# Patient Record
Sex: FEMALE | Race: WHITE | NOT HISPANIC OR LATINO | Employment: OTHER | URBAN - METROPOLITAN AREA
[De-identification: names, ages, dates, MRNs, and addresses within clinical notes are randomized per-mention and may not be internally consistent; named-entity substitution may affect disease eponyms.]

---

## 2018-02-09 ENCOUNTER — APPOINTMENT (EMERGENCY)
Dept: CT IMAGING | Facility: HOSPITAL | Age: 68
DRG: 191 | End: 2018-02-09
Payer: MEDICARE

## 2018-02-09 ENCOUNTER — HOSPITAL ENCOUNTER (INPATIENT)
Facility: HOSPITAL | Age: 68
LOS: 3 days | Discharge: HOME | DRG: 191 | End: 2018-02-12
Attending: EMERGENCY MEDICINE | Admitting: INTERNAL MEDICINE
Payer: MEDICARE

## 2018-02-09 ENCOUNTER — APPOINTMENT (EMERGENCY)
Dept: RADIOLOGY | Facility: HOSPITAL | Age: 68
DRG: 191 | End: 2018-02-09
Payer: MEDICARE

## 2018-02-09 DIAGNOSIS — J44.1 COPD EXACERBATION (HCC): ICD-10-CM

## 2018-02-09 DIAGNOSIS — R06.00 DYSPNEA: Primary | ICD-10-CM

## 2018-02-09 DIAGNOSIS — R07.9 CHEST PAIN: ICD-10-CM

## 2018-02-09 DIAGNOSIS — J44.1 ACUTE EXACERBATION OF CHRONIC OBSTRUCTIVE PULMONARY DISEASE (COPD) (HCC): ICD-10-CM

## 2018-02-09 DIAGNOSIS — R94.31 ABNORMAL EKG: ICD-10-CM

## 2018-02-09 DIAGNOSIS — J40 BRONCHITIS: ICD-10-CM

## 2018-02-09 DIAGNOSIS — I10 HYPERTENSION: ICD-10-CM

## 2018-02-09 PROBLEM — M54.16 CHRONIC RADICULAR PAIN OF LOWER BACK: Status: ACTIVE | Noted: 2018-02-09

## 2018-02-09 PROBLEM — I25.10 CORONARY ARTERY DISEASE: Status: ACTIVE | Noted: 2018-02-09

## 2018-02-09 PROBLEM — E66.01 MORBID OBESITY DUE TO EXCESS CALORIES (HCC): Status: ACTIVE | Noted: 2018-02-09

## 2018-02-09 PROBLEM — G89.29 CHRONIC RADICULAR PAIN OF LOWER BACK: Status: ACTIVE | Noted: 2018-02-09

## 2018-02-09 PROBLEM — E87.6 HYPOKALEMIA: Status: ACTIVE | Noted: 2018-02-09

## 2018-02-09 LAB
ALBUMIN SERPL BCP-MCNC: 3.7 G/DL (ref 3.5–5)
ALP SERPL-CCNC: 61 U/L (ref 46–116)
ALT SERPL W P-5'-P-CCNC: 41 U/L (ref 12–78)
ANION GAP SERPL CALCULATED.3IONS-SCNC: 8 MMOL/L (ref 4–13)
ARTERIAL PATENCY WRIST A: YES
AST SERPL W P-5'-P-CCNC: 31 U/L (ref 5–45)
BACTERIA UR QL AUTO: ABNORMAL /HPF
BACTERIA UR QL AUTO: ABNORMAL /HPF
BASE EXCESS BLDA CALC-SCNC: -0.1 MMOL/L
BASOPHILS # BLD AUTO: 0.05 THOUSANDS/ΜL (ref 0–0.1)
BASOPHILS NFR BLD AUTO: 1 % (ref 0–1)
BILIRUB DIRECT SERPL-MCNC: 0.22 MG/DL (ref 0–0.2)
BILIRUB SERPL-MCNC: 0.7 MG/DL (ref 0.2–1)
BILIRUB UR QL STRIP: NEGATIVE
BILIRUB UR QL STRIP: NEGATIVE
BUN SERPL-MCNC: 11 MG/DL (ref 5–25)
CALCIUM SERPL-MCNC: 8.9 MG/DL (ref 8.3–10.1)
CHLORIDE SERPL-SCNC: 103 MMOL/L (ref 100–108)
CLARITY UR: ABNORMAL
CLARITY UR: CLEAR
CO2 SERPL-SCNC: 33 MMOL/L (ref 21–32)
COLOR UR: YELLOW
COLOR UR: YELLOW
CREAT SERPL-MCNC: 0.96 MG/DL (ref 0.6–1.3)
DEPRECATED D DIMER PPP: 1015 NG/ML (FEU) (ref 0–424)
EOSINOPHIL # BLD AUTO: 0.24 THOUSAND/ΜL (ref 0–0.61)
EOSINOPHIL NFR BLD AUTO: 3 % (ref 0–6)
ERYTHROCYTE [DISTWIDTH] IN BLOOD BY AUTOMATED COUNT: 12.9 % (ref 11.6–15.1)
GFR SERPL CREATININE-BSD FRML MDRD: 62 ML/MIN/1.73SQ M
GLUCOSE SERPL-MCNC: 126 MG/DL (ref 65–140)
GLUCOSE UR STRIP-MCNC: ABNORMAL MG/DL
GLUCOSE UR STRIP-MCNC: NEGATIVE MG/DL
HCO3 BLDA-SCNC: 23.3 MMOL/L (ref 22–28)
HCT VFR BLD AUTO: 45.6 % (ref 34.8–46.1)
HGB BLD-MCNC: 14.8 G/DL (ref 11.5–15.4)
HGB UR QL STRIP.AUTO: ABNORMAL
HGB UR QL STRIP.AUTO: NEGATIVE
INR PPP: 0.91 (ref 0.86–1.16)
KETONES UR STRIP-MCNC: ABNORMAL MG/DL
KETONES UR STRIP-MCNC: NEGATIVE MG/DL
LEUKOCYTE ESTERASE UR QL STRIP: ABNORMAL
LEUKOCYTE ESTERASE UR QL STRIP: ABNORMAL
LIPASE SERPL-CCNC: 136 U/L (ref 73–393)
LYMPHOCYTES # BLD AUTO: 2.54 THOUSANDS/ΜL (ref 0.6–4.47)
LYMPHOCYTES NFR BLD AUTO: 35 % (ref 14–44)
MCH RBC QN AUTO: 28 PG (ref 26.8–34.3)
MCHC RBC AUTO-ENTMCNC: 32.5 G/DL (ref 31.4–37.4)
MCV RBC AUTO: 86 FL (ref 82–98)
MONOCYTES # BLD AUTO: 0.58 THOUSAND/ΜL (ref 0.17–1.22)
MONOCYTES NFR BLD AUTO: 8 % (ref 4–12)
NEUTROPHILS # BLD AUTO: 3.76 THOUSANDS/ΜL (ref 1.85–7.62)
NEUTS SEG NFR BLD AUTO: 52 % (ref 43–75)
NITRITE UR QL STRIP: NEGATIVE
NITRITE UR QL STRIP: NEGATIVE
NON VENT ROOM AIR: ABNORMAL %
NON-SQ EPI CELLS URNS QL MICRO: ABNORMAL /HPF
NON-SQ EPI CELLS URNS QL MICRO: ABNORMAL /HPF
NRBC BLD AUTO-RTO: 0 /100 WBCS
NT-PROBNP SERPL-MCNC: 185 PG/ML
O2 CT BLDA-SCNC: 19.2 ML/DL (ref 16–23)
OXYHGB MFR BLDA: 94 % (ref 94–97)
PCO2 BLDA: 34.5 MM HG (ref 36–44)
PH BLDA: 7.45 [PH] (ref 7.35–7.45)
PH UR STRIP.AUTO: 5.5 [PH] (ref 4.5–8)
PH UR STRIP.AUTO: 6 [PH] (ref 4.5–8)
PLATELET # BLD AUTO: 249 THOUSANDS/UL (ref 149–390)
PLATELET # BLD AUTO: 261 THOUSANDS/UL (ref 149–390)
PMV BLD AUTO: 8.9 FL (ref 8.9–12.7)
PMV BLD AUTO: 9.1 FL (ref 8.9–12.7)
PO2 BLDA: 75.2 MM HG (ref 75–129)
POTASSIUM SERPL-SCNC: 3.4 MMOL/L (ref 3.5–5.3)
PROT SERPL-MCNC: 7.5 G/DL (ref 6.4–8.2)
PROT UR STRIP-MCNC: NEGATIVE MG/DL
PROT UR STRIP-MCNC: NEGATIVE MG/DL
PROTHROMBIN TIME: 12.5 SECONDS (ref 12.1–14.4)
RBC # BLD AUTO: 5.28 MILLION/UL (ref 3.81–5.12)
RBC #/AREA URNS AUTO: ABNORMAL /HPF
RBC #/AREA URNS AUTO: ABNORMAL /HPF
SODIUM SERPL-SCNC: 144 MMOL/L (ref 136–145)
SP GR UR STRIP.AUTO: 1.01 (ref 1–1.03)
SP GR UR STRIP.AUTO: 1.01 (ref 1–1.03)
SPECIMEN SOURCE: ABNORMAL
TROPONIN I SERPL-MCNC: <0.02 NG/ML
TSH SERPL DL<=0.05 MIU/L-ACNC: 2.07 UIU/ML (ref 0.36–3.74)
UROBILINOGEN UR QL STRIP.AUTO: 0.2 E.U./DL
UROBILINOGEN UR QL STRIP.AUTO: 1 E.U./DL
WBC # BLD AUTO: 7.19 THOUSAND/UL (ref 4.31–10.16)
WBC #/AREA URNS AUTO: ABNORMAL /HPF
WBC #/AREA URNS AUTO: ABNORMAL /HPF

## 2018-02-09 PROCEDURE — 81001 URINALYSIS AUTO W/SCOPE: CPT | Performed by: INTERNAL MEDICINE

## 2018-02-09 PROCEDURE — 99285 EMERGENCY DEPT VISIT HI MDM: CPT

## 2018-02-09 PROCEDURE — 83880 ASSAY OF NATRIURETIC PEPTIDE: CPT | Performed by: EMERGENCY MEDICINE

## 2018-02-09 PROCEDURE — 83690 ASSAY OF LIPASE: CPT | Performed by: EMERGENCY MEDICINE

## 2018-02-09 PROCEDURE — 36415 COLL VENOUS BLD VENIPUNCTURE: CPT | Performed by: EMERGENCY MEDICINE

## 2018-02-09 PROCEDURE — 84484 ASSAY OF TROPONIN QUANT: CPT | Performed by: INTERNAL MEDICINE

## 2018-02-09 PROCEDURE — 87086 URINE CULTURE/COLONY COUNT: CPT | Performed by: EMERGENCY MEDICINE

## 2018-02-09 PROCEDURE — 36600 WITHDRAWAL OF ARTERIAL BLOOD: CPT

## 2018-02-09 PROCEDURE — 94640 AIRWAY INHALATION TREATMENT: CPT

## 2018-02-09 PROCEDURE — 84484 ASSAY OF TROPONIN QUANT: CPT | Performed by: EMERGENCY MEDICINE

## 2018-02-09 PROCEDURE — 85379 FIBRIN DEGRADATION QUANT: CPT | Performed by: EMERGENCY MEDICINE

## 2018-02-09 PROCEDURE — 80076 HEPATIC FUNCTION PANEL: CPT | Performed by: EMERGENCY MEDICINE

## 2018-02-09 PROCEDURE — 71275 CT ANGIOGRAPHY CHEST: CPT

## 2018-02-09 PROCEDURE — 85025 COMPLETE CBC W/AUTO DIFF WBC: CPT | Performed by: EMERGENCY MEDICINE

## 2018-02-09 PROCEDURE — 87186 SC STD MICRODIL/AGAR DIL: CPT | Performed by: EMERGENCY MEDICINE

## 2018-02-09 PROCEDURE — 84443 ASSAY THYROID STIM HORMONE: CPT | Performed by: INTERNAL MEDICINE

## 2018-02-09 PROCEDURE — 85049 AUTOMATED PLATELET COUNT: CPT | Performed by: INTERNAL MEDICINE

## 2018-02-09 PROCEDURE — 87798 DETECT AGENT NOS DNA AMP: CPT | Performed by: EMERGENCY MEDICINE

## 2018-02-09 PROCEDURE — 96374 THER/PROPH/DIAG INJ IV PUSH: CPT

## 2018-02-09 PROCEDURE — 82805 BLOOD GASES W/O2 SATURATION: CPT | Performed by: INTERNAL MEDICINE

## 2018-02-09 PROCEDURE — 93005 ELECTROCARDIOGRAM TRACING: CPT

## 2018-02-09 PROCEDURE — 87077 CULTURE AEROBIC IDENTIFY: CPT | Performed by: EMERGENCY MEDICINE

## 2018-02-09 PROCEDURE — 71046 X-RAY EXAM CHEST 2 VIEWS: CPT

## 2018-02-09 PROCEDURE — 80048 BASIC METABOLIC PNL TOTAL CA: CPT | Performed by: EMERGENCY MEDICINE

## 2018-02-09 PROCEDURE — 99223 1ST HOSP IP/OBS HIGH 75: CPT | Performed by: INTERNAL MEDICINE

## 2018-02-09 PROCEDURE — 85610 PROTHROMBIN TIME: CPT | Performed by: EMERGENCY MEDICINE

## 2018-02-09 PROCEDURE — 81001 URINALYSIS AUTO W/SCOPE: CPT | Performed by: EMERGENCY MEDICINE

## 2018-02-09 PROCEDURE — 94760 N-INVAS EAR/PLS OXIMETRY 1: CPT

## 2018-02-09 RX ORDER — FUROSEMIDE 20 MG/1
20 TABLET ORAL 2 TIMES DAILY
Status: DISCONTINUED | OUTPATIENT
Start: 2018-02-09 | End: 2018-02-12 | Stop reason: HOSPADM

## 2018-02-09 RX ORDER — MAGNESIUM HYDROXIDE/ALUMINUM HYDROXICE/SIMETHICONE 120; 1200; 1200 MG/30ML; MG/30ML; MG/30ML
30 SUSPENSION ORAL EVERY 6 HOURS PRN
Status: DISCONTINUED | OUTPATIENT
Start: 2018-02-09 | End: 2018-02-12 | Stop reason: HOSPADM

## 2018-02-09 RX ORDER — DOCUSATE SODIUM 100 MG/1
100 CAPSULE, LIQUID FILLED ORAL 2 TIMES DAILY
Status: DISCONTINUED | OUTPATIENT
Start: 2018-02-09 | End: 2018-02-11

## 2018-02-09 RX ORDER — LEVOTHYROXINE SODIUM 0.05 MG/1
50 TABLET ORAL DAILY
COMMUNITY
End: 2020-01-01 | Stop reason: HOSPADM

## 2018-02-09 RX ORDER — OXYCODONE HYDROCHLORIDE AND ACETAMINOPHEN 325; 5 MG/5ML; MG/5ML
SOLUTION ORAL EVERY 4 HOURS PRN
COMMUNITY
End: 2018-09-06 | Stop reason: HOSPADM

## 2018-02-09 RX ORDER — METHYLPREDNISOLONE SODIUM SUCCINATE 125 MG/2ML
60 INJECTION, POWDER, LYOPHILIZED, FOR SOLUTION INTRAMUSCULAR; INTRAVENOUS EVERY 6 HOURS SCHEDULED
Status: DISCONTINUED | OUTPATIENT
Start: 2018-02-09 | End: 2018-02-09

## 2018-02-09 RX ORDER — CYCLOBENZAPRINE HCL 5 MG
10 TABLET ORAL 3 TIMES DAILY PRN
COMMUNITY
End: 2018-09-21

## 2018-02-09 RX ORDER — CYCLOBENZAPRINE HCL 10 MG
10 TABLET ORAL 3 TIMES DAILY PRN
Status: DISCONTINUED | OUTPATIENT
Start: 2018-02-09 | End: 2018-02-12 | Stop reason: HOSPADM

## 2018-02-09 RX ORDER — HEPARIN SODIUM 5000 [USP'U]/ML
5000 INJECTION, SOLUTION INTRAVENOUS; SUBCUTANEOUS EVERY 8 HOURS SCHEDULED
Status: DISCONTINUED | OUTPATIENT
Start: 2018-02-09 | End: 2018-02-12 | Stop reason: HOSPADM

## 2018-02-09 RX ORDER — METHYLPREDNISOLONE SODIUM SUCCINATE 125 MG/2ML
125 INJECTION, POWDER, LYOPHILIZED, FOR SOLUTION INTRAMUSCULAR; INTRAVENOUS ONCE
Status: COMPLETED | OUTPATIENT
Start: 2018-02-09 | End: 2018-02-09

## 2018-02-09 RX ORDER — IBUPROFEN 600 MG/1
TABLET ORAL EVERY 6 HOURS PRN
COMMUNITY
End: 2018-09-26 | Stop reason: HOSPADM

## 2018-02-09 RX ORDER — ALBUTEROL SULFATE 90 UG/1
2 AEROSOL, METERED RESPIRATORY (INHALATION) EVERY 6 HOURS PRN
Status: DISCONTINUED | OUTPATIENT
Start: 2018-02-09 | End: 2018-02-10

## 2018-02-09 RX ORDER — ALBUTEROL SULFATE 2.5 MG/3ML
5 SOLUTION RESPIRATORY (INHALATION) ONCE
Status: COMPLETED | OUTPATIENT
Start: 2018-02-09 | End: 2018-02-09

## 2018-02-09 RX ORDER — ASPIRIN 81 MG/1
81 TABLET, CHEWABLE ORAL DAILY
COMMUNITY
End: 2020-01-01 | Stop reason: HOSPADM

## 2018-02-09 RX ORDER — PANTOPRAZOLE SODIUM 40 MG/1
40 TABLET, DELAYED RELEASE ORAL DAILY
COMMUNITY
End: 2020-01-01 | Stop reason: HOSPADM

## 2018-02-09 RX ORDER — ALBUTEROL SULFATE 90 UG/1
2 AEROSOL, METERED RESPIRATORY (INHALATION) 2 TIMES DAILY
COMMUNITY
End: 2020-01-01 | Stop reason: HOSPADM

## 2018-02-09 RX ORDER — PANTOPRAZOLE SODIUM 40 MG/1
40 TABLET, DELAYED RELEASE ORAL
Status: DISCONTINUED | OUTPATIENT
Start: 2018-02-10 | End: 2018-02-12 | Stop reason: HOSPADM

## 2018-02-09 RX ORDER — METHYLPREDNISOLONE SODIUM SUCCINATE 40 MG/ML
40 INJECTION, POWDER, LYOPHILIZED, FOR SOLUTION INTRAMUSCULAR; INTRAVENOUS EVERY 8 HOURS
Status: DISCONTINUED | OUTPATIENT
Start: 2018-02-09 | End: 2018-02-10

## 2018-02-09 RX ORDER — OXYCODONE HCL 5 MG/5 ML
5 SOLUTION, ORAL ORAL EVERY 4 HOURS PRN
Status: DISCONTINUED | OUTPATIENT
Start: 2018-02-09 | End: 2018-02-12 | Stop reason: HOSPADM

## 2018-02-09 RX ORDER — FUROSEMIDE 20 MG/1
20 TABLET ORAL 2 TIMES DAILY
COMMUNITY
End: 2018-03-05 | Stop reason: SDUPTHER

## 2018-02-09 RX ORDER — ALBUTEROL SULFATE 2.5 MG/3ML
2.5 SOLUTION RESPIRATORY (INHALATION)
Status: DISCONTINUED | OUTPATIENT
Start: 2018-02-09 | End: 2018-02-10

## 2018-02-09 RX ORDER — SODIUM CHLORIDE FOR INHALATION 0.9 %
3 VIAL, NEBULIZER (ML) INHALATION EVERY 4 HOURS PRN
Status: DISCONTINUED | OUTPATIENT
Start: 2018-02-09 | End: 2018-02-10

## 2018-02-09 RX ORDER — ASPIRIN 81 MG/1
162 TABLET, CHEWABLE ORAL DAILY
Status: DISCONTINUED | OUTPATIENT
Start: 2018-02-09 | End: 2018-02-09

## 2018-02-09 RX ORDER — ASPIRIN 81 MG/1
81 TABLET, CHEWABLE ORAL DAILY
Status: DISCONTINUED | OUTPATIENT
Start: 2018-02-10 | End: 2018-02-12 | Stop reason: HOSPADM

## 2018-02-09 RX ORDER — ONDANSETRON 2 MG/ML
4 INJECTION INTRAMUSCULAR; INTRAVENOUS EVERY 6 HOURS PRN
Status: DISCONTINUED | OUTPATIENT
Start: 2018-02-09 | End: 2018-02-12 | Stop reason: HOSPADM

## 2018-02-09 RX ORDER — OXYCODONE HYDROCHLORIDE AND ACETAMINOPHEN 325; 5 MG/5ML; MG/5ML
5 SOLUTION ORAL EVERY 4 HOURS PRN
Status: DISCONTINUED | OUTPATIENT
Start: 2018-02-09 | End: 2018-02-09

## 2018-02-09 RX ORDER — LEVOTHYROXINE SODIUM 0.05 MG/1
50 TABLET ORAL
Status: DISCONTINUED | OUTPATIENT
Start: 2018-02-10 | End: 2018-02-12 | Stop reason: HOSPADM

## 2018-02-09 RX ADMIN — IOHEXOL 85 ML: 350 INJECTION, SOLUTION INTRAVENOUS at 15:33

## 2018-02-09 RX ADMIN — HEPARIN SODIUM 5000 UNITS: 5000 INJECTION, SOLUTION INTRAVENOUS; SUBCUTANEOUS at 21:04

## 2018-02-09 RX ADMIN — IPRATROPIUM BROMIDE 0.5 MG: 0.5 SOLUTION RESPIRATORY (INHALATION) at 16:12

## 2018-02-09 RX ADMIN — ALBUTEROL SULFATE 2.5 MG: 2.5 SOLUTION RESPIRATORY (INHALATION) at 21:22

## 2018-02-09 RX ADMIN — METHYLPREDNISOLONE SODIUM SUCCINATE 40 MG: 125 INJECTION, POWDER, FOR SOLUTION INTRAMUSCULAR; INTRAVENOUS at 20:49

## 2018-02-09 RX ADMIN — ASPIRIN 81 MG 162 MG: 81 TABLET ORAL at 20:49

## 2018-02-09 RX ADMIN — FLUTICASONE PROPIONATE AND SALMETEROL 1 PUFF: 50; 100 POWDER RESPIRATORY (INHALATION) at 20:51

## 2018-02-09 RX ADMIN — METHYLPREDNISOLONE SODIUM SUCCINATE 125 MG: 125 INJECTION, POWDER, FOR SOLUTION INTRAMUSCULAR; INTRAVENOUS at 16:16

## 2018-02-09 RX ADMIN — METOPROLOL TARTRATE 25 MG: 25 TABLET, FILM COATED ORAL at 20:53

## 2018-02-09 RX ADMIN — ALBUTEROL SULFATE 5 MG: 2.5 SOLUTION RESPIRATORY (INHALATION) at 16:12

## 2018-02-09 RX ADMIN — FUROSEMIDE 20 MG: 20 TABLET ORAL at 20:54

## 2018-02-09 RX ADMIN — METHYLPREDNISOLONE SODIUM SUCCINATE 60 MG: 125 INJECTION, POWDER, FOR SOLUTION INTRAMUSCULAR; INTRAVENOUS at 20:50

## 2018-02-09 NOTE — H&P
History and Physical - [facility] Internal Medicine    Patient Information: Pavan Vargas 74 y o  female MRN: [id number]  Unit/Bed#: ED 09 Encounter: [id number]  Admitting Physician: Farhan Robles MD  PCP: No primary care provider on file  Date of Admission:  02/09/18    Assessment/Plan:    Hospital Problem List:     Principal Problem:    Acute exacerbation of chronic obstructive pulmonary disease (COPD) ([facility] [address] )  Active Problems:    Chest pain    Accelerated hypertension    Morbid obesity due to excess calories (HCC)    Coronary artery disease    Hypokalemia    Chronic radicular pain of lower back      Plan for the Primary Problem(s):  #1ASSESSMENT:  Differential diagnoses will include  - SOB due to    *Asthma COPD acute exacerbation caused by URTI, allergen exposure, medication nonocompliance causing respiratory distress was a hypoxemia  *Bronchitis  *Pneumonia - no infiltrate on CXR    PLAN:  -DuoNeb's q 4 hr and PRN SOB  - Solu-medrol 40 mg IV q 6 hr  - O2 to keep SpO2 higher than 92% (SpO higher than 95% if CAD)only if needed  - CBC, BMP in AM  - Sputum Gram stain, C+S  - Robitussin 10 cc PO q 4 hr  - Tylenol 650 mg PO q 4-6 hr PRN pain/fever  - Heparin 5000 U SQ BID  - Home meds - check the list    - Azithromycine 500mg po daily  -May Consider CT chest and ABG if no improvement  -We'll consider obtaining pulmonology consultation if no improvement within a reasonable time    #2CHEST PAIN/ TIGHTNESS:  Given patients chest pain symptoms and risk factors, we will initiate further cardiac workup and GI prophylaxis  Also EKG does show ST depression in inferior and lateral leads and first set of cardiac enzymes were not alarming and the history does not really fit this kind of pain with such a prolonged course  These findings reviewed and discussed with the patient/family  It is certainly reasonable to monitor patient in Telemetry setting over next 12-24 hours to rule out ACS      We will draw for two other sets of cardiac enzymes  Repeat EKG if needed  For now, will give antiplatelet therapy with aspirin 325 by mouth daily  For pain control, well initiate therapy with Acetaminophen 650 mg PO q6hr pain, with consideration for narcotics if pain persists or worsens  Heartburn prophylaxis with Nexium 40mg PO qhs and sucralfate 1g or Maalox PO q6hrs PRN qd  Well consider giving supplemental oxygen if needed  We'll consider obtaining stress tests and cardiology consultation if needed  #3 accelerated hypertension  History of HTN:       We will continue patient home medications  Although initially his blood pressure was higher in emergency department, it later stabilized  Well also initiate IV hydralazine and IV metoprolol for SBP greater than 160 mmHg  We will advise patient to follow-up with next PCP in regards to further better management of ongoing HTN  VTE Prophylaxis: Heparin  / sequential compression device   Code Status: full code  POLST: There is no POLST form on file for this patient (pre-hospital)    Anticipated Length of Stay:  Patient will be admitted on an Inpatient basis with an anticipated length of stay of  Greater than 2 midnights  Justification for Hospital Stay: COPD exacerbation    Total Time for Visit, including Counseling / Coordination of Care: 45 minutes  Greater than 50% of this total time spent on direct patient counseling and coordination of care  Chief Complaint:   "shortness of breath is out of shape ×12 days, chest pain ×1 day    History of Present Illness: This is a 51-year-old female was past medical history of hypertension, morbid obesity, history of advanced COPD, 50-pack-year history of tobacco abuse reports over the last 2 weeks, shortness of breath, she reports at times she feels like she can't catch her breath  Apparently seen by her doctor felt to have possible urinary tract infection but not treated with medication at that time    Patient reports the symptoms seem to have resolved but now she complains of some shortness of breath cough and congestion    reports positive sputum production, does report some chest tightness with exertion only but no chest pain at this time  She reports no chills no nausea vomiting does report decreased appetite no other changes in bowel or urinary habits  Review of Systems:    Review of Systems  As per HPI  Past Medical and Surgical History:     Past Medical History:   Diagnosis Date    Cardiac disease     Rheumatic fever        Past Surgical History:   Procedure Laterality Date    CARDIAC SURGERY      valve replacement        Meds/Allergies:    Prior to Admission medications    Medication Sig Start Date End Date Taking?  Authorizing Provider   albuterol (PROVENTIL HFA,VENTOLIN HFA) 90 mcg/act inhaler Inhale 2 puffs every 6 (six) hours as needed for wheezing   Yes Historical Provider, MD   aspirin 81 mg chewable tablet Chew 81 mg daily   Yes Historical Provider, MD   co-enzyme Q-10 30 MG capsule Take 400 mg by mouth 3 (three) times a day   Yes Historical Provider, MD   cyclobenzaprine (FLEXERIL) 5 mg tablet Take 10 mg by mouth 3 (three) times a day as needed for muscle spasms   Yes Historical Provider, MD   furosemide (LASIX) 20 mg tablet Take 20 mg by mouth 2 (two) times a day   Yes Historical Provider, MD   ibuprofen (MOTRIN) 600 mg tablet Take by mouth every 6 (six) hours as needed for mild pain   Yes Historical Provider, MD   levothyroxine 50 mcg tablet Take 50 mcg by mouth daily   Yes Historical Provider, MD   metoprolol tartrate (LOPRESSOR) 25 mg tablet Take 25 mg by mouth every 12 (twelve) hours   Yes Historical Provider, MD   oxyCODONE-acetaminophen (ROXICET) 5-325 mg/5 mL solution Take by mouth every 4 (four) hours as needed for moderate pain   Yes Historical Provider, MD   pantoprazole (PROTONIX) 40 mg tablet Take 40 mg by mouth daily   Yes Historical Provider, MD     I have reviewed home medications with patient personally  Allergies: Allergies   Allergen Reactions    Iohexol     Statins        Social History:     Marital Status:      History   Alcohol Use No     History   Smoking Status    Former Smoker   Smokeless Tobacco    Never Used     History   Drug Use No       Family History:    non-contributory    Physical Exam:     Vitals:   Blood Pressure: (!) 190/87 (02/09/18 1302)  Pulse: 104 (02/09/18 1302)  Temperature: 97 8 °F (36 6 °C) (02/09/18 1302)  Temp Source: Oral (02/09/18 1302)  Respirations: 18 (02/09/18 1302)  Height: 5' 2" (157 5 cm) (02/09/18 1302)  Weight - Scale: 73 9 kg (163 lb) (02/09/18 1302)  SpO2: 98 % (02/09/18 1302)    Physical Exam    GENERAL APPEARANCE: WD/WN in NAD pleasant, obese daughter by bedside  SKIN: no rash  HEENT: NC/AT, PERRLA (B), moist MM, no epistaxis  NECK: Supple, no JVD    LUNGS:mild expiratory wheezing with exertion, poor air movement throughout, severely prolonged expiratory phase,   no use of accessory muscles  HEART:          S1S 2, RRR  , PMI is not displaced  ABDOMEN: Soft, nontender, nondistended, +BS  Rectal exam:  EXTREMITIES: no edema   PERIPHERAL VASCULAR: palpable pulses   NEURO:  AAO x 3, CN 2-12: non focal  MUSCLE STRENGHT: 5/5 (B), SENSATION: nonfocal  DTR: ++, CEREBELLAR: non focal    Lab Results: I have personally reviewed pertinent reports          Results from last 7 days  Lab Units 02/09/18  1352   WBC Thousand/uL 7 19   HEMOGLOBIN g/dL 14 8   HEMATOCRIT % 45 6   PLATELETS Thousands/uL 249   NEUTROS PCT % 52   LYMPHS PCT % 35   MONOS PCT % 8   EOS PCT % 3       Results from last 7 days  Lab Units 02/09/18  1352   SODIUM mmol/L 144   POTASSIUM mmol/L 3 4*   CHLORIDE mmol/L 103   CO2 mmol/L 33*   BUN mg/dL 11   CREATININE mg/dL 0 96   CALCIUM mg/dL 8 9   TOTAL PROTEIN g/dL 7 5   BILIRUBIN TOTAL mg/dL 0 70   ALK PHOS U/L 61   ALT U/L 41   AST U/L 31   GLUCOSE RANDOM mg/dL 126       Results from last 7 days  Lab Units 02/09/18  1352   INR  0 91 Imaging: I have personally reviewed pertinent reports  Xr Chest Pa & Lateral    Result Date: 2/9/2018  Narrative: CHEST - DUAL ENERGY INDICATION:  Cough, shortness of breath and chest pains COMPARISON:  None VIEWS:  PA (including soft tissue/bone algorithms) and lateral projections IMAGES:  4 FINDINGS: Cardiomediastinal silhouette appears unremarkable  A prosthetic aortic valve is present  No evidence of heart failure  The lungs are clear  No pneumothorax or pleural effusion  Surgical clips in the right paratracheal region  Visualized osseous structures appear within normal limits for the patient's age  Impression: No active pulmonary disease  Workstation performed: [id number]     Cta Ed Chest Pe Study    Result Date: 2/9/2018  Narrative: CTA - CHEST WITH IV CONTRAST - PULMONARY ANGIOGRAM INDICATION: Chest pain  Shortness of breath  COMPARISON: None  TECHNIQUE: CTA examination of the chest was performed using angiographic technique according to a protocol specifically tailored to evaluate for pulmonary embolism  Reformatted images were created in axial, sagittal, and coronal planes  In addition, coronal 3D MIP postprocessing was performed on the acquisition scanner  Radiation dose length product (DLP) for this visit:  542 mGy-cm   This examination, like all CT scans performed in the [facility], was performed utilizing techniques to minimize radiation dose exposure, including the use of iterative reconstruction and automated exposure control  IV Contrast:  85 mL of iohexol (OMNIPAQUE)  FINDINGS: Diffuse atherosclerotic plaque throughout the aorta and its branches  PULMONARY ARTERIAL TREE:  No pulmonary embolus is seen  LUNGS:  Diffuse lung emphysematous changes  8 mm subpleural nodule right lower lobe series 3 image 45  Diffuse bronchial wall thickening could relate to bronchitis  PLEURA:  Unremarkable   HEART/AORTA:  Diffuse atherosclerotic plaque throughout the aorta and its branches  There is an aortic valve prosthetic device  4 2 x 4 cm ascending aortic aneurysm  MEDIASTINUM AND MARIELA:  Unremarkable  CHEST WALL AND LOWER NECK: Normal  VISUALIZED STRUCTURES IN THE UPPER ABDOMEN:  Hepatic steatosis  OSSEOUS STRUCTURES:  No acute fracture or destructive osseous lesion  Impression: 1  No evidence of pulmonary metabolism  2   4 2 x 4 cm ascending aortic aneurysm  3   Diffuse emphysematous changes  4   8 mm subpleural nodule right lower lobe Based on current Fleischner Society 2017 Guidelines on incidental pulmonary nodule, followup non-contrast CT is recommended at 6 months from the initial examination and, if stable at that time, an additional followup is recommended for 18-24 months from the initial examination  5   Diffuse bronchial wall thickening could relate to bronchitis  Workstation performed: [id number]       EKG, Pathology, and Other Studies Reviewed on Admission:   · EKG: ST depression in inferior lateral leads, nonspecific T-wave changes no ST elevation appreciated    Allscripts / Epic Records Reviewed: No     ** Please Note: This note has been constructed using a voice recognition system   **

## 2018-02-09 NOTE — ED PROVIDER NOTES
History  Chief Complaint   Patient presents with    Shortness of Breath     Pt with shortness of breath for about 2 weeks, and also a UTI      HPI patient is a 61-year-old female reports over the last 2 weeks, shortness of breath, she reports at times she feels like she can't catch her breath  Apparently seen by her doctor felt to have possible urinary tract infection but not treated with medication at that time  Patient reports the symptoms seem to have resolved but now she complains of some shortness of breath cough and congestion  Patient reports shortness of breath with exertion  She denies any previous lung disease  She denies any recent cough or congestion  She denies any fever  Past medical history of valve replacement, no history of asthma  Family history noncontributory  Social history, nonsmoker no history of drug abuse,    Prior to Admission Medications   Prescriptions Last Dose Informant Patient Reported? Taking?    albuterol (PROVENTIL HFA,VENTOLIN HFA) 90 mcg/act inhaler 2/9/2018 at Unknown time  Yes Yes   Sig: Inhale 2 puffs every 6 (six) hours as needed for wheezing   aspirin 81 mg chewable tablet 2/9/2018 at Unknown time  Yes Yes   Sig: Chew 81 mg daily   co-enzyme Q-10 30 MG capsule 2/9/2018 at Unknown time  Yes Yes   Sig: Take 400 mg by mouth 3 (three) times a day   cyclobenzaprine (FLEXERIL) 5 mg tablet 2/9/2018 at Unknown time  Yes Yes   Sig: Take 10 mg by mouth 3 (three) times a day as needed for muscle spasms   furosemide (LASIX) 20 mg tablet 2/9/2018 at Unknown time  Yes Yes   Sig: Take 20 mg by mouth 2 (two) times a day   ibuprofen (MOTRIN) 600 mg tablet 2/9/2018 at Unknown time  Yes Yes   Sig: Take by mouth every 6 (six) hours as needed for mild pain   levothyroxine 50 mcg tablet 2/9/2018 at Unknown time  Yes Yes   Sig: Take 50 mcg by mouth daily   metoprolol tartrate (LOPRESSOR) 25 mg tablet 2/9/2018 at Unknown time  Yes Yes   Sig: Take 25 mg by mouth every 12 (twelve) hours oxyCODONE-acetaminophen (ROXICET) 5-325 mg/5 mL solution 2/9/2018 at Unknown time  Yes Yes   Sig: Take by mouth every 4 (four) hours as needed for moderate pain   pantoprazole (PROTONIX) 40 mg tablet 2/9/2018 at Unknown time  Yes Yes   Sig: Take 40 mg by mouth daily      Facility-Administered Medications: None       Past Medical History:   Diagnosis Date    Cardiac disease     Rheumatic fever        Past Surgical History:   Procedure Laterality Date    CARDIAC SURGERY      valve replacement        History reviewed  No pertinent family history  I have reviewed and agree with the history as documented  Social History   Substance Use Topics    Smoking status: Former Smoker    Smokeless tobacco: Never Used    Alcohol use No        Review of Systems   Constitutional: Negative for diaphoresis, fatigue and fever  HENT: Negative for congestion, ear pain, nosebleeds and sore throat  Eyes: Negative for photophobia, pain, discharge and visual disturbance  Respiratory: Positive for chest tightness and shortness of breath  Negative for cough, choking and wheezing  Cardiovascular: Negative for chest pain and palpitations  Gastrointestinal: Negative for abdominal distention, abdominal pain, diarrhea and vomiting  Genitourinary: Negative for dysuria, flank pain and frequency  Musculoskeletal: Negative for back pain, gait problem and joint swelling  Skin: Negative for color change and rash  Neurological: Negative for dizziness, syncope and headaches  Psychiatric/Behavioral: Negative for behavioral problems and confusion  The patient is not nervous/anxious  All other systems reviewed and are negative        Physical Exam  ED Triage Vitals [02/09/18 1302]   Temperature Pulse Respirations Blood Pressure SpO2   97 8 °F (36 6 °C) 104 18 (!) 190/87 98 %      Temp Source Heart Rate Source Patient Position - Orthostatic VS BP Location FiO2 (%)   Oral Monitor Sitting Left arm --      Pain Score       No Pain           Orthostatic Vital Signs  Vitals:    02/09/18 1302 02/09/18 2017   BP: (!) 190/87 159/78   Pulse: 104 98   Patient Position - Orthostatic VS: Sitting Sitting       Physical Exam   Constitutional: She is oriented to person, place, and time  She appears well-developed and well-nourished  HENT:   Head: Normocephalic  Right Ear: External ear normal    Left Ear: External ear normal    Nose: Nose normal    Mouth/Throat: Oropharynx is clear and moist    Eyes: EOM and lids are normal  Pupils are equal, round, and reactive to light  Neck: Normal range of motion  Neck supple  Cardiovascular: Normal rate, regular rhythm, normal heart sounds and intact distal pulses  Pulmonary/Chest: Effort normal  No respiratory distress  Bilateral decreased breath sounds, minimal faint wheeze with expiration  Abdominal: Soft  She exhibits no mass  There is no tenderness  There is no guarding  Musculoskeletal: Normal range of motion  She exhibits no deformity  Neurological: She is alert and oriented to person, place, and time  Skin: Skin is warm and dry  Psychiatric: She has a normal mood and affect  Nursing note and vitals reviewed     Pulse oximetry 93% on room air adequate oxygenation, there is no hypoxia    ED Medications  Medications   furosemide (LASIX) tablet 20 mg (20 mg Oral Given 2/9/18 2054)   metoprolol tartrate (LOPRESSOR) tablet 25 mg (25 mg Oral Given 2/9/18 2053)   levothyroxine tablet 50 mcg (not administered)   pantoprazole (PROTONIX) EC tablet 40 mg (not administered)   aspirin chewable tablet 81 mg (not administered)   albuterol (PROVENTIL HFA,VENTOLIN HFA) inhaler 2 puff (not administered)   cyclobenzaprine (FLEXERIL) tablet 10 mg (not administered)   docusate sodium (COLACE) capsule 100 mg (100 mg Oral Not Given 2/9/18 2016)   ondansetron (ZOFRAN) injection 4 mg (not administered)   aluminum-magnesium hydroxide-simethicone (MYLANTA) 200-200-20 mg/5 mL oral suspension 30 mL (not administered)   albuterol inhalation solution 2 5 mg (2 5 mg Nebulization Given 2/9/18 2122)     And   sodium chloride 0 9 % inhalation solution 3 mL (not administered)   methylPREDNISolone sodium succinate (Solu-MEDROL) injection 40 mg (40 mg Intravenous Given 2/9/18 2049)   heparin (porcine) subcutaneous injection 5,000 Units (5,000 Units Subcutaneous Given 2/9/18 2104)   fluticasone-salmeterol (ADVAIR) 100-50 mcg/dose inhaler 1 puff (1 puff Inhalation Given 2/9/18 2051)   oxyCODONE (ROXICODONE) oral solution 5 mg (not administered)   iohexol (OMNIPAQUE) 350 MG/ML injection (MULTI-DOSE) 85 mL (85 mL Intravenous Given 2/9/18 1533)   albuterol inhalation solution 5 mg (5 mg Nebulization Given 2/9/18 1612)   ipratropium (ATROVENT) 0 02 % inhalation solution 0 5 mg (0 5 mg Nebulization Given 2/9/18 1612)   methylPREDNISolone sodium succinate (Solu-MEDROL) injection 125 mg (125 mg Intravenous Given 2/9/18 1616)       Diagnostic Studies  Results Reviewed     Procedure Component Value Units Date/Time    Troponin I [[id number]]     Lab Status:  No result Specimen:  Blood     Troponin I [[id number]]  (Normal) Collected:  02/09/18 2034    Lab Status:  Final result Specimen:  Blood from Arm, Left Updated:  02/09/18 2103     Troponin I <0 02 ng/mL     Narrative:         [employer] Chemistry analyzer 99% cutoff is > 0 04 ng/mL in network labs    o cTnI 99% cutoff is useful only when applied to patients in the clinical setting of myocardial ischemia  o cTnI 99% cutoff should be interpreted in the context of clinical history, ECG findings and possibly cardiac imaging to establish correct diagnosis  o cTnI 99% cutoff may be suggestive but clearly not indicative of a coronary event without the clinical setting of myocardial ischemia      Troponin I [[id number]]  (Normal) Collected:  02/09/18 1743    Lab Status:  Final result Specimen:  Blood from Arm, Right Updated:  02/09/18 1811     Troponin I <0 02 ng/mL     Narrative:         Rapid Pathogen Screening Chemistry analyzer 99% cutoff is > 0 04 ng/mL in network labs    o cTnI 99% cutoff is useful only when applied to patients in the clinical setting of myocardial ischemia  o cTnI 99% cutoff should be interpreted in the context of clinical history, ECG findings and possibly cardiac imaging to establish correct diagnosis  o cTnI 99% cutoff may be suggestive but clearly not indicative of a coronary event without the clinical setting of myocardial ischemia  Troponin I [[id number]]     Lab Status:  No result Specimen:  Blood     BNP [[id number]]  (Abnormal) Collected:  02/09/18 1352    Lab Status:  Final result Specimen:  Blood from Arm, Right Updated:  02/09/18 1558     NT-proBNP 185 (H) pg/mL     Urine Microscopic [[id number]]  (Abnormal) Collected:  02/09/18 1417    Lab Status:  Final result Specimen:  Urine from Urine, Clean Catch Updated:  02/09/18 1431     RBC, UA 0-1 (A) /hpf      WBC, UA 20-30 (A) /hpf      Epithelial Cells Occasional /hpf      Bacteria, UA Occasional /hpf     Urine culture [[id number]] Collected:  02/09/18 1417    Lab Status: In process Specimen:  Urine from Urine, Clean Catch Updated:  02/09/18 1431    UA w Reflex to Microscopic w Reflex to Culture [[id number]]  (Abnormal) Collected:  02/09/18 1417    Lab Status:  Final result Specimen:  Urine from Urine, Clean Catch Updated:  02/09/18 1424     Color, UA Yellow     Clarity, UA Slightly Cloudy     Specific Gravity, UA 1 015     pH, UA 6 0     Leukocytes, UA Moderate (A)     Nitrite, UA Negative     Protein, UA Negative mg/dl      Glucose, UA Negative mg/dl      Ketones, UA Negative mg/dl      Urobilinogen, UA 0 2 E U /dl      Bilirubin, UA Negative     Blood, UA Negative    Influenza A/B and RSV by PCR (indicated for patients >2 mo of age) [[id number]] Collected:  02/09/18 1417    Lab Status:   In process Specimen:  Nasopharyngeal from Nasopharyngeal Swab Updated:  02/09/18 1421    D-dimer, quantitative [[id number]]  (Abnormal) Collected:  02/09/18 1355 Lab Status:  Final result Specimen:  Blood from Arm, Right Updated:  02/09/18 1417     D-Dimer, Quant 1,015 (H) ng/ml (FEU)     Troponin I [[id number]]  (Normal) Collected:  02/09/18 1352    Lab Status:  Final result Specimen:  Blood from Arm, Right Updated:  02/09/18 1416     Troponin I <0 02 ng/mL     Narrative:         [employer] Chemistry analyzer 99% cutoff is > 0 04 ng/mL in network labs    o cTnI 99% cutoff is useful only when applied to patients in the clinical setting of myocardial ischemia  o cTnI 99% cutoff should be interpreted in the context of clinical history, ECG findings and possibly cardiac imaging to establish correct diagnosis  o cTnI 99% cutoff may be suggestive but clearly not indicative of a coronary event without the clinical setting of myocardial ischemia  Hepatic function panel [[id number]]  (Abnormal) Collected:  02/09/18 1352    Lab Status:  Final result Specimen:  Blood from Arm, Right Updated:  02/09/18 1414     Total Bilirubin 0 70 mg/dL      Bilirubin, Direct 0 22 (H) mg/dL      Alkaline Phosphatase 61 U/L      AST 31 U/L      ALT 41 U/L      Total Protein 7 5 g/dL      Albumin 3 7 g/dL     Basic metabolic panel [[id number]]  (Abnormal) Collected:  02/09/18 1352    Lab Status:  Final result Specimen:  Blood from Arm, Right Updated:  02/09/18 1414     Sodium 144 mmol/L      Potassium 3 4 (L) mmol/L      Chloride 103 mmol/L      CO2 33 (H) mmol/L      Anion Gap 8 mmol/L      BUN 11 mg/dL      Creatinine 0 96 mg/dL      Glucose 126 mg/dL      Calcium 8 9 mg/dL      eGFR 62 ml/min/1 73sq m     Narrative:         National Kidney Disease Education Program recommendations are as follows:  GFR calculation is accurate only with a steady state creatinine  Chronic Kidney disease less than 60 ml/min/1 73 sq  meters  Kidney failure less than 15 ml/min/1 73 sq  meters      Lipase [[id number]]  (Normal) Collected:  02/09/18 1352    Lab Status:  Final result Specimen:  Blood from Arm, Right Updated: 02/09/18 1414     Lipase 136 u/L     Protime-INR [[id number]]  (Normal) Collected:  02/09/18 1352    Lab Status:  Final result Specimen:  Blood from Arm, Right Updated:  02/09/18 1413     Protime 12 5 seconds      INR 0 91    CBC and differential [[id number]]  (Abnormal) Collected:  02/09/18 1352    Lab Status:  Final result Specimen:  Blood from Arm, Right Updated:  02/09/18 1358     WBC 7 19 Thousand/uL      RBC 5 28 (H) Million/uL      Hemoglobin 14 8 g/dL      Hematocrit 45 6 %      MCV 86 fL      MCH 28 0 pg      MCHC 32 5 g/dL      RDW 12 9 %      MPV 8 9 fL      Platelets 984 Thousands/uL      nRBC 0 /100 WBCs      Neutrophils Relative 52 %      Lymphocytes Relative 35 %      Monocytes Relative 8 %      Eosinophils Relative 3 %      Basophils Relative 1 %      Neutrophils Absolute 3 76 Thousands/µL      Lymphocytes Absolute 2 54 Thousands/µL      Monocytes Absolute 0 58 Thousand/µL      Eosinophils Absolute 0 24 Thousand/µL      Basophils Absolute 0 05 Thousands/µL                  CTA ED chest PE study   Final Result by Darlyn White MD (02/09 1552)         1  No evidence of pulmonary metabolism  2   4 2 x 4 cm ascending aortic aneurysm  3   Diffuse emphysematous changes  4   8 mm subpleural nodule right lower lobe Based on current Fleischner Society 2017 Guidelines on incidental pulmonary nodule, followup non-contrast CT is recommended at 6 months from the initial examination and, if stable at that time, an additional    followup is recommended for 18-24 months from the initial examination  5   Diffuse bronchial wall thickening could relate to bronchitis  Workstation performed: [id number]         XR chest pa & lateral   Final Result by Diego Lemus MD (02/09 1446)      No active pulmonary disease        Workstation performed: [id number]                    Procedures  ECG 12 Lead Documentation  Date/Time: 2/9/2018 4:20 PM  Performed by: Lencho Bennett by: Kenya Henderson Indications / Diagnosis:  Shortness of breath  ECG reviewed by me, the ED Provider: yes    Patient location:  ED  Previous ECG:     Previous ECG:  Unavailable  Interpretation:     Interpretation: non-specific    Rate:     ECG rate:  Eighty-two    ECG rate assessment: normal    Rhythm:     Rhythm: sinus rhythm    Comments:      Nonspecific ST-T wave changes, there inferior EKG lead changes       initial chest x-ray showed no infiltrates, normal mediastinum, no pneumothorax, normal bony architecture, interpreted by me I was initial   Phone Contacts  ED Phone Contact    ED Course  ED Course               repeat EKG showed persistent inferior wall depressions, no acute elevations  diagnostic testing showed normal cardiac troponin no sign of cardiac ischemia, patient's urinalysis did show some white cells  D-dimer was elevated patient complained of shortness of breath, therefore CT scan was required  CT for pulmonary embolism showed no acute pulmonary embolism, there was bronchitis on CT, patient's white count was normal at 7 1, hemoglobin was normal at 14 8 no sign of anemia  MDM medical decision making 72-year-old female presents with 2 weeks of dyspnea, she reports shortness of breath, she reports an uncomfortable chest tightness and shortness of breath with minimal exertion, exam showed decreased breath sounds bilaterally but no significant bronchospasm, patient did not improve with bronchodilators  EKG showed inferior ST-T wave changes consistent with ischemia, initial cardiac troponin was negative, CT was done because the patient had a positive D-dimer, no sign of pulmonary embolism with the patient had bronchitis on CT  At a minimum the patient has bronchitis with shortness of breath not responsive to bronchodilators  Patient may have underlying cardiac ischemia  Woman present with atypical symptoms for cardiac ischemia and the patient has primarily dyspnea with exertion  Due to abnormal EKG, significant inferior ST depressions I believe the patient should be observed for serial troponins she may require stress testing or cardiology consult  Discussed with the hospitalist service they agree to admission    I do not feel comfortable discharging the patient as her EKG was significantly abnormal   CritCare Time    Disposition  Final diagnoses:   Dyspnea   Abnormal EKG   Bronchitis   COPD exacerbation ([facility] [address] )     Time reflects when diagnosis was documented in both MDM as applicable and the Disposition within this note     Time User Action Codes Description Comment    2/9/2018  5:07 PM Reji Sierras Add [R06 00] Dyspnea     2/9/2018  5:07 PM Reji Sierras Add [R94 31] Abnormal EKG     2/9/2018  5:07 PM Reji Sierras Add [J40] Bronchitis     2/9/2018  5:07 PM Reji Sierras Add [J44 1] COPD exacerbation ([facility] [address] )     2/9/2018  7:00 PM Shanice Bullion Add [R07 9] Chest pain     2/9/2018  7:00 PM Shanice Bullion Modify [R07 9] Chest pain       ED Disposition     ED Disposition Condition Comment    Admit  Case was discussed with Dr Per Flores and the patient's admission status was agreed to be 2 midnights the service of Dr Feroz Ortega    None       Current Discharge Medication List      CONTINUE these medications which have NOT CHANGED    Details   albuterol (PROVENTIL HFA,VENTOLIN HFA) 90 mcg/act inhaler Inhale 2 puffs every 6 (six) hours as needed for wheezing      aspirin 81 mg chewable tablet Chew 81 mg daily      co-enzyme Q-10 30 MG capsule Take 400 mg by mouth 3 (three) times a day      cyclobenzaprine (FLEXERIL) 5 mg tablet Take 10 mg by mouth 3 (three) times a day as needed for muscle spasms      furosemide (LASIX) 20 mg tablet Take 20 mg by mouth 2 (two) times a day      ibuprofen (MOTRIN) 600 mg tablet Take by mouth every 6 (six) hours as needed for mild pain      levothyroxine 50 mcg tablet Take 50 mcg by mouth daily      metoprolol tartrate (LOPRESSOR) 25 mg tablet Take 25 mg by mouth every 12 (twelve) hours      oxyCODONE-acetaminophen (ROXICET) 5-325 mg/5 mL solution Take by mouth every 4 (four) hours as needed for moderate pain      pantoprazole (PROTONIX) 40 mg tablet Take 40 mg by mouth daily           No discharge procedures on file      ED Provider  Electronically Signed by           Aleks Cohn MD  02/09/18 5408       Aleks Cohn MD  02/09/18 6176

## 2018-02-10 PROBLEM — N39.0 URINARY TRACT INFECTION: Status: ACTIVE | Noted: 2018-02-10

## 2018-02-10 LAB
ALBUMIN SERPL BCP-MCNC: 3.4 G/DL (ref 3.5–5)
ALP SERPL-CCNC: 58 U/L (ref 46–116)
ALT SERPL W P-5'-P-CCNC: 36 U/L (ref 12–78)
ANION GAP SERPL CALCULATED.3IONS-SCNC: 13 MMOL/L (ref 4–13)
AST SERPL W P-5'-P-CCNC: 22 U/L (ref 5–45)
ATRIAL RATE: 82 BPM
ATRIAL RATE: 95 BPM
ATRIAL RATE: 96 BPM
BILIRUB SERPL-MCNC: 0.6 MG/DL (ref 0.2–1)
BUN SERPL-MCNC: 13 MG/DL (ref 5–25)
CALCIUM SERPL-MCNC: 9.1 MG/DL (ref 8.3–10.1)
CHLORIDE SERPL-SCNC: 99 MMOL/L (ref 100–108)
CO2 SERPL-SCNC: 26 MMOL/L (ref 21–32)
CREAT SERPL-MCNC: 1.13 MG/DL (ref 0.6–1.3)
ERYTHROCYTE [DISTWIDTH] IN BLOOD BY AUTOMATED COUNT: 12.8 % (ref 11.6–15.1)
FLUAV AG SPEC QL: NORMAL
FLUBV AG SPEC QL: NORMAL
GFR SERPL CREATININE-BSD FRML MDRD: 51 ML/MIN/1.73SQ M
GLUCOSE SERPL-MCNC: 277 MG/DL (ref 65–140)
HCT VFR BLD AUTO: 41.3 % (ref 34.8–46.1)
HGB BLD-MCNC: 13.7 G/DL (ref 11.5–15.4)
MCH RBC QN AUTO: 28 PG (ref 26.8–34.3)
MCHC RBC AUTO-ENTMCNC: 33.2 G/DL (ref 31.4–37.4)
MCV RBC AUTO: 84 FL (ref 82–98)
P AXIS: 75 DEGREES
P AXIS: 76 DEGREES
P AXIS: 76 DEGREES
PHOSPHATE SERPL-MCNC: 2.5 MG/DL (ref 2.3–4.1)
PLATELET # BLD AUTO: 260 THOUSANDS/UL (ref 149–390)
PMV BLD AUTO: 9.2 FL (ref 8.9–12.7)
POTASSIUM SERPL-SCNC: 3.5 MMOL/L (ref 3.5–5.3)
PR INTERVAL: 142 MS
PR INTERVAL: 144 MS
PR INTERVAL: 154 MS
PROT SERPL-MCNC: 7.3 G/DL (ref 6.4–8.2)
QRS AXIS: 51 DEGREES
QRS AXIS: 67 DEGREES
QRS AXIS: 71 DEGREES
QRSD INTERVAL: 82 MS
QRSD INTERVAL: 84 MS
QRSD INTERVAL: 84 MS
QT INTERVAL: 354 MS
QT INTERVAL: 378 MS
QT INTERVAL: 382 MS
QTC INTERVAL: 444 MS
QTC INTERVAL: 446 MS
QTC INTERVAL: 477 MS
RBC # BLD AUTO: 4.9 MILLION/UL (ref 3.81–5.12)
RSV B RNA SPEC QL NAA+PROBE: NORMAL
SODIUM SERPL-SCNC: 138 MMOL/L (ref 136–145)
T WAVE AXIS: 106 DEGREES
T WAVE AXIS: 153 DEGREES
T WAVE AXIS: 231 DEGREES
TROPONIN I SERPL-MCNC: <0.02 NG/ML
VENTRICULAR RATE: 82 BPM
VENTRICULAR RATE: 95 BPM
VENTRICULAR RATE: 96 BPM
WBC # BLD AUTO: 9.13 THOUSAND/UL (ref 4.31–10.16)

## 2018-02-10 PROCEDURE — 99232 SBSQ HOSP IP/OBS MODERATE 35: CPT | Performed by: NURSE PRACTITIONER

## 2018-02-10 PROCEDURE — 84484 ASSAY OF TROPONIN QUANT: CPT | Performed by: INTERNAL MEDICINE

## 2018-02-10 PROCEDURE — 80053 COMPREHEN METABOLIC PANEL: CPT | Performed by: INTERNAL MEDICINE

## 2018-02-10 PROCEDURE — 93010 ELECTROCARDIOGRAM REPORT: CPT | Performed by: INTERNAL MEDICINE

## 2018-02-10 PROCEDURE — 94640 AIRWAY INHALATION TREATMENT: CPT

## 2018-02-10 PROCEDURE — 94760 N-INVAS EAR/PLS OXIMETRY 1: CPT

## 2018-02-10 PROCEDURE — 84100 ASSAY OF PHOSPHORUS: CPT | Performed by: INTERNAL MEDICINE

## 2018-02-10 PROCEDURE — 99222 1ST HOSP IP/OBS MODERATE 55: CPT | Performed by: INTERNAL MEDICINE

## 2018-02-10 PROCEDURE — 85027 COMPLETE CBC AUTOMATED: CPT | Performed by: INTERNAL MEDICINE

## 2018-02-10 RX ORDER — ALPRAZOLAM 0.25 MG/1
0.25 TABLET ORAL ONCE
Status: COMPLETED | OUTPATIENT
Start: 2018-02-10 | End: 2018-02-10

## 2018-02-10 RX ORDER — IPRATROPIUM BROMIDE AND ALBUTEROL SULFATE 2.5; .5 MG/3ML; MG/3ML
3 SOLUTION RESPIRATORY (INHALATION)
Status: DISCONTINUED | OUTPATIENT
Start: 2018-02-10 | End: 2018-02-12 | Stop reason: HOSPADM

## 2018-02-10 RX ORDER — METHYLPREDNISOLONE SODIUM SUCCINATE 40 MG/ML
40 INJECTION, POWDER, LYOPHILIZED, FOR SOLUTION INTRAMUSCULAR; INTRAVENOUS EVERY 12 HOURS SCHEDULED
Status: DISCONTINUED | OUTPATIENT
Start: 2018-02-10 | End: 2018-02-11

## 2018-02-10 RX ORDER — ALBUTEROL SULFATE 90 UG/1
2 AEROSOL, METERED RESPIRATORY (INHALATION) EVERY 4 HOURS PRN
Status: DISCONTINUED | OUTPATIENT
Start: 2018-02-10 | End: 2018-02-12 | Stop reason: HOSPADM

## 2018-02-10 RX ORDER — IPRATROPIUM BROMIDE AND ALBUTEROL SULFATE 2.5; .5 MG/3ML; MG/3ML
3 SOLUTION RESPIRATORY (INHALATION)
Status: DISCONTINUED | OUTPATIENT
Start: 2018-02-10 | End: 2018-02-10

## 2018-02-10 RX ADMIN — HEPARIN SODIUM 5000 UNITS: 5000 INJECTION, SOLUTION INTRAVENOUS; SUBCUTANEOUS at 13:40

## 2018-02-10 RX ADMIN — HEPARIN SODIUM 5000 UNITS: 5000 INJECTION, SOLUTION INTRAVENOUS; SUBCUTANEOUS at 01:00

## 2018-02-10 RX ADMIN — FLUTICASONE PROPIONATE AND SALMETEROL 1 PUFF: 50; 100 POWDER RESPIRATORY (INHALATION) at 09:09

## 2018-02-10 RX ADMIN — METHYLPREDNISOLONE SODIUM SUCCINATE 40 MG: 125 INJECTION, POWDER, FOR SOLUTION INTRAMUSCULAR; INTRAVENOUS at 05:10

## 2018-02-10 RX ADMIN — METHYLPREDNISOLONE SODIUM SUCCINATE 40 MG: 40 INJECTION, POWDER, FOR SOLUTION INTRAMUSCULAR; INTRAVENOUS at 20:04

## 2018-02-10 RX ADMIN — OXYCODONE HYDROCHLORIDE 5 MG: 5 SOLUTION ORAL at 16:46

## 2018-02-10 RX ADMIN — LEVOTHYROXINE SODIUM 50 MCG: 50 TABLET ORAL at 05:10

## 2018-02-10 RX ADMIN — FUROSEMIDE 20 MG: 20 TABLET ORAL at 17:43

## 2018-02-10 RX ADMIN — IPRATROPIUM BROMIDE AND ALBUTEROL SULFATE 3 ML: .5; 3 SOLUTION RESPIRATORY (INHALATION) at 10:40

## 2018-02-10 RX ADMIN — METOPROLOL TARTRATE 25 MG: 25 TABLET, FILM COATED ORAL at 20:04

## 2018-02-10 RX ADMIN — HEPARIN SODIUM 5000 UNITS: 5000 INJECTION, SOLUTION INTRAVENOUS; SUBCUTANEOUS at 21:06

## 2018-02-10 RX ADMIN — METOPROLOL TARTRATE 25 MG: 25 TABLET, FILM COATED ORAL at 09:06

## 2018-02-10 RX ADMIN — ASPIRIN 81 MG 81 MG: 81 TABLET ORAL at 09:09

## 2018-02-10 RX ADMIN — ALPRAZOLAM 0.25 MG: 0.25 TABLET ORAL at 17:41

## 2018-02-10 RX ADMIN — FUROSEMIDE 20 MG: 20 TABLET ORAL at 09:08

## 2018-02-10 RX ADMIN — PANTOPRAZOLE SODIUM 40 MG: 40 TABLET, DELAYED RELEASE ORAL at 05:11

## 2018-02-10 RX ADMIN — IPRATROPIUM BROMIDE AND ALBUTEROL SULFATE 3 ML: .5; 3 SOLUTION RESPIRATORY (INHALATION) at 14:41

## 2018-02-10 RX ADMIN — HEPARIN SODIUM 5000 UNITS: 5000 INJECTION, SOLUTION INTRAVENOUS; SUBCUTANEOUS at 05:11

## 2018-02-10 RX ADMIN — METHYLPREDNISOLONE SODIUM SUCCINATE 40 MG: 125 INJECTION, POWDER, FOR SOLUTION INTRAMUSCULAR; INTRAVENOUS at 12:54

## 2018-02-10 RX ADMIN — IPRATROPIUM BROMIDE AND ALBUTEROL SULFATE 3 ML: .5; 3 SOLUTION RESPIRATORY (INHALATION) at 19:29

## 2018-02-10 RX ADMIN — CEFTRIAXONE 1000 MG: 1 INJECTION, SOLUTION INTRAVENOUS at 14:26

## 2018-02-10 RX ADMIN — CYCLOBENZAPRINE HYDROCHLORIDE 10 MG: 10 TABLET, FILM COATED ORAL at 16:46

## 2018-02-10 NOTE — CONSULTS
Consultation - Cardiology    Nilton Harper 74 y o  female MRN: [id number]  Unit/Bed#: -01 Encounter: [id number]    Physician Requesting Consult: Vitaliy Sutherland MD    Reason for Consult / Chief Complaint: sob/cp    HPI: Nilton Harper is a 74 y o  female with a past medical history significant for aortic valve replacement with porcine valve done through the groin, htn, copd  Patient states for the last couple of weeks she has been feeling short of breath  She states she feels like she just can't catch her breath  She states it occurs with minimal exertion  She states she was actually seen at the doctor couple of weeks ago thought to have a UTI  She states those symptoms have resolved but she continued to have shortness of breath, cough, chest pain, congestion  She states it feels like her bra was too tight  She states the symptoms of feeling short of breath have just gotten worse and become more continuous  She states chest pain occurs with exertion  She also admits to palpitations  Patient had TAVR done in [place] in approximately 2013, with no cardiac follow-up since then  Patient states she moved from [place] 1 year after the procedure, and has not seen cardiologist   She denies any recent stress test or echocardiogram   She states the symptoms she is having feel similar to when she had her aortic valve replaced  She denies any cardiac catheterization at that time  She states they went through the groin to fix her aortic valve  She states it is a pig valve  Historical Information   Past Medical History:   Diagnosis Date    Cardiac disease     Rheumatic fever      Past Surgical History:   Procedure Laterality Date    CARDIAC SURGERY      valve replacement      History   Alcohol Use No     History   Drug Use No     History   Smoking Status    Former Smoker   Smokeless Tobacco    Never Used       FAMILY HISTORY:  History reviewed  No pertinent family history      MEDS & ALLERGIES:  all current active meds have been reviewed and current meds:   Current Facility-Administered Medications   Medication Dose Route Frequency    albuterol (PROVENTIL HFA,VENTOLIN HFA) inhaler 2 puff  2 puff Inhalation Q4H PRN    aluminum-magnesium hydroxide-simethicone (MYLANTA) 200-200-20 mg/5 mL oral suspension 30 mL  30 mL Oral Q6H PRN    aspirin chewable tablet 81 mg  81 mg Oral Daily    cyclobenzaprine (FLEXERIL) tablet 10 mg  10 mg Oral TID PRN    docusate sodium (COLACE) capsule 100 mg  100 mg Oral BID    fluticasone-salmeterol (ADVAIR) 100-50 mcg/dose inhaler 1 puff  1 puff Inhalation Q12H JUNI    furosemide (LASIX) tablet 20 mg  20 mg Oral BID    heparin (porcine) subcutaneous injection 5,000 Units  5,000 Units Subcutaneous Q8H [address]    ipratropium-albuterol (DUO-NEB) 0 5-2 5 mg/3 mL inhalation solution 3 mL  3 mL Nebulization 4x Daily    levothyroxine tablet 50 mcg  50 mcg Oral Early Morning    methylPREDNISolone sodium succinate (Solu-MEDROL) injection 40 mg  40 mg Intravenous Q8H    metoprolol tartrate (LOPRESSOR) tablet 25 mg  25 mg Oral Q12H JUNI    ondansetron (ZOFRAN) injection 4 mg  4 mg Intravenous Q6H PRN    oxyCODONE (ROXICODONE) oral solution 5 mg  5 mg Oral Q4H PRN    pantoprazole (PROTONIX) EC tablet 40 mg  40 mg Oral Daily Before Breakfast        Allergies   Allergen Reactions    Iohexol     Statins Other (See Comments)     Severe muscle cramps-- cannot move         REVIEW OF SYSTEMS:  Constitutional: Denies fever or chills  Eyes: Denies eye discharge or change in visual acuity   HENT: Denies sneezing, nasal congestion or sore throat   Respiratory: + shortness of breath, cough, congestion  Cardiovascular: + chest pain, palpitations Denies lower extremity swelling   GI: Denies abdominal pain, nausea, vomiting, bloody stools or diarrhea   : Denies dysuria, frequency, difficulty in micturition or nocturia  Musculoskeletal: Denies back pain or joint pain   Neurologic: Denies lightheadedness, syncope, headache, focal weakness or sensory changes   Endocrine: Denies polyuria or polydipsia   Lymphatic: Denies swollen glands   Psychiatric: Denies depression, anxiety or panic       PHYSICAL EXAM:  Vitals:   Vitals:    02/10/18 0700   BP: 133/60   Pulse: 90   Resp: 18   Temp: 97 9 °F (36 6 °C)   SpO2: 95%     Body mass index is 29 8 kg/m²  Systolic (27XLD), COL:353 , Min:112 , FDP:044     Diastolic (43JZQ), NEQ:01, Min:60, Max:87      Intake/Output Summary (Last 24 hours) at 02/10/18 0917  Last data filed at 02/09/18 2113   Gross per 24 hour   Intake                0 ml   Output              100 ml   Net             -100 ml     Weight (last 2 days)     Date/Time   Weight    02/09/18 2017  73 9 (162 92)    02/09/18 1302  73 9 (163)            Invasive Devices     Peripheral Intravenous Line            Peripheral IV 02/09/18 Right Antecubital less than 1 day                General: Patient is not in acute distress  Laying comfortably in the bed  Awake, alert, responding to commands  Head: Normocephalic  Atraumatic  HEENT: Both pupils normal sized, round and reactive to light  Nonicteric  Neck: JVP not elevated  Trachea central  No thyromegaly  Respiratory:  Decreased breath sounds bilaterally  Cardiovascular: RRR  S1 and S2 normal  No murmur, rub or gallop  GI: Abdomen soft, nontender  No guarding or rigidity  Liver and spleen not palpable  Bowel sounds present  Neurologic: Patient is awake, alert, responding to commands  Well-oriented to time, place and person   Moving all extremities  Extremities: No clubbing, cyanosis or edema  Integument: No skin rashes or ulceration  Lymphatic: No cervical lymphadenopathy      LABORATORY RESULTS:    Results from last 7 days  Lab Units 02/10/18  0526 02/09/18 2034 02/09/18  1743   TROPONIN I ng/mL <0 02 <0 02 <0 02       CBC with diff:   Results from last 7 days  Lab Units 02/10/18  0526 02/09/18 2034 02/09/18  1352   WBC Thousand/uL 9 13  -- 7  19   HEMOGLOBIN g/dL 13 7  --  14 8   HEMATOCRIT % 41 3  --  45 6   MCV fL 84  --  86   PLATELETS Thousands/uL 260 261 249   MCH pg 28 0  --  28 0   MCHC g/dL 33 2  --  32 5   RDW % 12 8  --  12 9   MPV fL 9 2 9 1 8 9   NRBC AUTO /100 WBCs  --   --  0       CMP:  Results from last 7 days  Lab Units 02/10/18  0526 02/09/18  1352   SODIUM mmol/L 138 144   POTASSIUM mmol/L 3 5 3 4*   CHLORIDE mmol/L 99* 103   CO2 mmol/L 26 33*   ANION GAP mmol/L 13 8   BUN mg/dL 13 11   CREATININE mg/dL 1 13 0 96   GLUCOSE RANDOM mg/dL 277* 126   CALCIUM mg/dL 9 1 8 9   AST U/L 22 31   ALT U/L 36 41   ALK PHOS U/L 58 61   TOTAL PROTEIN g/dL 7 3 7 5   BILIRUBIN TOTAL mg/dL 0 60 0 70   EGFR ml/min/1 73sq m 51 62       BMP:  Results from last 7 days  Lab Units 02/10/18  0526 02/09/18  1352   SODIUM mmol/L 138 144   POTASSIUM mmol/L 3 5 3 4*   CHLORIDE mmol/L 99* 103   CO2 mmol/L 26 33*   BUN mg/dL 13 11   CREATININE mg/dL 1 13 0 96   GLUCOSE RANDOM mg/dL 277* 126   CALCIUM mg/dL 9 1 8 9         Results from last 7 days  Lab Units 02/09/18  1352   NT-PRO BNP pg/mL 185*                Results from last 7 days  Lab Units 02/09/18  2034   TSH 3RD GENERATON uIU/mL 2 066       Results from last 7 days  Lab Units 02/09/18  1352   INR  0 91       Lipid Profile:   No results found for: CHOL  No results found for: HDL  No results found for: LDLCALC  No results found for: TRIG    Cardiac testing:   No results found for this or any previous visit  No results found for this or any previous visit  No procedure found  No results found for this or any previous visit  Imaging: I have personally reviewed pertinent reports  Procedure: Xr Chest Pa & Lateral    Result Date: 2/9/2018  Narrative: CHEST - DUAL ENERGY INDICATION:  Cough, shortness of breath and chest pains COMPARISON:  None VIEWS:  PA (including soft tissue/bone algorithms) and lateral projections IMAGES:  4 FINDINGS: Cardiomediastinal silhouette appears unremarkable    A prosthetic aortic valve is present  No evidence of heart failure  The lungs are clear  No pneumothorax or pleural effusion  Surgical clips in the right paratracheal region  Visualized osseous structures appear within normal limits for the patient's age  Impression: No active pulmonary disease  Workstation performed: [id number]     Procedure: Cta Ed Chest Pe Study    Result Date: 2/9/2018  Narrative: CTA - CHEST WITH IV CONTRAST - PULMONARY ANGIOGRAM INDICATION: Chest pain  Shortness of breath  COMPARISON: None  TECHNIQUE: CTA examination of the chest was performed using angiographic technique according to a protocol specifically tailored to evaluate for pulmonary embolism  Reformatted images were created in axial, sagittal, and coronal planes  In addition, coronal 3D MIP postprocessing was performed on the acquisition scanner  Radiation dose length product (DLP) for this visit:  542 mGy-cm   This examination, like all CT scans performed in the [facility], was performed utilizing techniques to minimize radiation dose exposure, including the use of iterative reconstruction and automated exposure control  IV Contrast:  85 mL of iohexol (OMNIPAQUE)  FINDINGS: Diffuse atherosclerotic plaque throughout the aorta and its branches  PULMONARY ARTERIAL TREE:  No pulmonary embolus is seen  LUNGS:  Diffuse lung emphysematous changes  8 mm subpleural nodule right lower lobe series 3 image 45  Diffuse bronchial wall thickening could relate to bronchitis  PLEURA:  Unremarkable  HEART/AORTA:  Diffuse atherosclerotic plaque throughout the aorta and its branches  There is an aortic valve prosthetic device  4 2 x 4 cm ascending aortic aneurysm  MEDIASTINUM AND MARIELA:  Unremarkable  CHEST WALL AND LOWER NECK: Normal  VISUALIZED STRUCTURES IN THE UPPER ABDOMEN:  Hepatic steatosis  OSSEOUS STRUCTURES:  No acute fracture or destructive osseous lesion  Impression: 1  No evidence of pulmonary metabolism   2   4 2 x 4 cm ascending aortic aneurysm  3   Diffuse emphysematous changes  4   8 mm subpleural nodule right lower lobe Based on current Fleischner Society 2017 Guidelines on incidental pulmonary nodule, followup non-contrast CT is recommended at 6 months from the initial examination and, if stable at that time, an additional followup is recommended for 18-24 months from the initial examination  5   Diffuse bronchial wall thickening could relate to bronchitis  Workstation performed: [id number]       EKG reviewed personally:  EKG unavailable to me at this time, will order 1    Telemetry reviewed personally:  Sinus rhythm      Assessment and Plan:  1  Chest pain; troponins negative x3  Will check echocardiogram to assess LV function  Discussed the need for stress test to assess for ischemia as the cause of her symptoms  Patient denies any stress test recently  Patient denies any cardiac catheterization in the past   Continue aspirin, heparin subcu, metoprolol 25 b i d   Patient has allergy to statins  2   Dyspnea--likely multifactorial could be related to CAD, could have a component of COPD/acute bronchitis  Continue all medications  Continue neb meds/steroids  Management per [facility] Hospitalist   Echocardiogram pending  Stress test pending  3   History of aortic valve replacement, porcine valve done in 2013 without recent cardiology follow-up  Check echocardiogram to assess LV function and valve status  4   Ascending aortic aneurysm measuring 4 2 x 4 cm as seen on CT scan--unclear if this is new  Continue all medications  Continue good blood pressure control  5   Hypertension--stable  Last blood pressure 133/60  Continue all medications  Code Status: Level 1 - Full Code      Thank you for allowing us to participate in this patient's care  This pt will follow up with Dr Eric Moore  once discharged          Counseling / Coordination of Care  Total floor / unit time spent today 35 minutes  Greater than 50% of total time was spent with the patient and / or family counseling and / or coordination of care  A description of the counseling / coordination of care: Review of history, current assessment, development of a plan         Mónica Chavira PA-C  2/10/2018,9:17 AM

## 2018-02-10 NOTE — ASSESSMENT & PLAN NOTE
· Continue home dose of Lopressor and Lasix  · Blood pressure much better controlled today    · Monitor

## 2018-02-10 NOTE — ASSESSMENT & PLAN NOTE
· Patient admitted with shortness of breath  Does have history of COPD, thought to be COPD exacerbation  Started on intravenous Solu-Medrol  · Will decrease intravenous Solu-Medrol to 40 mg q 12 hours  Patient is doing well, currently on room air  Lung sounds are decreased but without wheezes  Patient states that breathing has improved  · Chest x-ray negative, CT PE study negative for acute pulmonary embolism  · Continue Alisha Mak nebs t i d  · Sputum culture pending  · Flu/RSV swab pending however I do not feel the patient has the flu   · Do not feel patient needs pulmonary consult at this time as she is improving    · Continue to monitor

## 2018-02-10 NOTE — INCIDENTAL FINDINGS
The following findings require follow up:  Radiographic finding   Findin mm subpleural nodule right lower lobe   Follow up required: repeat imaging in 6 months      Follow up should be done within 6 month(s)    Please notify the following clinician to assist with the follow up:   Primary Care Physician

## 2018-02-10 NOTE — CASE MANAGEMENT
Initial Clinical Review    Admission: Date/Time/Statement:   2/9/18 AT 1708     Orders Placed This Encounter   Procedures    Inpatient Admission (expected length of stay for this patient is greater than two midnights)     Standing Status:   Standing     Number of Occurrences:   1     Order Specific Question:   Admitting Physician     Answer:   NEEMA Reese [[id number]]     Order Specific Question:   Level of Care     Answer:   Med Surg [16]     Order Specific Question:   Estimated length of stay     Answer:   More than 2 Midnights     Order Specific Question:   Certification     Answer:   I certify that inpatient services are medically necessary for this patient for a duration of greater than two midnights  See H&P and MD Progress Notes for additional information about the patient's course of treatment       ED: Date/Time/Mode of Arrival:   ED Arrival Information     Expected Arrival Acuity Means of Arrival Escorted By Service Admission Type    - 2/9/2018 12:58 Urgent Walk-In Family Member General Medicine Urgent    Arrival Complaint    SHORTNESS OF BREATH          Chief Complaint:   Chief Complaint   Patient presents with    Shortness of Breath     Pt with shortness of breath for about 2 weeks, and also a UTI        Chief Complaint:   "shortness of breath is out of shape ×12 days, chest pain ×1 day     History of Present Illness:   This is a 22-year-old female was past medical history of hypertension, morbid obesity, history of advanced COPD, 50-pack-year history of tobacco abuse reports over the last 2 weeks, shortness of breath, she reports at times she feels like she can't catch her breath   Apparently seen by her doctor felt to have possible urinary tract infection but not treated with medication at that time   Patient reports the symptoms seem to have resolved but now she complains of some shortness of breath cough and congestion    reports positive sputum production, does report some chest tightness with exertion only but no chest pain at this time    She reports no chills no nausea vomiting does report decreased appetite no other changes in bowel or urinary habits      Review of Systems:  As per HPI      ED Vital Signs:   ED Triage Vitals [02/09/18 1302]   Temperature Pulse Respirations Blood Pressure SpO2   97 8 °F (36 6 °C) 104 18 (!) 190/87 98 %      Temp Source Heart Rate Source Patient Position - Orthostatic VS BP Location FiO2 (%)   Oral Monitor Sitting Left arm --      Pain Score       No Pain        Wt Readings from Last 1 Encounters:   02/09/18 73 9 kg (162 lb 14 7 oz)      02/09 0701  02/10 0700 02/10 0701  02/10 1652  Most Recent    Temperature (°F) 97 397 9 97 598 2  97 5 (36 4)    Pulse 79104 9397  97    Respirations 18 18  18    Blood Pressure 112/60190/87 118/60122/73  122/73    SpO2 (%) 9398 9397  94          LABS/Diagnostic Test Results:   Results from last 7 days  Lab Units 02/09/18  1352   WBC Thousand/uL 7 19   HEMOGLOBIN g/dL 14 8   HEMATOCRIT % 45 6   PLATELETS Thousands/uL 249   NEUTROS PCT % 52   LYMPHS PCT % 35   MONOS PCT % 8   EOS PCT % 3       Results from last 7 days  Lab Units 02/09/18  1352   SODIUM mmol/L 144   POTASSIUM mmol/L 3 4*   CHLORIDE mmol/L 103   CO2 mmol/L 33*   BUN mg/dL 11   CREATININE mg/dL 0 96   CALCIUM mg/dL 8 9   TOTAL PROTEIN g/dL 7 5   BILIRUBIN TOTAL mg/dL 0 70   ALK PHOS U/L 61   ALT U/L 41   AST U/L 31   GLUCOSE RANDOM mg/dL 126       Results from last 7 days  Lab Units 02/09/18  1352   INR   0 91     D-dimer, quantitative [[id number]] (Abnormal) Collected: 02/09/18 1352   Lab Status: Final result Specimen: Blood from Arm, Right Updated: 02/09/18 1417    D-Dimer, Quant 1,015 (H) 0 - 424 ng/ml (FEU)      Urinalysis [[id number]] (Abnormal) Collected: 02/09/18 2112   Lab Status: Final result Specimen: Urine from Urine, Clean Catch Updated: 02/09/18 2124    Color, UA Yellow    Clarity, UA Clear    Specific [place], UA 1 010 1 003 - 1 030     pH, UA 5 5 4 5 - 8 0 Leukocytes, UA Trace (A) Negative     Nitrite, UA Negative Negative     Protein, UA Negative Negative mg/dl     Glucose, UA >=1000 (1%) (A) Negative mg/dl     Ketones, UA 15 (1+) (A) Negative mg/dl     Urobilinogen, UA 1 0 0 2, 1 0 E U /dl E U /dl     Bilirubin, UA Negative Negative     Blood, UA Trace-lysed (A) Negative    Urine Microscopic [[id number]] (Abnormal) Collected: 02/09/18 2112   Lab Status: Final result Specimen: Urine from Urine, Clean Catch Updated: 02/09/18 2131    RBC, UA 4-10 (A) None Seen, 0-5 /hpf     WBC, UA 10-20 (A) None Seen, 0-5, 5-55, 5-65 /hpf     Epithelial Cells Moderate (A) None Seen, Occasional /hpf     Bacteria, UA Moderate (A) None Seen, Occasional /hpf          TROPONIN NEG X 3      · EKG:  ST depression in inferior lateral leads, nonspecific T-wave changes no ST elevation appreciated    CT CHEST -  1  No evidence of pulmonary metabolism  2   4 2 x 4 cm ascending aortic aneurysm  3   Diffuse emphysematous changes  4   8 mm subpleural nodule right lower lobe Based on current Fleischner Society 2017 Guidelines on incidental pulmonary nodule, followup non-contrast CT is recommended at 6 months from the initial examination and, if stable at that time, an additional followup is recommended for 18-24 months from the initial examination     5   Diffuse bronchial wall thickening could relate to bronchitis      ED Treatment:   Medication Administration from 02/09/2018 1258 to 02/09/2018 2013       Date/Time Order Dose Route Action Action by Comments     02/09/2018 1533 iohexol (OMNIPAQUE) 350 MG/ML injection (MULTI-DOSE) 85 mL 85 mL Intravenous Given Salome Doss      02/09/2018 1612 albuterol inhalation solution 5 mg 5 mg Nebulization Given Doretha Hidalgo RN      02/09/2018 1612 ipratropium (ATROVENT) 0 02 % inhalation solution 0 5 mg 0 5 mg Nebulization Given Doretha Hidalgo RN      02/09/2018 1616 methylPREDNISolone sodium succinate (Solu-MEDROL) injection 125 mg 125 mg Intravenous Given Tete Salas RN           Past Medical/Surgical History:   Past Medical History:   Diagnosis Date    Cardiac disease     Rheumatic fever        Admitting Diagnosis: Chest pain [R07 9]  Dyspnea [R06 00]  SOB (shortness of breath) [R06 02]  Bronchitis [J40]  Abnormal EKG [R94 31]  COPD exacerbation (HCC) [J44 1]    Age/Sex: 74 y o  female      Assessment/Plan:   Hospital Problem List:    Principal Problem:    Acute exacerbation of chronic obstructive pulmonary disease (COPD) ([facility] [address] )  Active Problems:    Chest pain    Accelerated hypertension    Morbid obesity due to excess calories (HCC)    Coronary artery disease    Hypokalemia    Chronic radicular pain of lower back      Plan for the Primary Problem(s):  #1ASSESSMENT:  Differential diagnoses will include  - SOB due to    *Asthma COPD acute exacerbation caused by URTI, allergen exposure, medication nonocompliance causing respiratory distress was a hypoxemia  *Bronchitis  *Pneumonia - no infiltrate on CXR     PLAN:  -DuoNeb's q 4 hr and PRN SOB  - Solu-medrol 40 mg IV q 6 hr  - O2 to keep SpO2 higher than 92% (SpO higher than 95% if CAD)only if needed  - CBC, BMP in AM  - Sputum Gram stain, C+S  - Robitussin 10 cc PO q 4 hr  - Tylenol 650 mg PO q 4-6 hr PRN pain/fever  - Heparin 5000 U SQ BID  - Home meds - check the list    - Azithromycine 500mg po daily  -May Consider CT chest and ABG if no improvement  -We'll consider obtaining pulmonology consultation if no improvement within a reasonable time          #2CHEST PAIN/ TIGHTNESS:  Given patients chest pain symptoms and risk factors, we will initiate further cardiac workup and GI prophylaxis  Also EKG does show ST depression in inferior and lateral leads and first set of cardiac enzymes were not alarming and the history does not really fit this kind of pain with such a prolonged course  These findings reviewed and discussed with the patient/family    It is certainly reasonable to monitor patient in Telemetry setting over next 12-24 hours to rule out ACS  We will draw for two other sets of cardiac enzymes  Repeat EKG if needed  For now, will give antiplatelet therapy with aspirin 325 by mouth daily  For pain control, well initiate therapy with Acetaminophen 650 mg PO q6hr pain, with consideration for narcotics if pain persists or worsens  Heartburn prophylaxis with Nexium 40mg PO qhs and sucralfate 1g or Maalox PO q6hrs PRN qd  Well consider giving supplemental oxygen if needed  We'll consider obtaining stress tests and cardiology consultation if needed      #3 accelerated hypertension  History of HTN:       We will continue patient home medications  Although initially his blood pressure was higher in emergency department, it later stabilized  Well also initiate IV hydralazine and IV metoprolol for SBP greater than 160 mmHg  We will advise patient to follow-up with next PCP in regards to further better management of ongoing HTN     VTE Prophylaxis: Heparin  / sequential compression device   Code Status: full code  POLST: There is no POLST form on file for this patient (pre-hospital)     Anticipated Length of Stay:  Patient will be admitted on an Inpatient basis with an anticipated length of stay of  Greater than 2 midnights       Justification for Hospital Stay: COPD exacerbation       Admission Orders:   2/9/18 AT 1708   ADMIT INPATIENT  TELEMETRY  VS Q4HRS      Activity as Tolerated  SCD    Regular House Diet    IV ACCESS    Scheduled Meds:   Current Facility-Administered Medications:  albuterol 2 puff Inhalation Q4H PRN Jeffery Cobos MD    aluminum-magnesium hydroxide-simethicone 30 mL Oral Q6H PRN Landy Batres MD    aspirin 81 mg Oral Daily Landy Batres MD    cefTRIAXone 1,000 mg Intravenous Q24H HAVEN Prince Last Rate: 1,000 mg (02/10/18 1426)   cyclobenzaprine 10 mg Oral TID PRN Landy Batres MD    docusate sodium 100 mg Oral BID Landy Batres MD fluticasone-salmeterol 1 puff Inhalation Q12H [address] Jazmyne Pate MD    furosemide 20 mg Oral BID Jazmyne Pate MD    heparin (porcine) 5,000 Units Subcutaneous [facility] Jazmyne Pate MD    ipratropium-albuterol 3 mL Nebulization TID Marlen Lynn MD    levothyroxine 50 mcg Oral Early Morning HAVEN Bradley    methylPREDNISolone sodium succinate 40 mg Intravenous Q12H [address] HAVEN Prince    metoprolol tartrate 25 mg Oral Q12H [address] Jazmyne Pate MD    ondansetron 4 mg Intravenous Q6H PRN Jazmyne Pate MD    oxyCODONE 5 mg Oral Q4H PRN HAVEN Duvall    pantoprazole 40 mg Oral Daily Before Breakfast Jazmyne Pate MD        PRN Meds:     NEB Tx with albuterol    aluminum-magnesium hydroxide-simethicone    Cyclobenzaprine 10 mg TID prn given x 1    ondansetron    oxyCODONE 5 mg q4hrs prn given x 1    CONSULT CARD    ECHO    STRESS TEST        Cardiology  Consults Date of Service: 2/10/2018  9:16 AM     Assessment and Plan:  1  Chest pain; troponins negative x3  Will check echocardiogram to assess LV function  Discussed the need for stress test to assess for ischemia as the cause of her symptoms  Patient denies any stress test recently  Patient denies any cardiac catheterization in the past   Continue aspirin, heparin subcu, metoprolol 25 b i d   Patient has allergy to statins      2  Dyspnea--likely multifactorial could be related to CAD, could have a component of COPD/acute bronchitis  Continue all medications  Continue neb meds/steroids  Management per Freya Malik Internal Medicine Hospitalist   Echocardiogram pending  Stress test pending      3  History of aortic valve replacement, porcine valve done in 2013 without recent cardiology follow-up  Check echocardiogram to assess LV function and valve status      4  Ascending aortic aneurysm measuring 4 2 x 4 cm as seen on CT scan--unclear if this is new  Continue all medications  Continue good blood pressure control      5  Hypertension--stable  Last blood pressure 133/60    Continue all medications

## 2018-02-10 NOTE — ASSESSMENT & PLAN NOTE
· Patient complaining of central/substernal chest pain  Patient states that it does worsen when she coughs  Denies any radiation or associated factors  · Troponins x3 were negative  · EKG did show some changes  Cardiology has been consulted plan is for stress test on Monday  Patient does have a history of a porcine aortic valve replacement  Has not had any follow-up for several years  · Echocardiogram and stress test pending, follow-up result  · Continue telemetry, no events on event review  · Continue aspirin  · Started on Protonix on admission, continue

## 2018-02-10 NOTE — ASSESSMENT & PLAN NOTE
· Patient was recently seen by her primary care physician who did a urine test secondary to dysuria, frequency and urgency however she states this test was normal   She states that her PCP called her in some Pyridium but this did not help  · Patient still complaining of urgency, frequency and dysuria at times  Urinalysis on admission was positive, culture showing E coli  Will start patient on intravenous Rocephin    · Patient is afebrile without leukocytosis  · Monitor for symptomatic improvement

## 2018-02-10 NOTE — PROGRESS NOTES
[facility]  Progress Note - Mary Brown 1950, 74 y o  female MRN: [id number]    Unit/Bed#: -01 Encounter: [id number]    Primary Care Provider: No primary care provider on file  Date and time admitted to hospital: 2/9/2018  1:17 PM    * Acute exacerbation of chronic obstructive pulmonary disease (COPD) ([facility] [address] )   Assessment & Plan    · Patient admitted with shortness of breath  Does have history of COPD, thought to be COPD exacerbation  Started on intravenous Solu-Medrol  · Will decrease intravenous Solu-Medrol to 40 mg q 12 hours  Patient is doing well, currently on room air  Lung sounds are decreased but without wheezes  Patient states that breathing has improved  · Chest x-ray negative, CT PE study negative for acute pulmonary embolism  · Continue Alisha Mak nebs t i d  · Sputum culture pending  · Flu/RSV swab pending however I do not feel the patient has the flu   · Do not feel patient needs pulmonary consult at this time as she is improving  · Continue to monitor        Chest pain   Assessment & Plan    · Patient complaining of central/substernal chest pain  Patient states that it does worsen when she coughs  Denies any radiation or associated factors  · Troponins x3 were negative  · EKG did show some changes  Cardiology has been consulted plan is for stress test on Monday  Patient does have a history of a porcine aortic valve replacement  Has not had any follow-up for several years  · Echocardiogram and stress test pending, follow-up result  · Continue telemetry, no events on event review  · Continue aspirin  · Started on Protonix on admission, continue          Urinary tract infection   Assessment & Plan    · Patient was recently seen by her primary care physician who did a urine test secondary to dysuria, frequency and urgency however she states this test was normal   She states that her PCP called her in some Pyridium but this did not help   · Patient still complaining of urgency, frequency and dysuria at times  Urinalysis on admission was positive, culture showing E coli  Will start patient on intravenous Rocephin  · Patient is afebrile without leukocytosis  · Monitor for symptomatic improvement        Coronary artery disease   Assessment & Plan    · Continue aspirin  Patient is allergic to statins  · Lipid panel pending        Morbid obesity due to excess calories ([address] )   Assessment & Plan    · Encourage lifestyle modification, weight loss  Hypertension   Assessment & Plan    · Continue home dose of Lopressor and Lasix  · Blood pressure much better controlled today  · Monitor          Pharmacologic: Heparin  Mechanical VTE Prophylaxis in Place: Yes    Patient Centered Rounds: I have performed bedside rounds with nursing staff today  Discussions with Specialists or Other Care Team Provider: nursing    Education and Discussions with Family / Patient: patient, daughters, and sister at bedside  Time Spent for Care: 30 minutes  More than 50% of total time spent on counseling and coordination of care as described above  Current Length of Stay: 1 day(s)    Current Patient Status: Inpatient   Certification Statement: The patient will continue to require additional inpatient hospital stay due to Stress test, echocardiogram pending    Discharge Plan / Estimated Discharge Date:  Pending echocardiogram and stress test       Code Status: Level 1 - Full Code      Subjective:   Patient states that her breathing has improved  Still complaining of central/substernal chest pain which does worsen with coughing  States that it does not radiate anywhere  She is also complaining of urgency, frequency and dysuria  Denies any fevers or chills but does state that her temperature of 98 0° is abnormal for her because her normal temperature is 96 5       Objective:     Vitals:   Temp (24hrs), Av 8 °F (36 6 °C), Min:97 3 °F (36 3 °C), Max:98 2 °F (36 8 °C)    HR:  [79-98] 93  Resp:  [18] 18  BP: (112-159)/(60-78) 118/60  SpO2:  [93 %-97 %] 95 %  Body mass index is 29 8 kg/m²  Input and Output Summary (last 24 hours): Intake/Output Summary (Last 24 hours) at 02/10/18 1407  Last data filed at 02/09/18 2113   Gross per 24 hour   Intake                0 ml   Output              100 ml   Net             -100 ml       Physical Exam:     Physical Exam    Additional Data:     Labs:      Results from last 7 days  Lab Units 02/10/18  0526  02/09/18  1352   WBC Thousand/uL 9 13  --  7 19   HEMOGLOBIN g/dL 13 7  --  14 8   HEMATOCRIT % 41 3  --  45 6   PLATELETS Thousands/uL 260  < > 249   NEUTROS PCT %  --   --  52   LYMPHS PCT %  --   --  35   MONOS PCT %  --   --  8   EOS PCT %  --   --  3   < > = values in this interval not displayed      Results from last 7 days  Lab Units 02/10/18  0526   SODIUM mmol/L 138   POTASSIUM mmol/L 3 5   CHLORIDE mmol/L 99*   CO2 mmol/L 26   BUN mg/dL 13   CREATININE mg/dL 1 13   CALCIUM mg/dL 9 1   TOTAL PROTEIN g/dL 7 3   BILIRUBIN TOTAL mg/dL 0 60   ALK PHOS U/L 58   ALT U/L 36   AST U/L 22   GLUCOSE RANDOM mg/dL 277*       Results from last 7 days  Lab Units 02/09/18  1352   INR  0 91         Recent Cultures (last 7 days):       Results from last 7 days  Lab Units 02/09/18  1417   URINE CULTURE  >100,000 cfu/ml Gram Negative [place] resembling Escherichia coli*   INFLUENZA A PCR  None Detected   INFLUENZA B PCR  None Detected   RSV PCR  None Detected       Last 24 Hours Medication List:     Current Facility-Administered Medications:  albuterol 2 puff Inhalation Q4H PRN Altaf Ulloa MD   aluminum-magnesium hydroxide-simethicone 30 mL Oral Q6H PRN Deann Gray MD   aspirin 81 mg Oral Daily Deann Gray MD   cefTRIAXone 1,000 mg Intravenous Q24H HAVEN Prince   cyclobenzaprine 10 mg Oral TID PRN Deann Gray MD   docusate sodium 100 mg Oral BID Deann Gray MD   fluticasone-salmeterol 1 puff Inhalation Q12H [address] Ulysses Patterson MD   furosemide 20 mg Oral BID Ulysses Patterson MD   heparin (porcine) 5,000 Units Subcutaneous [facility] Ulysses Patterson MD   ipratropium-albuterol 3 mL Nebulization TID Doreen Mobley MD   levothyroxine 50 mcg Oral Early Morning Ulysses Patterson MD   methylPREDNISolone sodium succinate 40 mg Intravenous Q12H [address] HAVEN Prince   metoprolol tartrate 25 mg Oral Q12H [address] Ulysses Patterson MD   ondansetron 4 mg Intravenous Q6H PRN Ulysses Patterson MD   oxyCODONE 5 mg Oral Q4H PRN HAVEN Duvall   pantoprazole 40 mg Oral Daily Before Breakfast Ulysses Patterson MD        Today, Patient Was Seen By: HAVEN Dasilva    ** Please Note: Dragon 360 Dictation voice to text software may have been used in the creation of this document   **

## 2018-02-11 ENCOUNTER — APPOINTMENT (INPATIENT)
Dept: NON INVASIVE DIAGNOSTICS | Facility: HOSPITAL | Age: 68
DRG: 191 | End: 2018-02-11
Payer: MEDICARE

## 2018-02-11 LAB
ANION GAP SERPL CALCULATED.3IONS-SCNC: 10 MMOL/L (ref 4–13)
BACTERIA UR CULT: ABNORMAL
BACTERIA UR CULT: ABNORMAL
BASOPHILS # BLD AUTO: 0.01 THOUSANDS/ΜL (ref 0–0.1)
BASOPHILS NFR BLD AUTO: 0 % (ref 0–1)
BUN SERPL-MCNC: 17 MG/DL (ref 5–25)
CALCIUM SERPL-MCNC: 9.1 MG/DL (ref 8.3–10.1)
CHLORIDE SERPL-SCNC: 102 MMOL/L (ref 100–108)
CHOLEST SERPL-MCNC: 235 MG/DL (ref 50–200)
CO2 SERPL-SCNC: 28 MMOL/L (ref 21–32)
CREAT SERPL-MCNC: 1.04 MG/DL (ref 0.6–1.3)
EOSINOPHIL # BLD AUTO: 0 THOUSAND/ΜL (ref 0–0.61)
EOSINOPHIL NFR BLD AUTO: 0 % (ref 0–6)
ERYTHROCYTE [DISTWIDTH] IN BLOOD BY AUTOMATED COUNT: 13.1 % (ref 11.6–15.1)
GFR SERPL CREATININE-BSD FRML MDRD: 57 ML/MIN/1.73SQ M
GLUCOSE SERPL-MCNC: 264 MG/DL (ref 65–140)
HCT VFR BLD AUTO: 38.1 % (ref 34.8–46.1)
HDLC SERPL-MCNC: 52 MG/DL (ref 40–60)
HGB BLD-MCNC: 12.5 G/DL (ref 11.5–15.4)
LDLC SERPL CALC-MCNC: 160 MG/DL (ref 0–100)
LYMPHOCYTES # BLD AUTO: 1.44 THOUSANDS/ΜL (ref 0.6–4.47)
LYMPHOCYTES NFR BLD AUTO: 10 % (ref 14–44)
MCH RBC QN AUTO: 28 PG (ref 26.8–34.3)
MCHC RBC AUTO-ENTMCNC: 32.8 G/DL (ref 31.4–37.4)
MCV RBC AUTO: 85 FL (ref 82–98)
MONOCYTES # BLD AUTO: 0.73 THOUSAND/ΜL (ref 0.17–1.22)
MONOCYTES NFR BLD AUTO: 5 % (ref 4–12)
NEUTROPHILS # BLD AUTO: 12.92 THOUSANDS/ΜL (ref 1.85–7.62)
NEUTS SEG NFR BLD AUTO: 85 % (ref 43–75)
NRBC BLD AUTO-RTO: 0 /100 WBCS
PLATELET # BLD AUTO: 261 THOUSANDS/UL (ref 149–390)
PMV BLD AUTO: 9.3 FL (ref 8.9–12.7)
POTASSIUM SERPL-SCNC: 4.3 MMOL/L (ref 3.5–5.3)
RBC # BLD AUTO: 4.46 MILLION/UL (ref 3.81–5.12)
SODIUM SERPL-SCNC: 140 MMOL/L (ref 136–145)
TRIGL SERPL-MCNC: 115 MG/DL
WBC # BLD AUTO: 15.18 THOUSAND/UL (ref 4.31–10.16)

## 2018-02-11 PROCEDURE — 80061 LIPID PANEL: CPT | Performed by: PHYSICIAN ASSISTANT

## 2018-02-11 PROCEDURE — 93308 TTE F-UP OR LMTD: CPT | Performed by: INTERNAL MEDICINE

## 2018-02-11 PROCEDURE — 80048 BASIC METABOLIC PNL TOTAL CA: CPT | Performed by: NURSE PRACTITIONER

## 2018-02-11 PROCEDURE — 93306 TTE W/DOPPLER COMPLETE: CPT

## 2018-02-11 PROCEDURE — 94640 AIRWAY INHALATION TREATMENT: CPT

## 2018-02-11 PROCEDURE — 99232 SBSQ HOSP IP/OBS MODERATE 35: CPT | Performed by: INTERNAL MEDICINE

## 2018-02-11 PROCEDURE — 85025 COMPLETE CBC W/AUTO DIFF WBC: CPT | Performed by: NURSE PRACTITIONER

## 2018-02-11 PROCEDURE — 93325 DOPPLER ECHO COLOR FLOW MAPG: CPT | Performed by: INTERNAL MEDICINE

## 2018-02-11 PROCEDURE — 93321 DOPPLER ECHO F-UP/LMTD STD: CPT | Performed by: INTERNAL MEDICINE

## 2018-02-11 PROCEDURE — 94760 N-INVAS EAR/PLS OXIMETRY 1: CPT

## 2018-02-11 RX ORDER — DOCUSATE SODIUM 100 MG/1
100 CAPSULE, LIQUID FILLED ORAL 2 TIMES DAILY PRN
Status: DISCONTINUED | OUTPATIENT
Start: 2018-02-11 | End: 2018-02-12 | Stop reason: HOSPADM

## 2018-02-11 RX ORDER — DIPHENHYDRAMINE HYDROCHLORIDE 50 MG/ML
25 INJECTION INTRAMUSCULAR; INTRAVENOUS
Status: DISCONTINUED | OUTPATIENT
Start: 2018-02-11 | End: 2018-02-12 | Stop reason: HOSPADM

## 2018-02-11 RX ORDER — PREDNISONE 20 MG/1
20 TABLET ORAL DAILY
Status: DISCONTINUED | OUTPATIENT
Start: 2018-02-11 | End: 2018-02-12 | Stop reason: HOSPADM

## 2018-02-11 RX ADMIN — DIPHENHYDRAMINE HYDROCHLORIDE 25 MG: 50 INJECTION, SOLUTION INTRAMUSCULAR; INTRAVENOUS at 20:03

## 2018-02-11 RX ADMIN — FUROSEMIDE 20 MG: 20 TABLET ORAL at 08:24

## 2018-02-11 RX ADMIN — IPRATROPIUM BROMIDE AND ALBUTEROL SULFATE 3 ML: .5; 3 SOLUTION RESPIRATORY (INHALATION) at 07:36

## 2018-02-11 RX ADMIN — ASPIRIN 81 MG 81 MG: 81 TABLET ORAL at 08:24

## 2018-02-11 RX ADMIN — IPRATROPIUM BROMIDE AND ALBUTEROL SULFATE 3 ML: .5; 3 SOLUTION RESPIRATORY (INHALATION) at 21:10

## 2018-02-11 RX ADMIN — HEPARIN SODIUM 5000 UNITS: 5000 INJECTION, SOLUTION INTRAVENOUS; SUBCUTANEOUS at 05:02

## 2018-02-11 RX ADMIN — HEPARIN SODIUM 5000 UNITS: 5000 INJECTION, SOLUTION INTRAVENOUS; SUBCUTANEOUS at 17:47

## 2018-02-11 RX ADMIN — METOPROLOL TARTRATE 25 MG: 25 TABLET, FILM COATED ORAL at 08:24

## 2018-02-11 RX ADMIN — FUROSEMIDE 20 MG: 20 TABLET ORAL at 17:47

## 2018-02-11 RX ADMIN — FLUTICASONE PROPIONATE AND SALMETEROL 1 PUFF: 50; 100 POWDER RESPIRATORY (INHALATION) at 21:04

## 2018-02-11 RX ADMIN — IPRATROPIUM BROMIDE AND ALBUTEROL SULFATE 3 ML: .5; 3 SOLUTION RESPIRATORY (INHALATION) at 15:01

## 2018-02-11 RX ADMIN — OXYCODONE HYDROCHLORIDE 5 MG: 5 SOLUTION ORAL at 12:53

## 2018-02-11 RX ADMIN — HEPARIN SODIUM 5000 UNITS: 5000 INJECTION, SOLUTION INTRAVENOUS; SUBCUTANEOUS at 21:02

## 2018-02-11 RX ADMIN — CEFTRIAXONE 1000 MG: 1 INJECTION, SOLUTION INTRAVENOUS at 17:46

## 2018-02-11 RX ADMIN — PANTOPRAZOLE SODIUM 40 MG: 40 TABLET, DELAYED RELEASE ORAL at 05:02

## 2018-02-11 RX ADMIN — METOPROLOL TARTRATE 25 MG: 25 TABLET, FILM COATED ORAL at 20:03

## 2018-02-11 RX ADMIN — OXYCODONE HYDROCHLORIDE 5 MG: 5 SOLUTION ORAL at 20:03

## 2018-02-11 RX ADMIN — LEVOTHYROXINE SODIUM 50 MCG: 50 TABLET ORAL at 05:02

## 2018-02-11 RX ADMIN — PREDNISONE 20 MG: 20 TABLET ORAL at 12:53

## 2018-02-11 NOTE — PROGRESS NOTES
Progress Note - Cardiology     Chandra Cons 74 y o  female MRN: [id number]  Unit/Bed#: [facility]01 Encounter: [id number]      Subjective:   SOB better  Patient denies chest pain, palpitations, lightheadedness  Objective:   Vitals:  Vitals:    02/11/18 1503   BP: 119/61   Pulse: 86   Resp: 18   Temp: 97 7 °F (36 5 °C)   SpO2: 100%       Body mass index is 29 8 kg/m²  Systolic (93OMW), VÍCTOR:173 , Min:104 , DBV:349     Diastolic (03KHB), KVV:50, Min:54, Max:61      Intake/Output Summary (Last 24 hours) at 02/11/18 1617  Last data filed at 02/10/18 1730   Gross per 24 hour   Intake              420 ml   Output              150 ml   Net              270 ml     Weight (last 2 days)     Date/Time   Weight    02/09/18 2017  73 9 (162 92)    02/09/18 1302  73 9 (163)              PHYSICAL EXAM:  General: Patient is not in acute distress  Laying comfortably in the bed  Awake, alert, responding to commands  Head: Normocephalic  Atraumatic  HEENT: Both pupils normal sized, round and reactive to light  Nonicteric  Neck: JVP not raised  Trachea central  No thyromegaly  Respiratory: Bilateral bronchovascular breath sounds with no crackles or rhonchi  Cardiovascular: RRR  S1 and S2 normal  No murmur, rub or gallop  GI: Abdomen soft, nontender  No guarding or rigidity  Liver and spleen not palpable  Bowel sounds present  Neurologic: Patient is awake, alert, responding to command   Moving all extremities  Integumentary:  No skin rash  Lymphatic: No cervical lymphadenopathy  Extremities: No clubbing, cyanosis or edema      LABORATORY RESULTS:    Results from last 7 days  Lab Units 02/10/18  0526 02/09/18 2034 02/09/18  1743   TROPONIN I ng/mL <0 02 <0 02 <0 02     CBC with diff:   Results from last 7 days  Lab Units 02/11/18  0445 02/10/18  0526 02/09/18 2034 02/09/18  1352   WBC Thousand/uL 15 18* 9 13  --  7 19   HEMOGLOBIN g/dL 12 5 13 7  --  14 8   HEMATOCRIT % 38 1 41 3  --  45 6   MCV fL 85 84  --  86   PLATELETS Thousands/uL 261 260 261 249   MCH pg 28 0 28 0  --  28 0   MCHC g/dL 32 8 33 2  --  32 5   RDW % 13 1 12 8  --  12 9   MPV fL 9 3 9 2 9 1 8 9   NRBC AUTO /100 WBCs 0  --   --  0       CMP:  Results from last 7 days  Lab Units 02/11/18  0445 02/10/18  0526 02/09/18  1352   SODIUM mmol/L 140 138 144   POTASSIUM mmol/L 4 3 3 5 3 4*   CHLORIDE mmol/L 102 99* 103   CO2 mmol/L 28 26 33*   ANION GAP mmol/L 10 13 8   BUN mg/dL 17 13 11   CREATININE mg/dL 1 04 1 13 0 96   GLUCOSE RANDOM mg/dL 264* 277* 126   CALCIUM mg/dL 9 1 9 1 8 9   AST U/L  --  22 31   ALT U/L  --  36 41   ALK PHOS U/L  --  58 61   TOTAL PROTEIN g/dL  --  7 3 7 5   BILIRUBIN TOTAL mg/dL  --  0 60 0 70   EGFR ml/min/1 73sq m 57 51 62       BMP:  Results from last 7 days  Lab Units 02/11/18  0445 02/10/18  0526 02/09/18  1352   SODIUM mmol/L 140 138 144   POTASSIUM mmol/L 4 3 3 5 3 4*   CHLORIDE mmol/L 102 99* 103   CO2 mmol/L 28 26 33*   BUN mg/dL 17 13 11   CREATININE mg/dL 1 04 1 13 0 96   GLUCOSE RANDOM mg/dL 264* 277* 126   CALCIUM mg/dL 9 1 9 1 8 9         Results from last 7 days  Lab Units 02/09/18  1352   NT-PRO BNP pg/mL 185*      Results from last 7 days  Lab Units 02/09/18  2034   TSH 3RD GENERATON uIU/mL 2 066         Results from last 7 days  Lab Units 02/09/18  1352   INR  0 91       Lipid Profile:   Lab Results   Component Value Date    CHOL 235 (H) 02/11/2018     Lab Results   Component Value Date    HDL 52 02/11/2018     Lab Results   Component Value Date    LDLCALC 160 (H) 02/11/2018     Lab Results   Component Value Date    TRIG 115 02/11/2018       Cardiac testing:   Results for orders placed during the hospital encounter of 02/09/18   Echo complete with contrast if indicated    Narrative [address] [place] [address] [place], [place] [address]  [phone]    Transthoracic Echocardiogram  Limited 2D, M-mode, Doppler, and Color Doppler    Study date:  11-Feb-2018    Patient: Cindi Thomas  MR number: [id number]  Account number: [de-identified]  : 1950  Age: 74 years  Gender: Female  Status: Inpatient  Location: Bedside  Height: 62 in  Weight: 162 lb  BP: 133/ 60 mmHg    Indications: Aortic valve disorder  Diagnoses: I35 0 - Nonrheumatic aortic (valve) stenosis    Sonographer:  Mckinney RCS  Interpreting Physician:  Lani Westbrook MD  Referring Physician:  Lani Westbrook MD  Group:  [facility]    SUMMARY    PROCEDURE INFORMATION:  This was a technically difficult study  LEFT VENTRICLE:  Systolic function was normal  Ejection fraction was estimated to be 60 %  There were no regional wall motion abnormalities  Doppler parameters were consistent with abnormal left ventricular relaxation (grade 1 diastolic dysfunction)  RIGHT VENTRICLE:  The size was normal   Systolic function was normal     MITRAL VALVE:  There was mild annular calcification  AORTIC VALVE:  Pt is s/p TAVR  The valve was not well visualized  The peak and mean gradients across the LVOT and aortic valve as measured by CW Doppler were 63 and 18 mm Hg respectively  TRICUSPID VALVE:  There was trace regurgitation  Pulmonary artery systolic pressure was within the normal range  HISTORY: PRIOR HISTORY: TAVR    COPD  CAD  PRIOR PROCEDURES: Bioprosthesis of aortic valve  PROCEDURE: The procedure was performed at the bedside  This was a routine study  The transthoracic approach was used  The study included limited 2D imaging, M-mode, complete spectral Doppler, and color Doppler  The heart rate was 100 bpm,  at the start of the study  Images were obtained from the parasternal, apical, subcostal, and suprasternal notch acoustic windows  This was a technically difficult study  LEFT VENTRICLE: Size was normal  Systolic function was normal  Ejection fraction was estimated to be 60 %  There were no regional wall motion abnormalities  Wall thickness was normal  No evidence of apical thrombus  DOPPLER: Doppler  parameters were consistent with abnormal left ventricular relaxation (grade 1 diastolic dysfunction)  RIGHT VENTRICLE: The size was normal  Systolic function was normal  Wall thickness was normal     LEFT ATRIUM: Size was normal     RIGHT ATRIUM: Size was normal     MITRAL VALVE: There was mild annular calcification  There was normal leaflet separation  DOPPLER: The transmitral velocity was within the normal range  There was no evidence for stenosis  There was no significant regurgitation  AORTIC VALVE: Pt is s/p TAVR  The valve was not well visualized  The peak and mean gradients across the LVOT and aortic valve as measured by CW Doppler were 63 and 18 mm Hg respectively  DOPPLER: There was no significant regurgitation  TRICUSPID VALVE: The valve structure was normal  There was normal leaflet separation  DOPPLER: The transtricuspid velocity was within the normal range  There was no evidence for stenosis  There was trace regurgitation  Pulmonary artery  systolic pressure was within the normal range  PULMONIC VALVE: Leaflets exhibited normal thickness, no calcification, and normal cuspal separation  DOPPLER: The transpulmonic velocity was within the normal range  There was no significant regurgitation  PERICARDIUM: There was no pericardial effusion  The pericardium was normal in appearance  AORTA: The root exhibited normal size  SYSTEMIC VEINS: IVC: The inferior vena cava was normal in size  SYSTEM MEASUREMENT TABLES    2D  LA Area: 12 8 cm2  LVEDV MOD A4C: 54 8 ml  LVEF MOD A4C: 72 8 %  LVESV MOD A4C: 14 9 ml  LVLd A4C: 6 9 cm  LVLs A4C: 6 cm  RA Area: 8 cm2  RVIDd: 2 7 cm  SV MOD A4C: 39 9 ml    CW  AV Env  Ti: 306 4 ms  AV VTI: 57 4 cm  AV Vmax: 0 m/s  AV Vmax: 3 8 m/s  AV Vmean: 1 9 m/s  AV maxP mmHg  AV maxP mmHg  AV meanP mmHg  MV A Denzel: 2 1 m/s  MV Dec Addison: 6 5 m/s2  MV DecT: 250 ms  MV E Denzel: 1 6 m/s  MV E/A Ratio: 0 8  MV PHT: 72 5 ms  MVA By PHT: 3 cm2  TR Vmax: 2 3 m/s  TR maxP 9 mmHg    MM  TAPSE: 1 7 cm    PW  E': 0 1 m/s  E/E': 25 4    [employer] Accredited Echocardiography Laboratory    Prepared and electronically signed by    Phyllis Vazquez MD  Signed 2018 14:39:49       No results found for this or any previous visit  No results found for this or any previous visit  No procedure found  No results found for this or any previous visit  Imaging: I have personally reviewed pertinent reports  Procedure: Xr Chest Pa & Lateral    Result Date: 2018  Narrative: CHEST - DUAL ENERGY INDICATION:  Cough, shortness of breath and chest pains COMPARISON:  None VIEWS:  PA (including soft tissue/bone algorithms) and lateral projections IMAGES:  4 FINDINGS: Cardiomediastinal silhouette appears unremarkable  A prosthetic aortic valve is present  No evidence of heart failure  The lungs are clear  No pneumothorax or pleural effusion  Surgical clips in the right paratracheal region  Visualized osseous structures appear within normal limits for the patient's age  Impression: No active pulmonary disease  Workstation performed: [id number]     Procedure: Cta Ed Chest Pe Study    Result Date: 2018  Narrative: CTA - CHEST WITH IV CONTRAST - PULMONARY ANGIOGRAM INDICATION: Chest pain  Shortness of breath  COMPARISON: None  TECHNIQUE: CTA examination of the chest was performed using angiographic technique according to a protocol specifically tailored to evaluate for pulmonary embolism  Reformatted images were created in axial, sagittal, and coronal planes  In addition, coronal 3D MIP postprocessing was performed on the acquisition scanner  Radiation dose length product (DLP) for this visit:  542 mGy-cm     This examination, like all CT scans performed in the [facility], was performed utilizing techniques to minimize radiation dose exposure, including the use of iterative reconstruction and automated exposure control  IV Contrast:  85 mL of iohexol (OMNIPAQUE)  FINDINGS: Diffuse atherosclerotic plaque throughout the aorta and its branches  PULMONARY ARTERIAL TREE:  No pulmonary embolus is seen  LUNGS:  Diffuse lung emphysematous changes  8 mm subpleural nodule right lower lobe series 3 image 45  Diffuse bronchial wall thickening could relate to bronchitis  PLEURA:  Unremarkable  HEART/AORTA:  Diffuse atherosclerotic plaque throughout the aorta and its branches  There is an aortic valve prosthetic device  4 2 x 4 cm ascending aortic aneurysm  MEDIASTINUM AND MARIELA:  Unremarkable  CHEST WALL AND LOWER NECK: Normal  VISUALIZED STRUCTURES IN THE UPPER ABDOMEN:  Hepatic steatosis  OSSEOUS STRUCTURES:  No acute fracture or destructive osseous lesion  Impression: 1  No evidence of pulmonary metabolism  2   4 2 x 4 cm ascending aortic aneurysm  3   Diffuse emphysematous changes  4   8 mm subpleural nodule right lower lobe Based on current Fleischner Society 2017 Guidelines on incidental pulmonary nodule, followup non-contrast CT is recommended at 6 months from the initial examination and, if stable at that time, an additional followup is recommended for 18-24 months from the initial examination  5   Diffuse bronchial wall thickening could relate to bronchitis   Workstation performed: [id number]         Meds/Allergies   current meds:   Current Facility-Administered Medications   Medication Dose Route Frequency    albuterol (PROVENTIL HFA,VENTOLIN HFA) inhaler 2 puff  2 puff Inhalation Q4H PRN    aluminum-magnesium hydroxide-simethicone (MYLANTA) 200-200-20 mg/5 mL oral suspension 30 mL  30 mL Oral Q6H PRN    aspirin chewable tablet 81 mg  81 mg Oral Daily    cefTRIAXone (ROCEPHIN) IVPB (premix) 1,000 mg  1,000 mg Intravenous Q24H    cyclobenzaprine (FLEXERIL) tablet 10 mg  10 mg Oral TID PRN    diphenhydrAMINE (BENADRYL) injection 25 mg  25 mg Intravenous HS PRN    docusate sodium (COLACE) capsule 100 mg  100 mg Oral BID PRN    fluticasone-salmeterol (ADVAIR) 100-50 mcg/dose inhaler 1 puff  1 puff Inhalation Q12H [address]    furosemide (LASIX) tablet 20 mg  20 mg Oral BID    heparin (porcine) subcutaneous injection 5,000 Units  5,000 Units Subcutaneous Q8H [address]    ipratropium-albuterol (DUO-NEB) 0 5-2 5 mg/3 mL inhalation solution 3 mL  3 mL Nebulization TID    levothyroxine tablet 50 mcg  50 mcg Oral Early Morning    metoprolol tartrate (LOPRESSOR) tablet 25 mg  25 mg Oral Q12H [address]    ondansetron (ZOFRAN) injection 4 mg  4 mg Intravenous Q6H PRN    oxyCODONE (ROXICODONE) oral solution 5 mg  5 mg Oral Q4H PRN    pantoprazole (PROTONIX) EC tablet 40 mg  40 mg Oral Daily Before Breakfast    predniSONE tablet 20 mg  20 mg Oral Daily     Prescriptions Prior to Admission   Medication    albuterol (PROVENTIL HFA,VENTOLIN HFA) 90 mcg/act inhaler    aspirin 81 mg chewable tablet    co-enzyme Q-10 30 MG capsule    cyclobenzaprine (FLEXERIL) 5 mg tablet    furosemide (LASIX) 20 mg tablet    ibuprofen (MOTRIN) 600 mg tablet    levothyroxine 50 mcg tablet    metoprolol tartrate (LOPRESSOR) 25 mg tablet    oxyCODONE-acetaminophen (ROXICET) 5-325 mg/5 mL solution    pantoprazole (PROTONIX) 40 mg tablet            Assessment and Plan:    1  Chest pain - troponins negative x3  Echocardiogram with normal EF and no RWMA  Scheduled for stress test tomorrow  Continue aspirin, metoprolol  Patient has allergy to statins      2  Dyspnea - likely multifactorial could be related to CAD, could have a component of COPD/acute bronchitis  Continue nebs/steroids  Echocardiogram reviewed  Stress test pending      3  History of aortic valve replacement, porcine valve, done in 2013 without recent cardiology follow-up  Increased gradient seen across the LVOT and aortic valve  Limited echo tomorrow to localize the exact site of pressure gradient       4    Ascending aortic aneurysm measuring 4 2 x 4 cm as seen on CT scan-unclear if this is new   Continue BB  Continue good blood pressure control      5  Hypertension--stable  Continue current medications       ** Please Note: Dictation voice to text software may have been used in the creation of this document   **

## 2018-02-11 NOTE — PHYSICIAN ADVISOR
Current patient class: Inpatient  The patient is currently on Hospital Day: 2      The patient was admitted to the hospital at 1708 on 2/9/18 for the following diagnosis:  Chest pain [R07 9]  Dyspnea [R06 00]  SOB (shortness of breath) [R06 02]  Bronchitis [J40]  Abnormal EKG [R94 31]  COPD exacerbation (HCC) [J44 1]       There is documentation in the medical record of an expected length of stay of at least 2 midnights  The patient is therefore expected to satisfy the 2 midnight benchmark and given the 2 midnight presumption is appropriate for INPATIENT ADMISSION  Given this expectation of a satisfying stay, CMS instructs us that the patient is most often appropriate for inpatient admission under part A provided medical necessity is documented in the chart  After review of the relevant documentation, labs, vital signs and test results, the patient is appropriate for INPATIENT ADMISSION  Admission to the hospital as an inpatient is a complex decision making process which requires the practitioner to consider the patients presenting complaint, history and physical examination and all relevant testing  With this in mind, in this case, the patient was deemed appropriate for INPATIENT ADMISSION  After review of the documentation and testing available at the time of the admission I concur with this clinical determination of medical necessity  Rationale is as follows:     The patient is a 74 yrs old Female who presented to the ED at 2/9/2018  1:17 PM with a chief complaint of Shortness of Breath (Pt with shortness of breath for about 2 weeks, and also a UTI )    The patients vitals on arrival were ED Triage Vitals [02/09/18 1302]   Temperature Pulse Respirations Blood Pressure SpO2   97 8 °F (36 6 °C) 104 18 (!) 190/87 98 %      Temp Source Heart Rate Source Patient Position - Orthostatic VS BP Location FiO2 (%)   Oral Monitor Sitting Left arm --      Pain Score       No Pain           Past Medical History: Diagnosis Date    Cardiac disease     Rheumatic fever      Past Surgical History:   Procedure Laterality Date    CARDIAC SURGERY      valve replacement            Consults have been placed to:   IP CONSULT TO CARDIOLOGY    Vitals:    02/10/18 1442 02/10/18 1500 02/10/18 1900 02/10/18 1929   BP:  122/73 122/57    BP Location:  Left arm Left arm    Pulse:  97 101    Resp:  18 18    Temp:  97 5 °F (36 4 °C) 97 9 °F (36 6 °C)    TempSrc:  Oral Oral    SpO2: 93% 94% 94% 96%   Weight:       Height:           Most recent labs:    Recent Labs      02/09/18   1352   02/10/18   0526   WBC  7 19   --   9 13   HGB  14 8   --   13 7   HCT  45 6   --   41 3   PLT  249   < >  260   K  3 4*   --   3 5   NA  144   --   138   CALCIUM  8 9   --   9 1   BUN  11   --   13   CREATININE  0 96   --   1 13   LIPASE  136   --    --    INR  0 91   --    --    TROPONINI  <0 02   < >  <0 02   AST  31   --   22   ALT  41   --   36   ALKPHOS  61   --   58   BILITOT  0 70   --   0 60    < > = values in this interval not displayed         Scheduled Meds:  Current Facility-Administered Medications:  albuterol 2 puff Inhalation Q4H PRN Brandon Vallecillo MD    aluminum-magnesium hydroxide-simethicone 30 mL Oral Q6H PRN Deloris Villarreal MD    aspirin 81 mg Oral Daily Deloris Villarreal MD    cefTRIAXone 1,000 mg Intravenous Q24H HAVEN Prince Last Rate: 1,000 mg (02/10/18 1426)   cyclobenzaprine 10 mg Oral TID PRN Deloris Villarreal MD    docusate sodium 100 mg Oral BID Deloris Villarreal MD    fluticasone-salmeterol 1 puff Inhalation Q12H [facility] Deloris Villarreal MD    furosemide 20 mg Oral BID Deloris Villarreal MD    heparin (porcine) 5,000 Units Subcutaneous [facility] Deloris Villarreal MD    ipratropium-albuterol 3 mL Nebulization TID Brandon Vallecillo MD    levothyroxine 50 mcg Oral Early Morning HAVEN Gotti    methylPREDNISolone sodium succinate 40 mg Intravenous Q12H [facility] HAVEN Prince    metoprolol tartrate 25 mg Oral Q12H [facility] Deloris Villarreal MD ondansetron 4 mg Intravenous Q6H PRN Tayo Turner MD    oxyCODONE 5 mg Oral Q4H PRN HAVEN Duvall    pantoprazole 40 mg Oral Daily Before Breakfast Tayo Turner MD      Continuous Infusions:   PRN Meds:   albuterol    aluminum-magnesium hydroxide-simethicone    cyclobenzaprine    ondansetron    oxyCODONE    Surgical procedures (if appropriate):

## 2018-02-11 NOTE — PROGRESS NOTES
[facility] Progress Note  Patient: Puneet Tyler 74 y o  female   MRN: [id number]  PCP: No primary care provider on file  Unit/Bed#: [facility] Encounter: [id number]  Date Of Visit: 02/11/18    Assessment:    Principal Problem:    Acute exacerbation of chronic obstructive pulmonary disease (COPD) (Formerly [facility])  Active Problems:    Chest pain    Hypertension    Morbid obesity due to excess calories (Formerly [facility])    Coronary artery disease    Hypokalemia    Chronic radicular pain of lower back    Urinary tract infection      Plan:      #1ASSESSMENT:  Differential diagnoses will include  - SOB due to    *Asthma COPD acute exacerbation caused by URTI, allergen exposure, medication nonocompliance causing respiratory distress was a hypoxemia  *Bronchitis  *Pneumonia - no infiltrate on CXR/CTA negative for PE  PLAN:  -DuoNeb's q 4 hr and PRN SOB  - Solu-medrol 40 mg IV q 6 hr  - O2 to keep SpO2 higher than 92% (SpO higher than 95% if CAD)  - CBC, BMP in AM  - Sputum Gram stain, C+S  - Robitussin 10 cc PO q 4 hr  - Tylenol 650 mg PO q 4-6 hr PRN pain/fever  - Heparin 5000 U SQ BID  - Home meds - check the list    - Azithromycine 500mg po daily  -May Consider CT chest and ABG if no improvement  -We'll consider obtaining pulmonology consultation if no improvement within a reasonable time  #2ASSESSMENT:  - Chest pain due to  Highly atypical in nature  *Muskuloskeletal CP - myofascial strain, costochondritis  *GERD  *Esophageal spasm      CAD Risk     HTN PVD LDL [address] DM HDL Smoking Age Sedentary  PLAN:  - Serial cardiac enzymes x 3 q 6 hr  - EKG now and in AM  - ASA  - O2 by NC to keep SpO2 greater than 92%  - UA  - Urine toxic screen  - CBC, BMP in AM  - Fasting lipids  - Morphine 2 mg IV q 4- 6hr PRN chest pain  - Tylenol 650 mg PO q 4-6 hr PRN headache  - Home meds (check list)  - Heparin 5000 U SQ BID        #3 UTI  Continue his Rocephin  Awaiting for culture and sensitivity of Escherichia coli    #4 Generalized deconditioning  Due to acute illness  PT OT eval  Nursing instructed to ambulate this patient at least 3 times a day  Patient instructed to be out of bed      VTE Pharmacologic Prophylaxis:   Pharmacologic: Heparin  Mechanical VTE Prophylaxis in Place: Yes    Patient Centered Rounds: I have performed bedside rounds with nursing staff today  Discussions with Specialists or Other Care Team Provider: S    Education and Discussions with Family / Patient: S    Time Spent for Care: 45 minutes  More than 50% of total time spent on counseling and coordination of care as described above  Current Length of Stay: 2 day(s)    Current Patient Status: Inpatient   Certification Statement: The patient will continue to require additional inpatient hospital stay due to COPD exacerbation    Discharge Plan / Estimated Discharge Date: 1-2 days If clinical course allows/1 clinical course allows    Code Status: Level 1 - Full Code      Subjective:   "Feeling slightly better today"  Afebrile stable overnight    Objective:     Vitals:   Temp (24hrs), Av °F (36 7 °C), Min:97 5 °F (36 4 °C), Max:98 2 °F (36 8 °C)    HR:  [] 96  Resp:  [18] 18  BP: (106-136)/(54-73) 136/60  SpO2:  [93 %-97 %] 95 %  Body mass index is 29 8 kg/m²  Input and Output Summary (last 24 hours): Intake/Output Summary (Last 24 hours) at 18 1007  Last data filed at 02/10/18 1730   Gross per 24 hour   Intake              420 ml   Output              150 ml   Net              270 ml       Physical Exam:     Physical Exam  GENERAL APPEARANCE: WD/WN in NAD pleasant  SKIN: no rash  HEENT: NC/AT, PERRLA (B), moist MM, no epistaxis  NECK: Supple, no JVD    LUNGS: CTA (B) mildly prolonged expiratory phase,   no use of accessory muscles    HEART:          S1S 2, RRR  , PMI is not displaced  ABDOMEN: Soft, nontender, nondistended, +BS  Rectal exam:  EXTREMITIES: no edema   PERIPHERAL VASCULAR: palpable pulses   NEURO:  AAO x 3, CN 2-12: non focal  MUSCLE STRENGHT: 5/5 (B), SENSATION: nonfocal  DTR: ++, CEREBELLAR: non focal  Additional Data:     Labs:      Results from last 7 days  Lab Units 02/11/18  0445   WBC Thousand/uL 15 18*   HEMOGLOBIN g/dL 12 5   HEMATOCRIT % 38 1   PLATELETS Thousands/uL 261   NEUTROS PCT % 85*   LYMPHS PCT % 10*   MONOS PCT % 5   EOS PCT % 0       Results from last 7 days  Lab Units 02/11/18  0445 02/10/18  0526   SODIUM mmol/L 140 138   POTASSIUM mmol/L 4 3 3 5   CHLORIDE mmol/L 102 99*   CO2 mmol/L 28 26   BUN mg/dL 17 13   CREATININE mg/dL 1 04 1 13   CALCIUM mg/dL 9 1 9 1   TOTAL PROTEIN g/dL  --  7 3   BILIRUBIN TOTAL mg/dL  --  0 60   ALK PHOS U/L  --  58   ALT U/L  --  36   AST U/L  --  22   GLUCOSE RANDOM mg/dL 264* 277*       Results from last 7 days  Lab Units 02/09/18  1352   INR  0 91       * I Have Reviewed All Lab Data Listed Above  * Additional Pertinent Lab Tests Reviewed:  [facility] 68 Admission Reviewed    Imaging:    Imaging Reports Reviewed Today Include: Yes  Imaging Personally Reviewed by Myself Includes:  Yes    Recent Cultures (last 7 days):       Results from last 7 days  Lab Units 02/09/18  1417   URINE CULTURE  >100,000 cfu/ml Gram Negative [place] resembling Escherichia coli*   INFLUENZA A PCR  None Detected   INFLUENZA B PCR  None Detected   RSV PCR  None Detected       Last 24 Hours Medication List:     Current Facility-Administered Medications:  albuterol 2 puff Inhalation Q4H PRN Rob Nieto MD    aluminum-magnesium hydroxide-simethicone 30 mL Oral Q6H PRN Shanell Pradhan MD    aspirin 81 mg Oral Daily Shanell Pradhan MD    cefTRIAXone 1,000 mg Intravenous Q24H HAVEN Prince Last Rate: 1,000 mg (02/10/18 1426)   cyclobenzaprine 10 mg Oral TID PRN Shanell Pradhan MD    docusate sodium 100 mg Oral BID Shanell Pradhan MD    fluticasone-salmeterol 1 puff Inhalation Q12H [address] Shanell Pradhan MD    furosemide 20 mg Oral BID Shanell Pradhan MD    heparin (porcine) 5,000 Units Subcutaneous Q8H Jeannie Johnson MD    ipratropium-albuterol 3 mL Nebulization TID Charlette Sagastume MD    levothyroxine 50 mcg Oral Early Morning HAVEN Rosenberg    methylPREDNISolone sodium succinate 40 mg Intravenous Q12H [address] HAVEN Prince    metoprolol tartrate 25 mg Oral Q12H [address] Sherryle Shells, MD    ondansetron 4 mg Intravenous Q6H PRN Sherryle Shells, MD    oxyCODONE 5 mg Oral Q4H PRN HAVEN Duvall    pantoprazole 40 mg Oral Daily Before Breakfast Sherryle Shells, MD         Today, Patient Was Seen By: Sherryle Shells, MD    ** Please Note: This note has been constructed using a voice recognition system   **

## 2018-02-12 ENCOUNTER — APPOINTMENT (INPATIENT)
Dept: NON INVASIVE DIAGNOSTICS | Facility: HOSPITAL | Age: 68
DRG: 191 | End: 2018-02-12
Payer: MEDICARE

## 2018-02-12 ENCOUNTER — APPOINTMENT (INPATIENT)
Dept: NUCLEAR MEDICINE | Facility: HOSPITAL | Age: 68
DRG: 191 | End: 2018-02-12
Payer: MEDICARE

## 2018-02-12 VITALS
RESPIRATION RATE: 17 BRPM | HEIGHT: 62 IN | BODY MASS INDEX: 29.98 KG/M2 | HEART RATE: 71 BPM | WEIGHT: 162.92 LBS | OXYGEN SATURATION: 94 % | DIASTOLIC BLOOD PRESSURE: 96 MMHG | TEMPERATURE: 97.6 F | SYSTOLIC BLOOD PRESSURE: 123 MMHG

## 2018-02-12 LAB
ANION GAP SERPL CALCULATED.3IONS-SCNC: 7 MMOL/L (ref 4–13)
BASOPHILS # BLD AUTO: 0.01 THOUSANDS/ΜL (ref 0–0.1)
BASOPHILS NFR BLD AUTO: 0 % (ref 0–1)
BUN SERPL-MCNC: 17 MG/DL (ref 5–25)
CALCIUM SERPL-MCNC: 9 MG/DL (ref 8.3–10.1)
CHEST PAIN STATEMENT: NORMAL
CHLORIDE SERPL-SCNC: 102 MMOL/L (ref 100–108)
CO2 SERPL-SCNC: 34 MMOL/L (ref 21–32)
CREAT SERPL-MCNC: 0.94 MG/DL (ref 0.6–1.3)
EOSINOPHIL # BLD AUTO: 0.02 THOUSAND/ΜL (ref 0–0.61)
EOSINOPHIL NFR BLD AUTO: 0 % (ref 0–6)
ERYTHROCYTE [DISTWIDTH] IN BLOOD BY AUTOMATED COUNT: 13.3 % (ref 11.6–15.1)
GFR SERPL CREATININE-BSD FRML MDRD: 64 ML/MIN/1.73SQ M
GLUCOSE SERPL-MCNC: 135 MG/DL (ref 65–140)
HCT VFR BLD AUTO: 40 % (ref 34.8–46.1)
HGB BLD-MCNC: 12.8 G/DL (ref 11.5–15.4)
LYMPHOCYTES # BLD AUTO: 3.68 THOUSANDS/ΜL (ref 0.6–4.47)
LYMPHOCYTES NFR BLD AUTO: 29 % (ref 14–44)
MAX DIASTOLIC BP: 60 MMHG
MAX HEART RATE: 93 BPM
MAX PREDICTED HEART RATE: 155 BPM
MAX. SYSTOLIC BP: 130 MMHG
MCH RBC QN AUTO: 28 PG (ref 26.8–34.3)
MCHC RBC AUTO-ENTMCNC: 32 G/DL (ref 31.4–37.4)
MCV RBC AUTO: 88 FL (ref 82–98)
MONOCYTES # BLD AUTO: 0.76 THOUSAND/ΜL (ref 0.17–1.22)
MONOCYTES NFR BLD AUTO: 6 % (ref 4–12)
NEUTROPHILS # BLD AUTO: 8.11 THOUSANDS/ΜL (ref 1.85–7.62)
NEUTS SEG NFR BLD AUTO: 64 % (ref 43–75)
NRBC BLD AUTO-RTO: 0 /100 WBCS
PLATELET # BLD AUTO: 257 THOUSANDS/UL (ref 149–390)
PMV BLD AUTO: 9.3 FL (ref 8.9–12.7)
POTASSIUM SERPL-SCNC: 3.2 MMOL/L (ref 3.5–5.3)
PROTOCOL NAME: NORMAL
RBC # BLD AUTO: 4.57 MILLION/UL (ref 3.81–5.12)
REASON FOR TERMINATION: NORMAL
SODIUM SERPL-SCNC: 143 MMOL/L (ref 136–145)
TARGET HR FORMULA: NORMAL
TEST INDICATION: NORMAL
TIME IN EXERCISE PHASE: NORMAL
WBC # BLD AUTO: 12.69 THOUSAND/UL (ref 4.31–10.16)

## 2018-02-12 PROCEDURE — 78452 HT MUSCLE IMAGE SPECT MULT: CPT

## 2018-02-12 PROCEDURE — 99239 HOSP IP/OBS DSCHRG MGMT >30: CPT | Performed by: INTERNAL MEDICINE

## 2018-02-12 PROCEDURE — 78452 HT MUSCLE IMAGE SPECT MULT: CPT | Performed by: INTERNAL MEDICINE

## 2018-02-12 PROCEDURE — 99232 SBSQ HOSP IP/OBS MODERATE 35: CPT | Performed by: INTERNAL MEDICINE

## 2018-02-12 PROCEDURE — A9502 TC99M TETROFOSMIN: HCPCS

## 2018-02-12 PROCEDURE — G8979 MOBILITY GOAL STATUS: HCPCS

## 2018-02-12 PROCEDURE — 80048 BASIC METABOLIC PNL TOTAL CA: CPT | Performed by: INTERNAL MEDICINE

## 2018-02-12 PROCEDURE — 93018 CV STRESS TEST I&R ONLY: CPT | Performed by: INTERNAL MEDICINE

## 2018-02-12 PROCEDURE — 93016 CV STRESS TEST SUPVJ ONLY: CPT | Performed by: INTERNAL MEDICINE

## 2018-02-12 PROCEDURE — 93017 CV STRESS TEST TRACING ONLY: CPT

## 2018-02-12 PROCEDURE — 94640 AIRWAY INHALATION TREATMENT: CPT

## 2018-02-12 PROCEDURE — 93308 TTE F-UP OR LMTD: CPT

## 2018-02-12 PROCEDURE — 97162 PT EVAL MOD COMPLEX 30 MIN: CPT

## 2018-02-12 PROCEDURE — 94760 N-INVAS EAR/PLS OXIMETRY 1: CPT

## 2018-02-12 PROCEDURE — 85025 COMPLETE CBC W/AUTO DIFF WBC: CPT | Performed by: INTERNAL MEDICINE

## 2018-02-12 PROCEDURE — G8978 MOBILITY CURRENT STATUS: HCPCS

## 2018-02-12 RX ORDER — POTASSIUM CHLORIDE 20 MEQ/1
40 TABLET, EXTENDED RELEASE ORAL ONCE
Status: DISCONTINUED | OUTPATIENT
Start: 2018-02-12 | End: 2018-02-12 | Stop reason: HOSPADM

## 2018-02-12 RX ORDER — POTASSIUM CHLORIDE 20 MEQ/1
40 TABLET, EXTENDED RELEASE ORAL ONCE
Status: COMPLETED | OUTPATIENT
Start: 2018-02-12 | End: 2018-02-12

## 2018-02-12 RX ORDER — PREDNISONE 20 MG/1
20 TABLET ORAL DAILY
Qty: 5 TABLET | Refills: 0 | Status: SHIPPED | OUTPATIENT
Start: 2018-02-13 | End: 2018-02-18

## 2018-02-12 RX ORDER — FUROSEMIDE 20 MG/1
20 TABLET ORAL 2 TIMES DAILY
Qty: 30 TABLET | Refills: 0 | Status: ON HOLD | OUTPATIENT
Start: 2018-02-12 | End: 2018-09-26

## 2018-02-12 RX ADMIN — FUROSEMIDE 20 MG: 20 TABLET ORAL at 09:05

## 2018-02-12 RX ADMIN — HEPARIN SODIUM 5000 UNITS: 5000 INJECTION, SOLUTION INTRAVENOUS; SUBCUTANEOUS at 14:08

## 2018-02-12 RX ADMIN — LEVOTHYROXINE SODIUM 50 MCG: 50 TABLET ORAL at 05:04

## 2018-02-12 RX ADMIN — IPRATROPIUM BROMIDE AND ALBUTEROL SULFATE 3 ML: .5; 3 SOLUTION RESPIRATORY (INHALATION) at 14:24

## 2018-02-12 RX ADMIN — PREDNISONE 20 MG: 20 TABLET ORAL at 09:05

## 2018-02-12 RX ADMIN — CEFTRIAXONE 1000 MG: 1 INJECTION, SOLUTION INTRAVENOUS at 14:08

## 2018-02-12 RX ADMIN — ASPIRIN 81 MG 81 MG: 81 TABLET ORAL at 09:05

## 2018-02-12 RX ADMIN — OXYCODONE HYDROCHLORIDE 5 MG: 5 SOLUTION ORAL at 14:10

## 2018-02-12 RX ADMIN — IPRATROPIUM BROMIDE AND ALBUTEROL SULFATE 3 ML: .5; 3 SOLUTION RESPIRATORY (INHALATION) at 08:43

## 2018-02-12 RX ADMIN — PANTOPRAZOLE SODIUM 40 MG: 40 TABLET, DELAYED RELEASE ORAL at 05:05

## 2018-02-12 RX ADMIN — POTASSIUM CHLORIDE 40 MEQ: 1500 TABLET, EXTENDED RELEASE ORAL at 09:05

## 2018-02-12 RX ADMIN — METOPROLOL TARTRATE 25 MG: 25 TABLET, FILM COATED ORAL at 09:05

## 2018-02-12 RX ADMIN — REGADENOSON 0.4 MG: 0.08 INJECTION, SOLUTION INTRAVENOUS at 10:50

## 2018-02-12 RX ADMIN — HEPARIN SODIUM 5000 UNITS: 5000 INJECTION, SOLUTION INTRAVENOUS; SUBCUTANEOUS at 05:04

## 2018-02-12 NOTE — PLAN OF CARE
Problem: Potential for Falls  Goal: Patient will remain free of falls  INTERVENTIONS:  - Assess patient frequently for physical needs  -  Identify cognitive and physical deficits and behaviors that affect risk of falls    -  [place] fall precautions as indicated by assessment   - Educate patient/family on patient safety including physical limitations  - Instruct patient to call for assistance with activity based on assessment  - Modify environment to reduce risk of injury  - Consider OT/PT consult to assist with strengthening/mobility   Outcome: Progressing      Problem: PAIN - ADULT  Goal: Verbalizes/displays adequate comfort level or baseline comfort level  Interventions:  - Encourage patient to monitor pain and request assistance  - Assess pain using appropriate pain scale  - Administer analgesics based on type and severity of pain and evaluate response  - Implement non-pharmacological measures as appropriate and evaluate response  - Consider cultural and social influences on pain and pain management  - Notify physician/advanced practitioner if interventions unsuccessful or patient reports new pain   Outcome: Progressing      Problem: INFECTION - ADULT  Goal: Absence or prevention of progression during hospitalization  INTERVENTIONS:  - Assess and monitor for signs and symptoms of infection  - Monitor lab/diagnostic results  - Monitor all insertion sites, i e  indwelling lines, tubes, and drains  - Monitor endotracheal (as able) and nasal secretions for changes in amount and color  - [place] appropriate cooling/warming therapies per order  - Administer medications as ordered  - Instruct and encourage patient and family to use good hand hygiene technique  - Identify and instruct in appropriate isolation precautions for identified infection/condition   Outcome: Progressing    Goal: Absence of fever/infection during neutropenic period  INTERVENTIONS:  - Monitor WBC  - Implement neutropenic guidelines   Outcome: Progressing      Problem: SAFETY ADULT  Goal: Maintain or return to baseline ADL function  INTERVENTIONS:  -  Assess patient's ability to carry out ADLs; assess patient's baseline for ADL function and identify physical deficits which impact ability to perform ADLs (bathing, care of mouth/teeth, toileting, grooming, dressing, etc )  - Assess/evaluate cause of self-care deficits   - Assess range of motion  - Assess patient's mobility; develop plan if impaired  - Assess patient's need for assistive devices and provide as appropriate  - Encourage maximum independence but intervene and supervise when necessary  ¯ Involve family in performance of ADLs  ¯ Assess for home care needs following discharge   ¯ Request OT consult to assist with ADL evaluation and planning for discharge  ¯ Provide patient education as appropriate   Outcome: Progressing    Goal: Maintain or return mobility status to optimal level  INTERVENTIONS:  - Assess patient's baseline mobility status (ambulation, transfers, stairs, etc )    - Identify cognitive and physical deficits and behaviors that affect mobility  - Identify mobility aids required to assist with transfers and/or ambulation (gait belt, sit-to-stand, lift, walker, cane, etc )  - [place] fall precautions as indicated by assessment  - Record patient progress and toleration of activity level on Mobility SBAR; progress patient to next Phase/Stage  - Instruct patient to call for assistance with activity based on assessment  - Request Rehabilitation consult to assist with strengthening/weightbearing, etc    Outcome: Progressing      Problem: DISCHARGE PLANNING  Goal: Discharge to home or other facility with appropriate resources  INTERVENTIONS:  - Identify barriers to discharge w/patient and caregiver  - Arrange for needed discharge resources and transportation as appropriate  - Identify discharge learning needs (meds, wound care, etc )  - Arrange for interpretive services to assist at discharge as needed  - Refer to Case Management Department for coordinating discharge planning if the patient needs post-hospital services based on physician/advanced practitioner order or complex needs related to functional status, cognitive ability, or social support system   Outcome: Progressing      Problem: Knowledge Deficit  Goal: Patient/family/caregiver demonstrates understanding of disease process, treatment plan, medications, and discharge instructions  Complete learning assessment and assess knowledge base    Interventions:  - Provide teaching at level of understanding  - Provide teaching via preferred learning methods   Outcome: Progressing      Problem: DISCHARGE PLANNING - CARE MANAGEMENT  Goal: Discharge to post-acute care or home with appropriate resources  INTERVENTIONS:  - Conduct assessment to determine patient/family and health care team treatment goals, and need for post-acute services based on payer coverage, community resources, and patient preferences, and barriers to discharge  - Address psychosocial, clinical, and financial barriers to discharge as identified in assessment in conjunction with the patient/family and health care team  - Arrange appropriate level of post-acute services according to patients   needs and preference and payer coverage in collaboration with the physician and health care team  - Communicate with and update the patient/family, physician, and health care team regarding progress on the discharge plan  - Arrange appropriate transportation to post-acute venues   Outcome: Progressing

## 2018-02-12 NOTE — PHYSICAL THERAPY NOTE
Physical Therapy Evaluation    Patient's Name: Nilton Harper    Admitting Diagnosis  Chest pain [R07 9]  Dyspnea [R06 00]  SOB (shortness of breath) [R06 02]  Bronchitis [J40]  Abnormal EKG [R94 31]  COPD exacerbation (HCC) [J44 1]    Problem List  Patient Active Problem List   Diagnosis    Acute exacerbation of chronic obstructive pulmonary disease (COPD) ([facility] [address] [facility]    Chest pain    Hypertension    Morbid obesity due to excess calories ([facility] [address] )    Coronary artery disease    Hypokalemia    Chronic radicular pain of lower back    Urinary tract infection       Past Medical History  Past Medical History:   Diagnosis Date    Cardiac disease     Rheumatic fever        Past Surgical History  Past Surgical History:   Procedure Laterality Date    CARDIAC SURGERY      valve replacement       02/12/18 1416   Note Type   Note type Eval only   Pain Assessment   Pain Assessment 0-10   Pain Score 5   Pain Location Chest  ("where they did the ECHO")   Home Living   Type of [address] [place] [address] Two level;Stairs to enter with rails;Bed/bath upstairs  (bedroom upstairs, also full bath on 1st floor, 4 OBDULIA)   Bathroom Shower/Tub Walk-in shower  (has both tub/shower and walk in shower)   Bathroom Toilet Standard   Bathroom Equipment Grab bars in shower  (no DME at baseline)   [address] (no AD at baseline)   Prior Function   Level of [place] Independent with ADLs and functional mobility   Lives With Family;Daughter  (sister and dtr- sister also retired)   Receives Help From [email or web address]  (per pt)   IADLs Needs assistance  (sister performs at baseline)   Falls in the last 6 months 0  ((+) fall history, none recently)   Vocational Retired   Comments (+) driving   Restrictions/Precautions   [place] Bearing Precautions Per Order No   Braces or Orthoses (none per pt)   Other Precautions Telemetry;Pain   General   Family/Caregiver Present No   Cognition   Overall Cognitive Status WFL   Arousal/Participation Alert   Orientation Level Oriented X4   Memory Within functional limits   Following Commands Follows all commands and directions without difficulty   Comments pt agreeable to PT IE   RUE Assessment   RUE Assessment WFL   RUE Strength   RUE Overall Strength Within Functional Limits - able to perform ADL tasks with strength   LUE Assessment   LUE Assessment WFL   LUE Strength   LUE Overall Strength Within Functional Limits - able to perform ADL tasks with strength   RLE Assessment   RLE Assessment WFL  (grossly assessed during functional mobility: at least 3+/5)   LLE Assessment   LLE Assessment WFL  (grossly assessed during functional mobility: at least 3+/5)   Coordination   Movements are Fluid and Coordinated 1   Sensation WFL  (pt denying any numbness/tingling)   Light Touch   RLE Light Touch Grossly intact   LLE Light Touch Grossly intact   Bed Mobility   Rolling R 6  Modified independent   Rolling L 6  Modified independent   Supine to Sit 6  Modified independent   Sit to Supine 6  Modified independent   Transfers   Sit to Stand 6  Modified independent   Stand to Sit 6  Modified independent   Toilet transfer 6  Modified independent   Additional Comments (+) SOB, lowest SpO2 during ambulation 91% on RA   Ambulation/Elevation   Gait pattern Inconsistent nhung   Gait Assistance 5  Supervision   Additional items Assist x 1;Verbal cues  (for pacing)   Assistive Device None   Distance 150' x2   Stair Management Assistance 5  Supervision   Additional items Assist x 1;Verbal cues  (for pacing)   Stair Management Technique One rail L;Alternating pattern; Foreward   Number of Stairs 4   Balance   Static Sitting Good   Dynamic Sitting Good   Static Standing Good   Dynamic Standing Good   Ambulatory Good   Endurance Deficit   Endurance Deficit Yes   Endurance Deficit Description mild SOB/KIRKLAND   Activity Tolerance   Activity Tolerance Patient limited by fatigue   Nurse Made Aware yes, LEIGHANN Jeronimo verbalized pt appropriate to see, made aware of session outcome/recs   Assessment   Prognosis Good   Problem List Decreased endurance;Decreased mobility;Pain;Obesity   Assessment Pt is 74 y o  female seen for PT evaluation on 2/12/2018 s/p admit to [facility] on 2/9/2018 w/ Acute exacerbation of chronic obstructive pulmonary disease (COPD) ([facility] [address] )  Pt presents with worsening SOB  PT consulted to assess pt's functional mobility and d/c needs  Order placed for PT eval and tx, w/ activity as tolerated and bathroom privileges order  Performed at least 2 patient identifiers during session: Name and wristband  Comorbidities affecting pt's physical performance at time of assessment include: h/o tobacco use, HTN, morbid obesity, h/o advanced COPD, rheumatic fever, cardiac disease  PTA, pt was independent w/ all functional mobility w/ no AD/DME, ambulates unrestricted distances and all terrain, has 4 OBDULIA, lives w/ sister and dtr in 2 level home and retired  Personal factors affecting pt at time of IE include: lives in 2 story house, stairs to enter home and positive fall history  Please find objective findings from PT assessment regarding body systems outlined above with impairments and limitations including decreased endurance, gait deviations, decreased activity tolerance and SOB upon exertion, as well as mobility assessment (need for mod I- S assist w/ all phases of mobility when usually ambulating independently and need for cueing for mobility technique)  The following objective measures performed on IE also reveal limitations: Barthel Index: 85/100 and Modified [place]: 2 (slight disability)  Pt's clinical presentation is currently evolving seen in pt's presentation of on telemetry monitoring, unstable medical status and pending further cardiology workup  Pt to benefit from continued PT tx to address deficits as defined above and maximize level of functional independent mobility and consistency   From PT/mobility standpoint, recommendation at time of d/c would be Home PT vs  anticipate no needs pending progress in order to facilitate return to PLOF  Barriers to Discharge None   Goals   Patient Goals to return home   STG Expiration Date 02/22/18   Short Term Goal #1 In 7-10 days: Perform all transfers independently to improve independence, Ambulate > 300 ft  with least restrictive assistive device independently w/o LOB and w/ normalized gait pattern 100% of the time, Navigate 13 stairs independently with unilateral handrail to facilitate return to previous living environment, Increase all balance 1/2 grade to decrease risk for falls, Complete exercise program independently and Improve Barthel Index score to 95 or greater to facilitate independence   Treatment Day 0   Plan   Treatment/Interventions Functional transfer training;LE strengthening/ROM; Elevations; Therapeutic exercise; Endurance training;Patient/family training;Equipment eval/education;Gait training;Spoke to nursing   PT Frequency (3-5x/wk)   Recommendation   Recommendation Home PT; Home with family support  (vs no further PT needs pending progress)   Equipment Recommended (TBD, anticipate none)   PT - OK to Discharge Yes  (when medically cleared)   Modified Adri Scale   Modified [place] Scale 2   Barthel Index   Feeding 10   Bathing 5   Grooming Score 5   Dressing Score 10   Bladder Score 10   Bowels Score 10   Toilet Use Score 10   Transfers (Bed/Chair) Score 10   Mobility (Level Surface) Score 10   Stairs Score 5   Barthel Index Score 85           Phu Shrestha, PT, DPT

## 2018-02-12 NOTE — PLAN OF CARE
Problem: PHYSICAL THERAPY ADULT  Goal: Performs mobility at highest level of function for planned discharge setting  See evaluation for individualized goals  Treatment/Interventions: Functional transfer training, LE strengthening/ROM, Elevations, Therapeutic exercise, Endurance training, Patient/family training, Equipment eval/education, Gait training, Spoke to nursing  Equipment Recommended:  (TBD, anticipate none)       See flowsheet documentation for full assessment, interventions and recommendations  Prognosis: Good  Problem List: Decreased endurance, Decreased mobility, Pain, Obesity  Assessment: Pt is 74 y o  female seen for PT evaluation on 2/12/2018 s/p admit to [facility] on 2/9/2018 w/ Acute exacerbation of chronic obstructive pulmonary disease (COPD) ([facility] [address] )  Pt presents with worsening SOB  PT consulted to assess pt's functional mobility and d/c needs  Order placed for PT eval and tx, w/ activity as tolerated and bathroom privileges order  Performed at least 2 patient identifiers during session: Name and wristband  Comorbidities affecting pt's physical performance at time of assessment include: h/o tobacco use, HTN, morbid obesity, h/o advanced COPD, rheumatic fever, cardiac disease  PTA, pt was independent w/ all functional mobility w/ no AD/DME, ambulates unrestricted distances and all terrain, has 4 OBDULIA, lives w/ sister and dtr in 2 level home and retired  Personal factors affecting pt at time of IE include: lives in 2 story house, stairs to enter home and positive fall history  Please find objective findings from PT assessment regarding body systems outlined above with impairments and limitations including decreased endurance, gait deviations, decreased activity tolerance and SOB upon exertion, as well as mobility assessment (need for mod I- S assist w/ all phases of mobility when usually ambulating independently and need for cueing for mobility technique)   The following objective measures performed on IE also reveal limitations: Barthel Index: 85/100 and Modified [place]: 2 (slight disability)  Pt's clinical presentation is currently evolving seen in pt's presentation of on telemetry monitoring, unstable medical status and pending further cardiology workup  Pt to benefit from continued PT tx to address deficits as defined above and maximize level of functional independent mobility and consistency  From PT/mobility standpoint, recommendation at time of d/c would be Home PT vs  anticipate no needs pending progress in order to facilitate return to PLOF  Barriers to Discharge: None     Recommendation: Home PT, Home with family support (vs no further PT needs pending progress)     PT - OK to Discharge: Yes (when medically cleared)    See flowsheet documentation for full assessment

## 2018-02-12 NOTE — SOCIAL WORK
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  It is difficult to find a local PCP who will order her pain meds  Her pain level at the present time is a 4/10  She used OP/PT in 2013 following a valve replacement  She also used [facility] services at that time, but does not remember the name of the agency  Pt denies substance abuse or mental health issues  She used [facility] in [facility] and has no problem with her co-pays  She does not have a POA or Advanced Directive, but already has the info to complete  She does not work, but does drive  Her daughter Samantha Schmittr will transport home when medically cleared  CM discussed discharge needs including [facility] services  Pt does not feel she will need [facility] services at this time  CM department will continue to follow  CM reviewed discharge planning process including the following: identifying help at home, patient preference for discharge planning needs, pharmacy preference, and availability of treatment team to discuss questions or concerns patient and/or family may have regarding understanding medications and recognizing signs and symptoms once discharged  CM also encouraged patient to follow up with all recommended appointments after discharge  Patient advised of importance for patient and family to participate in managing patients medical well being

## 2018-02-12 NOTE — PROGRESS NOTES
General Cardiology   Progress Note   Sanjeev Luo 74 y o  female MRN: [id number]  Unit/Bed#: -01 Encounter: [id number]        Subjective:    No significant events since the last encounter  No chest pain or shortness of breath    REVIEW OF SYSTEMS:  Constitutional:  Denies fever or chills   Eyes:  Denies change in visual acuity   HENT:  Denies nasal congestion or sore throat   Respiratory:  Denies cough or shortness of breath   Cardiovascular:  Denies chest pain or edema   GI:  Denies abdominal pain, nausea, vomiting, bloody stools or diarrhea   :  Denies dysuria, frequency, difficulty in micturition and nocturia  Musculoskeletal:  Denies back pain or joint pain   Neurologic:  Denies headache, focal weakness or sensory changes   Endocrine:  Denies polyuria or polydipsia   Lymphatic:  Denies swollen glands   Psychiatric:  Denies depression or anxiety  Objective:   Vitals:  Vitals:    02/12/18 0843   BP:    Pulse:    Resp:    Temp:    SpO2: 95%       Body mass index is 29 8 kg/m²  Systolic (40LGG), VZS:000 , Min:119 , ZEH:292     Diastolic (10CPE), ITN:82, Min:61, Max:96      Intake/Output Summary (Last 24 hours) at 02/12/18 1121  Last data filed at 02/11/18 2104   Gross per 24 hour   Intake              180 ml   Output              470 ml   Net             -290 ml     Weight (last 2 days)     None          Telemetry Review: No significant arrhythmias seen on telemetry review  PHYSICAL EXAMS:  General:  Patient is not in acute distress, laying in the bed comfortably, awake, alert responding to commands  Head: Normocephalic, Atraumatic  HEENT: White sclera, pink conjunctiva,  PERRLA,pharynx benign  Neck:  Supple, no neck vein distention, carotids+2/+2 no bruits, thyromegaly, adenopathy  Respiratory: clear to P/A  Cardiovascular:  PMI normal, S1-S2 normal, 3/ 6 systolic Murmur, thrills, gallops, rubs   Regular rhythm  GI:  Abdomen soft nontender   No hepatosplenomegaly, adenopathy, ascites,or rebound tenderness  Extremities: No edema, normal pulses, no calf tenderness, no joint deformities, no venous disease   Integument:  No skin rashes or ulceration  Lymphatic:  No cervical or inguinal lymphadenopathy  Neurologic:  Patient is awake alert, responding to command, well-oriented to time and place and person moving all extremities      LABORATORY RESULTS:    Results from last 7 days  Lab Units 02/10/18  0526 02/09/18  2034 02/09/18  1743   TROPONIN I ng/mL <0 02 <0 02 <0 02     CBC with diff:   Results from last 7 days  Lab Units 02/12/18  0446 02/11/18  0445 02/10/18  0526  02/09/18  1352   WBC Thousand/uL 12 69* 15 18* 9 13  --  7 19   HEMOGLOBIN g/dL 12 8 12 5 13 7  --  14 8   HEMATOCRIT % 40 0 38 1 41 3  --  45 6   MCV fL 88 85 84  --  86   PLATELETS Thousands/uL 257 261 260  < > 249   MCH pg 28 0 28 0 28 0  --  28 0   MCHC g/dL 32 0 32 8 33 2  --  32 5   RDW % 13 3 13 1 12 8  --  12 9   MPV fL 9 3 9 3 9 2  < > 8 9   NRBC AUTO /100 WBCs 0 0  --   --  0   < > = values in this interval not displayed      CMP:  Results from last 7 days  Lab Units 02/12/18  0446 02/11/18  0445 02/10/18  0526 02/09/18  1352   SODIUM mmol/L 143 140 138 144   POTASSIUM mmol/L 3 2* 4 3 3 5 3 4*   CHLORIDE mmol/L 102 102 99* 103   CO2 mmol/L 34* 28 26 33*   ANION GAP mmol/L 7 10 13 8   BUN mg/dL 17 17 13 11   CREATININE mg/dL 0 94 1 04 1 13 0 96   GLUCOSE RANDOM mg/dL 135 264* 277* 126   CALCIUM mg/dL 9 0 9 1 9 1 8 9   AST U/L  --   --  22 31   ALT U/L  --   --  36 41   ALK PHOS U/L  --   --  58 61   TOTAL PROTEIN g/dL  --   --  7 3 7 5   BILIRUBIN TOTAL mg/dL  --   --  0 60 0 70   EGFR ml/min/1 73sq m 64 57 51 62       BMP:  Results from last 7 days  Lab Units 02/12/18  0446 02/11/18  0445 02/10/18  0526   SODIUM mmol/L 143 140 138   POTASSIUM mmol/L 3 2* 4 3 3 5   CHLORIDE mmol/L 102 102 99*   CO2 mmol/L 34* 28 26   BUN mg/dL 17 17 13   CREATININE mg/dL 0 94 1 04 1 13   GLUCOSE RANDOM mg/dL 135 264* 277*   CALCIUM mg/dL 9 0 9 1 9 1         Results from last 7 days  Lab Units 18  1352   NT-PRO BNP pg/mL 185*                Results from last 7 days  Lab Units 18  2034   TSH 3RD GENERATON uIU/mL 2 066       Results from last 7 days  Lab Units 18  1352   INR  0 91       Lipid Profile:   Lab Results   Component Value Date    CHOL 235 (H) 2018     Lab Results   Component Value Date    HDL 52 2018     Lab Results   Component Value Date    LDLCALC 160 (H) 2018     Lab Results   Component Value Date    TRIG 115 2018       Cardiac testing:   Results for orders placed during the hospital encounter of 18   Echo complete with contrast if indicated    Narrative [address] [place] [phone]    Transthoracic Echocardiogram  Limited 2D, M-mode, Doppler, and Color Doppler    Study date:  2018    Patient: Mary Jo Camacho  MR number: [id number]  Account number: [de-identified]  : 1950  Age: 74 years  Gender: Female  Status: Inpatient  Location: Bedside  Height: 62 in  Weight: 162 lb  BP: 133/ 60 mmHg    Indications: Aortic valve disorder  Diagnoses: I35 0 - Nonrheumatic aortic (valve) stenosis    Sonographer:  Zepeda RCS  Interpreting Physician:  Chester Ram MD  Referring Physician:  Chester Ram MD  Group:  [facility]    SUMMARY    PROCEDURE INFORMATION:  This was a technically difficult study  LEFT VENTRICLE:  Systolic function was normal  Ejection fraction was estimated to be 60 %  There were no regional wall motion abnormalities  Doppler parameters were consistent with abnormal left ventricular relaxation (grade 1 diastolic dysfunction)  RIGHT VENTRICLE:  The size was normal   Systolic function was normal     MITRAL VALVE:  There was mild annular calcification  AORTIC VALVE:  Pt is s/p TAVR  The valve was not well visualized   The peak and mean gradients across the LVOT and aortic valve as measured by CW Doppler were 63 and 18 mm Hg respectively  TRICUSPID VALVE:  There was trace regurgitation  Pulmonary artery systolic pressure was within the normal range  HISTORY: PRIOR HISTORY: TAVR  2013  COPD  CAD  PRIOR PROCEDURES: Bioprosthesis of aortic valve  PROCEDURE: The procedure was performed at the bedside  This was a routine study  The transthoracic approach was used  The study included limited 2D imaging, M-mode, complete spectral Doppler, and color Doppler  The heart rate was 100 bpm,  at the start of the study  Images were obtained from the parasternal, apical, subcostal, and suprasternal notch acoustic windows  This was a technically difficult study  LEFT VENTRICLE: Size was normal  Systolic function was normal  Ejection fraction was estimated to be 60 %  There were no regional wall motion abnormalities  Wall thickness was normal  No evidence of apical thrombus  DOPPLER: Doppler  parameters were consistent with abnormal left ventricular relaxation (grade 1 diastolic dysfunction)  RIGHT VENTRICLE: The size was normal  Systolic function was normal  Wall thickness was normal     LEFT ATRIUM: Size was normal     RIGHT ATRIUM: Size was normal     MITRAL VALVE: There was mild annular calcification  There was normal leaflet separation  DOPPLER: The transmitral velocity was within the normal range  There was no evidence for stenosis  There was no significant regurgitation  AORTIC VALVE: Pt is s/p TAVR  The valve was not well visualized  The peak and mean gradients across the LVOT and aortic valve as measured by CW Doppler were 63 and 18 mm Hg respectively  DOPPLER: There was no significant regurgitation  TRICUSPID VALVE: The valve structure was normal  There was normal leaflet separation  DOPPLER: The transtricuspid velocity was within the normal range  There was no evidence for stenosis  There was trace regurgitation   Pulmonary artery  systolic pressure was within the normal range  PULMONIC VALVE: Leaflets exhibited normal thickness, no calcification, and normal cuspal separation  DOPPLER: The transpulmonic velocity was within the normal range  There was no significant regurgitation  PERICARDIUM: There was no pericardial effusion  The pericardium was normal in appearance  AORTA: The root exhibited normal size  SYSTEMIC VEINS: IVC: The inferior vena cava was normal in size  SYSTEM MEASUREMENT TABLES    2D  LA Area: 12 8 cm2  LVEDV MOD A4C: 54 8 ml  LVEF MOD A4C: 72 8 %  LVESV MOD A4C: 14 9 ml  LVLd A4C: 6 9 cm  LVLs A4C: 6 cm  RA Area: 8 cm2  RVIDd: 2 7 cm  SV MOD A4C: 39 9 ml    CW  AV Env  Ti: 306 4 ms  AV VTI: 57 4 cm  AV Vmax: 0 m/s  AV Vmax: 3 8 m/s  AV Vmean: 1 9 m/s  AV maxP mmHg  AV maxP mmHg  AV meanP mmHg  MV A Denzel: 2 1 m/s  MV Dec Elk: 6 5 m/s2  MV DecT: 250 ms  MV E Denzel: 1 6 m/s  MV E/A Ratio: 0 8  MV PHT: 72 5 ms  MVA By PHT: 3 cm2  TR Vmax: 2 3 m/s  TR maxP 9 mmHg    MM  TAPSE: 1 7 cm    PW  E': 0 1 m/s  E/E': 25 4    [employer] Accredited E[facility]    Prepared and electronically signed by    Amanda Dunbar MD  Signed 2018 14:39:49       No results found for this or any previous visit  No results found for this or any previous visit  No procedure found  No results found for this or any previous visit      Meds/Allergies   all current active meds have been reviewed  Prescriptions Prior to Admission   Medication    albuterol (PROVENTIL HFA,VENTOLIN HFA) 90 mcg/act inhaler    aspirin 81 mg chewable tablet    co-enzyme Q-10 30 MG capsule    cyclobenzaprine (FLEXERIL) 5 mg tablet    furosemide (LASIX) 20 mg tablet    ibuprofen (MOTRIN) 600 mg tablet    levothyroxine 50 mcg tablet    metoprolol tartrate (LOPRESSOR) 25 mg tablet    oxyCODONE-acetaminophen (ROXICET) 5-325 mg/5 mL solution    pantoprazole (PROTONIX) 40 mg tablet            Assessment/Plan:  1   Chest pain - troponins negative x3  Echocardiogram with normal EF and no RWMA  Stress test rule out ischemia  Continue aspirin, metoprolol     Patient has allergy to statins      2   Dyspnea - likely multifactorial, could be related to CAD, could have a component of COPD/acute bronchitis   Continue nebs/steroids     Echocardiogram reviewed  Lucía Hook elevated aortic valve gradient  Will consider follow-up echocardiogram 6 months      3   History of aortic valve replacement, porcine valve(TAVR), done in 2013   Mildly elevated aortic valve gradient  MEAN GRADIENT 18 mmhg  Will continue to monitor  If shortness of breath persists, will consider VERONICA to evaluate aortic valve as it was not well visualized on transthoracic echo        4   Ascending aortic aneurysm measuring 4 2 x 4 cm as seen on CT scan-unclear if this is new   Continue BB  Needs follow-up CT scan  Continue good blood pressure control      5   Hypertension--stable  Continue current medications  Counseling / Coordination of Care  Total floor / unit time spent today 30 minutes  Greater than 50% of total time was spent with the patient and / or family counseling and / or coordination of care  ** Please Note: Dragon 360 Dictation voice to text software may have been used in the creation of this document   **

## 2018-02-12 NOTE — PLAN OF CARE
Problem: DISCHARGE PLANNING - CARE MANAGEMENT  Goal: Discharge to post-acute care or home with appropriate resources  INTERVENTIONS:  - Conduct assessment to determine patient/family and health care team treatment goals, and need for post-acute services based on payer coverage, community resources, and patient preferences, and barriers to discharge  - Address psychosocial, clinical, and financial barriers to discharge as identified in assessment in conjunction with the patient/family and health care team  - Arrange appropriate level of post-acute services according to patients   needs and preference and payer coverage in collaboration with the physician and health care team  - Communicate with and update the patient/family, physician, and health care team regarding progress on the discharge plan  - Arrange appropriate transportation to post-acute venues  Outcome: Progressing  CM met with pt at bedside  Pt lives with her sister King Clare and her handicapped daughter Damian Castillo in a 2 story house with 13 steps w/railing to reach her bedroom and 4 steps w/railing to enter the residence  Pt occasionally has difficulty navigating steps due to SOB  Pt has COPD-she quit smoking 12 years ago  Pt moved here from [place] last year and has not established with a local pulmonologist   CM set up an appoint for pt to see Dr Kathryn Luu per pt request on 3/6/18-12:45pm   She wants to keep her PCP in [place] at the present time  Her PCP treats her with Percocet and 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  It is difficult to find a local PCP who will order her pain meds  Her pain level at the present time is a 4/10  She used OP/PT in 2013 following a valve replacement  She also used [facility] services at that time, but does not remember the name of the agency  Pt denies substance abuse or mental health issues  She used [employer] in [place] [facility] and has no problem with her co-pays   She does not have a POA or Advanced Directive, but already has the info to complete  She does not work, but does drive  Her daughter Dixie Mejia will transport home when medically cleared  CM discussed discharge needs including [facility] services  Pt does not feel she will need [facility] services at this time  CM department will continue to follow  CM reviewed discharge planning process including the following: identifying help at home, patient preference for discharge planning needs, pharmacy preference, and availability of treatment team to discuss questions or concerns patient and/or family may have regarding understanding medications and recognizing signs and symptoms once discharged  CM also encouraged patient to follow up with all recommended appointments after discharge  Patient advised of importance for patient and family to participate in managing patients medical well being

## 2018-02-12 NOTE — DISCHARGE SUMMARY
Discharge Summary - Marco Antonio Chun 74 y o  female MRN: [id number]    Unit/Bed#: -01 Encounter: [id number]    Admission Date: 2/9/2018     Admitting Diagnosis: Chest pain [R07 9]  Dyspnea [R06 00]  SOB (shortness of breath) [R06 02]  Bronchitis [J40]  Abnormal EKG [R94 31]  COPD exacerbation (HCC) [J44 1]    HPI: This is a 81-year-old female was past medical history of hypertension, morbid obesity, history of advanced COPD, 50-pack-year history of tobacco abuse reports over the last 2 weeks, shortness of breath, she reports at times she feels like she can't catch her breath   Apparently seen by her doctor felt to have possible urinary tract infection but not treated with medication at that time   Patient reports the symptoms seem to have resolved but now she complains of some shortness of breath cough and congestion    reports positive sputum production, does report some chest tightness with exertion only but no chest pain at this time  She reports no chills no nausea vomiting does report decreased appetite no other changes in bowel or urinary habits  Review of Systems:    Procedures Performed:   Orders Placed This Encounter   Procedures    ED ECG Documentation Only       Hospital Course: patient was subsequently admitted for COPD exacerbation treated with IV Solu-Medrol symptoms quickly improved patient was switched to by mouth prednisone  Recommended continuation of Advair at home with prednisone taper  Patient also underwent left heart catheterization as per cardiology recommendation which was unremarkable and patient was cleared for discharge  Prior to discharge I have ambulated with the patient personally for approximately 4 feet and obtain POSS oximetry no drop in oxygen levels noted patient is stable she reports at home she has been in control of all activities daily living she is able to climb stairs no problem    At discharge vital signs are stable: Discharge exam is as following    GENERAL APPEARANCE: WD/WN in NAD pleasant  SKIN: no rash  HEENT: NC/AT, PERRLA (B), moist MM, no epistaxis  NECK: Supple, no JVD    LUNGS: CTA (B) mildly prolonged expiratory phase,   no use of accessory muscles  HEART:          S1S 2, RRR  , PMI is not displaced  ABDOMEN: Soft, nontender, nondistended, +BS  Rectal exam:  EXTREMITIES: no edema   PERIPHERAL VASCULAR: palpable pulses   NEURO:  AAO x 3, CN 2-12: non focal  MUSCLE STRENGHT: 5/5 (B), SENSATION: nonfocal  DTR: ++, CEREBELLAR: non focal    Discharge planning was discussed on telephone was patient's daughter all parties currently agreeable with current discharge treatment plan: prescriptions sent to [facility] of patient's choice  Significant Findings, Care, Treatment and Services Provided: Yes    Complications: none    Discharge Diagnosis:   COPD exacerbation  Coronary artery disease or obstructive  Hypertension  Condition at Discharge: good     Discharge instructions/Information to patient and family:   See after visit summary for information provided to patient and family  Provisions for Follow-Up Care:  See after visit summary for information related to follow-up care and any pertinent home health orders  Disposition: Home    Planned Readmission: No    Discharge Statement   I spent 45 minutes discharging the patient  This time was spent on the day of discharge  I had direct contact with the patient on the day of discharge  Additional documentation is required if more than 30 minutes were spent on discharge  Discharge Medications:  See after visit summary for reconciled discharge medications provided to patient and family

## 2018-02-13 PROCEDURE — 93325 DOPPLER ECHO COLOR FLOW MAPG: CPT | Performed by: INTERNAL MEDICINE

## 2018-02-13 PROCEDURE — 93321 DOPPLER ECHO F-UP/LMTD STD: CPT | Performed by: INTERNAL MEDICINE

## 2018-02-13 PROCEDURE — 93308 TTE F-UP OR LMTD: CPT | Performed by: INTERNAL MEDICINE

## 2018-03-14 ENCOUNTER — OFFICE VISIT (OUTPATIENT)
Dept: CARDIOLOGY CLINIC | Facility: CLINIC | Age: 68
End: 2018-03-14
Payer: MEDICARE

## 2018-03-14 VITALS
WEIGHT: 162 LBS | DIASTOLIC BLOOD PRESSURE: 74 MMHG | SYSTOLIC BLOOD PRESSURE: 142 MMHG | BODY MASS INDEX: 29.81 KG/M2 | OXYGEN SATURATION: 99 % | HEIGHT: 62 IN | HEART RATE: 102 BPM

## 2018-03-14 DIAGNOSIS — I10 HYPERTENSION, UNSPECIFIED TYPE: ICD-10-CM

## 2018-03-14 DIAGNOSIS — I73.9 PAD (PERIPHERAL ARTERY DISEASE) (HCC): ICD-10-CM

## 2018-03-14 DIAGNOSIS — I71.2 ASCENDING AORTIC ANEURYSM (HCC): ICD-10-CM

## 2018-03-14 DIAGNOSIS — R00.0 TACHYCARDIA: ICD-10-CM

## 2018-03-14 DIAGNOSIS — I35.9 AORTIC VALVE DISEASE: Primary | ICD-10-CM

## 2018-03-14 DIAGNOSIS — E78.5 HYPERLIPIDEMIA, UNSPECIFIED HYPERLIPIDEMIA TYPE: ICD-10-CM

## 2018-03-14 PROCEDURE — 99214 OFFICE O/P EST MOD 30 MIN: CPT | Performed by: INTERNAL MEDICINE

## 2018-03-14 NOTE — PROGRESS NOTES
[facility]  [facility] [id number]  Cardiology Consultation     Janna Louis  [id number]  1950      1  Aortic valve disease     2  Ascending aortic aneurysm ([address] )     3  Hypertension, unspecified type         Chief Complaint   Patient presents with    Follow-up     s/p hospital       HPI:  Patient with aortic valve disease s/p TAVR, AAA, HTN, COPD presents for hospital follow up  Occasionally tachycardic  Continues to have SOB likely from lungs  Denies chest pain,  lightheadedness, leg swelling, syncope  TAVR was not well visualized in both ECHO and limited ECHO  Stress test unremarkable for ischemia  TAVR done in 2013 with vascular complications  Has R common femoral artery stent, has not had US of that stent within the last year  Patient Active Problem List   Diagnosis    Acute exacerbation of chronic obstructive pulmonary disease (COPD) ([address] )    Chest pain    Hypertension    Morbid obesity due to excess calories ([address] )    Coronary artery disease    Hypokalemia    Chronic radicular pain of lower back    Urinary tract infection     Past Medical History:   Diagnosis Date    Bronchiolitis     Cardiac disease     Chest pain     COPD (chronic obstructive pulmonary disease) ([address] )     Dyspnea     Hypertension     Hypokalemia     Morbid obesity ([address] )     Rheumatic fever     SOB (shortness of breath)      Social History     Social History    Marital status:      Spouse name: N/A    Number of children: N/A    Years of education: N/A     Occupational History    Not on file  Social History Main Topics    Smoking status: Former Smoker    Smokeless tobacco: Never Used    Alcohol use No    Drug use: No    Sexual activity: Not on file     Other Topics Concern    Not on file     Social History Narrative    No narrative on file      History reviewed  No pertinent family history    Past Surgical History:   Procedure Laterality Date  CARDIAC SURGERY      aortic valve replacement       Current Outpatient Prescriptions:     albuterol (PROVENTIL HFA,VENTOLIN HFA) 90 mcg/act inhaler, Inhale 2 puffs every 6 (six) hours as needed for wheezing, Disp: , Rfl:     aspirin 81 mg chewable tablet, Chew 81 mg daily, Disp: , Rfl:     co-enzyme Q-10 30 MG capsule, Take 400 mg by mouth 3 (three) times a day, Disp: , Rfl:     cyclobenzaprine (FLEXERIL) 5 mg tablet, Take 10 mg by mouth 3 (three) times a day as needed for muscle spasms, Disp: , Rfl:     fluticasone-salmeterol (ADVAIR) 100-50 mcg/dose, Inhale 1 puff every 12 (twelve) hours, Disp: 1 puff, Rfl: 0    furosemide (LASIX) 20 mg tablet, Take 1 tablet (20 mg total) by mouth 2 (two) times a day, Disp: 30 tablet, Rfl: 0    ibuprofen (MOTRIN) 600 mg tablet, Take by mouth every 6 (six) hours as needed for mild pain, Disp: , Rfl:     levothyroxine 50 mcg tablet, Take 50 mcg by mouth daily, Disp: , Rfl:     metoprolol tartrate (LOPRESSOR) 25 mg tablet, Take 1 tablet (25 mg total) by mouth every 12 (twelve) hours, Disp: 30 tablet, Rfl: 0    oxyCODONE-acetaminophen (ROXICET) 5-325 mg/5 mL solution, Take by mouth every 4 (four) hours as needed for moderate pain, Disp: , Rfl:     pantoprazole (PROTONIX) 40 mg tablet, Take 40 mg by mouth daily, Disp: , Rfl:     Potassium 95 MG TABS, Take 99 mg by mouth daily, Disp: , Rfl:   Allergies   Allergen Reactions    Iohexol     Statins Other (See Comments)     Severe muscle cramps-- cannot move     Vitals:    03/14/18 1613   BP: 142/74   Pulse: 102   SpO2: 99%   Weight: 73 5 kg (162 lb)   Height: 5' 2" (1 575 m)       Labs:  Admission on 02/09/2018, Discharged on 02/12/2018   No results displayed because visit has over 200 results  Imaging: No results found  Review of Systems:  Review of Systems   Constitutional: Negative for diaphoresis, fatigue and fever  HENT: Negative for congestion, ear discharge, hearing loss, sinus pain and sore throat  Eyes: Negative for pain, redness and visual disturbance  Respiratory: Positive for shortness of breath  Negative for cough, chest tightness and wheezing  Cardiovascular: Negative for chest pain, palpitations and leg swelling  Gastrointestinal: Negative for abdominal distention, abdominal pain, constipation, diarrhea, nausea and vomiting  Endocrine: Negative for cold intolerance and heat intolerance  Genitourinary: Negative for difficulty urinating and hematuria  Musculoskeletal: Negative for arthralgias, back pain, gait problem, joint swelling, myalgias, neck pain and neck stiffness  Skin: Negative for color change, pallor, rash and wound  Neurological: Negative for dizziness, syncope, weakness, numbness and headaches  Hematological: Does not bruise/bleed easily  Psychiatric/Behavioral: Negative for agitation, behavioral problems and confusion  The patient is not nervous/anxious  /74   Pulse 102   Ht 5' 2" (1 575 m)   Wt 73 5 kg (162 lb)   SpO2 99%   BMI 29 63 kg/m²     Physical Exam:  Physical Exam   Constitutional: She is oriented to person, place, and time  She appears well-developed and well-nourished  HENT:   Head: Normocephalic and atraumatic  Eyes: Conjunctivae and EOM are normal  Pupils are equal, round, and reactive to light  Neck: Normal range of motion  Neck supple  Cardiovascular: Normal rate, regular rhythm and intact distal pulses  Exam reveals no gallop and no friction rub  Murmur heard   +2/0 systolic murmur, +tachycardic   Pulmonary/Chest: Effort normal and breath sounds normal  No respiratory distress  She has no wheezes  She has no rales  She exhibits no tenderness  Abdominal: Soft  Bowel sounds are normal  She exhibits no distension  There is no rebound  Musculoskeletal: Normal range of motion  She exhibits no edema  Neurological: She is alert and oriented to person, place, and time  She has normal reflexes  Skin: Skin is warm and dry  Psychiatric: She has a normal mood and affect  Her behavior is normal  Judgment and thought content normal        Discussion/Summary:  1  SOB, Tachycardia: Will keep same dose of metoprolol  Tachycardia likely attributed to steroid use  ECHO shows EF 60%, grade 1 DD   Aortic valve not well visualized on either ECHO or limited ECHO  Stress test unremarkable  2  Aortic valve disease s/p porcineTAVR:   -Done 2013, with vascular complications  -Has R common femoral artery stent  -Not well visualized via ECHO or limited ECHO    3  R common femoral artery stent: Will order R sided lower limb arterial duplex to assess  Continue ASA  Failed statins  4  Ascending aortic aneurysm:   -4 2 x 4 cm as seen on CT scan Feb 2018  Continue BB   Needs follow up CT scan at some point  Continue BP control  5  HTN: Stable, continue present regimen      F/u 6 months

## 2018-03-20 ENCOUNTER — OFFICE VISIT (OUTPATIENT)
Dept: PULMONOLOGY | Facility: CLINIC | Age: 68
End: 2018-03-20
Payer: MEDICARE

## 2018-03-20 VITALS
DIASTOLIC BLOOD PRESSURE: 80 MMHG | BODY MASS INDEX: 30 KG/M2 | HEIGHT: 62 IN | HEART RATE: 88 BPM | OXYGEN SATURATION: 98 % | WEIGHT: 163 LBS | SYSTOLIC BLOOD PRESSURE: 132 MMHG

## 2018-03-20 DIAGNOSIS — R06.02 SHORTNESS OF BREATH: Primary | ICD-10-CM

## 2018-03-20 DIAGNOSIS — J43.2 CENTRILOBULAR EMPHYSEMA (HCC): ICD-10-CM

## 2018-03-20 DIAGNOSIS — R91.1 LUNG NODULE: ICD-10-CM

## 2018-03-20 PROCEDURE — 99214 OFFICE O/P EST MOD 30 MIN: CPT | Performed by: INTERNAL MEDICINE

## 2018-03-20 NOTE — PROGRESS NOTES
Assessment/Plan:     Diagnoses and all orders for this visit:    Shortness of breath  -     Complete pulmonary function test; Future    Centrilobular emphysema (HCC)  -     Complete pulmonary function test; Future  -     Tiotropium Bromide-Olodaterol (STIOLTO RESPIMAT) 2 5-2 5 MCG/ACT AERS; Inhale 2 puffs daily    Lung nodule  -     CT chest without contrast; Future      Shortness of breath and dyspnea on exertion probably secondary to her COPD emphysema  Will get complete PFTs with bronchodilators lung volumes and DLCO  Recent CT of the chest with significant amount of emphysema bilaterally  Discussed with the patient the need for compliance with the long-acting bronchodilators understands and verbalizes  Stiolto repimat 2 puffs daily  Ventolin MDI 2 puffs every 6 hourly when necessary    Lung nodule, 8 mm lung nodule right lower lobe, no prior CTs of the chest to compare, given extensive smoking history and the size of the lung nodules would do a short-term follow-up in 3 months for follow-up  Have also requested records from her prior pulmonologist/PCP at [place]  She will follow-up in 3 months with the CT of the chest or when necessary earlier as needed  Vaccinations up-to-date  Return in about 3 months (around 6/20/2018)  All questions are answered to the patient's satisfaction and understanding  She verbalizes understanding  She is encouraged to call with any further questions or concerns  Portions of the record may have been created with voice recognition software  Occasional wrong word or "sound a like" substitutions may have occurred due to the inherent limitations of voice recognition software  Read the chart carefully and recognize, using context, where substitutions have occurred  ______________________________________________________________________    Chief Complaint: No chief complaint on file         Patient ID: Kentrell Schneider is a 74 y o  y o  female has a past medical history of Bronchiolitis; Cardiac disease; Chest pain; COPD (chronic obstructive pulmonary disease) ([facility] ); Dyspnea; Hypertension; Hypokalemia; Morbid obesity ([facility] ); Rheumatic fever; and SOB (shortness of breath)     3/20/2018  Patient is a very pleasant 29-year-old lady, with significant smoking history greater than 50-pack-year smoking history who quit a few years ago, diagnosed with COPD 2 years ago, used to follow-up with the pulmonologist at [place], recently moved into [address]  Recently she was admitted at [address] for shortness of breath and was treated for COPD exacerbation  She states she does feel better since the hospital admission, but still with shortness of breath and dyspnea on exertion  She was on long-acting bronchodilators in the past, has not been using it for the past several months since she moved into the [facility],      Occupational/Exposure history:  Travel history:  Review of Systems   Constitutional: Positive for fatigue  Negative for activity change, appetite change, chills, diaphoresis, fever and unexpected weight change  HENT: Negative for congestion, dental problem, drooling, nosebleeds, postnasal drip, rhinorrhea, sinus pressure, sore throat and voice change  Eyes: Negative for discharge, itching and visual disturbance  Respiratory: Positive for cough (clear sputum, no hemoptysis), shortness of breath and wheezing  Cardiovascular: Negative for chest pain, palpitations and leg swelling  Endocrine: Negative for cold intolerance and heat intolerance  Allergic/Immunologic: Negative for food allergies and immunocompromised state  Neurological: Negative for dizziness, facial asymmetry, speech difficulty and weakness  Hematological: Negative for adenopathy  Psychiatric/Behavioral: Negative for agitation, confusion and sleep disturbance  The patient is not nervous/anxious  Social history: She reports that she has quit smoking   She has never used smokeless tobacco  She reports that she does not drink alcohol or use drugs  Past surgical history:   Past Surgical History:   Procedure Laterality Date    CARDIAC SURGERY      aortic valve replacement     Family history: No family history on file  There is no immunization history on file for this patient  Current Outpatient Prescriptions   Medication Sig Dispense Refill    albuterol (PROVENTIL HFA,VENTOLIN HFA) 90 mcg/act inhaler Inhale 2 puffs every 6 (six) hours as needed for wheezing      aspirin 81 mg chewable tablet Chew 81 mg daily      co-enzyme Q-10 30 MG capsule Take 400 mg by mouth 3 (three) times a day      cyclobenzaprine (FLEXERIL) 5 mg tablet Take 10 mg by mouth 3 (three) times a day as needed for muscle spasms      furosemide (LASIX) 20 mg tablet Take 1 tablet (20 mg total) by mouth 2 (two) times a day 30 tablet 0    ibuprofen (MOTRIN) 600 mg tablet Take by mouth every 6 (six) hours as needed for mild pain      levothyroxine 50 mcg tablet Take 50 mcg by mouth daily      metoprolol tartrate (LOPRESSOR) 25 mg tablet Take 1 tablet (25 mg total) by mouth every 12 (twelve) hours 30 tablet 0    oxyCODONE-acetaminophen (ROXICET) 5-325 mg/5 mL solution Take by mouth every 4 (four) hours as needed for moderate pain      pantoprazole (PROTONIX) 40 mg tablet Take 40 mg by mouth daily      Potassium 95 MG TABS Take 99 mg by mouth daily      Tiotropium Bromide-Olodaterol (STIOLTO RESPIMAT) 2 5-2 5 MCG/ACT AERS Inhale 2 puffs daily 1 Inhaler 5     No current facility-administered medications for this visit  Allergies: Iohexol and Statins    Objective:  Vitals:    03/20/18 1342   BP: 132/80   Pulse: 88   SpO2: 98%   Weight: 73 9 kg (163 lb)   Height: 5' 2" (1 575 m)   Oxygen Therapy  SpO2: 98 %    Wt Readings from Last 3 Encounters:   03/20/18 73 9 kg (163 lb)   03/14/18 73 5 kg (162 lb)   02/09/18 73 9 kg (162 lb 14 7 oz)     Body mass index is 29 81 kg/m²      Physical Exam Constitutional: She is oriented to person, place, and time  She appears well-developed and well-nourished  HENT:   Head: Normocephalic and atraumatic  Eyes: Conjunctivae are normal  Pupils are equal, round, and reactive to light  Neck: Normal range of motion  Neck supple  No JVD present  No thyromegaly present  Cardiovascular: Normal rate, regular rhythm and normal heart sounds  Exam reveals no gallop and no friction rub  No murmur heard  Pulmonary/Chest: Effort normal and breath sounds normal  No respiratory distress  She has no wheezes  She has no rales  She exhibits no tenderness  Abdominal: Soft  Bowel sounds are normal    Musculoskeletal: Normal range of motion  She exhibits no edema, tenderness or deformity  Lymphadenopathy:     She has no cervical adenopathy  Neurological: She is alert and oriented to person, place, and time  Skin: Skin is warm and dry  Psychiatric: She has a normal mood and affect  Nursing note and vitals reviewed

## 2018-03-23 ENCOUNTER — TELEPHONE (OUTPATIENT)
Dept: CARDIOLOGY CLINIC | Facility: CLINIC | Age: 68
End: 2018-03-23

## 2018-04-05 ENCOUNTER — HOSPITAL ENCOUNTER (OUTPATIENT)
Dept: PULMONOLOGY | Facility: HOSPITAL | Age: 68
Discharge: HOME | End: 2018-04-05
Attending: INTERNAL MEDICINE
Payer: MEDICARE

## 2018-04-05 DIAGNOSIS — J43.2 CENTRILOBULAR EMPHYSEMA (HCC): ICD-10-CM

## 2018-04-05 DIAGNOSIS — R06.02 SHORTNESS OF BREATH: ICD-10-CM

## 2018-04-05 PROCEDURE — 94060 EVALUATION OF WHEEZING: CPT

## 2018-04-05 PROCEDURE — 94729 DIFFUSING CAPACITY: CPT

## 2018-04-05 PROCEDURE — 94727 GAS DIL/WSHOT DETER LNG VOL: CPT

## 2018-04-05 PROCEDURE — 94760 N-INVAS EAR/PLS OXIMETRY 1: CPT

## 2018-04-05 PROCEDURE — PBPULM: Performed by: INTERNAL MEDICINE

## 2018-04-05 RX ORDER — ALBUTEROL SULFATE 2.5 MG/3ML
2.5 SOLUTION RESPIRATORY (INHALATION) ONCE
Status: COMPLETED | OUTPATIENT
Start: 2018-04-05 | End: 2018-04-05

## 2018-04-05 RX ADMIN — ALBUTEROL SULFATE 2.5 MG: 2.5 SOLUTION RESPIRATORY (INHALATION) at 12:38

## 2018-04-11 ENCOUNTER — HOSPITAL ENCOUNTER (OUTPATIENT)
Dept: NON INVASIVE DIAGNOSTICS | Facility: CLINIC | Age: 68
Discharge: HOME | End: 2018-04-11
Payer: MEDICARE

## 2018-04-11 DIAGNOSIS — I73.9 PAD (PERIPHERAL ARTERY DISEASE) (HCC): ICD-10-CM

## 2018-04-11 PROCEDURE — 93923 UPR/LXTR ART STDY 3+ LVLS: CPT

## 2018-04-11 PROCEDURE — 93926 LOWER EXTREMITY STUDY: CPT

## 2018-04-12 PROCEDURE — 93926 LOWER EXTREMITY STUDY: CPT | Performed by: INTERNAL MEDICINE

## 2018-05-29 ENCOUNTER — HOSPITAL ENCOUNTER (OUTPATIENT)
Dept: CT IMAGING | Facility: HOSPITAL | Age: 68
Discharge: HOME | End: 2018-05-29
Attending: INTERNAL MEDICINE
Payer: MEDICARE

## 2018-05-29 DIAGNOSIS — R91.1 LUNG NODULE: ICD-10-CM

## 2018-05-29 PROCEDURE — 71250 CT THORAX DX C-: CPT

## 2018-06-11 ENCOUNTER — OFFICE VISIT (OUTPATIENT)
Dept: PULMONOLOGY | Facility: CLINIC | Age: 68
End: 2018-06-11
Payer: MEDICARE

## 2018-06-11 VITALS
DIASTOLIC BLOOD PRESSURE: 80 MMHG | HEIGHT: 62 IN | BODY MASS INDEX: 30.55 KG/M2 | WEIGHT: 166 LBS | HEART RATE: 73 BPM | OXYGEN SATURATION: 96 % | SYSTOLIC BLOOD PRESSURE: 120 MMHG

## 2018-06-11 DIAGNOSIS — R06.02 SHORTNESS OF BREATH: Primary | ICD-10-CM

## 2018-06-11 DIAGNOSIS — R91.1 LUNG NODULE: ICD-10-CM

## 2018-06-11 DIAGNOSIS — J43.2 CENTRILOBULAR EMPHYSEMA (HCC): ICD-10-CM

## 2018-06-11 PROCEDURE — 99214 OFFICE O/P EST MOD 30 MIN: CPT | Performed by: INTERNAL MEDICINE

## 2018-06-11 NOTE — PROGRESS NOTES
Assessment/Plan:   Diagnoses and all orders for this visit:    Shortness of breath    Centrilobular emphysema (HCC)    Lung nodule  -     CT chest without contrast; Future        Shortness of breath and dyspnea on exertion probably secondary to her centrilobular emphysema  PFTs demonstrating, moderate obstructive ventilatory limitation with no appreciable response to the bronchodilator with moderately decreased DLCO consistent with COPD emphysema  She is continuing to use her stiolto 2 puffs daily  Ventolin MDI 2 puffs every 6 hours as needed only  Lung nodule prior CT of the chest demonstrating 8 mm for which she had a repeat CT of the chest, its currently around 6 mm, as per the radiologist given the prior one was around 6 mm going back in measuring, definitely the size hasn't increased  Also there is a new 3 mm lung nodule  From the prior study  We will repeat CT of the chest in 6 months and follow-up  Vaccinations up-to-date    Return in about 6 months (around 12/11/2018)  All questions are answered to the patient's satisfaction and understanding  She verbalizes understanding  She is encouraged to call with any further questions or concerns  Portions of the record may have been created with voice recognition software  Occasional wrong word or "sound a like" substitutions may have occurred due to the inherent limitations of voice recognition software  Read the chart carefully and recognize, using context, where substitutions have occurred  ______________________________________________________________________    Chief Complaint:   Chief Complaint   Patient presents with    Shortness of Breath     pft ct chest        Patient ID: Maty Villaseñor is a 74 y o  y o  female has a past medical history of Bronchiolitis; Cardiac disease; Chest pain; COPD (chronic obstructive pulmonary disease) ([address] ); Dyspnea; Hypertension; Hypokalemia; Morbid obesity ([address] );  Rheumatic fever; and SOB (shortness of breath)  6/11/2018  3/20/2018  Patient is a very pleasant 42-year-old lady, with significant smoking history greater than 50-pack-year smoking history who quit a few years ago, diagnosed with COPD 2 years ago, used to follow-up with the pulmonologist at [place], recently moved into this area the [facility] [address]  Recently she was admitted at [address] for shortness of breath and was treated for COPD exacerbation  She states she does feel better since the hospital admission, but still with shortness of breath and dyspnea on exertion  She was on long-acting bronchodilators in the past, has not been using it for the past several months since she moved into the [facility]      Shortness of Breath   Associated symptoms include wheezing  Pertinent negatives include no chest pain, fever, leg swelling, rhinorrhea or sore throat  Review of Systems   Constitutional: Positive for fatigue  Negative for activity change, appetite change, chills, diaphoresis, fever and unexpected weight change  HENT: Negative for congestion, dental problem, drooling, nosebleeds, postnasal drip, rhinorrhea, sinus pressure, sore throat and voice change  Eyes: Negative for discharge, itching and visual disturbance  Respiratory: Positive for cough (clear sputum, no hemoptysis), shortness of breath and wheezing  Cardiovascular: Negative for chest pain, palpitations and leg swelling  Endocrine: Negative for cold intolerance and heat intolerance  Allergic/Immunologic: Negative for food allergies and immunocompromised state  Neurological: Negative for dizziness, facial asymmetry, speech difficulty and weakness  Hematological: Negative for adenopathy  Psychiatric/Behavioral: Negative for agitation, confusion and sleep disturbance  The patient is not nervous/anxious  Smoking history: She reports that she has quit smoking   She has never used smokeless tobacco     The following portions of the patient's history were reviewed and updated as appropriate: allergies, current medications, past family history, past medical history, past social history, past surgical history and problem list       There is no immunization history on file for this patient  Current Outpatient Prescriptions   Medication Sig Dispense Refill    albuterol (PROVENTIL HFA,VENTOLIN HFA) 90 mcg/act inhaler Inhale 2 puffs every 6 (six) hours as needed for wheezing      aspirin 81 mg chewable tablet Chew 81 mg daily      co-enzyme Q-10 30 MG capsule Take 400 mg by mouth 3 (three) times a day      cyclobenzaprine (FLEXERIL) 5 mg tablet Take 10 mg by mouth 3 (three) times a day as needed for muscle spasms      furosemide (LASIX) 20 mg tablet Take 1 tablet (20 mg total) by mouth 2 (two) times a day 30 tablet 0    ibuprofen (MOTRIN) 600 mg tablet Take by mouth every 6 (six) hours as needed for mild pain      levothyroxine 50 mcg tablet Take 50 mcg by mouth daily      metoprolol tartrate (LOPRESSOR) 25 mg tablet Take 1 tablet (25 mg total) by mouth every 12 (twelve) hours 30 tablet 0    oxyCODONE-acetaminophen (ROXICET) 5-325 mg/5 mL solution Take by mouth every 4 (four) hours as needed for moderate pain      pantoprazole (PROTONIX) 40 mg tablet Take 40 mg by mouth daily      Potassium 95 MG TABS Take 99 mg by mouth daily      Tiotropium Bromide-Olodaterol (STIOLTO RESPIMAT) 2 5-2 5 MCG/ACT AERS Inhale 2 puffs daily 1 Inhaler 5     No current facility-administered medications for this visit  Allergies: Iohexol and Statins    Objective:  Vitals:    06/11/18 1133   BP: 120/80   Pulse: 73   SpO2: 96%   Weight: 75 3 kg (166 lb)   Height: 5' 2" (1 575 m)   Oxygen Therapy  SpO2: 96 %    Wt Readings from Last 3 Encounters:   06/11/18 75 3 kg (166 lb)   03/20/18 73 9 kg (163 lb)   03/14/18 73 5 kg (162 lb)     Body mass index is 30 36 kg/m²  Physical Exam   Constitutional: She is oriented to person, place, and time   She appears well-developed and well-nourished  HENT:   Head: Normocephalic and atraumatic  Crowded oropharyngeal airways, Mallampati score 3   Eyes: Conjunctivae are normal  Pupils are equal, round, and reactive to light  Neck: Normal range of motion  Neck supple  No JVD present  No thyromegaly present  Short and wide neck   Cardiovascular: Normal rate, regular rhythm and normal heart sounds  Exam reveals no gallop and no friction rub  No murmur heard  Pulmonary/Chest: Effort normal and breath sounds normal  No respiratory distress  She has no wheezes  She has no rales  She exhibits no tenderness  Abdominal: Soft  Bowel sounds are normal    Musculoskeletal: Normal range of motion  She exhibits no edema, tenderness or deformity  Lymphadenopathy:     She has no cervical adenopathy  Neurological: She is alert and oriented to person, place, and time  Skin: Skin is warm and dry  Psychiatric: She has a normal mood and affect  Nursing note and vitals reviewed  Ct Chest Without Contrast    Result Date: 6/1/2018  Narrative: CT CHEST WITHOUT IV CONTRAST INDICATION:   R91 1: Solitary pulmonary nodule  Patient is a former smoker  COMPARISON: None  TECHNIQUE: CT examination of the chest was performed without intravenous contrast   Axial, sagittal, and coronal 2D reformatted images were created from the source data and submitted for interpretation  Radiation dose length product (DLP) for this visit:  161 mGy-cm   This examination, like all CT scans performed in the [facility], was performed utilizing techniques to minimize radiation dose exposure, including the use of iterative reconstruction and automated exposure control  FINDINGS: LUNGS: There is moderate emphysema  There are multiple pulmonary nodules, which will be described on series 2: Image 26, right upper lobe, solid, smooth bordered, 3 mm, unchanged    (Nodule was not described on prior report, but in retrospect was present ) Image 56, right lower lobe, solid, smooth bordered, 6 mm, unchanged  (Nodule was previously measured as 8 mm, but by my remeasurement, it was actually 6 mm ) No new pulmonary nodules  PLEURA:  Unremarkable  HEART/GREAT VESSELS:  Unchanged 4 2 cm ascending thoracic aortic aneurysm  Aortic valve replacement in place  MEDIASTINUM AND AMRIELA:  Unremarkable  CHEST WALL AND LOWER NECK:   Unremarkable  VISUALIZED STRUCTURES IN THE UPPER ABDOMEN:  Unremarkable  OSSEOUS STRUCTURES:  No acute fracture or destructive osseous lesion  Impression: Again seen is moderate emphysema, and a 4 2 cm ascending thoracic aortic aneurysm  Stable pulmonary nodules, largest 6 mm  Based on current Fleischner Society 2017 Guidelines on incidental pulmonary nodule, followup non-contrast CT is recommended at 6-12 months from the initial examination and, if stable at that time, an additional followup is recommended for 18-24 months from the initial examination    (Since 1st chest CT was 2/9/2018,  recommend next follow-up around February 2019 ) Workstation performed: [id number]

## 2018-09-06 ENCOUNTER — TELEPHONE (OUTPATIENT)
Dept: CARDIOLOGY CLINIC | Facility: CLINIC | Age: 68
End: 2018-09-06

## 2018-09-06 ENCOUNTER — OFFICE VISIT (OUTPATIENT)
Dept: CARDIOLOGY CLINIC | Facility: CLINIC | Age: 68
End: 2018-09-06
Payer: MEDICARE

## 2018-09-06 ENCOUNTER — APPOINTMENT (OUTPATIENT)
Dept: LAB | Facility: CLINIC | Age: 68
End: 2018-09-06
Payer: MEDICARE

## 2018-09-06 VITALS
DIASTOLIC BLOOD PRESSURE: 84 MMHG | WEIGHT: 165 LBS | OXYGEN SATURATION: 94 % | HEART RATE: 82 BPM | HEIGHT: 62 IN | SYSTOLIC BLOOD PRESSURE: 164 MMHG | BODY MASS INDEX: 30.36 KG/M2

## 2018-09-06 DIAGNOSIS — I73.9 PERIPHERAL ARTERY DISEASE (HCC): ICD-10-CM

## 2018-09-06 DIAGNOSIS — I70.213 INTERMITTENT CLAUDICATION OF BOTH LOWER EXTREMITIES DUE TO ATHEROSCLEROSIS (HCC): ICD-10-CM

## 2018-09-06 DIAGNOSIS — I10 ESSENTIAL HYPERTENSION: ICD-10-CM

## 2018-09-06 DIAGNOSIS — R06.00 DYSPNEA ON EXERTION: ICD-10-CM

## 2018-09-06 DIAGNOSIS — E78.2 MIXED HYPERLIPIDEMIA: ICD-10-CM

## 2018-09-06 DIAGNOSIS — Z95.3 S/P TAVR (TRANSCATHETER AORTIC VALVE REPLACEMENT), BIOPROSTHETIC: Primary | ICD-10-CM

## 2018-09-06 LAB
ANION GAP SERPL CALCULATED.3IONS-SCNC: 7 MMOL/L (ref 4–13)
BUN SERPL-MCNC: 11 MG/DL (ref 5–25)
CALCIUM SERPL-MCNC: 8.9 MG/DL (ref 8.3–10.1)
CHLORIDE SERPL-SCNC: 104 MMOL/L (ref 100–108)
CO2 SERPL-SCNC: 31 MMOL/L (ref 21–32)
CREAT SERPL-MCNC: 1.02 MG/DL (ref 0.6–1.3)
ERYTHROCYTE [DISTWIDTH] IN BLOOD BY AUTOMATED COUNT: 13.2 % (ref 11.6–15.1)
GFR SERPL CREATININE-BSD FRML MDRD: 57 ML/MIN/1.73SQ M
GLUCOSE P FAST SERPL-MCNC: 95 MG/DL (ref 65–99)
HCT VFR BLD AUTO: 47.1 % (ref 34.8–46.1)
HGB BLD-MCNC: 15 G/DL (ref 11.5–15.4)
INR PPP: 1.01 (ref 0.86–1.17)
MCH RBC QN AUTO: 28.4 PG (ref 26.8–34.3)
MCHC RBC AUTO-ENTMCNC: 31.8 G/DL (ref 31.4–37.4)
MCV RBC AUTO: 89 FL (ref 82–98)
PLATELET # BLD AUTO: 310 THOUSANDS/UL (ref 149–390)
PMV BLD AUTO: 9.5 FL (ref 8.9–12.7)
POTASSIUM SERPL-SCNC: 3.7 MMOL/L (ref 3.5–5.3)
PROTHROMBIN TIME: 13.4 SECONDS (ref 11.8–14.2)
RBC # BLD AUTO: 5.29 MILLION/UL (ref 3.81–5.12)
SODIUM SERPL-SCNC: 142 MMOL/L (ref 136–145)
WBC # BLD AUTO: 7.74 THOUSAND/UL (ref 4.31–10.16)

## 2018-09-06 PROCEDURE — 85610 PROTHROMBIN TIME: CPT | Performed by: INTERNAL MEDICINE

## 2018-09-06 PROCEDURE — 36415 COLL VENOUS BLD VENIPUNCTURE: CPT | Performed by: INTERNAL MEDICINE

## 2018-09-06 PROCEDURE — 80048 BASIC METABOLIC PNL TOTAL CA: CPT | Performed by: INTERNAL MEDICINE

## 2018-09-06 PROCEDURE — 85027 COMPLETE CBC AUTOMATED: CPT | Performed by: INTERNAL MEDICINE

## 2018-09-06 PROCEDURE — 99214 OFFICE O/P EST MOD 30 MIN: CPT | Performed by: INTERNAL MEDICINE

## 2018-09-06 NOTE — TELEPHONE ENCOUNTER
Dr Melanie Le ordered a lower extremity angiogram to be done at [facility]  He gave patient lab orders  Can you set up please  Thanks

## 2018-09-06 NOTE — PROGRESS NOTES
Cardiology Consultation     Nu Snell  [id number]  1950  [address]  [facility] [address] [place] [id number]    C/c:Follow-up of shortness of breath, TAVR, peripheral artery disease  HPI:  Patient with   Past medical history of aortic valve disease s/p BEJA(7052), AAA, HTN, COPD presents for follow up  Patient continues to have severe shortness of breath on exertion which is chronic and has not changed  Denies chest pain,  lightheadedness, leg swelling, syncope  Stress test unremarkable for ischemia  Patient complains of heaviness in the legs which is worse on exertion, right leg worse than the left leg  Lower extremity arterial Doppler did show stenosis in the right common femoral artery between 50-75%  RIGHT EXTERNAL ILIAC ARTERY WAS PATENT  TAVR done in 2013 with vascular complications  which was followed by right CFA stent    Impression:  RIGHT LOWER LIMB:  Patent external iliac artery stent  There is a 50-75% stenosis in the common femoral artery  Ankle/Brachial index:   0 94 (Normal)  Metatarsal pressure of 93 mmHg  Great toe pressure of 117 mmHg, within the healing range  PVR/ PPG tracings are normal      LEFT LOWER LIMB:  Ankle/Brachial index:  0 96 (Normal)  Metatarsal pressure of 128 mmHg  Great toe pressure of 95 mmHg, within the healing range    PVR/ PPG tracings are normal       Patient Active Problem List   Diagnosis    Acute exacerbation of chronic obstructive pulmonary disease (COPD) ([address] )    Chest pain    Hypertension    Morbid obesity due to excess calories ([address] )    Coronary artery disease    Hypokalemia    Chronic radicular pain of lower back    Urinary tract infection    Centrilobular emphysema (HCC)    Shortness of breath     Past Medical History:   Diagnosis Date    Aneurysm ([address] )     abdominal aortic aneurysm    Aortic valve disease     Aortic valve replaced     TAVR    Bronchiolitis     Cardiac disease     Chest pain     COPD (chronic obstructive pulmonary disease) (HCC)     Dyspnea     Hypertension     Hypokalemia     Morbid obesity (HCC)     PAD (peripheral artery disease) (HCC)     Rheumatic fever     SOB (shortness of breath)     Tachycardia      Social History     Social History    Marital status:      Spouse name: N/A    Number of children: N/A    Years of education: N/A     Occupational History    Not on file  Social History Main Topics    Smoking status: Former Smoker    Smokeless tobacco: Never Used    Alcohol use 0 6 - 1 2 oz/week     1 - 2 Cans of beer per week      Comment: social    Drug use: No    Sexual activity: Not on file     Other Topics Concern    Not on file     Social History Narrative    No narrative on file      History reviewed  No pertinent family history    Past Surgical History:   Procedure Laterality Date    CARDIAC SURGERY      aortic valve replacement    CARDIAC VALVE REPLACEMENT      REPLACEMENT AORTIC VALVE TRANSCATHETER (TAVR)         Current Outpatient Prescriptions:     albuterol (PROVENTIL HFA,VENTOLIN HFA) 90 mcg/act inhaler, Inhale 2 puffs 2 (two) times a day  , Disp: , Rfl:     aspirin 81 mg chewable tablet, Chew 81 mg daily, Disp: , Rfl:     co-enzyme Q-10 30 MG capsule, Take 400 mg by mouth daily  , Disp: , Rfl:     cyclobenzaprine (FLEXERIL) 5 mg tablet, Take 10 mg by mouth 3 (three) times a day as needed for muscle spasms, Disp: , Rfl:     furosemide (LASIX) 20 mg tablet, Take 1 tablet (20 mg total) by mouth 2 (two) times a day (Patient taking differently: Take 20 mg by mouth daily  ), Disp: 30 tablet, Rfl: 0    levothyroxine 50 mcg tablet, Take 50 mcg by mouth daily, Disp: , Rfl:     metoprolol tartrate (LOPRESSOR) 25 mg tablet, Take 1 tablet (25 mg total) by mouth every 12 (twelve) hours, Disp: 30 tablet, Rfl: 0    pantoprazole (PROTONIX) 40 mg tablet, Take 40 mg by mouth daily, Disp: , Rfl:     Potassium 95 MG TABS, Take 99 mg by mouth daily, Disp: , Rfl:     Tiotropium Bromide-Olodaterol (STIOLTO RESPIMAT) 2 5-2 5 MCG/ACT AERS, Inhale 2 puffs daily, Disp: 1 Inhaler, Rfl: 5    ibuprofen (MOTRIN) 600 mg tablet, Take by mouth every 6 (six) hours as needed for mild pain, Disp: , Rfl:   Allergies   Allergen Reactions    Iohexol     Statins Other (See Comments)     Severe muscle cramps-- cannot move     Vitals:    09/06/18 1355   BP: 164/84   Pulse: 82   SpO2: 94%   Weight: 74 8 kg (165 lb)   Height: 5' 2" (1 575 m)       Labs:  No visits with results within 2 Month(s) from this visit  Latest known visit with results is:   Admission on 02/09/2018, Discharged on 02/12/2018   No results displayed because visit has over 200 results  Imaging: No results found  Review of Systems:  Review of Systems   Constitutional: Negative for diaphoresis and fatigue  HENT: Negative for congestion and facial swelling  Eyes: Negative for photophobia and visual disturbance  Respiratory: Positive for shortness of breath  Negative for chest tightness  Bilateral lower extremity claudication,  Right worse than left   Cardiovascular: Negative for chest pain, palpitations and leg swelling  Gastrointestinal: Negative for abdominal pain and nausea  Endocrine: Negative for cold intolerance and heat intolerance  Musculoskeletal: Negative for arthralgias and myalgias  Skin: Negative for pallor and rash  Neurological: Negative for dizziness and tremors  Psychiatric/Behavioral: Negative for sleep disturbance  The patient is not nervous/anxious  Physical Exam:  Physical Exam   Constitutional: She is oriented to person, place, and time  She appears well-developed and well-nourished  HENT:   Head: Normocephalic and atraumatic  Eyes: Conjunctivae and EOM are normal  Pupils are equal, round, and reactive to light  Neck: Normal range of motion  Neck supple   No JVD present  No thyromegaly present  Cardiovascular: Normal rate, regular rhythm, S1 normal, S2 normal, normal heart sounds and intact distal pulses  Exam reveals no gallop and no friction rub  No murmur heard  Pulses:       Carotid pulses are 2+ on the right side, and 2+ on the left side  Pulmonary/Chest: Effort normal  No respiratory distress  She has decreased breath sounds  She has wheezes  She has no rales  Abdominal: Soft  Bowel sounds are normal  She exhibits no distension  There is no tenderness  There is no rebound and no guarding  Musculoskeletal: Normal range of motion  She exhibits no edema  Neurological: She is alert and oriented to person, place, and time  She has normal reflexes  No cranial nerve deficit  Skin: Skin is warm and dry  Psychiatric: She has a normal mood and affect  Discussion/Summary:   1  Aortic stenosis status post TAVR   mild gradient across the aortic valve   shortness of breath is probably noncardiac in due to COPD  2  Bilateral lower extremity claudication, abnormal arterial Doppler with 50-75% stenosis in right common femoral artery  I will schedule patient for aortogram with bilateral infrainguinal runoff with a without intervention  Risks, benefits and alternatives explained to the patient informed consent was obtained  3  Hyperlipidemia, last LDL of 160  Patient has taken for statins in the past including Lipitor, Crestor and simvastatin followed by significant side effects  I will initiate Aliene Formosa as she is high risk due to known peripheral artery disease  4   Ascending aortic aneurysm measuring 4 2 x 4 cm as seen on CT scan-unclear if this is new   Continue BB  Needs follow-up CT scan  Continue good blood pressure control      5   Hypertension--stable  Patient states the blood pressure is normally low in  House  She has white coat hypertension    She was asked to monitor blood pressure and call me for systolic blood pressures more than 140 mm of mercury  Continue current medications  5  Shortness of breath, if patient continues to have shortness of breath in spite of good medical management of COPD he may have to consider right and left heart catheterization to evaluate for the cause of shortness of breath        Follow up with me a week after the procedure

## 2018-09-07 ENCOUNTER — TELEPHONE (OUTPATIENT)
Dept: CARDIOLOGY CLINIC | Facility: CLINIC | Age: 68
End: 2018-09-07

## 2018-09-07 DIAGNOSIS — Z91.041 ALLERGY HISTORY, RADIOGRAPHIC DYE: Primary | ICD-10-CM

## 2018-09-07 RX ORDER — DIPHENHYDRAMINE HCL 25 MG
TABLET ORAL
Qty: 30 TABLET | Refills: 0 | Status: SHIPPED | OUTPATIENT
Start: 2018-09-07 | End: 2018-09-21

## 2018-09-07 RX ORDER — PREDNISONE 20 MG/1
TABLET ORAL
Qty: 6 TABLET | Refills: 0 | Status: SHIPPED | OUTPATIENT
Start: 2018-09-07 | End: 2018-09-21

## 2018-09-07 RX ORDER — FAMOTIDINE 20 MG/1
TABLET, FILM COATED ORAL
Qty: 2 TABLET | Refills: 0 | Status: SHIPPED | OUTPATIENT
Start: 2018-09-07 | End: 2018-09-21

## 2018-09-07 NOTE — TELEPHONE ENCOUNTER
S/w pt, paperwork faxed to the hospital for scheduling  IV Dye steroid prep sent to the pharmacy  Pt aware

## 2018-09-10 ENCOUNTER — TELEPHONE (OUTPATIENT)
Dept: INTERNAL MEDICINE CLINIC | Facility: CLINIC | Age: 68
End: 2018-09-10

## 2018-09-12 ENCOUNTER — TELEPHONE (OUTPATIENT)
Dept: INTERNAL MEDICINE CLINIC | Facility: CLINIC | Age: 68
End: 2018-09-12

## 2018-09-13 ENCOUNTER — TELEPHONE (OUTPATIENT)
Dept: CARDIOLOGY CLINIC | Facility: CLINIC | Age: 68
End: 2018-09-13

## 2018-09-13 NOTE — TELEPHONE ENCOUNTER
A prior auth was done on cover my meds for the Famotidine tablets  [employer] has denied the auth for the medication

## 2018-09-18 ENCOUNTER — TELEPHONE (OUTPATIENT)
Dept: NON INVASIVE DIAGNOSTICS | Facility: HOSPITAL | Age: 68
End: 2018-09-18

## 2018-09-18 RX ORDER — SODIUM CHLORIDE 9 MG/ML
75 INJECTION, SOLUTION INTRAVENOUS CONTINUOUS
Status: CANCELLED | OUTPATIENT
Start: 2018-09-18

## 2018-09-19 ENCOUNTER — TELEPHONE (OUTPATIENT)
Dept: SURGERY | Facility: HOSPITAL | Age: 68
End: 2018-09-19

## 2018-09-20 ENCOUNTER — HOSPITAL ENCOUNTER (OUTPATIENT)
Dept: INTERVENTIONAL RADIOLOGY/VASCULAR | Facility: HOSPITAL | Age: 68
Discharge: HOME | End: 2018-09-20
Attending: INTERNAL MEDICINE | Admitting: INTERNAL MEDICINE
Payer: MEDICARE

## 2018-09-20 VITALS
BODY MASS INDEX: 30.72 KG/M2 | HEIGHT: 61 IN | OXYGEN SATURATION: 94 % | WEIGHT: 162.7 LBS | TEMPERATURE: 98.5 F | SYSTOLIC BLOOD PRESSURE: 125 MMHG | HEART RATE: 74 BPM | DIASTOLIC BLOOD PRESSURE: 56 MMHG | RESPIRATION RATE: 18 BRPM

## 2018-09-20 DIAGNOSIS — I73.9 PERIPHERAL ARTERY DISEASE (HCC): ICD-10-CM

## 2018-09-20 DIAGNOSIS — I70.213 INTERMITTENT CLAUDICATION OF BOTH LOWER EXTREMITIES DUE TO ATHEROSCLEROSIS (HCC): ICD-10-CM

## 2018-09-20 PROCEDURE — 36246 INS CATH ABD/L-EXT ART 2ND: CPT

## 2018-09-20 PROCEDURE — 99153 MOD SED SAME PHYS/QHP EA: CPT | Performed by: INTERNAL MEDICINE

## 2018-09-20 PROCEDURE — 75630 X-RAY AORTA LEG ARTERIES: CPT | Performed by: INTERNAL MEDICINE

## 2018-09-20 PROCEDURE — C1769 GUIDE WIRE: HCPCS | Performed by: INTERNAL MEDICINE

## 2018-09-20 PROCEDURE — C1894 INTRO/SHEATH, NON-LASER: HCPCS | Performed by: INTERNAL MEDICINE

## 2018-09-20 PROCEDURE — 99152 MOD SED SAME PHYS/QHP 5/>YRS: CPT | Performed by: INTERNAL MEDICINE

## 2018-09-20 PROCEDURE — C1760 CLOSURE DEV, VASC: HCPCS | Performed by: INTERNAL MEDICINE

## 2018-09-20 RX ORDER — SODIUM CHLORIDE 9 MG/ML
75 INJECTION, SOLUTION INTRAVENOUS CONTINUOUS
Status: DISCONTINUED | OUTPATIENT
Start: 2018-09-20 | End: 2018-09-20

## 2018-09-20 RX ORDER — SODIUM CHLORIDE 9 MG/ML
75 INJECTION, SOLUTION INTRAVENOUS CONTINUOUS
Status: DISPENSED | OUTPATIENT
Start: 2018-09-20 | End: 2018-09-20

## 2018-09-20 RX ORDER — ACETAMINOPHEN 325 MG/1
650 TABLET ORAL EVERY 6 HOURS PRN
Status: DISCONTINUED | OUTPATIENT
Start: 2018-09-20 | End: 2018-09-22 | Stop reason: SDUPTHER

## 2018-09-20 RX ORDER — LIDOCAINE HYDROCHLORIDE 10 MG/ML
INJECTION, SOLUTION INFILTRATION; PERINEURAL CODE/TRAUMA/SEDATION MEDICATION
Status: COMPLETED | OUTPATIENT
Start: 2018-09-20 | End: 2018-09-20

## 2018-09-20 RX ORDER — FENTANYL CITRATE 50 UG/ML
INJECTION, SOLUTION INTRAMUSCULAR; INTRAVENOUS CODE/TRAUMA/SEDATION MEDICATION
Status: COMPLETED | OUTPATIENT
Start: 2018-09-20 | End: 2018-09-20

## 2018-09-20 RX ORDER — MIDAZOLAM HYDROCHLORIDE 1 MG/ML
INJECTION INTRAMUSCULAR; INTRAVENOUS CODE/TRAUMA/SEDATION MEDICATION
Status: COMPLETED | OUTPATIENT
Start: 2018-09-20 | End: 2018-09-20

## 2018-09-20 RX ADMIN — SODIUM CHLORIDE 75 ML/HR: 0.9 INJECTION, SOLUTION INTRAVENOUS at 07:46

## 2018-09-20 RX ADMIN — MIDAZOLAM HYDROCHLORIDE 1 MG: 1 INJECTION, SOLUTION INTRAMUSCULAR; INTRAVENOUS at 08:28

## 2018-09-20 RX ADMIN — MIDAZOLAM HYDROCHLORIDE 1 MG: 1 INJECTION, SOLUTION INTRAMUSCULAR; INTRAVENOUS at 09:01

## 2018-09-20 RX ADMIN — LIDOCAINE HYDROCHLORIDE 10 ML: 10 INJECTION, SOLUTION INFILTRATION; PERINEURAL at 08:34

## 2018-09-20 RX ADMIN — FENTANYL CITRATE 50 MCG: 50 INJECTION, SOLUTION INTRAMUSCULAR; INTRAVENOUS at 09:04

## 2018-09-20 RX ADMIN — IODIXANOL 70 ML: 320 INJECTION, SOLUTION INTRAVASCULAR at 09:00

## 2018-09-20 RX ADMIN — FENTANYL CITRATE 50 MCG: 50 INJECTION, SOLUTION INTRAMUSCULAR; INTRAVENOUS at 08:37

## 2018-09-20 RX ADMIN — ACETAMINOPHEN 650 MG: 325 TABLET, FILM COATED ORAL at 11:35

## 2018-09-20 RX ADMIN — FENTANYL CITRATE 50 MCG: 50 INJECTION, SOLUTION INTRAMUSCULAR; INTRAVENOUS at 08:28

## 2018-09-20 NOTE — INTERVAL H&P NOTE
Update: (This section must be completed if the H&P was completed greater than 24 hrs to procedure or admission)    H&P reviewed  After examining the patient, I find no changed to the H&P since it had been written  Patient re-evaluated   Accept as history and physical     Rudy Roach MD/September 20, 2018/8:11 AM

## 2018-09-20 NOTE — DISCHARGE INSTRUCTIONS
After Radial Heart Catheterization   WHAT YOU NEED TO KNOW:   What will happen after a radial heart catheterization? · You will be attached to a heart monitor until you are fully awake  A heart monitor is an EKG that stays on continuously to record your heart's electrical activity  Healthcare providers will monitor your vital signs and pulses in your arm  They will frequently check your pressure bandage for bleeding or swelling  · You may have a band wrapped tightly around your wrist  The band puts pressure on your wound and helps prevent bleeding  A healthcare provider can put air into the band or remove air from the band  A healthcare provider will gradually remove air from the band and decrease pressure on your wrist  The band may be removed in 2 hours or when your wound stops bleeding  · You will need to keep your wrist straight for 2 to 4 hours  Do not  push or pull with your arm  Arm movements can cause serious bleeding  After you are monitored for several hours, you may go home or may need to stay in the hospital overnight  What do I need to know before I go home? · Care for your wound as directed  Remove the pressure bandage in 24 hours or as directed  Mild bruising is normal and expected  A small bandage can be placed on your wound after you remove the pressure bandage  Do not put powders, lotions, or creams on your wound  They may cause your wound to get infected  Monitor your wound every day for signs of infection, such as redness, swelling, or pus  · Shower the day after your procedure or as directed  Remove your pressure bandage before you shower  Do not take baths or go in hot tubs or pools  Carefully wash the wound with soap and water  Pat the area dry  A small bandage can be placed on your wound after you shower  · Apply firm, steady pressure to your wound if it bleeds  Apply pressure with a clean gauze or towel for 5 to 10 minutes   Call 911 if bleeding becomes heavy or does not stop  · Drink liquids as directed  Liquids will help flush the contrast liquid from your body  Ask how much liquid to drink each day and which liquids are best for you  · Do not lift anything heavier than 5 pounds until directed by your healthcare provider  Heavy lifting can put stress on your wound and cause bleeding  Do not push or pull with the arm that was used for the procedure  Do not do vigorous activity for at least 48 hours  Vigorous activity may cause bleeding from your wound  Rest and do quiet activities  Take short walks around the house to prevent a blood clot  Ask your healthcare provider when you can return to your normal activities  · Do not drive or return to work until your healthcare provider says it is okay  Your healthcare provider may tell you to wait 48 hours before you drive to decrease your risk for bleeding  You may not be able to return to work for at least 2 days after your procedure if your job involves heavy lifting  What medicines may I need? You may need any of the following:  · Blood thinners    help prevent blood clots  Examples of blood thinners include heparin and warfarin  Clots can cause strokes, heart attacks, and death  The following are general safety guidelines to follow while you are taking a blood thinner:    ¨ Watch for bleeding and bruising while you take blood thinners  Watch for bleeding from your gums or nose  Watch for blood in your urine and bowel movements  Use a soft washcloth on your skin, and a soft toothbrush to brush your teeth  This can keep your skin and gums from bleeding  If you shave, use an electric shaver  Do not play contact sports  ¨ Tell your dentist and other healthcare providers that you take anticoagulants  Wear a bracelet or necklace that says you take this medicine  ¨ Do not start or stop any medicines unless your healthcare provider tells you to  Many medicines cannot be used with blood thinners       ¨ Tell your healthcare provider right away if you forget to take the medicine, or if you take too much  ¨ Warfarin  is a blood thinner that you may need to take  The following are things you should be aware of if you take warfarin  § Foods and medicines can affect the amount of warfarin in your blood  Do not make major changes to your diet while you take warfarin  Warfarin works best when you eat about the same amount of vitamin K every day  Vitamin K is found in green leafy vegetables and certain other foods  Ask for more information about what to eat when you are taking warfarin  § You will need to see your healthcare provider for follow-up visits when you are on warfarin  You will need regular blood tests  These tests are used to decide how much medicine you need  · Acetaminophen  helps decrease pain and fever  This medicine is available without a doctor's order  Ask how much medicine is safe to take, and how often to take it  Acetaminophen can cause liver damage if not taken correctly  · Take your medicine as directed  Contact your healthcare provider if you think your medicine is not helping or if you have side effects  Tell him or her if you are allergic to any medicine  Keep a list of the medicines, vitamins, and herbs you take  Include the amounts, and when and why you take them  Bring the list or the pill bottles to follow-up visits  Carry your medicine list with you in case of an emergency    Call 911 for any of the following:   · You have any of the following signs of a heart attack:      ¨ Squeezing, pressure, or pain in your chest that lasts longer than 5 minutes or returns    ¨ Discomfort or pain in your back, neck, jaw, stomach, or arm     ¨ Trouble breathing    ¨ Nausea or vomiting    ¨ Lightheadedness or a sudden cold sweat, especially with chest pain or trouble breathing    · You have any of the following signs of a stroke:      ¨ Numbness or drooping on one side of your face     ¨ Weakness in an arm or leg    ¨ Confusion or difficulty speaking    ¨ Dizziness, a severe headache, or vision loss    · You feel lightheaded, short of breath, and have chest pain  · You cough up blood  · You have trouble breathing  · You cannot stop the bleeding from your wound even after you hold firm pressure for 10 minutes  When should I seek immediate care? · Blood soaks through your bandage  · Your stitches come apart  · Your hand or arm feels numb, cool, or looks pale  · Your wound gets swollen quickly  When should I contact my healthcare provider? · You have a fever or chills  · Your wound is red, swollen, or draining pus  · Your wound looks more bruised or you have new bruising on the side of your wrist      · You have nausea or are vomiting  · Your skin is itchy, swollen, or you have a rash  · You have questions or concerns about your condition or care  CARE AGREEMENT:   You have the right to help plan your care  Learn about your health condition and how it may be treated  Discuss treatment options with your caregivers to decide what care you want to receive  You always have the right to refuse treatment  The above information is an  only  It is not intended as medical advice for individual conditions or treatments  Talk to your doctor, nurse or pharmacist before following any medical regimen to see if it is safe and effective for you  © 2017 [employer] Information is for End User's use only and may not be sold, redistributed or otherwise used for commercial purposes  All illustrations and images included in CareNotes® are the copyrighted property of [employer]  or [employer]

## 2018-09-20 NOTE — DISCHARGE SUMMARY
Pre-procedure diagnosis:  Bilateral lower extremity claudication, abnormal CANDIE, history of PAD  Postprocedure diagnosis:  Stable PAD without evidence of worsening disease  Procedure:  Lower extremity angiogram with patent right iliac stent, no evidence of worsening PAD  Diet:  Cardiac  Disposition:  Home  Condition:  Good  Medications:  Resume all home medications  Instructions:  Advised not to lift more than a gallon of milk for the next 1 week  Follow-up:  Follow up as directed  Patient be monitored for the next several hours  Vital signs we measured  TR band will be attended to  Patient will be fed  Patient will be ambulated  Lungs patient is stable she will be ready for discharge later today

## 2018-09-20 NOTE — H&P (VIEW-ONLY)
Cardiology Consultation     Adela Jacinto  [id number]  1950  [address]  [facility] [address] [place] [id number]    C/c:Follow-up of shortness of breath, TAVR, peripheral artery disease  HPI:  Patient with   Past medical history of aortic valve disease s/p DGCE(1995), AAA, HTN, COPD presents for follow up  Patient continues to have severe shortness of breath on exertion which is chronic and has not changed  Denies chest pain,  lightheadedness, leg swelling, syncope  Stress test unremarkable for ischemia  Patient complains of heaviness in the legs which is worse on exertion, right leg worse than the left leg  Lower extremity arterial Doppler did show stenosis in the right common femoral artery between 50-75%  RIGHT EXTERNAL ILIAC ARTERY WAS PATENT  TAVR done in 2013 with vascular complications  which was followed by right CFA stent    Impression:  RIGHT LOWER LIMB:  Patent external iliac artery stent  There is a 50-75% stenosis in the common femoral artery  Ankle/Brachial index:   0 94 (Normal)  Metatarsal pressure of 93 mmHg  Great toe pressure of 117 mmHg, within the healing range  PVR/ PPG tracings are normal      LEFT LOWER LIMB:  Ankle/Brachial index:  0 96 (Normal)  Metatarsal pressure of 128 mmHg  Great toe pressure of 95 mmHg, within the healing range    PVR/ PPG tracings are normal       Patient Active Problem List   Diagnosis    Acute exacerbation of chronic obstructive pulmonary disease (COPD) ([address] )    Chest pain    Hypertension    Morbid obesity due to excess calories ([address] )    Coronary artery disease    Hypokalemia    Chronic radicular pain of lower back    Urinary tract infection    Centrilobular emphysema (HCC)    Shortness of breath     Past Medical History:   Diagnosis Date    Aneurysm ([address] )     abdominal aortic aneurysm    Aortic valve disease     Aortic valve replaced     TAVR    Bronchiolitis     Cardiac disease     Chest pain     COPD (chronic obstructive pulmonary disease) (HCC)     Dyspnea     Hypertension     Hypokalemia     Morbid obesity (HCC)     PAD (peripheral artery disease) (HCC)     Rheumatic fever     SOB (shortness of breath)     Tachycardia      Social History     Social History    Marital status:      Spouse name: N/A    Number of children: N/A    Years of education: N/A     Occupational History    Not on file  Social History Main Topics    Smoking status: Former Smoker    Smokeless tobacco: Never Used    Alcohol use 0 6 - 1 2 oz/week     1 - 2 Cans of beer per week      Comment: social    Drug use: No    Sexual activity: Not on file     Other Topics Concern    Not on file     Social History Narrative    No narrative on file      History reviewed  No pertinent family history    Past Surgical History:   Procedure Laterality Date    CARDIAC SURGERY      aortic valve replacement    CARDIAC VALVE REPLACEMENT      REPLACEMENT AORTIC VALVE TRANSCATHETER (TAVR)         Current Outpatient Prescriptions:     albuterol (PROVENTIL HFA,VENTOLIN HFA) 90 mcg/act inhaler, Inhale 2 puffs 2 (two) times a day  , Disp: , Rfl:     aspirin 81 mg chewable tablet, Chew 81 mg daily, Disp: , Rfl:     co-enzyme Q-10 30 MG capsule, Take 400 mg by mouth daily  , Disp: , Rfl:     cyclobenzaprine (FLEXERIL) 5 mg tablet, Take 10 mg by mouth 3 (three) times a day as needed for muscle spasms, Disp: , Rfl:     furosemide (LASIX) 20 mg tablet, Take 1 tablet (20 mg total) by mouth 2 (two) times a day (Patient taking differently: Take 20 mg by mouth daily  ), Disp: 30 tablet, Rfl: 0    levothyroxine 50 mcg tablet, Take 50 mcg by mouth daily, Disp: , Rfl:     metoprolol tartrate (LOPRESSOR) 25 mg tablet, Take 1 tablet (25 mg total) by mouth every 12 (twelve) hours, Disp: 30 tablet, Rfl: 0    pantoprazole (PROTONIX) 40 mg tablet, Take 40 mg by mouth daily, Disp: , Rfl:     Potassium 95 MG TABS, Take 99 mg by mouth daily, Disp: , Rfl:     Tiotropium Bromide-Olodaterol (STIOLTO RESPIMAT) 2 5-2 5 MCG/ACT AERS, Inhale 2 puffs daily, Disp: 1 Inhaler, Rfl: 5    ibuprofen (MOTRIN) 600 mg tablet, Take by mouth every 6 (six) hours as needed for mild pain, Disp: , Rfl:   Allergies   Allergen Reactions    Iohexol     Statins Other (See Comments)     Severe muscle cramps-- cannot move     Vitals:    09/06/18 1355   BP: 164/84   Pulse: 82   SpO2: 94%   Weight: 74 8 kg (165 lb)   Height: 5' 2" (1 575 m)       Labs:  No visits with results within 2 Month(s) from this visit  Latest known visit with results is:   Admission on 02/09/2018, Discharged on 02/12/2018   No results displayed because visit has over 200 results  Imaging: No results found  Review of Systems:  Review of Systems   Constitutional: Negative for diaphoresis and fatigue  HENT: Negative for congestion and facial swelling  Eyes: Negative for photophobia and visual disturbance  Respiratory: Positive for shortness of breath  Negative for chest tightness  Bilateral lower extremity claudication,  Right worse than left   Cardiovascular: Negative for chest pain, palpitations and leg swelling  Gastrointestinal: Negative for abdominal pain and nausea  Endocrine: Negative for cold intolerance and heat intolerance  Musculoskeletal: Negative for arthralgias and myalgias  Skin: Negative for pallor and rash  Neurological: Negative for dizziness and tremors  Psychiatric/Behavioral: Negative for sleep disturbance  The patient is not nervous/anxious  Physical Exam:  Physical Exam   Constitutional: She is oriented to person, place, and time  She appears well-developed and well-nourished  HENT:   Head: Normocephalic and atraumatic  Eyes: Conjunctivae and EOM are normal  Pupils are equal, round, and reactive to light  Neck: Normal range of motion  Neck supple   No JVD present  No thyromegaly present  Cardiovascular: Normal rate, regular rhythm, S1 normal, S2 normal, normal heart sounds and intact distal pulses  Exam reveals no gallop and no friction rub  No murmur heard  Pulses:       Carotid pulses are 2+ on the right side, and 2+ on the left side  Pulmonary/Chest: Effort normal  No respiratory distress  She has decreased breath sounds  She has wheezes  She has no rales  Abdominal: Soft  Bowel sounds are normal  She exhibits no distension  There is no tenderness  There is no rebound and no guarding  Musculoskeletal: Normal range of motion  She exhibits no edema  Neurological: She is alert and oriented to person, place, and time  She has normal reflexes  No cranial nerve deficit  Skin: Skin is warm and dry  Psychiatric: She has a normal mood and affect  Discussion/Summary:   1  Aortic stenosis status post TAVR   mild gradient across the aortic valve   shortness of breath is probably noncardiac in due to COPD  2  Bilateral lower extremity claudication, abnormal arterial Doppler with 50-75% stenosis in right common femoral artery  I will schedule patient for aortogram with bilateral infrainguinal runoff with a without intervention  Risks, benefits and alternatives explained to the patient informed consent was obtained  3  Hyperlipidemia, last LDL of 160  Patient has taken for statins in the past including Lipitor, Crestor and simvastatin followed by significant side effects  I will initiate Abhay Questa as she is high risk due to known peripheral artery disease  4   Ascending aortic aneurysm measuring 4 2 x 4 cm as seen on CT scan-unclear if this is new   Continue BB  Needs follow-up CT scan  Continue good blood pressure control      5   Hypertension--stable  Patient states the blood pressure is normally low in  House  She has white coat hypertension    She was asked to monitor blood pressure and call me for systolic blood pressures more than 140 mm of mercury  Continue current medications  5  Shortness of breath, if patient continues to have shortness of breath in spite of good medical management of COPD he may have to consider right and left heart catheterization to evaluate for the cause of shortness of breath        Follow up with me a week after the procedure

## 2018-09-21 ENCOUNTER — ANESTHESIA EVENT (EMERGENCY)
Dept: PERIOP | Facility: HOSPITAL | Age: 68
DRG: 271 | End: 2018-09-21
Payer: MEDICARE

## 2018-09-21 ENCOUNTER — HOSPITAL ENCOUNTER (EMERGENCY)
Facility: HOSPITAL | Age: 68
End: 2018-09-21
Attending: EMERGENCY MEDICINE | Admitting: EMERGENCY MEDICINE
Payer: MEDICARE

## 2018-09-21 ENCOUNTER — APPOINTMENT (EMERGENCY)
Dept: RADIOLOGY | Facility: HOSPITAL | Age: 68
DRG: 271 | End: 2018-09-21
Payer: MEDICARE

## 2018-09-21 ENCOUNTER — APPOINTMENT (EMERGENCY)
Dept: ULTRASOUND IMAGING | Facility: HOSPITAL | Age: 68
End: 2018-09-21
Payer: MEDICARE

## 2018-09-21 ENCOUNTER — ANESTHESIA (EMERGENCY)
Dept: PERIOP | Facility: HOSPITAL | Age: 68
DRG: 271 | End: 2018-09-21
Payer: MEDICARE

## 2018-09-21 ENCOUNTER — HOSPITAL ENCOUNTER (INPATIENT)
Facility: HOSPITAL | Age: 68
LOS: 5 days | Discharge: HOME | DRG: 271 | End: 2018-09-26
Attending: SURGERY | Admitting: SURGERY
Payer: MEDICARE

## 2018-09-21 VITALS
DIASTOLIC BLOOD PRESSURE: 71 MMHG | OXYGEN SATURATION: 95 % | HEART RATE: 82 BPM | BODY MASS INDEX: 32.07 KG/M2 | WEIGHT: 169.75 LBS | SYSTOLIC BLOOD PRESSURE: 132 MMHG | RESPIRATION RATE: 27 BRPM | TEMPERATURE: 98.3 F

## 2018-09-21 DIAGNOSIS — I70.209 OCCLUSION OF COMMON FEMORAL ARTERY (HCC): Primary | ICD-10-CM

## 2018-09-21 DIAGNOSIS — I10 HYPERTENSION: ICD-10-CM

## 2018-09-21 DIAGNOSIS — I74.9 ARTERIAL THROMBOSIS (HCC): Primary | ICD-10-CM

## 2018-09-21 LAB
ABO GROUP BLD: NORMAL
ABO GROUP BLD: NORMAL
ANION GAP SERPL CALCULATED.3IONS-SCNC: 10 MMOL/L (ref 4–13)
APTT PPP: 25 SECONDS (ref 24–36)
BASOPHILS # BLD AUTO: 0.05 THOUSANDS/ΜL (ref 0–0.1)
BASOPHILS NFR BLD AUTO: 0 % (ref 0–1)
BLD GP AB SCN SERPL QL: NEGATIVE
BLD GP AB SCN SERPL QL: NEGATIVE
BUN SERPL-MCNC: 14 MG/DL (ref 5–25)
CALCIUM SERPL-MCNC: 8.7 MG/DL (ref 8.3–10.1)
CHLORIDE SERPL-SCNC: 104 MMOL/L (ref 100–108)
CO2 SERPL-SCNC: 28 MMOL/L (ref 21–32)
CREAT SERPL-MCNC: 1.24 MG/DL (ref 0.6–1.3)
EOSINOPHIL # BLD AUTO: 0.17 THOUSAND/ΜL (ref 0–0.61)
EOSINOPHIL NFR BLD AUTO: 1 % (ref 0–6)
ERYTHROCYTE [DISTWIDTH] IN BLOOD BY AUTOMATED COUNT: 13.3 % (ref 11.6–15.1)
GFR SERPL CREATININE-BSD FRML MDRD: 45 ML/MIN/1.73SQ M
GLUCOSE SERPL-MCNC: 140 MG/DL (ref 65–140)
GLUCOSE SERPL-MCNC: 80 MG/DL (ref 65–140)
HCT VFR BLD AUTO: 45.6 % (ref 34.8–46.1)
HGB BLD-MCNC: 14.7 G/DL (ref 11.5–15.4)
IMM GRANULOCYTES # BLD AUTO: 0.03 THOUSAND/UL (ref 0–0.2)
IMM GRANULOCYTES NFR BLD AUTO: 0 % (ref 0–2)
INR PPP: 1.03 (ref 0.86–1.17)
INR PPP: 1.21 (ref 0.86–1.17)
LYMPHOCYTES # BLD AUTO: 4.01 THOUSANDS/ΜL (ref 0.6–4.47)
LYMPHOCYTES NFR BLD AUTO: 34 % (ref 14–44)
MCH RBC QN AUTO: 28.7 PG (ref 26.8–34.3)
MCHC RBC AUTO-ENTMCNC: 32.2 G/DL (ref 31.4–37.4)
MCV RBC AUTO: 89 FL (ref 82–98)
MONOCYTES # BLD AUTO: 0.86 THOUSAND/ΜL (ref 0.17–1.22)
MONOCYTES NFR BLD AUTO: 7 % (ref 4–12)
NEUTROPHILS # BLD AUTO: 6.75 THOUSANDS/ΜL (ref 1.85–7.62)
NEUTS SEG NFR BLD AUTO: 58 % (ref 43–75)
NRBC BLD AUTO-RTO: 0 /100 WBCS
PLATELET # BLD AUTO: 263 THOUSANDS/UL (ref 149–390)
PMV BLD AUTO: 9.7 FL (ref 8.9–12.7)
POTASSIUM SERPL-SCNC: 3.1 MMOL/L (ref 3.5–5.3)
PROTHROMBIN TIME: 13.4 SECONDS (ref 11.8–14.2)
PROTHROMBIN TIME: 15.4 SECONDS (ref 11.8–14.2)
RBC # BLD AUTO: 5.12 MILLION/UL (ref 3.81–5.12)
RH BLD: POSITIVE
RH BLD: POSITIVE
SODIUM SERPL-SCNC: 142 MMOL/L (ref 136–145)
SPECIMEN EXPIRATION DATE: NORMAL
SPECIMEN EXPIRATION DATE: NORMAL
TSH SERPL DL<=0.05 MIU/L-ACNC: 5.6 UIU/ML (ref 0.36–3.74)
WBC # BLD AUTO: 11.87 THOUSAND/UL (ref 4.31–10.16)

## 2018-09-21 PROCEDURE — 85730 THROMBOPLASTIN TIME PARTIAL: CPT | Performed by: EMERGENCY MEDICINE

## 2018-09-21 PROCEDURE — 99223 1ST HOSP IP/OBS HIGH 75: CPT | Performed by: SURGERY

## 2018-09-21 PROCEDURE — 99284 EMERGENCY DEPT VISIT MOD MDM: CPT | Performed by: INTERNAL MEDICINE

## 2018-09-21 PROCEDURE — 85610 PROTHROMBIN TIME: CPT | Performed by: EMERGENCY MEDICINE

## 2018-09-21 PROCEDURE — 96365 THER/PROPH/DIAG IV INF INIT: CPT

## 2018-09-21 PROCEDURE — 99285 EMERGENCY DEPT VISIT HI MDM: CPT

## 2018-09-21 PROCEDURE — 82948 REAGENT STRIP/BLOOD GLUCOSE: CPT

## 2018-09-21 PROCEDURE — 86900 BLOOD TYPING SEROLOGIC ABO: CPT | Performed by: SURGERY

## 2018-09-21 PROCEDURE — 0YPB0JZ REMOVAL OF SYNTHETIC SUBSTITUTE FROM LEFT LOWER EXTREMITY, OPEN APPROACH: ICD-10-PCS | Performed by: SURGERY

## 2018-09-21 PROCEDURE — 93005 ELECTROCARDIOGRAM TRACING: CPT

## 2018-09-21 PROCEDURE — 04UJ0KZ SUPPLEMENT LEFT EXTERNAL ILIAC ARTERY WITH NONAUTOLOGOUS TISSUE SUBSTITUTE, OPEN APPROACH: ICD-10-PCS | Performed by: SURGERY

## 2018-09-21 PROCEDURE — 86920 COMPATIBILITY TEST SPIN: CPT

## 2018-09-21 PROCEDURE — 86850 RBC ANTIBODY SCREEN: CPT | Performed by: EMERGENCY MEDICINE

## 2018-09-21 PROCEDURE — 80048 BASIC METABOLIC PNL TOTAL CA: CPT | Performed by: EMERGENCY MEDICINE

## 2018-09-21 PROCEDURE — 86901 BLOOD TYPING SEROLOGIC RH(D): CPT | Performed by: SURGERY

## 2018-09-21 PROCEDURE — 96372 THER/PROPH/DIAG INJ SC/IM: CPT

## 2018-09-21 PROCEDURE — 86901 BLOOD TYPING SEROLOGIC RH(D): CPT | Performed by: EMERGENCY MEDICINE

## 2018-09-21 PROCEDURE — 85347 COAGULATION TIME ACTIVATED: CPT

## 2018-09-21 PROCEDURE — 36415 COLL VENOUS BLD VENIPUNCTURE: CPT | Performed by: EMERGENCY MEDICINE

## 2018-09-21 PROCEDURE — 86850 RBC ANTIBODY SCREEN: CPT | Performed by: SURGERY

## 2018-09-21 PROCEDURE — 93926 LOWER EXTREMITY STUDY: CPT

## 2018-09-21 PROCEDURE — 86900 BLOOD TYPING SEROLOGIC ABO: CPT | Performed by: EMERGENCY MEDICINE

## 2018-09-21 PROCEDURE — 04CJ0ZZ EXTIRPATION OF MATTER FROM LEFT EXTERNAL ILIAC ARTERY, OPEN APPROACH: ICD-10-PCS | Performed by: SURGERY

## 2018-09-21 PROCEDURE — 96375 TX/PRO/DX INJ NEW DRUG ADDON: CPT

## 2018-09-21 PROCEDURE — C1781 MESH (IMPLANTABLE): HCPCS | Performed by: SURGERY

## 2018-09-21 PROCEDURE — 84443 ASSAY THYROID STIM HORMONE: CPT | Performed by: INTERNAL MEDICINE

## 2018-09-21 PROCEDURE — 85730 THROMBOPLASTIN TIME PARTIAL: CPT | Performed by: SURGERY

## 2018-09-21 PROCEDURE — 85025 COMPLETE CBC W/AUTO DIFF WBC: CPT | Performed by: EMERGENCY MEDICINE

## 2018-09-21 PROCEDURE — 85610 PROTHROMBIN TIME: CPT | Performed by: SURGERY

## 2018-09-21 PROCEDURE — 85027 COMPLETE CBC AUTOMATED: CPT | Performed by: SURGERY

## 2018-09-21 PROCEDURE — 34201 REMOVAL OF ARTERY CLOT: CPT | Performed by: SURGERY

## 2018-09-21 DEVICE — XENOSURE BIOLOGIC PATCH, 0.8CM X 8CM, EIFU
Type: IMPLANTABLE DEVICE | Site: ARTERIAL | Status: FUNCTIONAL
Brand: XENOSURE BIOLOGIC PATCH

## 2018-09-21 RX ORDER — PROPOFOL 10 MG/ML
INJECTION, EMULSION INTRAVENOUS AS NEEDED
Status: DISCONTINUED | OUTPATIENT
Start: 2018-09-21 | End: 2018-09-21 | Stop reason: SURG

## 2018-09-21 RX ORDER — ONDANSETRON 2 MG/ML
4 INJECTION INTRAMUSCULAR; INTRAVENOUS ONCE AS NEEDED
Status: COMPLETED | OUTPATIENT
Start: 2018-09-21 | End: 2018-09-21

## 2018-09-21 RX ORDER — FENTANYL CITRATE/PF 50 MCG/ML
25 SYRINGE (ML) INJECTION
Status: DISCONTINUED | OUTPATIENT
Start: 2018-09-21 | End: 2018-09-22

## 2018-09-21 RX ORDER — SODIUM CHLORIDE, SODIUM LACTATE, POTASSIUM CHLORIDE, CALCIUM CHLORIDE 600; 310; 30; 20 MG/100ML; MG/100ML; MG/100ML; MG/100ML
INJECTION, SOLUTION INTRAVENOUS CONTINUOUS PRN
Status: DISCONTINUED | OUTPATIENT
Start: 2018-09-21 | End: 2018-09-21 | Stop reason: SURG

## 2018-09-21 RX ORDER — ROCURONIUM BROMIDE 10 MG/ML
INJECTION, SOLUTION INTRAVENOUS AS NEEDED
Status: DISCONTINUED | OUTPATIENT
Start: 2018-09-21 | End: 2018-09-21 | Stop reason: SURG

## 2018-09-21 RX ORDER — HEPARIN SODIUM 10000 [USP'U]/100ML
INJECTION, SOLUTION INTRAVENOUS CONTINUOUS PRN
Status: DISCONTINUED | OUTPATIENT
Start: 2018-09-21 | End: 2018-09-21 | Stop reason: SURG

## 2018-09-21 RX ORDER — DEXTROSE, SODIUM CHLORIDE, AND POTASSIUM CHLORIDE 5; .45; .15 G/100ML; G/100ML; G/100ML
75 INJECTION INTRAVENOUS CONTINUOUS
Status: DISCONTINUED | OUTPATIENT
Start: 2018-09-21 | End: 2018-09-21

## 2018-09-21 RX ORDER — SODIUM CHLORIDE, SODIUM LACTATE, POTASSIUM CHLORIDE, CALCIUM CHLORIDE 600; 310; 30; 20 MG/100ML; MG/100ML; MG/100ML; MG/100ML
20 INJECTION, SOLUTION INTRAVENOUS CONTINUOUS
Status: DISCONTINUED | OUTPATIENT
Start: 2018-09-21 | End: 2018-09-21

## 2018-09-21 RX ORDER — HEPARIN SODIUM 1000 [USP'U]/ML
6000 INJECTION, SOLUTION INTRAVENOUS; SUBCUTANEOUS AS NEEDED
Status: DISCONTINUED | OUTPATIENT
Start: 2018-09-21 | End: 2018-09-24

## 2018-09-21 RX ORDER — MORPHINE SULFATE 10 MG/ML
6 INJECTION, SOLUTION INTRAMUSCULAR; INTRAVENOUS ONCE
Status: COMPLETED | OUTPATIENT
Start: 2018-09-21 | End: 2018-09-21

## 2018-09-21 RX ORDER — CEFAZOLIN SODIUM 1 G/3ML
INJECTION, POWDER, FOR SOLUTION INTRAMUSCULAR; INTRAVENOUS AS NEEDED
Status: DISCONTINUED | OUTPATIENT
Start: 2018-09-21 | End: 2018-09-21 | Stop reason: SURG

## 2018-09-21 RX ORDER — HEPARIN SODIUM 5000 [USP'U]/ML
5000 INJECTION, SOLUTION INTRAVENOUS; SUBCUTANEOUS ONCE
Status: COMPLETED | OUTPATIENT
Start: 2018-09-21 | End: 2018-09-21

## 2018-09-21 RX ORDER — HEPARIN SODIUM 10000 [USP'U]/100ML
300-2000 INJECTION, SOLUTION INTRAVENOUS
Status: DISCONTINUED | OUTPATIENT
Start: 2018-09-21 | End: 2018-09-21 | Stop reason: HOSPADM

## 2018-09-21 RX ORDER — DIPHENHYDRAMINE HYDROCHLORIDE 50 MG/ML
INJECTION INTRAMUSCULAR; INTRAVENOUS AS NEEDED
Status: DISCONTINUED | OUTPATIENT
Start: 2018-09-21 | End: 2018-09-21 | Stop reason: SURG

## 2018-09-21 RX ORDER — HEPARIN SODIUM 10000 [USP'U]/100ML
3-30 INJECTION, SOLUTION INTRAVENOUS
Status: DISPENSED | OUTPATIENT
Start: 2018-09-21 | End: 2018-09-24

## 2018-09-21 RX ORDER — HEPARIN SODIUM 1000 [USP'U]/ML
3000 INJECTION, SOLUTION INTRAVENOUS; SUBCUTANEOUS AS NEEDED
Status: DISCONTINUED | OUTPATIENT
Start: 2018-09-21 | End: 2018-09-24

## 2018-09-21 RX ORDER — SODIUM CHLORIDE 9 MG/ML
INJECTION, SOLUTION INTRAVENOUS CONTINUOUS PRN
Status: DISCONTINUED | OUTPATIENT
Start: 2018-09-21 | End: 2018-09-21 | Stop reason: SURG

## 2018-09-21 RX ORDER — HEPARIN SODIUM 10000 [USP'U]/100ML
INJECTION, SOLUTION INTRAVENOUS CONTINUOUS PRN
Status: DISCONTINUED | OUTPATIENT
Start: 2018-09-21 | End: 2018-09-21

## 2018-09-21 RX ORDER — MIDAZOLAM HYDROCHLORIDE 1 MG/ML
INJECTION INTRAMUSCULAR; INTRAVENOUS AS NEEDED
Status: DISCONTINUED | OUTPATIENT
Start: 2018-09-21 | End: 2018-09-21 | Stop reason: SURG

## 2018-09-21 RX ORDER — HEPARIN SODIUM 1000 [USP'U]/ML
INJECTION, SOLUTION INTRAVENOUS; SUBCUTANEOUS AS NEEDED
Status: DISCONTINUED | OUTPATIENT
Start: 2018-09-21 | End: 2018-09-21 | Stop reason: SURG

## 2018-09-21 RX ORDER — ALBUMIN, HUMAN INJ 5% 5 %
SOLUTION INTRAVENOUS CONTINUOUS PRN
Status: DISCONTINUED | OUTPATIENT
Start: 2018-09-21 | End: 2018-09-21 | Stop reason: SURG

## 2018-09-21 RX ORDER — LIDOCAINE HYDROCHLORIDE 10 MG/ML
INJECTION, SOLUTION INFILTRATION; PERINEURAL AS NEEDED
Status: DISCONTINUED | OUTPATIENT
Start: 2018-09-21 | End: 2018-09-21 | Stop reason: SURG

## 2018-09-21 RX ORDER — FENTANYL CITRATE 50 UG/ML
INJECTION, SOLUTION INTRAMUSCULAR; INTRAVENOUS AS NEEDED
Status: DISCONTINUED | OUTPATIENT
Start: 2018-09-21 | End: 2018-09-21 | Stop reason: SURG

## 2018-09-21 RX ADMIN — MORPHINE SULFATE 6 MG: 10 INJECTION INTRAVENOUS at 16:25

## 2018-09-21 RX ADMIN — PROPOFOL 50 MG: 10 INJECTION, EMULSION INTRAVENOUS at 19:00

## 2018-09-21 RX ADMIN — HEPARIN SODIUM AND DEXTROSE 1000 UNITS/HR: 10000; 5 INJECTION INTRAVENOUS at 16:29

## 2018-09-21 RX ADMIN — ALBUMIN (HUMAN): 12.5 SOLUTION INTRAVENOUS at 19:15

## 2018-09-21 RX ADMIN — MIDAZOLAM 2 MG: 1 INJECTION INTRAMUSCULAR; INTRAVENOUS at 18:45

## 2018-09-21 RX ADMIN — HEPARIN SODIUM 5000 UNITS: 1000 INJECTION INTRAVENOUS; SUBCUTANEOUS at 19:55

## 2018-09-21 RX ADMIN — SODIUM CHLORIDE, SODIUM LACTATE, POTASSIUM CHLORIDE, AND CALCIUM CHLORIDE: .6; .31; .03; .02 INJECTION, SOLUTION INTRAVENOUS at 18:47

## 2018-09-21 RX ADMIN — CEFAZOLIN 2000 MG: 1 INJECTION, POWDER, FOR SOLUTION INTRAVENOUS at 19:20

## 2018-09-21 RX ADMIN — LIDOCAINE HYDROCHLORIDE 50 MG: 10 INJECTION, SOLUTION INFILTRATION; PERINEURAL at 18:53

## 2018-09-21 RX ADMIN — DIPHENHYDRAMINE HYDROCHLORIDE 50 MG: 50 INJECTION, SOLUTION INTRAMUSCULAR; INTRAVENOUS at 19:29

## 2018-09-21 RX ADMIN — DEXAMETHASONE SODIUM PHOSPHATE 10 MG: 10 INJECTION INTRAMUSCULAR; INTRAVENOUS at 18:56

## 2018-09-21 RX ADMIN — ALBUMIN (HUMAN): 12.5 SOLUTION INTRAVENOUS at 18:57

## 2018-09-21 RX ADMIN — SODIUM CHLORIDE: 0.9 INJECTION, SOLUTION INTRAVENOUS at 18:57

## 2018-09-21 RX ADMIN — HYDROCORTISONE SODIUM SUCCINATE 125 MG: 100 INJECTION, POWDER, FOR SOLUTION INTRAMUSCULAR; INTRAVENOUS at 19:29

## 2018-09-21 RX ADMIN — HEPARIN SODIUM AND DEXTROSE 18 UNITS/KG/HR: 10000; 5 INJECTION INTRAVENOUS at 23:16

## 2018-09-21 RX ADMIN — HEPARIN SODIUM AND DEXTROSE 1000 UNITS/HR: 10000; 5 INJECTION INTRAVENOUS at 16:25

## 2018-09-21 RX ADMIN — ONDANSETRON 4 MG: 2 INJECTION INTRAMUSCULAR; INTRAVENOUS at 23:12

## 2018-09-21 RX ADMIN — ROCURONIUM BROMIDE 40 MG: 10 INJECTION INTRAVENOUS at 18:53

## 2018-09-21 RX ADMIN — HEPARIN SODIUM 18 UNITS/KG/HR: 10000 INJECTION, SOLUTION INTRAVENOUS at 19:59

## 2018-09-21 RX ADMIN — FENTANYL CITRATE 100 MCG: 50 INJECTION, SOLUTION INTRAMUSCULAR; INTRAVENOUS at 19:01

## 2018-09-21 RX ADMIN — PROPOFOL 150 MG: 10 INJECTION, EMULSION INTRAVENOUS at 18:53

## 2018-09-21 RX ADMIN — HEPARIN SODIUM 5000 UNITS: 5000 INJECTION, SOLUTION INTRAVENOUS; SUBCUTANEOUS at 16:24

## 2018-09-21 RX ADMIN — SODIUM CHLORIDE: 0.9 INJECTION, SOLUTION INTRAVENOUS at 21:24

## 2018-09-21 RX ADMIN — ALBUMIN (HUMAN): 12.5 SOLUTION INTRAVENOUS at 21:38

## 2018-09-21 NOTE — ED NOTES
Pt transported to OR via stretcher by myself, pt followed by vascular surgeon    Pt transferrred with Heparin drip infusing, continued per verbal order from Dr Antonia Stevenson, RN  09/21/18 1455

## 2018-09-21 NOTE — ED NOTES
Vascular surgery in to see pt, pt continues on heparin drip 1000 units/hr     Jacque Basurto, LEIGHANN  09/21/18 [place] Joyce Sniff, LEIGHANN  09/21/18 [id number]

## 2018-09-21 NOTE — ED PROVIDER NOTES
History  Chief Complaint   Patient presents with    Cold Extremity     Patient had stent placement in her left leg yesterday  Left leg cold     L leg pain and coolness since yesterday evening  Had cardiac cath via L groin yesterday  Pain in leg constant, aching, diffuse, worse w ambulation and resolved after several minutes of rest  No fever  No cp or sob  No known injury to LLE  Currently symptomatic  Prior to Admission Medications   Prescriptions Last Dose Informant Patient Reported? Taking?    Potassium 95 MG TABS   Yes No   Sig: Take 99 mg by mouth daily   Tiotropium Bromide-Olodaterol (STIOLTO RESPIMAT) 2 5-2 5 MCG/ACT AERS   No No   Sig: Inhale 2 puffs daily   albuterol (PROVENTIL HFA,VENTOLIN HFA) 90 mcg/act inhaler   Yes No   Sig: Inhale 2 puffs 2 (two) times a day     aspirin 81 mg chewable tablet   Yes No   Sig: Chew 81 mg daily   co-enzyme Q-10 30 MG capsule   Yes No   Sig: Take 400 mg by mouth daily     cyclobenzaprine (FLEXERIL) 5 mg tablet   Yes No   Sig: Take 10 mg by mouth 3 (three) times a day as needed for muscle spasms   diphenhydrAMINE (BENADRYL) 25 mg tablet   No No   Sig: TAKE 1 TABLET THE EVENING PRIOR TO  PROCEDURE AND 1 TABLET THE MORNING OF PROCEDURE   famotidine (PEPCID) 20 mg tablet   No No   Sig: TAKE 1 TABLET THE EVENING PRIOR TO  PROCEDURE AND 1 TABLET THE MORNING OF PROCEDURE   furosemide (LASIX) 20 mg tablet   No No   Sig: Take 1 tablet (20 mg total) by mouth 2 (two) times a day   Patient taking differently: Take 20 mg by mouth daily     ibuprofen (MOTRIN) 600 mg tablet   Yes No   Sig: Take by mouth every 6 (six) hours as needed for mild pain   levothyroxine 50 mcg tablet   Yes No   Sig: Take 50 mcg by mouth daily   metoprolol tartrate (LOPRESSOR) 25 mg tablet   No No   Sig: Take 1 tablet (25 mg total) by mouth every 12 (twelve) hours   pantoprazole (PROTONIX) 40 mg tablet   Yes No   Sig: Take 40 mg by mouth daily   predniSONE 20 mg tablet   No No   Sig: TAKE 3 TABLETS THE EVENING PRIOR TO PROCEDURE AND 3 TABLETS THE MORNING OF PROCEDURE      Facility-Administered Medications: None       Past Medical History:   Diagnosis Date    Aneurysm ([address] )     abdominal aortic aneurysm    Aortic valve disease     Aortic valve replaced     TAVR    Bronchiolitis     Cardiac disease     Chest pain     COPD (chronic obstructive pulmonary disease) (HCC)     Disease of thyroid gland     Dyspnea     Hypertension     Hypokalemia     Morbid obesity (HCC)     PAD (peripheral artery disease) (HCC)     Rheumatic fever     SOB (shortness of breath)     Tachycardia        Past Surgical History:   Procedure Laterality Date    CARDIAC SURGERY      aortic valve replacement    CARDIAC VALVE REPLACEMENT      ESOPHAGUS SURGERY N/A     REPLACEMENT AORTIC VALVE TRANSCATHETER (TAVR)         History reviewed  No pertinent family history  I have reviewed and agree with the history as documented  Social History   Substance Use Topics    Smoking status: Former Smoker    Smokeless tobacco: Never Used    Alcohol use 0 6 - 1 2 oz/week     1 - 2 Cans of beer per week      Comment: social        Review of Systems   Constitutional: Negative  Eyes: Negative  Respiratory: Negative  Cardiovascular: Negative  Gastrointestinal: Negative  Endocrine: Negative  Genitourinary: Negative  Skin: Positive for pallor  Neurological: Negative  Hematological: Negative          Physical Exam  Physical Exam    Vital Signs  ED Triage Vitals   Temperature Pulse Respirations Blood Pressure SpO2   09/21/18 1442 09/21/18 1430 09/21/18 1430 09/21/18 1430 09/21/18 1432   98 3 °F (36 8 °C) 75 14 (!) 171/69 96 %      Temp src Heart Rate Source Patient Position - Orthostatic VS BP Location FiO2 (%)   -- 09/21/18 1528 09/21/18 1528 09/21/18 1528 --    Monitor Lying Left arm       Pain Score       09/21/18 1625       Worst Possible Pain           Vitals:    09/21/18 1430 09/21/18 1528 09/21/18 1600   BP: (!) 171/69 126/58 132/71   Pulse: 75 80 82   Patient Position - Orthostatic VS:  Lying        Visual Acuity      ED Medications  Medications   heparin (porcine) 25,000 units in 250 mL infusion (premix) (1,000 Units/hr Intravenous New Bag 9/21/18 1629)   heparin (porcine) subcutaneous injection 5,000 Units (5,000 Units Subcutaneous Given 9/21/18 1624)   morphine (PF) 10 mg/mL injection 6 mg (6 mg Intravenous Given 9/21/18 1625)       Diagnostic Studies  Results Reviewed     Procedure Component Value Units Date/Time    Basic metabolic panel [[id number]]  (Abnormal) Collected:  09/21/18 1442    Lab Status:  Final result Specimen:  Blood from Arm, Right Updated:  09/21/18 1509     Sodium 142 mmol/L      Potassium 3 1 (L) mmol/L      Chloride 104 mmol/L      CO2 28 mmol/L      ANION GAP 10 mmol/L      BUN 14 mg/dL      Creatinine 1 24 mg/dL      Glucose 80 mg/dL      Calcium 8 7 mg/dL      eGFR 45 ml/min/1 73sq m     Narrative:         National Kidney Disease Education Program recommendations are as follows:  GFR calculation is accurate only with a steady state creatinine  Chronic Kidney disease less than 60 ml/min/1 73 sq  meters  Kidney failure less than 15 ml/min/1 73 sq  meters      Luisa Morrow [[id number]]  (Normal) Collected:  09/21/18 1442    Lab Status:  Final result Specimen:  Blood from Arm, Right Updated:  09/21/18 1508     Protime 13 4 seconds      INR 1 03    APTT [[id number]]  (Normal) Collected:  09/21/18 1442    Lab Status:  Final result Specimen:  Blood from Arm, Right Updated:  09/21/18 1508     PTT 25 seconds     CBC and differential [[id number]]  (Abnormal) Collected:  09/21/18 1442    Lab Status:  Final result Specimen:  Blood from Arm, Right Updated:  09/21/18 1456     WBC 11 87 (H) Thousand/uL      RBC 5 12 Million/uL      Hemoglobin 14 7 g/dL      Hematocrit 45 6 %      MCV 89 fL      MCH 28 7 pg      MCHC 32 2 g/dL      RDW 13 3 %      MPV 9 7 fL      Platelets 139 Thousands/uL      nRBC 0 /100 WBCs Neutrophils Relative 58 %      Immat GRANS % 0 %      Lymphocytes Relative 34 %      Monocytes Relative 7 %      Eosinophils Relative 1 %      Basophils Relative 0 %      Neutrophils Absolute 6 75 Thousands/µL      Immature Grans Absolute 0 03 Thousand/uL      Lymphocytes Absolute 4 01 Thousands/µL      Monocytes Absolute 0 86 Thousand/µL      Eosinophils Absolute 0 17 Thousand/µL      Basophils Absolute 0 05 Thousands/µL                  VAS pseudoaneurysm study    (Results Pending)              Procedures  ECG 12 Lead Documentation  Date/Time: 9/21/2018 2:53 PM  Performed by: Trish Molina  Authorized by: Ursula CALLOWAY     Indications / Diagnosis:  Leg pain, eval for Afib  ECG reviewed by me, the ED Provider: yes    Patient location:  ED  Previous ECG:     Previous ECG:  Compared to current    Similarity:  No change  Interpretation:     Interpretation: abnormal    Rate:     ECG rate:  66    ECG rate assessment: normal    Comments:      Inferior and lateral ST depression, abnormal EKG, unchanged from most recent EKG in [facility]    CriticalCare Time  Performed by: Trish Molina  Authorized by: Trish Molina     Critical care provider statement:     Critical care time (minutes):  35    Critical care time was exclusive of:  Separately billable procedures and treating other patients    Critical care was necessary to treat or prevent imminent or life-threatening deterioration of the following conditions:  Circulatory failure    Critical care was time spent personally by me on the following activities:  Ordering and performing treatments and interventions, ordering and review of laboratory studies, ordering and review of radiographic studies, review of old charts, examination of patient, evaluation of patient's response to treatment, discussions with consultants, development of treatment plan with patient or surrogate and obtaining history from patient or surrogate           Phone Contacts  ED Phone Contact    ED Course  ED Course as of Sep 21 1633   Fri Sep 21, 2018   1614 4:14 PM spoke w u/s tech regarding prelim findings concerning for L common fem arterial thrombus  Heparin ordered  Pt reevaluated, remains well appearing  Requesting analgesic at this time  Anticipate transfer for vascular surgery eval, order placed for transfer via PACS, a/w call back at this time  80 4:19 PM spoke w vascular surgery on call who agrees with plan to cont heparin transfer to [facility] and vascular will see in consult, recommend admission to medical service  MDM    The patient presented with a condition in which there was a high probability of imminent or life-threatening deterioration, and critical care services (excluding separately billable procedures) totalled 30-74 minutes  Disposition  Final diagnoses:   Arterial thrombosis ([address] )     Time reflects when diagnosis was documented in both MDM as applicable and the Disposition within this note     Time User Action Codes Description Comment    9/21/2018  4:28 PM Silvestre Thompson Add [I74 9] Arterial thrombosis [facility])       ED Disposition     ED Disposition Condition Comment    Transfer to Another [address] [place] [address] Rd should be transferred out to Dr Rody Werner MD Documentation      Most Recent Value   Patient Condition  The patient has been stabilized such that within reasonable medical probability, no material deterioration of the patient condition or the condition of the unborn child(vinod) is likely to result from the transfer   Reason for Transfer  Level of Care needed not available at this facility   Benefits of Transfer  Specialized equipment and/or services available at the receiving facility (Include comment)________________________   Risks of Transfer  Potential for delay in receiving treatment, Potential deterioration of medical condition, Loss of IV, Increased discomfort during transfer, Possible worsening of condition or death during transfer   Accepting [facility] [address] Name, Magaly Calderon   Sending MD rubio   Provider Certification  General risk, such as traffic hazards, adverse weather conditions, rough terrain or turbulence, possible failure of equipment (including vehicle or aircraft), or consequences of actions of persons outside the control of the transport personnel      RN Documentation      [address] Name, Kathy [address]   slb      Follow-up Information    None         Patient's Medications   Discharge Prescriptions    No medications on file     No discharge procedures on file      ED Provider  Electronically Signed by           Shahid Patel MD  09/21/18 8490

## 2018-09-21 NOTE — CONSULTS
Consultation - Cardiology Team One  Miguel Angel Milton 79 y o  female MRN: [id number]  Unit/Bed#: FT 02 Encounter: [id number]    Consults    Physician Requesting Consult: No att  providers found  Reason for Consult / Principal Problem: Cold limb    HPI: Cardiologist Dr Shruti Macias is a 79y o  year old female who has a history of aortic valve disease S/P tab her, AAA, hypertension, COPD presenting with cold left lower extremity after lower extremity angiogram   Patient had cardiac lower extremity angiography yesterday, was sent home, developed left lower limb pain, and coldness  Patient came into the ED today, lower extremity pulses intact, however left limb cold to touch  Patient found to have clot on imaging in left lower extremity  Placed on heparin drip prior to arrival   Pending transfer to [facility] for higher level of care  REVIEW OF SYSTEMS:  Constitutional:  Denies fever or chills   Eyes:  Denies change in visual acuity   HENT:  Denies nasal congestion or sore throat   Respiratory:  Denies cough or shortness of breath   Cardiovascular:  Denies chest pain or edema   GI:  Denies abdominal pain, nausea, vomiting, bloody stools or diarrhea   :  Denies dysuria, frequency, difficulty in micturition and nocturia  Musculoskeletal: + left lower extremity coldness, pain     Neurologic:  Denies headache, focal weakness or sensory changes   Endocrine:  Denies polyuria or polydipsia   Lymphatic:  Denies swollen glands   Psychiatric:  Denies depression or anxiety     Historical Information   Past Medical History:   Diagnosis Date    Aneurysm ([facility] [address] )     abdominal aortic aneurysm    Aortic valve disease     Aortic valve replaced     TAVR    Bronchiolitis     Cardiac disease     Chest pain     COPD (chronic obstructive pulmonary disease) (HCC)     Disease of thyroid gland     Dyspnea     Hypertension     Hypokalemia     Morbid obesity (HCC)     [facility]per[facility] [address] )     Rheumatic fever     SOB (shortness of breath)     Tachycardia      Past Surgical History:   Procedure Laterality Date    CARDIAC SURGERY      aortic valve replacement    CARDIAC VALVE REPLACEMENT      ESOPHAGUS SURGERY N/A     REPLACEMENT AORTIC VALVE TRANSCATHETER (TAVR)       History   Alcohol Use    0 6 - 1 2 oz/week    1 - 2 Cans of beer per week     Comment: social     History   Drug Use No     History   Smoking Status    Former Smoker   Smokeless Tobacco    Never Used       Family History: History reviewed  No pertinent family history  MEDS & ALLERGIES:  all current active meds have been reviewed and current meds: Current Facility-Administered Medications   Medication Dose Route Frequency    heparin (porcine) 25,000 units in 250 mL infusion (premix)  300-2,000 Units/hr Intravenous Titrated     Facility-Administered Medications Ordered in Other Encounters   Medication Dose Route Frequency    acetaminophen (TYLENOL) tablet 650 mg  650 mg Oral Q6H PRN       heparin (porcine) 300-2,000 Units/hr Last Rate: 1,000 Units/hr (09/21/18 1629)     Allergies   Allergen Reactions    Iohexol     Statins Other (See Comments)     Severe muscle cramps-- cannot move       OBJECTIVE:  Vitals:   Vitals:    09/21/18 1600   BP: 132/71   Pulse: 82   Resp: (!) 27   Temp:    SpO2: 95%     Body mass index is 32 07 kg/m²  Systolic (39VED), YMU:926 , Min:126 , VNI:611     Diastolic (44HML), JBO:27, Min:58, Max:71    No intake or output data in the 24 hours ending 09/21/18 1701  Weight (last 2 days) before discharge     Date/Time   Weight    09/21/18 1528  77 (169 75)            Invasive Devices     Peripheral Intravenous Line            Peripheral IV 09/21/18 Right Antecubital less than 1 day                PHYSICAL EXAMS:  General:  Patient is not in acute distress, laying in the bed comfortably, awake, alert responding to commands  Head: Normocephalic, Atraumatic     HEENT: White sclera, pink conjunctiva, PERRLA,pharynx benign  Neck:  Supple, no neck vein distention, carotids+2/+2 no bruits, thyromegaly, adenopathy  Respiratory: clear to P/A  Cardiovascular:  PMI normal, S1-S2 normal, No  Murmurs, thrills, gallops, rubs   Regular rhythm  GI:  Abdomen soft nontender  No hepatosplenomegaly, adenopathy, ascites,or rebound tenderness  Extremities: + left lower limb is cold to touch, intact pulses bilaterally  Patient has weakness of left lower extremity  Decreased sensation of L lower extremity  Pale on appearance    Integument:  No skin rashes or ulceration  Lymphatic:  No cervical or inguinal lymphadenopathy  Neurologic:  Patient is awake alert, responding to command, well-oriented to time and place and person moving all extremities    LABORATORY RESULTS:      CBC with diff:   Results from last 7 days  Lab Units 09/21/18  1442   WBC Thousand/uL 11 87*   HEMOGLOBIN g/dL 14 7   HEMATOCRIT % 45 6   MCV fL 89   PLATELETS Thousands/uL 263   MCH pg 28 7   MCHC g/dL 32 2   RDW % 13 3   MPV fL 9 7   NRBC AUTO /100 WBCs 0       CMP:  Results from last 7 days  Lab Units 09/21/18  1442   SODIUM mmol/L 142   POTASSIUM mmol/L 3 1*   CHLORIDE mmol/L 104   CO2 mmol/L 28   BUN mg/dL 14   CREATININE mg/dL 1 24   CALCIUM mg/dL 8 7   EGFR ml/min/1 73sq m 45       BMP:  Results from last 7 days  Lab Units 09/21/18  1442   SODIUM mmol/L 142   POTASSIUM mmol/L 3 1*   CHLORIDE mmol/L 104   CO2 mmol/L 28   BUN mg/dL 14   CREATININE mg/dL 1 24   CALCIUM mg/dL 8 7                          Results from last 7 days  Lab Units 09/21/18  1442   INR  1 03       Lipid Profile:   No results found for: CHOL  Lab Results   Component Value Date    HDL 52 02/11/2018     Lab Results   Component Value Date    LDLCALC 160 (H) 02/11/2018     Lab Results   Component Value Date    TRIG 115 02/11/2018       Cardiac testing:   Results for orders placed during the hospital encounter of 02/09/18   Echo complete with contrast if indicated    Narrative [facility] [place], [place] [address]  [phone]    Transthoracic Echocardiogram  Limited 2D, M-mode, Doppler, and Color Doppler    Study date:  2018    Patient: Yancy Bedoya  MR number: [id number]  Account number: [de-identified]  : 1950  Age: 74 years  Gender: Female  Status: Inpatient  Location: Bedside  Height: 62 in  Weight: 162 lb  BP: 133/ 60 mmHg    Indications: Aortic valve disorder  Diagnoses: I35 0 - Nonrheumatic aortic (valve) stenosis    Sonographer:  Hadley RCS  Interpreting Physician:  Alessia Low MD  Referring Physician:  Alessia Low MD  Group:  [facility]    SUMMARY    PROCEDURE INFORMATION:  This was a technically difficult study  LEFT VENTRICLE:  Systolic function was normal  Ejection fraction was estimated to be 60 %  There were no regional wall motion abnormalities  Doppler parameters were consistent with abnormal left ventricular relaxation (grade 1 diastolic dysfunction)  RIGHT VENTRICLE:  The size was normal   Systolic function was normal     MITRAL VALVE:  There was mild annular calcification  AORTIC VALVE:  Pt is s/p TAVR  The valve was not well visualized  The peak and mean gradients across the LVOT and aortic valve as measured by CW Doppler were 63 and 18 mm Hg respectively  TRICUSPID VALVE:  There was trace regurgitation  Pulmonary artery systolic pressure was within the normal range  HISTORY: PRIOR HISTORY: TAVR    COPD  CAD  PRIOR PROCEDURES: Bioprosthesis of aortic valve  PROCEDURE: The procedure was performed at the bedside  This was a routine study  The transthoracic approach was used  The study included limited 2D imaging, M-mode, complete spectral Doppler, and color Doppler  The heart rate was 100 bpm,  at the start of the study  Images were obtained from the parasternal, apical, subcostal, and suprasternal notch acoustic windows   This was a technically difficult study  LEFT VENTRICLE: Size was normal  Systolic function was normal  Ejection fraction was estimated to be 60 %  There were no regional wall motion abnormalities  Wall thickness was normal  No evidence of apical thrombus  DOPPLER: Doppler  parameters were consistent with abnormal left ventricular relaxation (grade 1 diastolic dysfunction)  RIGHT VENTRICLE: The size was normal  Systolic function was normal  Wall thickness was normal     LEFT ATRIUM: Size was normal     RIGHT ATRIUM: Size was normal     MITRAL VALVE: There was mild annular calcification  There was normal leaflet separation  DOPPLER: The transmitral velocity was within the normal range  There was no evidence for stenosis  There was no significant regurgitation  AORTIC VALVE: Pt is s/p TAVR  The valve was not well visualized  The peak and mean gradients across the LVOT and aortic valve as measured by CW Doppler were 63 and 18 mm Hg respectively  DOPPLER: There was no significant regurgitation  TRICUSPID VALVE: The valve structure was normal  There was normal leaflet separation  DOPPLER: The transtricuspid velocity was within the normal range  There was no evidence for stenosis  There was trace regurgitation  Pulmonary artery  systolic pressure was within the normal range  PULMONIC VALVE: Leaflets exhibited normal thickness, no calcification, and normal cuspal separation  DOPPLER: The transpulmonic velocity was within the normal range  There was no significant regurgitation  PERICARDIUM: There was no pericardial effusion  The pericardium was normal in appearance  AORTA: The root exhibited normal size  SYSTEMIC VEINS: IVC: The inferior vena cava was normal in size  SYSTEM MEASUREMENT TABLES    2D  LA Area: 12 8 cm2  LVEDV MOD A4C: 54 8 ml  LVEF MOD A4C: 72 8 %  LVESV MOD A4C: 14 9 ml  LVLd A4C: 6 9 cm  LVLs A4C: 6 cm  RA Area: 8 cm2  RVIDd: 2 7 cm  SV MOD A4C: 39 9 ml    CW  AV Env  Ti: 306 4 ms  AV VTI: 57 4 cm  AV Vmax: 0 m/s  AV Vmax: 3 8 m/s  AV Vmean: 1 9 m/s  AV maxP mmHg  AV maxP mmHg  AV meanP mmHg  MV A Denzel: 2 1 m/s  MV Dec Morris: 6 5 m/s2  MV DecT: 250 ms  MV E Denzel: 1 6 m/s  MV E/A Ratio: 0 8  MV PHT: 72 5 ms  MVA By PHT: 3 cm2  TR Vmax: 2 3 m/s  TR maxP 9 mmHg    MM  TAPSE: 1 7 cm    PW  E': 0 1 m/s  E/E': 25 4    [facility]    Prepared and electronically signed by    Kinza Presley MD  Signed 2018 14:39:49    Addendum Date: 2018 10:39:59 AM  The following statement is erroneous as the pressure gradients measured were wrong due to an image acquisition error - "The peak and mean gradients across the LVOT and aortic valve as measured by CW Doppler were 63 and 18 mm Hg  respectively "  The correct peak and mean AV gradients across the bioprosthetic AV were 19 and 10 mm Hg respectively  Addendum entered by: Kinza Presley MD         Imaging:   I have personally reviewed pertinent reports  Assessment/Plan:  1  Limb ischemia:  -left leg cold to touch, S/P lower extremity angiography  -patient found to have thrombus of left lower extremity  -patient started on heparin drip  -will be transferred to [facility] for higher level of care  2   Aortic stenosis S/P TAVR:  Stable    3  Hyperlipidemia:  Intolerant to statins  4   Ascending aortic aneurysm:  4 2 x 4 cm continue beta-blocker  Will need follow-up CT scan  Tight BP control  5   Hypertension:  Stable, continue present regimen  Code Status: Prior    Counseling / Coordination of Care  Total floor / unit time spent today 35 minutes  Greater than 50% of total time was spent with the patient and / or family counseling and / or coordination of care  A description of the counseling / coordination of care: Review of history, current assessment, development of a plan      Vane Coats PA-C  ,3:61 PM

## 2018-09-21 NOTE — H&P
H&P Exam - Vascular Surgery   Butch Mann 79 y o  female MRN: [id number]  Unit/Bed#: ED 02 Encounter: [id number]    Assessment/Plan     Assessment:  68F with a thrombus in the left CFA s/p catheterization  She has some distal flow with a Doppler signal in the left PT  Plan:  -OR for open thromboembolectomy  -admission orders to follow    History of Present Illness   HPI:  Butch Mann is a 79 y o  female who presents with pain in her left groin puncture site  The pain went on to spread down the leg and to her buttock  She has worsening pain with walking and standing, and it is relieved with rest  She went to the ED at [facility] where they noted that her left foot was significantly colder than her right  They also did a pseudoscan to look for hematoma  She was found to have a completely occluded Left CFA  She was [facility] to [facility] for vascular surgery evaluation  Review of Systems   Constitutional: Negative  Negative for activity change and appetite change  HENT: Negative  Negative for hearing loss and trouble swallowing  Eyes: Negative  Negative for photophobia and redness  Respiratory: Negative  Negative for chest tightness and shortness of breath  Cardiovascular: Negative  Negative for chest pain and palpitations  Gastrointestinal: Negative  Negative for abdominal distention and abdominal pain  Endocrine: Negative  Negative for cold intolerance and heat intolerance  Genitourinary: Negative  Negative for flank pain and genital sores  Musculoskeletal: Positive for arthralgias  Negative for back pain  Skin: Negative  Negative for color change and pallor  Allergic/Immunologic: Negative  Neurological: Negative  Negative for facial asymmetry, speech difficulty and light-headedness  Hematological: Negative  Negative for adenopathy  Psychiatric/Behavioral: Negative  Negative for agitation and behavioral problems         Historical Information   Past Medical History: Diagnosis Date    Aneurysm [facility])     abdominal aortic aneurysm    Aortic valve disease     Aortic valve replaced     TAVR    Bronchiolitis     Cardiac disease     Chest pain     [facility]     Disease of thyroid gland     Dyspnea     Hypertension     Hypokalemia     Morbid obesity (HCC)     [facility]peripheral artery disease) ([facility])     Rheumatic fever     SOB (shortness of breath)     Tachycardia      Past Surgical History:   Procedure Laterality Date    CARDIAC SURGERY      aortic valve replacement    CARDIAC VALVE REPLACEMENT      ESOPHAGUS SURGERY N/A     REPLACEMENT AORTIC VALVE TRANSCATHETER (TAVR)       Social History   History   Alcohol Use    0 6 - 1 2 oz/week    1 - 2 Cans of beer per week     Comment: social     History   Drug Use No     History   Smoking Status    Former Smoker   Smokeless Tobacco    Never Used     Family History: History reviewed  No pertinent family history  Meds/Allergies   PTA meds:   Prior to Admission Medications   Prescriptions Last Dose Informant Patient Reported? Taking?    POTASSIUM CHLORIDE PO   Yes No   Sig: Take 250 mg by mouth daily   Tiotropium Bromide-Olodaterol (STIOLTO RESPIMAT) 2 5-2 5 MCG/ACT AERS   No No   Sig: Inhale 2 puffs daily   albuterol (PROVENTIL HFA,VENTOLIN HFA) 90 mcg/act inhaler   Yes No   Sig: Inhale 2 puffs 2 (two) times a day     aspirin 81 mg chewable tablet   Yes No   Sig: Chew 81 mg daily   co-enzyme Q-10 30 MG capsule   Yes No   Sig: Take 100 mg by mouth daily     furosemide (LASIX) 20 mg tablet   No No   Sig: Take 1 tablet (20 mg total) by mouth 2 (two) times a day   Patient taking differently: Take 20 mg by mouth daily     ibuprofen (MOTRIN) 600 mg tablet   Yes No   Sig: Take by mouth every 6 (six) hours as needed for mild pain   levothyroxine 50 mcg tablet   Yes No   Sig: Take 50 mcg by mouth daily   metoprolol tartrate (LOPRESSOR) 25 mg tablet   No No   Sig: Take 1 tablet (25 mg total) by mouth every 12 (twelve) hours   pantoprazole (PROTONIX) 40 mg tablet   Yes No   Sig: Take 40 mg by mouth daily      Facility-Administered Medications: None     Allergies   Allergen Reactions    Iohexol     Statins Other (See Comments)     Severe muscle cramps-- cannot move       Objective   Vitals: Blood pressure 128/64, pulse 88, resp  rate 21, height 5' 1" (1 549 m), weight 77 kg (169 lb 12 1 oz), SpO2 94 %, not currently breastfeeding  ,Body mass index is 32 07 kg/m²  No intake or output data in the 24 hours ending 09/21/18 1810  Invasive Devices     Peripheral Intravenous Line            Peripheral IV 09/21/18 Right Antecubital less than 1 day                Physical Exam   Constitutional: She is oriented to person, place, and time  She appears well-developed and well-nourished  No distress  HENT:   Head: Normocephalic and atraumatic  Right Ear: External ear normal    Left Ear: External ear normal    Mouth/Throat: Oropharynx is clear and moist    Eyes: Conjunctivae and EOM are normal  Pupils are equal, round, and reactive to light  Right eye exhibits no discharge  Left eye exhibits no discharge  No scleral icterus  Neck: No tracheal deviation present  Cardiovascular: Normal rate  Pulmonary/Chest: Effort normal  No stridor  No respiratory distress  Abdominal: Soft  She exhibits no distension  There is no tenderness  There is no rebound  Musculoskeletal: Normal range of motion  She exhibits no edema or deformity  Neurological: She is alert and oriented to person, place, and time  No cranial nerve deficit  Skin: Skin is warm and dry  She is not diaphoretic  No erythema  No pallor       Pulse exam:   Right: Doppler signal of femoral and posterial tibial  Palpable 2+ DP  Left: Doppler signal of left PT, absent DP    Lab Results:   Coags:   Lab Results   Component Value Date    PTT 25 09/21/2018    INR 1 03 09/21/2018   , Creatinine:   Lab Results   Component Value Date    CREATININE 1 24 09/21/2018   , CBC with diff:   Lab Results   Component Value Date    WBC 11 87 (H) 09/21/2018    HGB 14 7 09/21/2018    HCT 45 6 09/21/2018    MCV 89 09/21/2018     09/21/2018    MCH 28 7 09/21/2018    MCHC 32 2 09/21/2018    RDW 13 3 09/21/2018    MPV 9 7 09/21/2018    NRBC 0 09/21/2018   , BMP/CMP:   Lab Results   Component Value Date     09/21/2018    K 3 1 (L) 09/21/2018     09/21/2018    CO2 28 09/21/2018    BUN 14 09/21/2018    CREATININE 1 24 09/21/2018    CALCIUM 8 7 09/21/2018    EGFR 45 09/21/2018     Imaging: I have personally reviewed pertinent reports  and I have personally reviewed pertinent films in PACS  EKG, Pathology, and Other Studies: I have personally reviewed pertinent reports        Code Status: Prior  Advance Directive and Living Will:      Power of :    POLST:

## 2018-09-21 NOTE — ANESTHESIA PREPROCEDURE EVALUATION
Review of Systems/Medical History  Patient summary reviewed  Chart reviewed  No history of anesthetic complications     Cardiovascular  Hyperlipidemia, Hypertension , Valve replacement aortic valve  replacement, CAD , Aortic disease,   Comment: ASC AO ANEURYSM; S/P TAVR  ,  Pulmonary  COPD moderate- medication dependent , Shortness of breath, No recent URI ,        GI/Hepatic            Endo/Other  History of thyroid disease ,      GYN       Hematology   Musculoskeletal       Neurology   Psychology         Lab Results   Component Value Date    WBC 11 87 (H) 09/21/2018    HGB 14 7 09/21/2018     09/21/2018     Lab Results   Component Value Date     09/21/2018    K 3 1 (L) 09/21/2018    BUN 14 09/21/2018    CREATININE 1 24 09/21/2018     Lab Results   Component Value Date    PTT 25 09/21/2018      Lab Results   Component Value Date    INR 1 03 09/21/2018       Blood type AB    No results found for: HGBA1C    Physical Exam    Airway    Mallampati score: II  TM Distance: >3 FB       Dental       Cardiovascular      Pulmonary      Other Findings        Anesthesia Plan  ASA Score- 3 Emergent    Anesthesia Type- general with ASA Monitors  Additional Monitors:   Airway Plan: ETT  Comment:  ACOSTA Perez , have personally seen and evaluated the patient prior to anesthetic care  I have reviewed the pre-anesthetic record, and other medical records if appropriate to the anesthetic care  If a CRNA is involved in the case, I have reviewed the CRNA assessment, if present, and agree  Risks/benefits and alternatives discussed with patient including possible PONV, sore throat, and possibility of rare anesthetic and surgical emergencies        Plan Factors- Patient instructed to abstain from smoking on day of procedure  Patient did not smoke on day of surgery  Induction- intravenous  Postoperative Plan- Plan for postoperative opioid use   Planned trial extubation    Informed Consent- Anesthetic plan and risks discussed with patient  I personally reviewed this patient with the CRNA  Discussed and agreed on the Anesthesia Plan with the CRNA  Viola Barrera [place] [address]  [phone]     Transthoracic Echocardiogram  Limited 2D, Doppler, and Color Doppler     Study date:  2018     Patient: Mg Roldan  MR number: [id number]  Account number: [de-identified]  : 1950  Age: 74 years  Gender: Female  Status: Inpatient  Location: Bedside  Height: 62 in  Weight: 162 lb  BP: 123/ 96 mmHg     Indications: Aortic valve disease, Evaluate suspected aortic stenosis      Diagnoses: I35 8 - Other nonrheumatic aortic valve disorders     Sonographer:   [facility] , RDCS  Interpreting Physician:  Megha Vega MD  Referring Physician:  Megha Vega MD  Group:  [facility]     SUMMARY     PROCEDURE INFORMATION:  This was a limited study performed to evaluate the aortic valve and the LVOT  For the remaining echo, please refer to the study dated 18  This was a technically difficult study      LEFT VENTRICLE:  Systolic function was normal  Ejection fraction was estimated to be 60 %      AORTIC VALVE:  Pt is s/p TAVR  The bioprosthesis was not seen well  Peak and mean pressure gradients across the valve were 19 and 10 mm Hg respectively

## 2018-09-21 NOTE — EMTALA/ACUTE CARE TRANSFER
[address] [address]  Dept: [phone]      NNSZ[facility] TRANSFER CONSENT    NAME Hetal Bello                                         1950                              MRN [id number]    I have been informed of my rights regarding examination, treatment, and transfer   by Dr Edward Amezcua MD    Benefits: Specialized equipment and/or services available at the receiving facility (Include comment)________________________    Risks: Potential for delay in receiving treatment, Potential deterioration of medical condition, Loss of IV, Increased discomfort during transfer, Possible worsening of condition or death during transfer      Transfer Request   I acknowledge that my medical condition has been evaluated and explained to me by the emergency department physician or other qualified medical person and/or my attending physician who has recommended and offered to me further medical examination and treatment  I understand the Hospital's obligation with respect to the treatment and stabilization of my emergency medical condition  I nevertheless request to be transferred  I release the Hospital, the doctor, and any other persons caring for me from all responsibility or liability for any injury or ill effects that may result from my transfer and agree to accept all responsibility for the consequences of my choice to transfer, rather than receive stabilizing treatment at the Hospital  I understand that because the transfer is my request, my insurance may not provide reimbursement for the services  The Hospital will assist and direct me and my family in how to make arrangements for transfer, but the hospital is not liable for any fees charged by the transport service    In spite of this understanding, I refuse to consent to further medical examination and treatment which has been offered to me, and request transfer to  [facility]ita  Name, [facility] : sl[place]  I authorize the performance of emergency medical procedures and treatments upon me in both transit and upon arrival at the receiving facility  Additionally, I authorize the release of any and all medical records to the receiving facility and request they be transported with me, if possible  I authorize the performance of emergency medical procedures and treatments upon me in both transit and upon arrival at the receiving facility  Additionally, I authorize the release of any and all medical records to the receiving facility and request they be transported with me, if possible  I understand that the safest mode of transportation during a medical emergency is an ambulance and that the Hospital advocates the use of this mode of transport  Risks of traveling to the receiving facility by car, including absence of medical control, life sustaining equipment, such as oxygen, and medical personnel has been explained to me and I fully understand them  ([facility] CORRECT BOX BELOW)  [  ]  I consent to the stated transfer and to be transported by ambulance/helicopter  [  ]  I consent to the stated transfer, but refuse transportation by ambulance and accept full responsibility for my transportation by car  I understand the risks of non-ambulance transfers and I exonerate the Hospital and its staff from any deterioration in my condition that results from this refusal     X___________________________________________    DATE  18  TIME________  Signature of patient or legally responsible individual signing on patient behalf           RELATIONSHIP TO PATIENT_________________________          Provider Certification    NAME Adela Jacinto                                        [facility] 1950                              MRN [id number]    A medical screening exam was performed on the above named patient    Based on the examination:    Condition Necessitating Transfer The encounter diagnosis was Arterial thrombosis ([facility] [address] )  Patient Condition: The patient has been stabilized such that within reasonable medical probability, no material deterioration of the patient condition or the condition of the unborn child(vinod) is likely to result from the transfer    Reason for Transfer: Level of Care needed not available at this facility    Transfer Requirements: Facility hospitals   · Space available and qualified personnel available for treatment as acknowledged by    · Agreed to accept transfer and to provide appropriate medical treatment as acknowledged by       Curlie Seip  · Appropriate medical records of the examination and treatment of the patient are provided at the time of transfer   [address] _______  · Transfer will be performed by qualified personnel from    and appropriate transfer equipment as required, including the use of necessary and appropriate life support measures  Provider Certification: I have examined the patient and explained the following risks and benefits of being transferred/refusing transfer to the patient/family:  General risk, such as traffic hazards, adverse weather conditions, rough terrain or turbulence, possible failure of equipment (including vehicle or aircraft), or consequences of actions of persons outside the control of the transport personnel      Based on these reasonable risks and benefits to the patient and/or the unborn child(vinod), and based upon the information available at the time of the patients examination, I certify that the medical benefits reasonably to be expected from the provision of appropriate medical treatments at another medical facility outweigh the increasing risks, if any, to the individuals medical condition, and in the case of labor to the unborn child, from effecting the transfer      X____________________________________________ DATE 09/21/18        TIME_______      ORIGINAL - SEND TO MEDICAL RECORDS   COPY - SEND WITH PATIENT DURING TRANSFER

## 2018-09-21 NOTE — BRIEF OP NOTE (RAD/CATH)
CARDIAC LOWER EXTREMITY ANGIOGRAPHY  Procedure Note    PATIENT NAME: Sharon Daniel  : 1950  MRN: [id number]     Pre-op Diagnosis:   1  Intermittent claudication of both lower extremities due to atherosclerosis ([facility] [address] )    2  Peripheral artery disease (HCC)      Post-op Diagnosis:   1  Intermittent claudication of both lower extremities due to atherosclerosis ([facility] [address] )    2  Peripheral artery disease [facility])        Surgeon:   Ramona Morel MD    Estimated Blood Loss: 5 cc    Procedure description:  Patient was brought to cath lab of risks and benefits explained informed consent was obtained  Patient was prepped and draped in sterile fashion  2% lidocaine was administered in the left groin and access obtained using 5 Citizen of [place] sheath  Five Stunablera Omniflush catheter was placed in the distal aorta below the renal arteries  40 cc of diluted nonionic contrast was power injector 20 cc/second  A straight tip glide catheter was placed in the right common femoral artery and selective right superficial femoral artery angiogram with infrapopliteal runoff was performed  Angiogram of left infrainguinal segment was performed through the 5 Citizen of [place] sheath  The sheath was confirmed to be above bifurcation and hemostasis was obtained using Angio-Seal   Patient tolerated procedure well with no complications  She returned to recovery room in good condition  Findings:    Distal aorta: There is mild aneurysm of infrarenal aorta    Right-side:  No significant stenosis in the right common iliac artery and internal iliac artery  The stent in the right external iliac artery is patent  No significant stenosis in the right common femoral artery, superficial femoral artery, profunda femoral artery, popliteal artery the  There is 3 vessel runoff below the knee with patent tibioperoneal trunk, anterior tibial artery, peroneal artery and posterior tibial artery      Left side:  No significant stenosis in the right common iliac artery and internal iliac artery  The stent in the right external iliac artery is patent  No significant stenosis in the right common femoral artery, superficial femoral artery, profunda femoral artery, popliteal artery the  There is 3 vessel runoff below the knee with patent tibioperoneal trunk, anterior tibial artery, peroneal artery and posterior tibial artery      Complications:  None    Anesthesia: Conscious sedation and Local     Plan:  Medical management of nonobstructive peripheral artery disease  Stable  DC home today  Follow-up within 1-2 weeks    Maureen Rosa MD     Date: 9/21/2018  Time: 1:17 PM

## 2018-09-22 ENCOUNTER — APPOINTMENT (INPATIENT)
Dept: NON INVASIVE DIAGNOSTICS | Facility: HOSPITAL | Age: 68
DRG: 271 | End: 2018-09-22
Payer: MEDICARE

## 2018-09-22 LAB
ANION GAP SERPL CALCULATED.3IONS-SCNC: 5 MMOL/L (ref 4–13)
APTT PPP: 133 SECONDS (ref 24–36)
APTT PPP: 85 SECONDS (ref 24–36)
APTT PPP: 89 SECONDS (ref 24–36)
APTT PPP: >210 SECONDS (ref 24–36)
APTT PPP: >210 SECONDS (ref 24–36)
BASOPHILS # BLD AUTO: 0.01 THOUSANDS/ΜL (ref 0–0.1)
BASOPHILS NFR BLD AUTO: 0 % (ref 0–1)
BUN SERPL-MCNC: 13 MG/DL (ref 5–25)
CALCIUM SERPL-MCNC: 7.4 MG/DL (ref 8.3–10.1)
CHLORIDE SERPL-SCNC: 105 MMOL/L (ref 100–108)
CO2 SERPL-SCNC: 29 MMOL/L (ref 21–32)
CREAT SERPL-MCNC: 1.02 MG/DL (ref 0.6–1.3)
EOSINOPHIL # BLD AUTO: 0.01 THOUSAND/ΜL (ref 0–0.61)
EOSINOPHIL NFR BLD AUTO: 0 % (ref 0–6)
ERYTHROCYTE [DISTWIDTH] IN BLOOD BY AUTOMATED COUNT: 13.2 % (ref 11.6–15.1)
ERYTHROCYTE [DISTWIDTH] IN BLOOD BY AUTOMATED COUNT: 13.3 % (ref 11.6–15.1)
GFR SERPL CREATININE-BSD FRML MDRD: 57 ML/MIN/1.73SQ M
GLUCOSE SERPL-MCNC: 165 MG/DL (ref 65–140)
HCT VFR BLD AUTO: 34.6 % (ref 34.8–46.1)
HCT VFR BLD AUTO: 34.9 % (ref 34.8–46.1)
HGB BLD-MCNC: 11.1 G/DL (ref 11.5–15.4)
HGB BLD-MCNC: 11.3 G/DL (ref 11.5–15.4)
IMM GRANULOCYTES # BLD AUTO: 0.01 THOUSAND/UL (ref 0–0.2)
IMM GRANULOCYTES NFR BLD AUTO: 0 % (ref 0–2)
INR PPP: 1.13 (ref 0.86–1.17)
LACTATE SERPL-SCNC: 1.7 MMOL/L (ref 0.5–2)
LYMPHOCYTES # BLD AUTO: 0.96 THOUSANDS/ΜL (ref 0.6–4.47)
LYMPHOCYTES NFR BLD AUTO: 15 % (ref 14–44)
MAGNESIUM SERPL-MCNC: 2 MG/DL (ref 1.6–2.6)
MCH RBC QN AUTO: 28.7 PG (ref 26.8–34.3)
MCH RBC QN AUTO: 28.7 PG (ref 26.8–34.3)
MCHC RBC AUTO-ENTMCNC: 32.1 G/DL (ref 31.4–37.4)
MCHC RBC AUTO-ENTMCNC: 32.4 G/DL (ref 31.4–37.4)
MCV RBC AUTO: 89 FL (ref 82–98)
MCV RBC AUTO: 89 FL (ref 82–98)
MONOCYTES # BLD AUTO: 0.4 THOUSAND/ΜL (ref 0.17–1.22)
MONOCYTES NFR BLD AUTO: 6 % (ref 4–12)
NEUTROPHILS # BLD AUTO: 5.01 THOUSANDS/ΜL (ref 1.85–7.62)
NEUTS SEG NFR BLD AUTO: 79 % (ref 43–75)
NRBC BLD AUTO-RTO: 0 /100 WBCS
PHOSPHATE SERPL-MCNC: 3.6 MG/DL (ref 2.3–4.1)
PLATELET # BLD AUTO: 188 THOUSANDS/UL (ref 149–390)
PLATELET # BLD AUTO: 196 THOUSANDS/UL (ref 149–390)
PMV BLD AUTO: 9.7 FL (ref 8.9–12.7)
PMV BLD AUTO: 9.8 FL (ref 8.9–12.7)
POTASSIUM SERPL-SCNC: 3.9 MMOL/L (ref 3.5–5.3)
PROTHROMBIN TIME: 14.6 SECONDS (ref 11.8–14.2)
RBC # BLD AUTO: 3.87 MILLION/UL (ref 3.81–5.12)
RBC # BLD AUTO: 3.94 MILLION/UL (ref 3.81–5.12)
SODIUM SERPL-SCNC: 139 MMOL/L (ref 136–145)
WBC # BLD AUTO: 6.4 THOUSAND/UL (ref 4.31–10.16)
WBC # BLD AUTO: 8.45 THOUSAND/UL (ref 4.31–10.16)

## 2018-09-22 PROCEDURE — 99024 POSTOP FOLLOW-UP VISIT: CPT | Performed by: SURGERY

## 2018-09-22 PROCEDURE — 94760 N-INVAS EAR/PLS OXIMETRY 1: CPT

## 2018-09-22 PROCEDURE — 83735 ASSAY OF MAGNESIUM: CPT | Performed by: INTERNAL MEDICINE

## 2018-09-22 PROCEDURE — 85610 PROTHROMBIN TIME: CPT | Performed by: SURGERY

## 2018-09-22 PROCEDURE — 83605 ASSAY OF LACTIC ACID: CPT | Performed by: SURGERY

## 2018-09-22 PROCEDURE — 93923 UPR/LXTR ART STDY 3+ LVLS: CPT

## 2018-09-22 PROCEDURE — 93926 LOWER EXTREMITY STUDY: CPT | Performed by: SURGERY

## 2018-09-22 PROCEDURE — 80048 BASIC METABOLIC PNL TOTAL CA: CPT | Performed by: INTERNAL MEDICINE

## 2018-09-22 PROCEDURE — 93925 LOWER EXTREMITY STUDY: CPT | Performed by: SURGERY

## 2018-09-22 PROCEDURE — 93922 UPR/L XTREMITY ART 2 LEVELS: CPT | Performed by: SURGERY

## 2018-09-22 PROCEDURE — 85025 COMPLETE CBC W/AUTO DIFF WBC: CPT | Performed by: INTERNAL MEDICINE

## 2018-09-22 PROCEDURE — 85730 THROMBOPLASTIN TIME PARTIAL: CPT | Performed by: SURGERY

## 2018-09-22 PROCEDURE — 84100 ASSAY OF PHOSPHORUS: CPT | Performed by: INTERNAL MEDICINE

## 2018-09-22 PROCEDURE — 93925 LOWER EXTREMITY STUDY: CPT

## 2018-09-22 RX ORDER — CHOLECALCIFEROL (VITAMIN D3) 125 MCG
100 CAPSULE ORAL DAILY
Status: DISCONTINUED | OUTPATIENT
Start: 2018-09-22 | End: 2018-09-26 | Stop reason: HOSPADM

## 2018-09-22 RX ORDER — POTASSIUM CHLORIDE 20MEQ/15ML
20 LIQUID (ML) ORAL DAILY
Status: DISCONTINUED | OUTPATIENT
Start: 2018-09-22 | End: 2018-09-26 | Stop reason: HOSPADM

## 2018-09-22 RX ORDER — MAGNESIUM HYDROXIDE/ALUMINUM HYDROXICE/SIMETHICONE 120; 1200; 1200 MG/30ML; MG/30ML; MG/30ML
15 SUSPENSION ORAL EVERY 6 HOURS PRN
Status: DISCONTINUED | OUTPATIENT
Start: 2018-09-22 | End: 2018-09-26 | Stop reason: HOSPADM

## 2018-09-22 RX ORDER — ACETAMINOPHEN 325 MG/1
650 TABLET ORAL EVERY 6 HOURS PRN
Status: DISCONTINUED | OUTPATIENT
Start: 2018-09-22 | End: 2018-09-26 | Stop reason: HOSPADM

## 2018-09-22 RX ORDER — ONDANSETRON 2 MG/ML
4 INJECTION INTRAMUSCULAR; INTRAVENOUS EVERY 6 HOURS PRN
Status: DISCONTINUED | OUTPATIENT
Start: 2018-09-22 | End: 2018-09-22 | Stop reason: SDUPTHER

## 2018-09-22 RX ORDER — ACETAMINOPHEN 325 MG/1
650 TABLET ORAL EVERY 6 HOURS PRN
Status: DISCONTINUED | OUTPATIENT
Start: 2018-09-22 | End: 2018-09-22 | Stop reason: SDUPTHER

## 2018-09-22 RX ORDER — LEVOTHYROXINE SODIUM 0.05 MG/1
50 TABLET ORAL
Status: DISCONTINUED | OUTPATIENT
Start: 2018-09-22 | End: 2018-09-26 | Stop reason: HOSPADM

## 2018-09-22 RX ORDER — ASPIRIN 81 MG/1
81 TABLET, CHEWABLE ORAL DAILY
Status: DISCONTINUED | OUTPATIENT
Start: 2018-09-22 | End: 2018-09-26 | Stop reason: HOSPADM

## 2018-09-22 RX ORDER — ALBUTEROL SULFATE 2.5 MG/3ML
2.5 SOLUTION RESPIRATORY (INHALATION) EVERY 4 HOURS PRN
Status: DISCONTINUED | OUTPATIENT
Start: 2018-09-22 | End: 2018-09-22

## 2018-09-22 RX ORDER — DOCUSATE SODIUM 100 MG/1
100 CAPSULE, LIQUID FILLED ORAL 2 TIMES DAILY
Status: DISCONTINUED | OUTPATIENT
Start: 2018-09-22 | End: 2018-09-26 | Stop reason: HOSPADM

## 2018-09-22 RX ORDER — ONDANSETRON 2 MG/ML
4 INJECTION INTRAMUSCULAR; INTRAVENOUS EVERY 6 HOURS PRN
Status: DISCONTINUED | OUTPATIENT
Start: 2018-09-22 | End: 2018-09-26 | Stop reason: HOSPADM

## 2018-09-22 RX ORDER — DOCUSATE SODIUM 100 MG/1
100 CAPSULE, LIQUID FILLED ORAL 2 TIMES DAILY PRN
Status: DISCONTINUED | OUTPATIENT
Start: 2018-09-22 | End: 2018-09-26 | Stop reason: HOSPADM

## 2018-09-22 RX ORDER — OXYCODONE HYDROCHLORIDE 10 MG/1
10 TABLET ORAL EVERY 4 HOURS PRN
Status: DISCONTINUED | OUTPATIENT
Start: 2018-09-22 | End: 2018-09-26 | Stop reason: HOSPADM

## 2018-09-22 RX ORDER — OXYCODONE HYDROCHLORIDE 5 MG/1
5 TABLET ORAL EVERY 4 HOURS PRN
Status: DISCONTINUED | OUTPATIENT
Start: 2018-09-22 | End: 2018-09-26 | Stop reason: HOSPADM

## 2018-09-22 RX ORDER — FUROSEMIDE 20 MG/1
20 TABLET ORAL DAILY
Status: DISCONTINUED | OUTPATIENT
Start: 2018-09-22 | End: 2018-09-26 | Stop reason: HOSPADM

## 2018-09-22 RX ORDER — ALBUTEROL SULFATE 90 UG/1
2 AEROSOL, METERED RESPIRATORY (INHALATION) EVERY 4 HOURS PRN
Status: DISCONTINUED | OUTPATIENT
Start: 2018-09-22 | End: 2018-09-26 | Stop reason: HOSPADM

## 2018-09-22 RX ORDER — PANTOPRAZOLE SODIUM 40 MG/1
40 TABLET, DELAYED RELEASE ORAL
Status: DISCONTINUED | OUTPATIENT
Start: 2018-09-22 | End: 2018-09-26 | Stop reason: HOSPADM

## 2018-09-22 RX ADMIN — OXYCODONE HYDROCHLORIDE 10 MG: 10 TABLET ORAL at 15:31

## 2018-09-22 RX ADMIN — METOPROLOL TARTRATE 25 MG: 25 TABLET ORAL at 08:03

## 2018-09-22 RX ADMIN — LEVOTHYROXINE SODIUM 50 MCG: 50 TABLET ORAL at 05:30

## 2018-09-22 RX ADMIN — ASPIRIN 81 MG 81 MG: 81 TABLET ORAL at 08:03

## 2018-09-22 RX ADMIN — METOPROLOL TARTRATE 25 MG: 25 TABLET ORAL at 01:20

## 2018-09-22 RX ADMIN — POTASSIUM CHLORIDE 20 MEQ: 20 SOLUTION ORAL at 08:03

## 2018-09-22 RX ADMIN — METOPROLOL TARTRATE 25 MG: 25 TABLET ORAL at 20:21

## 2018-09-22 RX ADMIN — Medication 100 MG: at 08:03

## 2018-09-22 RX ADMIN — PANTOPRAZOLE SODIUM 40 MG: 40 TABLET, DELAYED RELEASE ORAL at 05:30

## 2018-09-22 RX ADMIN — TIOTROPIUM BROMIDE 18 MCG: 18 CAPSULE ORAL; RESPIRATORY (INHALATION) at 08:02

## 2018-09-22 RX ADMIN — OXYCODONE HYDROCHLORIDE 10 MG: 10 TABLET ORAL at 19:55

## 2018-09-22 RX ADMIN — FUROSEMIDE 20 MG: 20 TABLET ORAL at 08:03

## 2018-09-22 RX ADMIN — ACETAMINOPHEN 650 MG: 325 TABLET, FILM COATED ORAL at 01:22

## 2018-09-22 NOTE — OP NOTE
OPERATIVE REPORT  PATIENT NAME: Elia Olson    :  1950  MRN: [id number]  Pt Location: BE OR ROOM 09    SURGERY DATE: 2018 - 2018    Surgeon(s) and Role:     Ananya Ortiz MD - Primary  Lyle Newby, R5    Preop Diagnosis:  LLE ischemia    Postop Dx:  LLE ischemia 2/2 L CFA thrombosis with angioseal closure device and arterial atherosclerosis    Procedure(s) (LRB):  Left femoral exploration  L iliofemoral thrombectomy  L femoral endarterectomy with patch angioplasty    Specimen(s):  none    Estimated Blood Loss:   300 mL    Drains:  Urethral Catheter Non-latex 16 Fr  (Active)   Site Assessment Clean;Skin intact 2018  2:00 AM   Collection Container Standard drainage bag 2018  2:00 AM   Securement Method Securing device (Describe) 2018  2:00 AM   Output (mL) 700 mL 2018  1:21 PM   Number of days: 1       Anesthesia Type:   General ETA    Operative Indications:  Pt is a 80 yo F w/ hx of COPD, HTN, rheumatic fever, s/p TAVR '10, PAD s/p R EIA stent, recently had B leg heaviness and underwent diagnostic BLE angiogram by Dr Melanie Le at [facility] [place] [place]  Since the procedure yesterday, she has been having increased LLE pain, found to have CFA thrombosis/occlusion on ultrasound exam   To OR for revasc in the setting of acute LLE ischemia     Operative Findings:  L common femoral artery with moderate to severe smooth calcification; dissection plane in the common femoral artery proximally  Surrounding inflammation with angioseal closure device in the common femoral artery surrounded by acute thrombus extending to the femoral bifurcation and the proximal common femoral artery                Complications:   None    Procedure and Technique:  After informed consent was obtained, the patient was brought to the operating room and placed in the supine position  Perioperative IV antibiotics were given  She was given anesthesia and endotracheally intubated    A radial arterial line was placed for monitoring  She was then prepped and draped in the usual sterile fashion exposing the bilateral groins and left leg circumferentially  A timeout was performed  A marking pen was used to jesus longitudinal incision over the femoral artery, below the level of the inguinal ligament  A 15-blade was used to make the incision  Cautery was used to dissect through the soft tissue  The inguinal ligament was identified and freed medially and laterally  The femoral sheath was incised and the common femoral artery was identified and freed proximally  A vessel loop was placed  The circumflex iliac branches were also identified and lopped  The dissection was continued distally along the superficial femoral artery which was freed circumferentially and a vessel loop was placed  Lastly, the profunda femoral artery was identified and a vessel loop was placed  Despite a continuous heparin gtt, ACT was only 140 and an additional 5000 units of IV heparin were given  The heparin gtt was increased to 18 units/kg/hr and continued throughout the case  After this was given time to circulate, the vessel loops were pulled taught  An 11-blade was used to make a longitudinal arteriotomy in the common femoral artery and this was extended with Durham scissors  Fresh appearing thrombus was noted in the arterial bed  The thrombus was pulled free  The closure device was extracted from the artery wall  A #4 Phyllis catheter was used to perform thrombectomy of the iliac artery without significant return of thrombus  Flow was now noted to be brisk and pulsatile  The artery was flushed with heparinized saline and the vessel loop was again pulled taught  A #3 Phyllis catheter was then used to perform thrombectomy of the superficial femoral artery without return of thrombus  Excellent backbleeding was noted from the superficial femoral and profunda femoral arteries  The vessel loops were again pulled taught        Due to significant atherosclerosis and arterial wall calcification, the decision was made to perform endarterectomy prior to closure  The endarterectomy spatula was used to create the endarterectomy plane  This was continued towards the proximal common femoral artery  The plaque was pulled free above the level of the dissection plane which was noted upon entry into the artery  The plane was then continued distally in the common femoral artery  The hard plaque was noted to end just proximal to the femoral bifurcation and the soft plaque was sharply transected with [place] scissors  The endarterectomy bed was flushed with heparinized saline and all loose debris were pulled free  The distal endpoint was tacked down in two areas with 7-0 prolene suture  All endpoints were checked and noted to be adherent without any flaps or debris  A Xenosure bovine pericardial patch was selected and was sewn in using two 6-0 prolene sutures, tacked down at either end and run to the center of the patch  Prior to completion, the arteries were bled and back-bled, and the endarterectomy bed was flushed copiously with heparinized saline  The patch was completed and flow was restored  Because a PT pulse could be palpated, the decision was made not to perform a completion agram   The wound was then copiously irrigated with antibiotic saline  It was then checked for hemostasis  Surgicel and floseal were used to aid with hemostasis  The heparin gtt was held during closure  The wound was closed with two layers of running 2-0 vicryl suture for the femoral sheath and salo's fascia, and two layers of running 3-0 vicryl for the soft tissue and deep dermal layers  Staples were used for the skin  The incision was dressed with Histoacryl glue and a silver mepilex  2+ R DP and 1+ L PT at completion    The patient was allowed to awaken and was extubated  She was then transferred to the PACU for postoperative care         I was present for the entire procedure    Patient Disposition:  PACU     SIGNATURE: Dustin Ortiz MD  DATE: September 22, 2018  TIME: 4:05 PM

## 2018-09-22 NOTE — PROGRESS NOTES
Progress Note - Vascular Surgery   Janna Storey 79 y o  female MRN: [id number]  Unit/Bed#: [facility] 408-01 Encounter: [id number]    Assessment:  78 y/o F p/w L CFA occlusion 2/2 angioseal closure device from b/l LE angiogram on 9/20, s/p L groin cutdown, removal L femoral artery closure device, thrombectomy, L CFA endarterectomy w/ bovine patch angioplasty, POD #1    Plan:  --f/u Post-op LEADS  --Continue Heparin gtt  --Regular diet  --Serial L groin checks, pulse checks  --IS  --OOB, ambulate    Subjective/Objective     Subjective:     No acute events overnight  Pt c/o 6/10 "soreness" in her L groin  Tolerating diet  Denies any other complaints  Objective:     Blood pressure (!) 118/48, pulse 56, temperature 98 2 °F (36 8 °C), temperature source Oral, resp  rate 19, height 5' 1" (1 549 m), weight 75 5 kg (166 lb 7 2 oz), SpO2 97 %, not currently breastfeeding  ,Body mass index is 31 45 kg/m²  Intake/Output Summary (Last 24 hours) at 09/22/18 0511  Last data filed at 09/22/18 0200   Gross per 24 hour   Intake           2559 6 ml   Output             1225 ml   Net           1334 6 ml       Invasive Devices     Peripheral Intravenous Line            Peripheral IV 09/21/18 Left Wrist 1 day    Peripheral IV 09/21/18 Right Antecubital less than 1 day          Arterial Line            Arterial Line 09/21/18 Right Radial 1 day          Drain            Urethral Catheter Non-latex 16 Fr  less than 1 day                Physical Exam:    GEN: NAD  HEENT: MMM  CV: RRR  Lung: normal effort  Ab: Soft, NT/ND  Extrem: No CCE  Neuro:  A+Ox3, motor and sensation grossly intact  Pulse exam: b/l palp fem, doppl R PT, doppl L DP/PT    Lab, Imaging and other studies:  CBC:   Lab Results   Component Value Date    WBC 8 45 09/21/2018    HGB 11 3 (L) 09/21/2018    HCT 34 9 09/21/2018    MCV 89 09/21/2018     09/21/2018    MCH 28 7 09/21/2018    MCHC 32 4 09/21/2018    RDW 13 3 09/21/2018    MPV 9 7 09/21/2018    NRBC 0 09/21/2018   , CMP:   Lab Results   Component Value Date     09/21/2018    K 3 1 (L) 09/21/2018     09/21/2018    CO2 28 09/21/2018    BUN 14 09/21/2018    CREATININE 1 24 09/21/2018    CALCIUM 8 7 09/21/2018    EGFR 45 09/21/2018   , Coagulation:   Lab Results   Component Value Date    INR 1 21 (H) 09/21/2018   , Urinalysis: No results found for: Domi Manuela, SPECGRAV, PHUR, LEUKOCYTESUR, NITRITE, PROTEINUA, GLUCOSEU, KETONESU, BILIRUBINUR, BLOODU, Amylase: No results found for: AMYLASE, Lipase: No results found for: LIPASE    VTE Pharmacologic Prophylaxis: Heparin  VTE Mechanical Prophylaxis: sequential compression device

## 2018-09-22 NOTE — PROGRESS NOTES
Patient on Heparin gtt VTE/PE high running at 18 units/kg/hr  PTT was >210 at 0029 am  Notified Lisette Wiley from [facility] (vascular)  Will follow heparin protocol and order another PTT for 0730

## 2018-09-22 NOTE — RESPIRATORY THERAPY NOTE
RT Protocol Note  Phyllis Canela 79 y o  female MRN: [id number]  Unit/Bed#: [facility] 408-01 Encounter: [id number]    Assessment    Principal Problem:    Occlusion of common femoral artery (HCC)  Active Problems:    Acute exacerbation of chronic obstructive pulmonary disease (COPD) ([facility])    Hypertension    Coronary artery disease    Hypokalemia      Home Pulmonary Medications:    Home Devices/Therapy:  (spiriva daily, albuterol mdi prn)    Past Medical History:   Diagnosis Date    Aneurysm ([facility] )     abdominal aortic aneurysm    Aortic valve disease     Aortic valve replaced     TAVR    Bronchiolitis     Cardiac disease     Chest pain     COPD (chronic obstructive pulmonary disease) ([facility] )     Disease of thyroid gland     Dyspnea     Hypertension     Hypokalemia     Morbid obesity ([place] [address] )     PAD (peripheral artery disease) ([facility])     Rheumatic fever     SOB (shortness of breath)     Tachycardia      Social History     Social History    Marital status:      Spouse name: N/A    Number of children: N/A    Years of education: N/A     Social History Main Topics    Smoking status: Former Smoker    Smokeless tobacco: Never Used    Alcohol use 0 6 - 1 2 oz/week     1 - 2 Cans of beer per week      Comment: social    Drug use: No    Sexual activity: Not Asked     Other Topics Concern    None     Social History Narrative    None       Subjective         Objective    Physical Exam:   Assessment Type: Assess only  General Appearance: Alert, Awake  Respiratory Pattern: Normal  Chest Assessment: Chest expansion symmetrical  Bilateral Breath Sounds: Diminished  O2 Device: (P) NC    Vitals:  Blood pressure 117/56, pulse 87, temperature 97 8 °F (36 6 °C), temperature source Oral, resp  rate 22, height 5' 1" (1 549 m), weight 75 5 kg (166 lb 7 2 oz), SpO2 93 %, not currently breastfeeding  Imaging and other studies: I have personally reviewed pertinent reports        O2 Device: (P) NC     Plan    Respiratory Plan: Home Bronchodilator Patient pathway        Resp Comments: (P) Pt  has a history of COPD and currently uses spiriva daily and an albuterol mdi prn at home  Pt is experiencing no sob or distress and states breathing is at baseline  No wheezing noted at this time  Will continue albuterol mdi prn per patient home therapy

## 2018-09-22 NOTE — PROGRESS NOTES
[address] 79 y o  female MRN: [id number]  Unit/Bed#: [facility] 408-01 Encounter: [id number]    Assessment/Plan:  68F s/p thromboembolectomy of left CFA  Patient is feeling well and has only mild pain  She has no expanding hematoma, although there was a small area under the sterile dressing with possible collection  The dressing was clean dry and intact  Pain control is being achieved with Tylenol  The patient has had no nausea or vomiting  She is on a heparin drip to prevent re-occlusion  She has not eaten yet, but is able to order a regular diet  Blood pressure 116/59, pulse 76, temperature 97 8 °F (36 6 °C), temperature source Oral, resp  rate 20, height 5' 1" (1 549 m), weight 75 5 kg (166 lb 7 2 oz), SpO2 93 %, not currently breastfeeding  ,Body mass index is 31 45 kg/m²  Intake/Output Summary (Last 24 hours) at 09/22/18 0204  Last data filed at 09/21/18 2326   Gross per 24 hour   Intake             2500 ml   Output              775 ml   Net             1725 ml       Invasive Devices     Peripheral Intravenous Line            Peripheral IV 09/21/18 Left Wrist 1 day    Peripheral IV 09/21/18 Right Antecubital less than 1 day          Arterial Line            Arterial Line 09/21/18 Right Radial 1 day          Drain            Urethral Catheter Non-latex 16 Fr  less than 1 day                Pulses:  Palpable R DP, Doppler signal at R PT and right femoral  Palpable L femoral  Doppler signal at L PT  Faint Doppler signal at L DP    Both feet are motorsensory intact  The Left still feels slightly cooler than the right

## 2018-09-22 NOTE — ANESTHESIA POSTPROCEDURE EVALUATION
Post-Op Assessment Note      CV Status:  Stable    Mental Status:  Awake    Hydration Status:  Stable    PONV Controlled:  None    Airway Patency:  Patent    Post Op Vitals Reviewed: Yes          Staff: Anesthesiologist, CRNA           BP   140/60   Temp   98 4   Pulse  95   Resp 16   SpO2 99

## 2018-09-23 LAB
ANION GAP SERPL CALCULATED.3IONS-SCNC: 4 MMOL/L (ref 4–13)
APTT PPP: 52 SECONDS (ref 24–36)
APTT PPP: 64 SECONDS (ref 24–36)
APTT PPP: 98 SECONDS (ref 24–36)
ATRIAL RATE: 54 BPM
BASOPHILS # BLD AUTO: 0.03 THOUSANDS/ΜL (ref 0–0.1)
BASOPHILS NFR BLD AUTO: 0 % (ref 0–1)
BUN SERPL-MCNC: 12 MG/DL (ref 5–25)
CALCIUM SERPL-MCNC: 7.9 MG/DL (ref 8.3–10.1)
CHLORIDE SERPL-SCNC: 103 MMOL/L (ref 100–108)
CO2 SERPL-SCNC: 34 MMOL/L (ref 21–32)
CREAT SERPL-MCNC: 0.94 MG/DL (ref 0.6–1.3)
EOSINOPHIL # BLD AUTO: 0.26 THOUSAND/ΜL (ref 0–0.61)
EOSINOPHIL NFR BLD AUTO: 3 % (ref 0–6)
ERYTHROCYTE [DISTWIDTH] IN BLOOD BY AUTOMATED COUNT: 13.1 % (ref 11.6–15.1)
GFR SERPL CREATININE-BSD FRML MDRD: 63 ML/MIN/1.73SQ M
GLUCOSE SERPL-MCNC: 115 MG/DL (ref 65–140)
HCT VFR BLD AUTO: 33.3 % (ref 34.8–46.1)
HGB BLD-MCNC: 10.6 G/DL (ref 11.5–15.4)
IMM GRANULOCYTES # BLD AUTO: 0.03 THOUSAND/UL (ref 0–0.2)
IMM GRANULOCYTES NFR BLD AUTO: 0 % (ref 0–2)
LYMPHOCYTES # BLD AUTO: 2.73 THOUSANDS/ΜL (ref 0.6–4.47)
LYMPHOCYTES NFR BLD AUTO: 30 % (ref 14–44)
MCH RBC QN AUTO: 28.7 PG (ref 26.8–34.3)
MCHC RBC AUTO-ENTMCNC: 31.8 G/DL (ref 31.4–37.4)
MCV RBC AUTO: 90 FL (ref 82–98)
MONOCYTES # BLD AUTO: 0.81 THOUSAND/ΜL (ref 0.17–1.22)
MONOCYTES NFR BLD AUTO: 9 % (ref 4–12)
NEUTROPHILS # BLD AUTO: 5.14 THOUSANDS/ΜL (ref 1.85–7.62)
NEUTS SEG NFR BLD AUTO: 58 % (ref 43–75)
NRBC BLD AUTO-RTO: 0 /100 WBCS
P AXIS: 80 DEGREES
PLATELET # BLD AUTO: 184 THOUSANDS/UL (ref 149–390)
PMV BLD AUTO: 9.6 FL (ref 8.9–12.7)
POTASSIUM SERPL-SCNC: 3.1 MMOL/L (ref 3.5–5.3)
PR INTERVAL: 148 MS
QRS AXIS: 60 DEGREES
QRSD INTERVAL: 100 MS
QT INTERVAL: 402 MS
QTC INTERVAL: 381 MS
RBC # BLD AUTO: 3.69 MILLION/UL (ref 3.81–5.12)
SODIUM SERPL-SCNC: 141 MMOL/L (ref 136–145)
T WAVE AXIS: 142 DEGREES
VENTRICULAR RATE: 54 BPM
WBC # BLD AUTO: 9 THOUSAND/UL (ref 4.31–10.16)

## 2018-09-23 PROCEDURE — 80048 BASIC METABOLIC PNL TOTAL CA: CPT | Performed by: SURGERY

## 2018-09-23 PROCEDURE — 85025 COMPLETE CBC W/AUTO DIFF WBC: CPT | Performed by: SURGERY

## 2018-09-23 PROCEDURE — 93005 ELECTROCARDIOGRAM TRACING: CPT

## 2018-09-23 PROCEDURE — 99024 POSTOP FOLLOW-UP VISIT: CPT | Performed by: SURGERY

## 2018-09-23 PROCEDURE — 93010 ELECTROCARDIOGRAM REPORT: CPT | Performed by: INTERNAL MEDICINE

## 2018-09-23 PROCEDURE — 85730 THROMBOPLASTIN TIME PARTIAL: CPT | Performed by: SURGERY

## 2018-09-23 RX ORDER — BISACODYL 10 MG
10 SUPPOSITORY, RECTAL RECTAL ONCE
Status: COMPLETED | OUTPATIENT
Start: 2018-09-23 | End: 2018-09-23

## 2018-09-23 RX ADMIN — TIOTROPIUM BROMIDE 18 MCG: 18 CAPSULE ORAL; RESPIRATORY (INHALATION) at 08:14

## 2018-09-23 RX ADMIN — POTASSIUM CHLORIDE 20 MEQ: 20 SOLUTION ORAL at 08:14

## 2018-09-23 RX ADMIN — OXYCODONE HYDROCHLORIDE 10 MG: 10 TABLET ORAL at 01:51

## 2018-09-23 RX ADMIN — ASPIRIN 81 MG 81 MG: 81 TABLET ORAL at 08:14

## 2018-09-23 RX ADMIN — OXYCODONE HYDROCHLORIDE 5 MG: 5 TABLET ORAL at 20:21

## 2018-09-23 RX ADMIN — PANTOPRAZOLE SODIUM 40 MG: 40 TABLET, DELAYED RELEASE ORAL at 05:15

## 2018-09-23 RX ADMIN — Medication 100 MG: at 08:14

## 2018-09-23 RX ADMIN — HEPARIN SODIUM 3000 UNITS: 1000 INJECTION, SOLUTION INTRAVENOUS; SUBCUTANEOUS at 20:39

## 2018-09-23 RX ADMIN — BISACODYL 10 MG: 10 SUPPOSITORY RECTAL at 17:02

## 2018-09-23 RX ADMIN — LEVOTHYROXINE SODIUM 50 MCG: 50 TABLET ORAL at 05:15

## 2018-09-23 RX ADMIN — FUROSEMIDE 20 MG: 20 TABLET ORAL at 08:14

## 2018-09-23 RX ADMIN — DOCUSATE SODIUM 100 MG: 100 CAPSULE, LIQUID FILLED ORAL at 20:21

## 2018-09-23 RX ADMIN — HEPARIN SODIUM AND DEXTROSE 12 UNITS/KG/HR: 10000; 5 INJECTION INTRAVENOUS at 01:52

## 2018-09-23 NOTE — PROGRESS NOTES
Patient c/o LLQ pain and tender with palpitations  Notified [facility] resident and resident came down to examine patient  It was determined that is was possible gas and patient needed to have a bowel movement  Rectal suppository ordered and administered  Will continue to monitor

## 2018-09-23 NOTE — PROGRESS NOTES
Progress Note - Vascular Surgery   Butch Mann 79 y o  female MRN: [id number]  Unit/Bed#: [facility] 408-01 Encounter: [id number]    Assessment:  78 y/o F p/w L CFA occlusion 2/2 angioseal closure device from b/l LE angiogram on 9/20, s/p L groin cutdown, removal L femoral artery closure device, thrombectomy, L CFA endarterectomy w/ bovine patch angioplasty, POD #2    Plan:  --Continue Heparin gtt  --Regular diet  --Serial L groin checks, pulse checks  --IS/OOB/ambulate    Subjective/Objective     Subjective:     No acute events overnight  Patient having some continued L groin soreness  States her pain is well controlled  Feeling well  No new complaints  Objective:     Blood pressure 110/58, pulse (!) 52, temperature 97 7 °F (36 5 °C), temperature source Oral, resp  rate 14, height 5' 1" (1 549 m), weight 76 4 kg (168 lb 6 9 oz), SpO2 97 %, not currently breastfeeding  ,Body mass index is 31 82 kg/m²  Intake/Output Summary (Last 24 hours) at 09/23/18 0800  Last data filed at 09/23/18 4160   Gross per 24 hour   Intake          1238 86 ml   Output             2750 ml   Net         -1511 14 ml       Invasive Devices     Peripheral Intravenous Line            Peripheral IV 09/21/18 Left Wrist 2 days                Physical Exam:    GEN: NAD  HEENT: MMM  CV: RRR  Lung: normal effort  Ab: Soft, NT/ND  Minimal left groin swelling  Extrem: No CCE  Neuro:  A+Ox3, motor and sensation grossly intact  Pulse exam: b/l palp fem, doppl R PT, doppl L DP/PT    Lab, Imaging and other studies:  CBC:   Lab Results   Component Value Date    WBC 9 00 09/23/2018    HGB 10 6 (L) 09/23/2018    HCT 33 3 (L) 09/23/2018    MCV 90 09/23/2018     09/23/2018    MCH 28 7 09/23/2018    MCHC 31 8 09/23/2018    RDW 13 1 09/23/2018    MPV 9 6 09/23/2018    NRBC 0 09/23/2018   , CMP:   Lab Results   Component Value Date     09/23/2018    K 3 1 (L) 09/23/2018     09/23/2018    CO2 34 (H) 09/23/2018    BUN 12 09/23/2018 CREATININE 0 94 09/23/2018    CALCIUM 7 9 (L) 09/23/2018    EGFR 63 09/23/2018     VTE Pharmacologic Prophylaxis: Heparin  VTE Mechanical Prophylaxis: sequential compression device

## 2018-09-23 NOTE — SOCIAL WORK
CM met with Pt with an introduction and explanation of role  Pt reported residing with her sister and daughter in a 2 story home with no use of DME and 4 steps to enter the home  Pt reported being independent with ADLs, had [employer] [id number] services in the past but cannot the name of the provider agency, no hx of SNF, mental health or drug/alcohol placements  Pt denied having a living will, uses [address] in [facility] and has Dr Gerard Callahan as a PCP  CM reviewed d/c planning process including the following: identifying help at home, patient preference for d/c planning needs, Discharge Lounge, [facility] Meds to Bed program, availability of treatment team to discuss questions or concerns patient and/or family may have regarding understanding medications and recognizing signs and symptoms once discharged  CM also encouraged patient to follow up with all recommended appointments after discharge  Patient advised of importance for patient and family to participate in managing patients medical well being

## 2018-09-23 NOTE — CASE MANAGEMENT
Initial Clinical Review    Admission: Date/Time/Statement: 9/21/18 @ 2006     9/21 Transfer from [facility] ED    Orders Placed This Encounter   Procedures    Inpatient Admission     Standing Status:   Standing     Number of Occurrences:   1     Order Specific Question:   Admitting Physician     Answer:   Den Alvarado [[id number]]     Order Specific Question:   Level of Care     Answer:   Med Surg [[id number]]     Order Specific Question:   Estimated length of stay     Answer:   More than 2 Midnights     Order Specific Question:   Certification     Answer:   I certify that inpatient services are medically necessary for this patient for a duration of greater than two midnights  See H&P and MD Progress Notes for additional information about the patient's course of treatment  ED: Date/Time/Mode of Arrival:   ED Arrival Information     Expected Arrival Acuity Means of Arrival Escorted By Service Admission Type    - 9/21/2018 17:53 Emergent Ambulance [facility]) Vascular Surgery Emergency    Arrival Complaint    -          Chief Complaint:   Chief Complaint   Patient presents with    Leg Pain     Pt trnasferred from [place] for thrombus to left leg after cardiac cath yesterday       History of Illness: 79 y o  female who presents with pain in her left groin puncture site  The pain went on to spread down the leg and to her buttock  She has worsening pain with walking and standing, and it is relieved with rest  She went to the ED at [facility] where they noted that her left foot was significantly colder than her right  They also did a pseudoscan to look for hematoma  She was found to have a completely occluded Left CFA   She was sent to [facility] for vascular surgery evaluation         ED Vital Signs:   ED Triage Vitals [09/21/18 1800]   Temperature Pulse Respirations Blood Pressure SpO2   98 3 °F (36 8 °C) 88 21 128/64 94 %      Temp Source Heart Rate Source Patient Position - Orthostatic VS BP Location FiO2 (%) Oral Monitor Lying Right arm --      Pain Score       6        Wt Readings from Last 1 Encounters:   09/23/18 76 4 kg (168 lb 6 9 oz)       Vital Signs (abnormal): B/P = 105/ 47    Abnormal Lab: Wbc = 11 87    Diagnostic Test Results: VAS lower limb arterial duplex -   LEFT LOWER LIMB  The common femoral artery endarterectomy site is patent  Elevate velocities suggest a >75% stenosis of the distal external iliac artery  Mild diffuse disease noted throughout the superficial femoral, proximal  profunda femoris and popliteal arteries, with no significant stenosis  ED Treatment:   Medication Administration from 09/21/2018 1749 to 09/22/2018 0007       Date/Time Order Dose Route Action     09/21/2018 2151 heparin (porcine) 2,000 Units, papaverine 60 mg in multi-electrolyte (ISOLYTE-S PH 7 4 equivalent) 500 mL irrigation 200 mL Irrigation Given     09/21/2018 2255 lactated ringers infusion 20 mL/hr Intravenous Continued from OR     09/21/2018 2312 ondansetron (ZOFRAN) injection 4 mg 4 mg Intravenous Given     09/22/2018 0000 heparin (porcine) 25,000 units in 250 mL infusion (premix) 18 Units/kg/hr Intravenous Rate/Dose Verify     09/21/2018 2316 heparin (porcine) 25,000 units in 250 mL infusion (premix) 18 Units/kg/hr Intravenous New Bag          Past Medical/Surgical History:    Active Ambulatory Problems     Diagnosis Date Noted    Acute exacerbation of chronic obstructive pulmonary disease (COPD) ([facility] ) 02/09/2018    Chest pain 02/09/2018    Hypertension 02/09/2018    Morbid obesity due to excess calories ([facility] ) 02/09/2018    Coronary artery disease 02/09/2018    Hypokalemia 02/09/2018    Chronic radicular pain of lower back 02/09/2018    Urinary tract infection 02/10/2018    Centrilobular emphysema (HCC)     Dyspnea on exertion     Mixed hyperlipidemia 09/06/2018    Peripheral artery disease ([facility] ) 09/06/2018    Intermittent claudication of both lower extremities due to atherosclerosis ([facility] ) 09/06/2018  S/p TAVR (transcatheter aortic valve replacement), bioprosthetic 09/06/2018     Resolved Ambulatory Problems     Diagnosis Date Noted    No Resolved Ambulatory Problems     Past Medical History:   Diagnosis Date    Aneurysm ([address] )     Aortic valve disease     Aortic valve replaced     Bronchiolitis     Cardiac disease     Chest pain     [facility]     Disease of thyroid gland     Dyspnea     Hypertension     Hypokalemia     Morbid obesity (HCC)     PAD [facility]peripheral artery disease) ([facility])     Rheumatic fever     SOB (shortness of breath)     Tachycardia        Admitting Diagnosis: Ischemic leg [I99 8]    Age/Sex: 79 y o  female    Assessment/Plan:   75y Female, transfer from [place] ED with Thrombus Left CFA  S/P Catherization   OR for open thromboembolectomy      Admission Orders:  9/21 OR - S/P Left femoral exploration  L iliofemoral thrombectomy  L femoral endarterectomy with patch angioplasty    A Line monitoring  Regular Diet  Up with assistance       Scheduled Meds:   Current Facility-Administered Medications:  aspirin 81 mg Oral Daily   cefazolin 1 g in sodium chloride 0 9% 1000 mL irrigation bottle  Irrigation Once   co-enzyme Q-10 100 mg Oral Daily   docusate sodium 100 mg Oral BID   furosemide 20 mg Oral Daily   levothyroxine 50 mcg Oral Early Morning   metoprolol tartrate 25 mg Oral Q12H JUNI   pantoprazole 40 mg Oral Early Morning   potassium chloride 20 mEq Oral Daily   tiotropium 18 mcg Inhalation Daily     Continuous Infusions:   heparin (porcine) 3-30 Units/kg/hr (Order-Specific) Last Rate: 10 Units/kg/hr (09/23/18 0606)     PRN Meds:   acetaminophen    albuterol    HYDROmorphone    ondansetron    oxyCODONE po x2

## 2018-09-24 ENCOUNTER — APPOINTMENT (INPATIENT)
Dept: RADIOLOGY | Facility: HOSPITAL | Age: 68
DRG: 271 | End: 2018-09-24
Payer: MEDICARE

## 2018-09-24 LAB
ABO GROUP BLD BPU: NORMAL
ABO GROUP BLD BPU: NORMAL
APTT PPP: 72 SECONDS (ref 24–36)
APTT PPP: 81 SECONDS (ref 24–36)
ATRIAL RATE: 66 BPM
BPU ID: NORMAL
BPU ID: NORMAL
CROSSMATCH: NORMAL
CROSSMATCH: NORMAL
P AXIS: 78 DEGREES
PR INTERVAL: 144 MS
QRS AXIS: 68 DEGREES
QRSD INTERVAL: 78 MS
QT INTERVAL: 398 MS
QTC INTERVAL: 417 MS
T WAVE AXIS: 256 DEGREES
UNIT DISPENSE STATUS: NORMAL
UNIT DISPENSE STATUS: NORMAL
UNIT PRODUCT CODE: NORMAL
UNIT PRODUCT CODE: NORMAL
UNIT RH: NORMAL
UNIT RH: NORMAL
VENTRICULAR RATE: 66 BPM

## 2018-09-24 PROCEDURE — 85730 THROMBOPLASTIN TIME PARTIAL: CPT | Performed by: SURGERY

## 2018-09-24 PROCEDURE — 93010 ELECTROCARDIOGRAM REPORT: CPT | Performed by: INTERNAL MEDICINE

## 2018-09-24 PROCEDURE — 74018 RADEX ABDOMEN 1 VIEW: CPT

## 2018-09-24 RX ORDER — POLYETHYLENE GLYCOL 3350 17 G/17G
17 POWDER, FOR SOLUTION ORAL DAILY PRN
Status: DISCONTINUED | OUTPATIENT
Start: 2018-09-24 | End: 2018-09-24

## 2018-09-24 RX ORDER — POLYETHYLENE GLYCOL 3350 17 G/17G
17 POWDER, FOR SOLUTION ORAL ONCE
Status: COMPLETED | OUTPATIENT
Start: 2018-09-24 | End: 2018-09-24

## 2018-09-24 RX ORDER — POLYETHYLENE GLYCOL 3350 17 G/17G
17 POWDER, FOR SOLUTION ORAL ONCE
Status: COMPLETED | OUTPATIENT
Start: 2018-09-25 | End: 2018-09-25

## 2018-09-24 RX ORDER — BISACODYL 10 MG
10 SUPPOSITORY, RECTAL RECTAL ONCE
Status: COMPLETED | OUTPATIENT
Start: 2018-09-24 | End: 2018-09-24

## 2018-09-24 RX ADMIN — Medication 100 MG: at 10:01

## 2018-09-24 RX ADMIN — DOCUSATE SODIUM 100 MG: 100 CAPSULE, LIQUID FILLED ORAL at 05:15

## 2018-09-24 RX ADMIN — BISACODYL 10 MG: 10 SUPPOSITORY RECTAL at 17:46

## 2018-09-24 RX ADMIN — DOCUSATE SODIUM 100 MG: 100 CAPSULE, LIQUID FILLED ORAL at 17:46

## 2018-09-24 RX ADMIN — DOCUSATE SODIUM 100 MG: 100 CAPSULE, LIQUID FILLED ORAL at 10:02

## 2018-09-24 RX ADMIN — RIVAROXABAN 15 MG: 15 TABLET, FILM COATED ORAL at 16:29

## 2018-09-24 RX ADMIN — POTASSIUM CHLORIDE 20 MEQ: 20 SOLUTION ORAL at 10:01

## 2018-09-24 RX ADMIN — ASPIRIN 81 MG 81 MG: 81 TABLET ORAL at 10:02

## 2018-09-24 RX ADMIN — PANTOPRAZOLE SODIUM 40 MG: 40 TABLET, DELAYED RELEASE ORAL at 05:09

## 2018-09-24 RX ADMIN — TIOTROPIUM BROMIDE 18 MCG: 18 CAPSULE ORAL; RESPIRATORY (INHALATION) at 10:10

## 2018-09-24 RX ADMIN — LEVOTHYROXINE SODIUM 50 MCG: 50 TABLET ORAL at 05:09

## 2018-09-24 RX ADMIN — OXYCODONE HYDROCHLORIDE 10 MG: 10 TABLET ORAL at 10:57

## 2018-09-24 RX ADMIN — FUROSEMIDE 20 MG: 20 TABLET ORAL at 10:08

## 2018-09-24 RX ADMIN — ACETAMINOPHEN 650 MG: 325 TABLET, FILM COATED ORAL at 05:15

## 2018-09-24 RX ADMIN — POLYETHYLENE GLYCOL 3350 17 G: 17 POWDER, FOR SOLUTION ORAL at 21:27

## 2018-09-24 RX ADMIN — POLYETHYLENE GLYCOL 3350 17 G: 17 POWDER, FOR SOLUTION ORAL at 17:46

## 2018-09-24 RX ADMIN — OXYCODONE HYDROCHLORIDE 5 MG: 5 TABLET ORAL at 16:29

## 2018-09-24 RX ADMIN — METOPROLOL TARTRATE 25 MG: 25 TABLET ORAL at 10:08

## 2018-09-24 RX ADMIN — HEPARIN SODIUM AND DEXTROSE 12 UNITS/KG/HR: 10000; 5 INJECTION INTRAVENOUS at 07:38

## 2018-09-24 RX ADMIN — METOPROLOL TARTRATE 25 MG: 25 TABLET ORAL at 21:27

## 2018-09-24 NOTE — RESTORATIVE TECHNICIAN NOTE
Restorative Specialist Mobility Note       Activity: Ambulate in watknis, Chair     Assistive Device: None

## 2018-09-24 NOTE — PLAN OF CARE
Problem: DISCHARGE PLANNING - CARE MANAGEMENT  Goal: Discharge to post-acute care or home with appropriate resources  INTERVENTIONS:  - Conduct assessment to determine patient/family and health care team treatment goals, and need for post-acute services based on payer coverage, community resources, and patient preferences, and barriers to discharge  - Address psychosocial, clinical, and financial barriers to discharge as identified in assessment in conjunction with the patient/family and health care team  - Arrange appropriate level of post-acute services according to patient's   needs and preference and payer coverage in collaboration with the physician and health care team  - Communicate with and update the patient/family, physician, and health care team regarding progress on the discharge plan  - Arrange appropriate transportation to post-acute venues   INTERVENTIONS:  - Conduct assessment to determine patient/family and health care team treatment goals, and need for post-acute services based on payer coverage, community resources, and patient preferences, and barriers to discharge  - Address psychosocial, clinical, and financial barriers to discharge as identified in assessment in conjunction with the patient/family and health care team  - Arrange appropriate level of post-acute services according to patient's   needs and preference and payer coverage in collaboration with the physician and health care team  - Communicate with and update the patient/family, physician, and health care team regarding progress on the discharge plan  - Arrange appropriate transportation to post-acute venues    Plan is to return home with daughter transporting and no [facility] needs     Outcome: Adequate for Discharge

## 2018-09-24 NOTE — SOCIAL WORK
Plan is to discharge home today with daughter  However this am she is constipated and is currently going to have an abdominal xray  Again asked if she felt she needed [facility] and she does not feel it is needed  Received Rx for [facility] to check price on Xarelto  After 30 days free her copay is $3 70 and pt agrees  Xarelto was delivered to pt's room  [facility] is aware

## 2018-09-24 NOTE — PROGRESS NOTES
Spoke with Ryland Stiles the [facility] PA asking when to turn off the heparin drip after giving xarelto and was told to ask the pharmacy  Called and spoke with pharmacy about patient getting xarelto tonight and when to shut the heparin drip off  Was told to do it at the same time

## 2018-09-24 NOTE — PROGRESS NOTES
Progress Note - Vascular Surgery   Ryland Dahl 79 y o  female MRN: [id number]  Unit/Bed#: [facility] 408-01 Encounter: [id number]    Assessment:  78 y/o F p/w L CFA occlusion 2/2 angioseal closure device from b/l LE angiogram on 9/20, s/p L groin cutdown, removal L femoral artery closure device, thrombectomy, L CFA endarterectomy w/ bovine patch angioplasty, POD #2    Plan:  --Continue Heparin gtt  --Regular diet  --Serial L groin checks, pulse checks  --Bowel Regimen  --IS/OOB/ambulate    Subjective/Objective     Subjective: Overnight patient complaining of LLQ pain and constipation  Given a suppository  Still no bowel movement yet   in the left groin, though pain well controlled  No fever or chills  Objective:     Blood pressure 98/57, pulse 81, temperature 98 4 °F (36 9 °C), temperature source Oral, resp  rate 18, height 5' 1" (1 549 m), weight 74 4 kg (164 lb 0 4 oz), SpO2 95 %, not currently breastfeeding  ,Body mass index is 30 99 kg/m²        Intake/Output Summary (Last 24 hours) at 09/24/18 7832  Last data filed at 09/24/18 0500   Gross per 24 hour   Intake           466 95 ml   Output             1725 ml   Net         -1258 05 ml       Invasive Devices     Peripheral Intravenous Line            Peripheral IV 09/21/18 Left Wrist 3 days                Physical Exam:  Gen: NAD, A&O, Comfortable in Bed  Chest: Normal work of breathing, no resp distress  Abd: S, ND, NT  Ext: No edema  Pulse exam: b/l palp fem, + biphasic bilateral Dps/PTs  Skin: warm, dry, intact      Lab, Imaging and other studies:  CBC:   No results found for: WBC, HGB, HCT, MCV, PLT, ADJUSTEDWBC, MCH, MCHC, RDW, MPV, NRBC, CMP:   No results found for: NA, K, CL, CO2, ANIONGAP, BUN, CREATININE, GLUCOSE, CALCIUM, AST, ALT, ALKPHOS, PROT, ALBUMIN, BILITOT, EGFR  VTE Pharmacologic Prophylaxis: Heparin  VTE Mechanical Prophylaxis: sequential compression device

## 2018-09-25 LAB
KCT BLD-ACNC: 149 SEC (ref 89–137)
KCT BLD-ACNC: 206 SEC (ref 89–137)
SPECIMEN SOURCE: ABNORMAL
SPECIMEN SOURCE: ABNORMAL

## 2018-09-25 PROCEDURE — 99024 POSTOP FOLLOW-UP VISIT: CPT | Performed by: SURGERY

## 2018-09-25 RX ORDER — SODIUM PHOSPHATE, DIBASIC AND SODIUM PHOSPHATE, MONOBASIC 7; 19 G/133ML; G/133ML
1 ENEMA RECTAL ONCE
Status: COMPLETED | OUTPATIENT
Start: 2018-09-25 | End: 2018-09-25

## 2018-09-25 RX ADMIN — FUROSEMIDE 20 MG: 20 TABLET ORAL at 09:38

## 2018-09-25 RX ADMIN — PANTOPRAZOLE SODIUM 40 MG: 40 TABLET, DELAYED RELEASE ORAL at 05:39

## 2018-09-25 RX ADMIN — METOPROLOL TARTRATE 25 MG: 25 TABLET ORAL at 09:38

## 2018-09-25 RX ADMIN — RIVAROXABAN 15 MG: 15 TABLET, FILM COATED ORAL at 17:36

## 2018-09-25 RX ADMIN — POTASSIUM CHLORIDE 20 MEQ: 20 SOLUTION ORAL at 09:38

## 2018-09-25 RX ADMIN — DOCUSATE SODIUM 100 MG: 100 CAPSULE, LIQUID FILLED ORAL at 17:36

## 2018-09-25 RX ADMIN — SODIUM PHOSPHATE, DIBASIC AND SODIUM PHOSPHATE, MONOBASIC 1 ENEMA: 7; 19 ENEMA RECTAL at 11:12

## 2018-09-25 RX ADMIN — DOCUSATE SODIUM 100 MG: 100 CAPSULE, LIQUID FILLED ORAL at 09:38

## 2018-09-25 RX ADMIN — ACETAMINOPHEN 650 MG: 325 TABLET, FILM COATED ORAL at 20:36

## 2018-09-25 RX ADMIN — OXYCODONE HYDROCHLORIDE 5 MG: 5 TABLET ORAL at 05:39

## 2018-09-25 RX ADMIN — ASPIRIN 81 MG 81 MG: 81 TABLET ORAL at 09:38

## 2018-09-25 RX ADMIN — LEVOTHYROXINE SODIUM 50 MCG: 50 TABLET ORAL at 05:39

## 2018-09-25 RX ADMIN — OXYCODONE HYDROCHLORIDE 5 MG: 5 TABLET ORAL at 21:45

## 2018-09-25 RX ADMIN — TIOTROPIUM BROMIDE 18 MCG: 18 CAPSULE ORAL; RESPIRATORY (INHALATION) at 09:39

## 2018-09-25 RX ADMIN — RIVAROXABAN 15 MG: 15 TABLET, FILM COATED ORAL at 09:38

## 2018-09-25 RX ADMIN — Medication 100 MG: at 09:38

## 2018-09-25 RX ADMIN — POLYETHYLENE GLYCOL 3350 17 G: 17 POWDER, FOR SOLUTION ORAL at 09:38

## 2018-09-25 NOTE — CASE MANAGEMENT
Continued Stay Review    Date: 09/25/2018  Age/Sex: 79 y o  female     Assessment/Plan:   80 y/o F p/w L CFA occlusion 2/2 angioseal closure device from b/l LE angiogram on 9/20, s/p L groin cutdown, removal L femoral artery closure device, thrombectomy, L CFA endarterectomy w/ bovine patch angioplasty    Discharge Plan: TBD  - Anticoagulation with Xarelto  - Regular Diet  - Bowel Regimen    Vital Signs: /60 (BP Location: Right arm)   Pulse 69   Temp 98 1 °F (36 7 °C) (Oral)   Resp 16   Ht 5' 1" (1 549 m)   Wt 74 6 kg (164 lb 7 4 oz)   SpO2 95%   BMI 31 08 kg/m²     Medications:   Scheduled Meds:   Current Facility-Administered Medications:  acetaminophen 650 mg Oral Q6H PRN Lydia Gale MD   albuterol 2 puff Inhalation Q4H PRN Jeramie Wise Doctor, MD   aluminum-magnesium hydroxide-simethicone 15 mL Oral Q6H PRN Navin Loving MD   aspirin 81 mg Oral Daily Navin Loving MD   co-enzyme Q-10 100 mg Oral Daily Navin Loving MD   docusate sodium 100 mg Oral BID PRN Navin Loving MD   docusate sodium 100 mg Oral BID Lydia Gale MD   furosemide 20 mg Oral Daily Navin Loving MD   HYDROmorphone 0 5 mg Intravenous Q3H PRN Lydia Gale MD   levothyroxine 50 mcg Oral Early Morning Navin Loving MD   metoprolol tartrate 25 mg Oral Q12H [address] Navin Loving MD   ondansetron 4 mg Intravenous Q6H PRN Navin Loving MD   oxyCODONE 10 mg Oral Q4H PRN Lydia Gale MD   oxyCODONE 5 mg Oral Q4H PRN Lydia Gale MD   pantoprazole 40 mg Oral Early Morning Navin Loving MD   potassium chloride 20 mEq Oral Daily Navin Loving MD   rivaroxaban 15 mg Oral BID With Meals Enoch Martin PA-C   tiotropium 18 mcg Inhalation Daily Navin Loving MD     Abnormal Labs/Diagnostic Results:

## 2018-09-25 NOTE — PROGRESS NOTES
Progress Note - Vascular Surgery   Adela Jacinto 79 y o  female MRN: [id number]  Unit/Bed#: [facility] 408-01 Encounter: [id number]    Assessment:  78 y/o F p/w L CFA occlusion 2/2 angioseal closure device from b/l LE angiogram on 9/20, s/p L groin cutdown, removal L femoral artery closure device, thrombectomy, L CFA endarterectomy w/ bovine patch angioplasty    Plan:  - Anticoagulation with Xarelto  - Regular Diet  - Bowel Regimen  - Discharge 9/25    Subjective/Objective   Chief Complaint:     Subjective: No acute events  Patient still with no bowel movement despite aggressive bowel regimen of suppository, colace and miralax  Patient denies fevers, chills, n/v, sob, cp  Patient does have pain in left flank and at left hip flexor  Objective:     Blood pressure 134/60, pulse 69, temperature 98 1 °F (36 7 °C), temperature source Oral, resp  rate 16, height 5' 1" (1 549 m), weight 74 6 kg (164 lb 7 4 oz), SpO2 95 %, not currently breastfeeding  ,Body mass index is 31 08 kg/m²  Intake/Output Summary (Last 24 hours) at 09/25/18 0809  Last data filed at 09/25/18 3881   Gross per 24 hour   Intake           1955 1 ml   Output             2000 ml   Net            -44 9 ml       Invasive Devices     Peripheral Intravenous Line            Peripheral IV 09/21/18 Left Wrist 4 days                Physical Exam: General: AAOx3  Respiratory: BS b/l  Abdomen: Soft, tender to palpation on left flank and at left hip flexor - patient does have an area of firmness on left flank at iliac crest that is not on the right side  Heart: RRR, S1s2  Ext: Warm no cyanosis, M/s in tact bilaterally    Pulses b/l palpable fem's    Lab, Imaging and other studies:I have personally reviewed pertinent lab results     9/24 KUB - IMPRESSION:     Prominent colonic stool with a nonobstructive bowel gas pattern      , CBC: No results found for: WBC, HGB, HCT, MCV, PLT, ADJUSTEDWBC, MCH, MCHC, RDW, MPV, NRBC, CMP: No results found for: NA, K, CL, CO2, ANIONGAP, BUN, CREATININE, GLUCOSE, CALCIUM, AST, ALT, ALKPHOS, PROT, ALBUMIN, BILITOT, EGFR  VTE Pharmacologic Prophylaxis: Reason for no pharmacologic prophylaxis Xarelto  VTE Mechanical Prophylaxis: sequential compression device

## 2018-09-26 VITALS
RESPIRATION RATE: 18 BRPM | HEIGHT: 61 IN | WEIGHT: 163.9 LBS | OXYGEN SATURATION: 95 % | BODY MASS INDEX: 30.94 KG/M2 | DIASTOLIC BLOOD PRESSURE: 57 MMHG | SYSTOLIC BLOOD PRESSURE: 117 MMHG | TEMPERATURE: 97.4 F | HEART RATE: 62 BPM

## 2018-09-26 PROBLEM — J44.1 ACUTE EXACERBATION OF CHRONIC OBSTRUCTIVE PULMONARY DISEASE (COPD) (HCC): Status: RESOLVED | Noted: 2018-02-09 | Resolved: 2018-09-26

## 2018-09-26 PROBLEM — E87.6 HYPOKALEMIA: Status: RESOLVED | Noted: 2018-02-09 | Resolved: 2018-09-26

## 2018-09-26 PROCEDURE — 99024 POSTOP FOLLOW-UP VISIT: CPT | Performed by: PHYSICIAN ASSISTANT

## 2018-09-26 RX ORDER — DOCUSATE SODIUM 100 MG/1
100 CAPSULE, LIQUID FILLED ORAL 2 TIMES DAILY PRN
Qty: 10 CAPSULE | Refills: 0 | COMMUNITY
Start: 2018-09-26 | End: 2020-01-01 | Stop reason: HOSPADM

## 2018-09-26 RX ORDER — FUROSEMIDE 20 MG/1
20 TABLET ORAL 2 TIMES DAILY
Qty: 30 TABLET | Refills: 0 | Status: SHIPPED | OUTPATIENT
Start: 2018-09-26 | End: 2018-09-26

## 2018-09-26 RX ORDER — FUROSEMIDE 20 MG/1
20 TABLET ORAL 2 TIMES DAILY
Qty: 60 TABLET | Refills: 0 | Status: SHIPPED | OUTPATIENT
Start: 2018-09-26 | End: 2020-01-01 | Stop reason: HOSPADM

## 2018-09-26 RX ORDER — OXYCODONE HYDROCHLORIDE 5 MG/1
5 TABLET ORAL EVERY 4 HOURS PRN
Qty: 30 TABLET | Refills: 0 | Status: SHIPPED | OUTPATIENT
Start: 2018-09-26 | End: 2018-10-06

## 2018-09-26 RX ORDER — ACETAMINOPHEN 325 MG/1
650 TABLET ORAL EVERY 6 HOURS PRN
Qty: 30 TABLET | Refills: 0 | COMMUNITY
Start: 2018-09-26 | End: 2020-01-01 | Stop reason: HOSPADM

## 2018-09-26 RX ADMIN — TIOTROPIUM BROMIDE 18 MCG: 18 CAPSULE ORAL; RESPIRATORY (INHALATION) at 08:38

## 2018-09-26 RX ADMIN — ACETAMINOPHEN 650 MG: 325 TABLET, FILM COATED ORAL at 05:58

## 2018-09-26 RX ADMIN — ASPIRIN 81 MG 81 MG: 81 TABLET ORAL at 08:36

## 2018-09-26 RX ADMIN — POTASSIUM CHLORIDE 20 MEQ: 20 SOLUTION ORAL at 08:37

## 2018-09-26 RX ADMIN — METOPROLOL TARTRATE 25 MG: 25 TABLET ORAL at 08:36

## 2018-09-26 RX ADMIN — Medication 100 MG: at 08:36

## 2018-09-26 RX ADMIN — LEVOTHYROXINE SODIUM 50 MCG: 50 TABLET ORAL at 05:56

## 2018-09-26 RX ADMIN — PANTOPRAZOLE SODIUM 40 MG: 40 TABLET, DELAYED RELEASE ORAL at 05:56

## 2018-09-26 RX ADMIN — RIVAROXABAN 15 MG: 15 TABLET, FILM COATED ORAL at 08:25

## 2018-09-26 RX ADMIN — FUROSEMIDE 20 MG: 20 TABLET ORAL at 08:37

## 2018-09-26 NOTE — PLAN OF CARE
DISCHARGE PLANNING     Discharge to home or other facility with appropriate resources Adequate for Discharge        DISCHARGE PLANNING - CARE MANAGEMENT     Discharge to post-acute care or home with appropriate resources Adequate for Discharge        Knowledge Deficit     Patient/family/caregiver demonstrates understanding of disease process, treatment plan, medications, and discharge instructions Adequate for Discharge        PAIN - ADULT     Verbalizes/displays adequate comfort level or baseline comfort level Adequate for Discharge        Potential for Falls     Patient will remain free of falls Adequate for Discharge        SAFETY ADULT     Patient will remain free of falls Adequate for Discharge     Maintain or return to baseline ADL function Adequate for Discharge     Maintain or return mobility status to optimal level Adequate for Discharge

## 2018-09-26 NOTE — DISCHARGE SUMMARY
Discharge Summary    Donal Fletcher 79 y o  female MRN: [id number]    Unit/Bed#: [place] 408-01 Encounter: [id number]    Admission Date: 9/21/2018     Discharge Date: 9/26/2018    Attending: Dr Sharee Ortiz    Admitting Diagnosis: Ischemic leg [I99 8]    Principle Diagnosis: Left leg Ischemia     Secondary Diagnosis:    · Constipation    Past Medical History:   Diagnosis Date    Aneurysm [facility])     abdominal aortic aneurysm    Aortic valve disease     Aortic valve replaced     TAVR    Bronchiolitis     Cardiac disease     Chest pain     COPD [facility] disease) [facility]     Disease of thyroid gland     Dyspnea     Hypertension     Hypokalemia     Morbid obesity ([facility]     [facility]     Rheumatic fever     SOB (shortness of breath)     Tachycardia      Past Surgical History:   Procedure Laterality Date    CARDIAC SURGERY      aortic valve replacement    CARDIAC VALVE REPLACEMENT      ESOPHAGUS SURGERY N/A     OR THROMBOENDARTECTMY FEMORAL COMMON Left 9/21/2018    Procedure: ENDARTERECTOMY ARTERIAL FEMORAL;  Surgeon: Sharee Ortiz MD;  [facility]: [facility];  Service: Vascular    REPLACEMENT AORTIC VALVE TRANSCATHETER (TAVR)      THROMBECTOMY W/ EMBOLECTOMY Left 9/21/2018    Procedure: EMBOLECTOMY/THROMBECTOMY LOWER EXTREMITY (GROIN EXPLORATION); Surgeon: Sharee Ortiz MD;  [facility]: [facility];  Service: Vascular        Procedures Performed: S/P L femoral exploration, retrieval of closure device, thrombectomy, endarterectomy with patch angioplasty 9/21    Discharge Medications:  See after visit summary for reconciled discharge medications provided to patient and family        Hospital Course:   78 y/o F with hx COPD, HTN, rheumatic fever, s/p TAVR '10, PAD s/p R EIA stent, recently had B leg heaviness and underwent diagnostic BLE arteriogram by Dr Lanre Sifuentes at [facility] on 9/20 c/b LLE ischemia now s/p L Femoral exploration, retrival of closure device, thrombectomy, endarterectomy w/ patch angioplasty 9/21  She tolerated surgery well and was maintained on a Heparin gtt post-op  Prior to discharge she was transitioned to Xarelto  Her post-op course was uncomplicated but she developed Left sided abdominal pain  KUB revealed constipation and she was treated with bowel regimen w/ enema  After BM her pain improved and she was deemed appropriate for discharge home  She was discharged in the care of her family on 9/26  She redman have Left EIA stenosis and will require further intervention with interval LLE angiogram with R femoral access to address stenosis after recovery and healing of surgical site  Condition at Discharge: stable     Provisions for Follow-Up Care:  See after visit summary for information related to follow-up care and any pertinent home health orders  Disposition: Home    Discharge instructions/Information to patient and family:   See after visit summary for information provided to patient and family  Planned Readmission: Yes She will require treatment of Left EIA stenosis with Arteriogram and endovascular intervention following recovery of her recent surgical site possibly within the next 30 days  Discharge Statement   I spent 30 minutes discharging the patient  This time was spent on the day of discharge  I had direct contact with the patient on the day of discharge  Additional documentation is required if more than 30 minutes were spent on discharge

## 2018-09-26 NOTE — PLAN OF CARE
Present in room with MANUEL Mccann to discuss plan of care to discharge home later today  Questions and concerns addressed from patient at this time

## 2018-09-26 NOTE — PLAN OF CARE
DISCHARGE PLANNING     Discharge to home or other facility with appropriate resources Progressing        DISCHARGE PLANNING - CARE MANAGEMENT     Discharge to post-acute care or home with appropriate resources Progressing        Knowledge Deficit     Patient/family/caregiver demonstrates understanding of disease process, treatment plan, medications, and discharge instructions Progressing        PAIN - ADULT     Verbalizes/displays adequate comfort level or baseline comfort level Progressing        Potential for Falls     Patient will remain free of falls Progressing        SAFETY ADULT     Patient will remain free of falls Progressing     Maintain or return to baseline ADL function Progressing     Maintain or return mobility status to optimal level Progressing

## 2018-09-26 NOTE — PROGRESS NOTES
Progress Note - Vascular Surgery   Carina Hoffman 79 y o  female MRN: [id number]  Unit/Bed#: [facility] 408-01 Encounter: [id number]    Assessment:  Pt is a 80 yo F w/ hx of COPD, HTN, rheumatic fever, s/p TAVR '10, PAD s/p R EIA stent, recently had B leg heaviness and underwent diagnostic BLE angiogram by Dr Bjorn Gomes at [facility] on 9/20 c/b LLE ischemia now s/p L femoral exploration, retrieval of closure device, thrombectomy, endarterectomy with patch angioplasty 9/21     - patient hemoglobin now stable, last 3 hemoglobins 11 3, 11 1, 10 6 all from hemoglobin of 14 7 on 09/21/2018 patient with with suspected acute blood loss anemia status post left femoral exploration with a blood loss of 300 cc blood, however monitoring hemoglobins is the standard of care in vascular surgery and would typically be followed    Plan:  - Continue Xarelto  - Continue AC outpatient - plan for interval LLE angiogram with R femoral access to address EIA stenosis  - Encourage OOB/Ambulation  - Bowel Regimen   - Discharge  Today    Subjective/Objective   Chief Complaint:     Subjective:  Patient had a bowel movement yesterday that she deemed adequate and is no longer feeling discomfort in belly  Patient's left hip is feeling better, still slightly tender however improved  Denies fevers, chills, nausea, vomiting, shortness of breath or chest pain  Objective:     Blood pressure 114/57, pulse 73, temperature 97 6 °F (36 4 °C), temperature source Oral, resp  rate 18, height 5' 1" (1 549 m), weight 74 6 kg (164 lb 7 4 oz), SpO2 96 %, not currently breastfeeding  ,Body mass index is 31 08 kg/m²        Intake/Output Summary (Last 24 hours) at 09/26/18 8588  Last data filed at 09/26/18 0234   Gross per 24 hour   Intake              820 ml   Output             1976 ml   Net            -1156 ml       Invasive Devices          No matching active lines, drains, or airways          Physical Exam: General: AAOx3  Respiratory: BS b/l  Abdomen: Soft, NT, no rebound/guarding  Incision c/d/i  Heart: RRR, S1s2  Ext: Warm no cyanosis  - M/S intact  Pulses palpable pulses right femoral and right DP  Left PT Doppler signal, left popliteal Doppler signal      Lab, Imaging and other studies:I have personally reviewed pertinent lab results    , CBC: No results found for: WBC, HGB, HCT, MCV, PLT, ADJUSTEDWBC, MCH, MCHC, RDW, MPV, NRBC, CMP: No results found for: NA, K, CL, CO2, ANIONGAP, BUN, CREATININE, GLUCOSE, CALCIUM, AST, ALT, ALKPHOS, PROT, ALBUMIN, BILITOT, EGFR  VTE Pharmacologic Prophylaxis: Reason for no pharmacologic prophylaxis Xarelto  VTE Mechanical Prophylaxis: sequential compression device

## 2018-09-26 NOTE — DISCHARGE INSTRUCTIONS
DISCHARGE INSTRUCTIONS  LEG SURGERY    Following discharge from the hospital, you may have some questions about your operation, your activities or your general condition  These instructions may answer some of your questions and help you adjust during the first few weeks following your operation  ACTIVITY:  Limit your physical activity to slow walking  Avoid climbing or heavy lifting for the first four weeks after surgery  Initially some discomfort will be felt when walking as the skin and muscle tissues heal  You may require help with walking or feel more secure with someone to lean on  Walking up steps and normal activities may be resumed, as you feel ready  You should not drive a car for one week following discharge from the hospital   You may ride in a car for short trips upon discharge  DIET:  Resume your normal diet  Try to eat low fat and low cholesterol foods  Good nutrition is important for healing of your incision  INCISION:  You may include the operated area in a shower on the third day following surgery  Wash your incision daily with soap and water  Pat the incision dry and leave open to air  Do not apply lotions, ointments, creams or powders to incision  Sitting in a tub is not recommended for the first two weeks following surgery or if you have any open wounds  It is normal to have swelling or discoloration around the incision  If increasing redness or pain develops, call our office immediately  Numbness in the region of the incision may occur following the surgery  This normally resolves in six to twelve months  If any of your incisions are open and require dressing changes, you will be given instruction for your daily incision care  If you are not able to change the dressings, a visiting nurse will be arranged  Your physician may have chosen to use a type of adhesive glue to close your incision  There are stitches present under the skin which will absorb on their own  The glue is used to cover the incision, assist in closure, and prevent contamination  This adhesive will darken and peel away on its own within one to two weeks  Alternatively, your surgeon may have chosen to use skin staples to close your incision which will be removed in the office at the time of your follow-up appointment  You may wash the incision with the staples present  LEG SWELLING: Most patients have noticeable leg swelling after leg surgery  This usually disappears within a few weeks  If swelling is present, elevate the leg whenever possible  Avoid sitting with the leg hanging down for prolonged time periods  Walking is beneficial   An ACE bandage or support stocking may be helpful, but this should be discussed with your physician prior to use  FOLLOW UP STUDIES:  Doppler ultrasound studies are very important to your post-operative care  Your surgeon will arrange for them at your first postoperative visit  Repeat studies are then scheduled every three months for the first year and periodically after this  PLEASE CALL THE OFFICE IF YOU HAVE ANY QUESTIONS    [phone] Cipriano Streeter [phone] Mia KAUR [phone]  [address] [place] [address] , [address], [place], [address]  [facility], [place], [address] [facility]  [address]  [address], [facility], [address]  [address] [facility], [facility], [address]  [address] [address], [address], [address]  [address] [place] [address], [address] [facility], [address] [facility] 97   [address] [facility], [address] [facility], [address]  One [facility], [address] [facility], [facility], [address]

## 2018-09-26 NOTE — SOCIAL WORK
Pt was ready for d/c on 9/24/18 but due to postop constipation remained here until 9/26/18, discharged home with daughter  [facility]  No other needs

## 2018-09-27 ENCOUNTER — OFFICE VISIT (OUTPATIENT)
Dept: CARDIOLOGY CLINIC | Facility: CLINIC | Age: 68
End: 2018-09-27
Payer: MEDICARE

## 2018-09-27 VITALS
BODY MASS INDEX: 30.73 KG/M2 | SYSTOLIC BLOOD PRESSURE: 150 MMHG | HEIGHT: 61 IN | OXYGEN SATURATION: 96 % | HEART RATE: 72 BPM | DIASTOLIC BLOOD PRESSURE: 82 MMHG | WEIGHT: 162.8 LBS

## 2018-09-27 DIAGNOSIS — E78.2 MIXED HYPERLIPIDEMIA: ICD-10-CM

## 2018-09-27 DIAGNOSIS — I10 ESSENTIAL HYPERTENSION: Primary | ICD-10-CM

## 2018-09-27 DIAGNOSIS — S75.002S: ICD-10-CM

## 2018-09-27 DIAGNOSIS — R06.00 DYSPNEA ON EXERTION: ICD-10-CM

## 2018-09-27 DIAGNOSIS — Z95.3 S/P TAVR (TRANSCATHETER AORTIC VALVE REPLACEMENT), BIOPROSTHETIC: ICD-10-CM

## 2018-09-27 DIAGNOSIS — I70.213 INTERMITTENT CLAUDICATION OF BOTH LOWER EXTREMITIES DUE TO ATHEROSCLEROSIS (HCC): ICD-10-CM

## 2018-09-27 PROCEDURE — 99214 OFFICE O/P EST MOD 30 MIN: CPT | Performed by: INTERNAL MEDICINE

## 2018-09-27 NOTE — PROGRESS NOTES
Cardiology Consultation     Geovanna Menezes  [id number]  1950  [facility] [address]  [address] [facility] [address] [facility],[address] [facility] [address]    Chief complaint:  Hospital follow-up after left common femoral endarterectomy  History of present illness:  79-year-old female patient with past medical history of aortic stenosis status post TAVR in 2013, peripheral artery disease status post right external iliac artery stent recently had aortogram with bilateral infrainguinal runoff for evaluation of bilateral lower extremity claudication  The procedure was uneventful patient had good hemostasis of the left CFA access  The day after  The procedure patient had severe exertional left leg pain  Patient was asked to come to ER  Ultrasound showed thrombus over the Angio-Seal foot plate  Patient was immediately transferred to [facility] for higher level of care  Patient was taken to or and underwent left common femoral endarterectomy with retrieval of Angio-Seal with plate  She was in the hospital for 3-4 days  She had uneventful course  She complains of left groin pain  The circulation a left leg has improved significantly  She had good pulses      Patient Active Problem List   Diagnosis    Chest pain    Hypertension    Morbid obesity due to excess calories (HCC)    Coronary artery disease    Chronic radicular pain of lower back    Urinary tract infection    Centrilobular emphysema (HCC)    Dyspnea on exertion    Mixed hyperlipidemia    Peripheral artery disease (HCC)    Intermittent claudication of both lower extremities due to atherosclerosis ([address] )    S/p TAVR (transcatheter aortic valve replacement), bioprosthetic    Occlusion of common femoral artery (HCC)     Past Medical History:   Diagnosis Date    Aneurysm ([address] )     abdominal aortic aneurysm    Aortic valve disease     Aortic valve replaced TAVR    Bronchiolitis     Cardiac disease     Chest pain     COPD (chronic obstructive pulmonary disease) (HCC)     Disease of thyroid gland     Dyspnea     Hypertension     Hypokalemia     Morbid obesity (HCC)     PAD (peripheral artery disease) (HCC)     Rheumatic fever     SOB (shortness of breath)     Tachycardia      Social History     Social History    Marital status:      Spouse name: N/A    Number of children: N/A    Years of education: N/A     Occupational History    Not on file  Social History Main Topics    Smoking status: Former Smoker    Smokeless tobacco: Never Used    Alcohol use 0 6 - 1 2 oz/week     1 - 2 Cans of beer per week      Comment: social    Drug use: No    Sexual activity: Not on file     Other Topics Concern    Not on file     Social History Narrative    No narrative on file      History reviewed  No pertinent family history  Past Surgical History:   Procedure Laterality Date    CARDIAC SURGERY      aortic valve replacement    CARDIAC VALVE REPLACEMENT      ESOPHAGUS SURGERY N/A     NE THROMBOENDARTECTMY FEMORAL COMMON Left 9/21/2018    Procedure: ENDARTERECTOMY ARTERIAL FEMORAL;  Surgeon: Hilaria Funes MD;  Location: [facility];  Service: Vascular    REPLACEMENT AORTIC VALVE TRANSCATHETER (TAVR)      THROMBECTOMY W/ EMBOLECTOMY Left 9/21/2018    Procedure: EMBOLECTOMY/THROMBECTOMY LOWER EXTREMITY (GROIN EXPLORATION);   Surgeon: Hilaria Funes MD;  Location: [facility];  Service: Vascular       Current Outpatient Prescriptions:     acetaminophen (TYLENOL) 325 mg tablet, Take 2 tablets (650 mg total) by mouth every 6 (six) hours as needed for mild pain or fever, Disp: 30 tablet, Rfl: 0    albuterol (PROVENTIL HFA,VENTOLIN HFA) 90 mcg/act inhaler, Inhale 2 puffs 2 (two) times a day  , Disp: , Rfl:     aspirin 81 mg chewable tablet, Chew 81 mg daily, Disp: , Rfl:     co-enzyme Q-10 30 MG capsule, Take 100 mg by mouth daily  , Disp: , Rfl:    docusate sodium (COLACE) 100 mg capsule, Take 1 capsule (100 mg total) by mouth 2 (two) times a day as needed for constipation, Disp: 10 capsule, Rfl: 0    furosemide (LASIX) 20 mg tablet, Take 1 tablet (20 mg total) by mouth 2 (two) times a day, Disp: 60 tablet, Rfl: 0    levothyroxine 50 mcg tablet, Take 50 mcg by mouth daily, Disp: , Rfl:     metoprolol tartrate (LOPRESSOR) 25 mg tablet, Take 1 tablet (25 mg total) by mouth every 12 (twelve) hours, Disp: 30 tablet, Rfl: 0    oxyCODONE (ROXICODONE) 5 mg immediate release tablet, Take 1 tablet (5 mg total) by mouth every 4 (four) hours as needed for moderate pain for up to 10 days Max Daily Amount: 30 mg, Disp: 30 tablet, Rfl: 0    pantoprazole (PROTONIX) 40 mg tablet, Take 40 mg by mouth daily, Disp: , Rfl:     POTASSIUM CHLORIDE PO, Take 250 mg by mouth daily, Disp: , Rfl:     rivaroxaban (XARELTO STARTER PACK) 15 & 20 MG starter pack, Take 1 Package by mouth see administration instructions, Disp: , Rfl: 0    [START ON 10/26/2018] rivaroxaban (XARELTO) 20 mg tablet, Take 1 tablet (20 mg total) by mouth daily with breakfast, Disp: , Rfl: 0    Tiotropium Bromide-Olodaterol (STIOLTO RESPIMAT) 2 5-2 5 MCG/ACT AERS, Inhale 2 puffs daily, Disp: 1 Inhaler, Rfl: 5  No current facility-administered medications for this visit     Allergies   Allergen Reactions    Iohexol     Statins Other (See Comments)     Severe muscle cramps-- cannot move     Vitals:    09/27/18 1037   BP: 150/82   Pulse: 72   SpO2: 96%   Weight: 73 8 kg (162 lb 12 8 oz)   Height: 5' 1" (1 549 m)       Labs:  Admission on 09/21/2018, Discharged on 09/26/2018   Component Date Value    ABO Grouping 09/21/2018 AB     Rh Factor 09/21/2018 Positive     Antibody Screen 09/21/2018 Negative     Specimen Expiration Date 09/21/2018 00458949     Unit Product Code 09/24/2018 [id number]     Unit Number 09/24/2018 [id number]     Unit ABO 09/24/2018 AB     Unit [facility] 09/24/2018 POS     Crossmatch 09/24/2018 Compatible     Unit Dispense Status 09/24/2018 Return to Inv     Unit Product Code 09/24/2018 [id number]     Unit Number 09/24/2018 [id number]     Unit ABO 09/24/2018 AB     Unit RH 09/24/2018 POS     Crossmatch 09/24/2018 Compatible     Unit Dispense Status 09/24/2018 Return to Inv     POC Glucose 09/21/2018 140     TSH 3RD GENERATON 09/21/2018 5 600*    PTT 09/21/2018 >210*    WBC 09/21/2018 8 45     RBC 09/21/2018 3 94     Hemoglobin 09/21/2018 11 3*    Hematocrit 09/21/2018 34 9     MCV 09/21/2018 89     MCH 09/21/2018 28 7     MCHC 09/21/2018 32 4     RDW 09/21/2018 13 3     Platelets 02/63/0334 196     MPV 09/21/2018 9 7     Protime 09/21/2018 15 4*    INR 09/21/2018 1 21*    LACTIC ACID 09/22/2018 1 7     PTT 09/22/2018 >210*    Sodium 09/22/2018 139     Potassium 09/22/2018 3 9     Chloride 09/22/2018 105     CO2 09/22/2018 29     ANION GAP 09/22/2018 5     BUN 09/22/2018 13     Creatinine 09/22/2018 1 02     Glucose 09/22/2018 165*    Calcium 09/22/2018 7 4*    eGFR 09/22/2018 57     Magnesium 09/22/2018 2 0     Phosphorus 09/22/2018 3 6     WBC 09/22/2018 6 40     RBC 09/22/2018 3 87     Hemoglobin 09/22/2018 11 1*    Hematocrit 09/22/2018 34 6*    MCV 09/22/2018 89     MCH 09/22/2018 28 7     MCHC 09/22/2018 32 1     RDW 09/22/2018 13 2     MPV 09/22/2018 9 8     Platelets 48/46/5392 188     nRBC 09/22/2018 0     Neutrophils Relative 09/22/2018 79*    Immat GRANS % 09/22/2018 0     Lymphocytes Relative 09/22/2018 15     Monocytes Relative 09/22/2018 6     Eosinophils Relative 09/22/2018 0     Basophils Relative 09/22/2018 0     Neutrophils Absolute 09/22/2018 5 01     Immature Grans Absolute 09/22/2018 0 01     Lymphocytes Absolute 09/22/2018 0 96     Monocytes Absolute 09/22/2018 0 40     Eosinophils Absolute 09/22/2018 0 01     Basophils Absolute 09/22/2018 0 01     Protime 09/22/2018 14 6*    INR 09/22/2018 1 13     PTT 09/22/2018 133*    PTT 09/22/2018 85*    PTT 09/22/2018 89*    PTT 09/23/2018 98*    Sodium 09/23/2018 141     Potassium 09/23/2018 3 1*    Chloride 09/23/2018 103     CO2 09/23/2018 34*    ANION GAP 09/23/2018 4     BUN 09/23/2018 12     Creatinine 09/23/2018 0 94     Glucose 09/23/2018 115     Calcium 09/23/2018 7 9*    eGFR 09/23/2018 63     WBC 09/23/2018 9 00     RBC 09/23/2018 3 69*    Hemoglobin 09/23/2018 10 6*    Hematocrit 09/23/2018 33 3*    MCV 09/23/2018 90     MCH 09/23/2018 28 7     MCHC 09/23/2018 31 8     RDW 09/23/2018 13 1     MPV 09/23/2018 9 6     Platelets 67/16/1232 184     nRBC 09/23/2018 0     Neutrophils Relative 09/23/2018 58     Immat GRANS % 09/23/2018 0     Lymphocytes Relative 09/23/2018 30     Monocytes Relative 09/23/2018 9     Eosinophils Relative 09/23/2018 3     Basophils Relative 09/23/2018 0     Neutrophils Absolute 09/23/2018 5 14     Immature Grans Absolute 09/23/2018 0 03     Lymphocytes Absolute 09/23/2018 2 73     Monocytes Absolute 09/23/2018 0 81     Eosinophils Absolute 09/23/2018 0 26     Basophils Absolute 09/23/2018 0 03     PTT 09/23/2018 64*    Ventricular Rate 09/23/2018 54     Atrial Rate 09/23/2018 54     DE Interval 09/23/2018 148     QRSD Interval 09/23/2018 100     QT Interval 09/23/2018 402     QTC Interval 09/23/2018 381     P Axis 09/23/2018 80     QRS Axis 09/23/2018 60     T Wave Axis 09/23/2018 142     PTT 09/23/2018 52*    PTT 09/24/2018 72*    PTT 09/24/2018 81*    Activated Clotting Time,* 09/21/2018 149*    Specimen Type 09/21/2018 VENOUS     Activated Clotting Time,* 09/21/2018 206*    Specimen Type 09/21/2018 VENOUS    Admission on 09/21/2018, Discharged on 09/21/2018   Component Date Value    WBC 09/21/2018 11 87*    RBC 09/21/2018 5 12     Hemoglobin 09/21/2018 14 7     Hematocrit 09/21/2018 45 6     MCV 09/21/2018 89     MCH 09/21/2018 28 7     MCHC 09/21/2018 32 2     RDW 09/21/2018 13 3     MPV 09/21/2018 9 7     Platelets 13/37/8499 263     nRBC 09/21/2018 0     Neutrophils Relative 09/21/2018 58     Immat GRANS % 09/21/2018 0     Lymphocytes Relative 09/21/2018 34     Monocytes Relative 09/21/2018 7     Eosinophils Relative 09/21/2018 1     Basophils Relative 09/21/2018 0     Neutrophils Absolute 09/21/2018 6 75     Immature Grans Absolute 09/21/2018 0 03     Lymphocytes Absolute 09/21/2018 4 01     Monocytes Absolute 09/21/2018 0 86     Eosinophils Absolute 09/21/2018 0 17     Basophils Absolute 09/21/2018 0 05     ABO Grouping 09/21/2018 AB     Rh Factor 09/21/2018 Positive     Antibody Screen 09/21/2018 Negative     Specimen Expiration Date 09/21/2018 [id number]     Sodium 09/21/2018 142     Potassium 09/21/2018 3 1*    Chloride 09/21/2018 104     CO2 09/21/2018 28     ANION GAP 09/21/2018 10     BUN 09/21/2018 14     Creatinine 09/21/2018 1 24     Glucose 09/21/2018 80     Calcium 09/21/2018 8 7     eGFR 09/21/2018 45     Protime 09/21/2018 13 4     INR 09/21/2018 1 03     PTT 09/21/2018 25     Ventricular Rate 09/21/2018 66     Atrial Rate 09/21/2018 66     AK Interval 09/21/2018 144     QRSD Interval 09/21/2018 78     QT Interval 09/21/2018 398     QTC Interval 09/21/2018 417     P Axis 09/21/2018 78     QRS Axis 09/21/2018 68     T Wave Axis 09/21/2018 256    Office Visit on 09/06/2018   Component Date Value    WBC 09/06/2018 7 74     RBC 09/06/2018 5 29*    Hemoglobin 09/06/2018 15 0     Hematocrit 09/06/2018 47 1*    MCV 09/06/2018 89     MCH 09/06/2018 28 4     MCHC 09/06/2018 31 8     RDW 09/06/2018 13 2     Platelets 16/56/8656 310     MPV 09/06/2018 9 5     Sodium 09/06/2018 142     Potassium 09/06/2018 3 7     Chloride 09/06/2018 104     CO2 09/06/2018 31     ANION GAP 09/06/2018 7     BUN 09/06/2018 11     Creatinine 09/06/2018 1 02     Glucose, Fasting 09/06/2018 95     Calcium 09/06/2018 8 9     eGFR 09/06/2018 57  Protime 09/06/2018 13 4     INR 09/06/2018 1 01      Imaging: Xr Abdomen 1 View Kub    Result Date: 9/24/2018  Narrative: ABDOMEN INDICATION:   abdominal pain  "ABDOMINAL PAIN; PT STATES SHE HAD A CLOT REMOVED FROM HER GROIN X 3 DAYS AGO; PT STATES SHE IS HAVING LLQ PAIN" COMPARISON:  None VIEWS:  AP supine FINDINGS: Right upper quadrant cholecystectomy surgical clips  Left inguinal surgical clips  Prominent colonic stool with a nonobstructive bowel gas pattern  No discernible free air on this supine study  Upright or left lateral decubitus imaging is more sensitive to detect subtle free air in the appropriate setting  No pathologic calcifications or soft tissue masses  Visualized lung bases are clear  Visualized osseous structures are unremarkable for the patient's age  Impression: Prominent colonic stool with a nonobstructive bowel gas pattern  Workstation performed: [id number]     Vas Lower Limb Arterial Duplex, Complete Bilateral    Result Date: 9/22/2018  Narrative:  [facility] REPORT CLINICAL: Indications: Initial Post-op evaluation s/p revascularization  Operative History: 2018-09-21 Left thrombectomy and left CFA endarterectomy Risk Factors The patient has history of HTN, HLD, PAD and previous smoking (quit >10yrs ago)  She has no history of Diabetes  Clinical Right Pressure:  126/ mm Hg, Left Pressure:  115/ mm Hg  FINDINGS:  Segment                Rig  Left                                          PSV  Impression  PSV  Dist EIA                    >75%        516  Common Femoral Artery  172               84  Prox Profunda          141               46  Prox SFA               195               80  Mid SFA                                  98  Dist SFA               135              105  Proximal Pop            75               51  Distal Pop              87               39  Tibioperoneal           98               61  Dist Post Tibial        48               37  Dist  Ant   Tibial 47               35     CONCLUSION: Impression: RIGHT LOWER LIMB Mild diffuse disease noted throughout the common femoral, superficial femoral, proximal profunda femoris and popliteal arteries, with no significant stenosis  Ankle Pressure 115  mm Hg ,  CANDIE: 0 91 Left Metatarsal Pressure: 113  mm Hg  Left Great Toe Pressure 68  mm Hg ,  within the healing range PVR/ PPG tracings are normal   LEFT LOWER LIMB The common femoral artery endarterectomy site is patent  Elevate velocities suggest a >75% stenosis of the distal external iliac artery  Mild diffuse disease noted throughout the superficial femoral, proximal profunda femoris and popliteal arteries, with no significant stenosis  Ankle Pressure 87  mm Hg ,  CANDIE: 0 69 Left Metatarsal Pressure: 125  mm Hg  Left Great Toe Pressure 56  mm Hg ,  within the healing range PVR/ PPG tracings are normal  Tech note: This study was technically limited study secondary to bandage over left groin  Technical findings given to Griselda Mower  SIGNATURE: Electronically Signed by: Fidelia Hollingsworth MD on 2018-09-22 06:23:11 PM    Vas Pseudoaneurysm Study    Result Date: 9/22/2018  Narrative:  [facility] REPORT CLINICAL: Indications:  PVD, Unspecified [I73 9]  Cardiac cath performed on 9/20  Now presents with pain, cold left foot, and diminished pedal  pulse  Risk Factors The patient has history of HTN and HLD  Known history of PVD  FINDINGS:  Left                   Impression  PSV  EDV  Dist EIA                            57   18  Common Femoral Artery  Thrombus      0    0  Prox Profunda                       35    7  Prox SFA                            39   12  Dist Post Tibial                     7    2  Dist  Ant  Tibial                    4    0     CONCLUSION:  Impression: No evidence of a pseudoaneurysm in the femoral artery  However, there is evidence of acute occlusive thrombus within the common femoral artery   Trickle flow is visualized in the anterior and posterior tibial arteries  Left foot is noticeably colder than right  The common femoral vein is patent with no evidence of an arteriovenous fistula noted  Results given to Dr Debbie Orantes at 2154  SIGNATURE: Electronically Signed by: Gaetana Meckel, MD on 2018-09-22 07:06:59 PM    Radiology Results    Result Date: 9/22/2018  Narrative: Ordered by an unspecified provider  Review of Systems:  Review of Systems   Constitutional: Negative for diaphoresis and fatigue  HENT: Negative for congestion and facial swelling  Eyes: Negative for photophobia and visual disturbance  Respiratory: Negative for chest tightness and shortness of breath  Cardiovascular: Negative for chest pain, palpitations and leg swelling  Gastrointestinal: Negative for abdominal pain and nausea  Endocrine: Negative for cold intolerance and heat intolerance  Musculoskeletal: Negative for arthralgias and myalgias  Skin: Negative for pallor and rash  Neurological: Negative for dizziness and tremors  Psychiatric/Behavioral: Negative for sleep disturbance  The patient is not nervous/anxious  Physical Exam:  Physical Exam   Constitutional: She is oriented to person, place, and time  She appears well-developed and well-nourished  HENT:   Head: Normocephalic and atraumatic  Eyes: Pupils are equal, round, and reactive to light  Conjunctivae and EOM are normal    Neck: Normal range of motion  Neck supple  No JVD present  No thyromegaly present  Cardiovascular: Normal rate, regular rhythm, S1 normal, S2 normal and intact distal pulses  Exam reveals no gallop and no friction rub  Murmur heard  Systolic murmur is present with a grade of 2/6   Pulses:       Carotid pulses are 2+ on the right side, and 2+ on the left side  Posterior tibial pulses are 1+ on the right side, and 1+ on the left side  Pulmonary/Chest: Effort normal  No respiratory distress  She has decreased breath sounds  She has no wheezes  She has no rales  Abdominal: Soft  Bowel sounds are normal  She exhibits no distension  There is no tenderness  There is no rebound and no guarding  Musculoskeletal: Normal range of motion  She exhibits no edema  Neurological: She is alert and oriented to person, place, and time  She has normal reflexes  No cranial nerve deficit  Skin: Skin is warm and dry  Psychiatric: She has a normal mood and affect  Discussion/Summary:   1  Aortic stenosis status post TAVR   mild gradient across the aortic valve   shortness of breath is probably noncardiac in due to COPD        2    Peripheral artery disease, right external iliac artery stent patent, status post left CF endarterectomy /left Angio-Seal foot plate thrombus    Patient has no significant peripheral artery disease on aortogram with bilateral infrainguinal runoff   patient will before the vascular surgery as a follow-up of left CFA endarterectomy  Continue Xarelto for now  If Xarelto is not approved by insurance will consider switching to Coumadin      3   Ascending aortic aneurysm measuring 4 2 x 4 cm as seen on CT scan-unclear if this is new   Continue BB   Needs follow-up CT scan  Continue blood pressure control      4   Hypertension--stable  Mildly elevated, probably secondary to pain  Will continue to monitor for now if the blood pressure is persistently high will consider changing medications        5   Shortness of breath, if patient continues to have shortness of breath in spite of good medical management of COPD he may have to consider right and left heart catheterization to evaluate for the cause of shortness of breath        Follow-up in 3 months

## 2018-10-02 ENCOUNTER — OFFICE VISIT (OUTPATIENT)
Dept: VASCULAR SURGERY | Facility: CLINIC | Age: 68
End: 2018-10-02

## 2018-10-02 VITALS
TEMPERATURE: 98.1 F | RESPIRATION RATE: 18 BRPM | DIASTOLIC BLOOD PRESSURE: 72 MMHG | HEIGHT: 62 IN | HEART RATE: 76 BPM | WEIGHT: 163 LBS | SYSTOLIC BLOOD PRESSURE: 136 MMHG | BODY MASS INDEX: 30 KG/M2

## 2018-10-02 DIAGNOSIS — S75.002S: Primary | ICD-10-CM

## 2018-10-02 DIAGNOSIS — I70.209 OCCLUSION OF COMMON FEMORAL ARTERY (HCC): ICD-10-CM

## 2018-10-02 PROCEDURE — 99024 POSTOP FOLLOW-UP VISIT: CPT | Performed by: SURGERY

## 2018-10-02 NOTE — LETTER
October 2, 2018     [address]    Patient: Mary Brown   YOB: 1950   Date of Visit: 10/2/2018       Dear Dr Fatoumata Medina:    Please find attached a copy of office visit for our mutual patient Ms Casper  If you have questions, please do not hesitate to call me  I look forward to following your patient along with you  Sincerely,        Shira Guzman MD        CC: Porsha Yoon Doctor, MD Irene [address]  10/2/2018  3:38 PM  Sign at close encounter  Assessment/Plan:    Occlusion of common femoral artery [facility])  S/p left common femoral artery repair/ closure device related occlusion  Her left foot is well perfused with excellent doppler sounds  However upon review of duplex there is a residual stenosis in the left external iliac artery and CANDIE is 0 69  I will continue Xarelto and follow up with repeat duplex in 1 month  If Xarelto is not affordable we will need to switch to coumadin  She does have left leg claudication, if this continues to be an issue she will need arteriogram with angioplasty of the left external iliac, possible stent  Diagnoses and all orders for this visit:    Common femoral artery injury, left, sequela  -     VAS lower limb arterial duplex, limited/unilateral; Future    Occlusion of common femoral artery (HCC)          Subjective:      Patient ID: Mary Brown is a 79 y o  female  Patient is status post EMBOLECTOMY/THROMBECTOMY LOWER EXTREMITY,  ENDARTERECTOMY ARTERIAL FEMORAL of the left leg by Dr Porsha Yoon on 9/21/2018  Patient has soreness in her groin area, she has "gina Horses" in the calf of left leg, her right buttocks at night  Patient denies fever and chills   Patient takes ASA and xarelto            The following portions of the patient's history were reviewed and updated as appropriate: allergies, current medications, past family history, past medical history, past social history, past surgical history and problem list     Review of Systems      Objective:      /72 (BP Location: Left arm, Patient Position: Sitting)   Pulse 76   Temp 98 1 °F (36 7 °C)   Resp 18   Ht 5' 2" (1 575 m)   Wt 73 9 kg (163 lb)   BMI 29 81 kg/m²           Physical Exam   Constitutional: She appears well-developed and well-nourished  Nursing note and vitals reviewed  strong biphasic left PT signal   Left groin incision is well healed, staples removed and steristrips applied  Right foot has 2+ DP pulse

## 2018-10-02 NOTE — PROGRESS NOTES
Assessment/Plan:    Occlusion of common femoral artery (HCC)  S/p left common femoral artery repair/ closure device related occlusion  Her left foot is well perfused with excellent doppler sounds  However upon review of duplex there is a residual stenosis in the left external iliac artery and CANDIE is 0 69  I will continue Xarelto and follow up with repeat duplex in 1 month and return for office visit  As xarelto is not affordable I have given her samples today  She does have left leg claudication, if this continues to be an issue she will need arteriogram with angioplasty of the left external iliac, possible stent  Would do that only after the endarterectomy is well healed in about next 6-8 weeks  Diagnoses and all orders for this visit:    Common femoral artery injury, left, sequela  -     VAS lower limb arterial duplex, limited/unilateral; Future    Occlusion of common femoral artery (HCC)          Subjective:      Patient ID: Bobo Bianchi is a 79 y o  female  Patient is status post EMBOLECTOMY/THROMBECTOMY LOWER EXTREMITY,  ENDARTERECTOMY ARTERIAL FEMORAL of the left leg by Dr Delpha Romberg on 9/21/2018  Patient has soreness in her groin area, she has "gina Horses" in the calf of left leg, her right buttocks at night  Patient denies fever and chills  Patient takes ASA and xarelto            The following portions of the patient's history were reviewed and updated as appropriate: allergies, current medications, past family history, past medical history, past social history, past surgical history and problem list     Review of Systems      Objective:      /72 (BP Location: Left arm, Patient Position: Sitting)   Pulse 76   Temp 98 1 °F (36 7 °C)   Resp 18   Ht 5' 2" (1 575 m)   Wt 73 9 kg (163 lb)   BMI 29 81 kg/m²          Physical Exam   Constitutional: She appears well-developed and well-nourished  Nursing note and vitals reviewed      strong biphasic left PT signal   Left groin incision is well healed, staples removed and steristrips applied  Right foot has 2+ DP pulse

## 2018-10-02 NOTE — ASSESSMENT & PLAN NOTE
S/p left common femoral artery repair/ closure device related occlusion  Her left foot is well perfused with excellent doppler sounds  However upon review of duplex there is a residual stenosis in the left external iliac artery and CANDIE is 0 69  I will continue Xarelto and follow up with repeat duplex in 1 month and return for office visit  As xarelto is not affordable I have given her samples today  She does have left leg claudication, if this continues to be an issue she will need arteriogram with angioplasty of the left external iliac, possible stent  Would do that only after the endarterectomy is well healed in about next 6-8 weeks

## 2018-10-08 ENCOUNTER — TELEPHONE (OUTPATIENT)
Dept: VASCULAR SURGERY | Facility: CLINIC | Age: 68
End: 2018-10-08

## 2018-10-08 DIAGNOSIS — S75.002S: Primary | ICD-10-CM

## 2018-10-08 NOTE — TELEPHONE ENCOUNTER
I spoke with pt to schedule her Agram with Dr Kofi Rodríguez  Pt was unaware of the change of plan and that she was being scheduled for this  She had questions for the Dr     Dr Kofi Rodrgíuez called and spoke to the pt, cleared things up and answered her questions  I the spoke again with the pt and confirmed date of Agram for 10/30/18 at [facility] with Dr Kofi Rodríguez  I am mailing a Lab Rx for her to have a BMP prior to [facility]  She will hold her Xarelto for 24 hours prior, last dose on 10/29/18  Instructions reviewed and understood

## 2018-10-09 ENCOUNTER — PREP FOR PROCEDURE (OUTPATIENT)
Dept: VASCULAR SURGERY | Facility: CLINIC | Age: 68
End: 2018-10-09

## 2018-10-09 ENCOUNTER — TELEPHONE (OUTPATIENT)
Dept: CARDIOLOGY CLINIC | Facility: CLINIC | Age: 68
End: 2018-10-09

## 2018-10-09 DIAGNOSIS — I70.209 OCCLUSION OF FEMORAL ARTERY (HCC): ICD-10-CM

## 2018-10-09 DIAGNOSIS — S75.002S: Primary | ICD-10-CM

## 2018-10-09 PROBLEM — S75.002A INJURY OF LEFT FEMORAL ARTERY: Status: ACTIVE | Noted: 2018-10-09

## 2018-10-09 NOTE — TELEPHONE ENCOUNTER
IR dept had a conflict with scheduling for 10/30  We had to change the date ofjass Buzz to 10/25/18 at [facility] [place] room  I spoke with pt and confirmed this new date and she is okay with the change  Her last dose of Xarelto will now be on 10/23

## 2018-10-16 ENCOUNTER — APPOINTMENT (OUTPATIENT)
Dept: LAB | Facility: HOSPITAL | Age: 68
End: 2018-10-16
Attending: SURGERY
Payer: MEDICARE

## 2018-10-16 DIAGNOSIS — S75.002S: ICD-10-CM

## 2018-10-16 LAB
ANION GAP SERPL CALCULATED.3IONS-SCNC: 8 MMOL/L (ref 4–13)
BUN SERPL-MCNC: 15 MG/DL (ref 5–25)
CALCIUM SERPL-MCNC: 8.8 MG/DL (ref 8.3–10.1)
CHLORIDE SERPL-SCNC: 104 MMOL/L (ref 100–108)
CO2 SERPL-SCNC: 32 MMOL/L (ref 21–32)
CREAT SERPL-MCNC: 1.11 MG/DL (ref 0.6–1.3)
GFR SERPL CREATININE-BSD FRML MDRD: 52 ML/MIN/1.73SQ M
GLUCOSE P FAST SERPL-MCNC: 123 MG/DL (ref 65–99)
POTASSIUM SERPL-SCNC: 3.3 MMOL/L (ref 3.5–5.3)
SODIUM SERPL-SCNC: 144 MMOL/L (ref 136–145)

## 2018-10-16 PROCEDURE — 36415 COLL VENOUS BLD VENIPUNCTURE: CPT

## 2018-10-16 PROCEDURE — 80048 BASIC METABOLIC PNL TOTAL CA: CPT

## 2018-10-19 NOTE — PRE-PROCEDURE INSTRUCTIONS
Pre-Surgery Instructions:   Medication Instructions    acetaminophen (TYLENOL) 325 mg tablet Instructed patient per Anesthesia Guidelines   albuterol (PROVENTIL HFA,VENTOLIN HFA) 90 mcg/act inhaler Instructed patient per Anesthesia Guidelines   aspirin 81 mg chewable tablet Patient was instructed by Physician and understands   co-enzyme Q-10 30 MG capsule Instructed patient per Anesthesia Guidelines   docusate sodium (COLACE) 100 mg capsule Instructed patient per Anesthesia Guidelines   furosemide (LASIX) 20 mg tablet Instructed patient per Anesthesia Guidelines   levothyroxine 50 mcg tablet Instructed patient per Anesthesia Guidelines   metoprolol tartrate (LOPRESSOR) 25 mg tablet Instructed patient per Anesthesia Guidelines   pantoprazole (PROTONIX) 40 mg tablet Instructed patient per Anesthesia Guidelines   POTASSIUM CHLORIDE PO Instructed patient per Anesthesia Guidelines   rivaroxaban (XARELTO STARTER PACK) 15 & 20 MG starter pack Patient was instructed by Physician and understands   [START ON 10/26/2018] rivaroxaban (XARELTO) 20 mg tablet Patient was instructed by Physician and understands   Tiotropium Bromide-Olodaterol (STIOLTO RESPIMAT) 2 5-2 5 MCG/ACT AERS Instructed patient per Anesthesia Guidelines  Diuretic Med Class     Continue this medication up to the evening before surgery/procedure, but do not take the morning of the day of surgery  Acetaminophen Med Class     Continue to take this medication on your normal schedule  If this is an oral medication and you take it in the morning, then you may take this medicine with a sip of water  ASA Med Class: Aspirin     Should be discontinued at least one week prior to planned operation, unless specifically stated otherwise by surgical service  Your Surgeon may have patient stop taking aspirin up to a week before surgery if having intracranial, middle ear, posterior eye, spine surgery or prostate surgery    [Patients taking aspirin for coronary stents should be reviewed by an anesthesiologist in the optimization clinic  Please do not discontinue aspirin in patients with coronary stents unless given specific permission to do so by the cardiologist who prescribed medication ]   If your surgeon approves please continue to take this medication on your normal schedule  You may take this medication on the morning of your surgery with a sip of water  Beta blocker Med Class     Continue to take this heart medication on your normal schedule  If this is an oral medication and you take it in the morning, then you may take this medicine with a sip of water  Direct Xa Inhibitor Med Class     Stop taking this medication at least 3 days prior to surgery/procedure with prescribing Physician and Surgeon consultation  Herbal Med Class     Stop taking this herbal medications at least one week prior to surgery/procedure  Inhalational Med Class     Continue to take these inhaler medications on your normal schedule up to and including the day of surgery  Stool Softener Med Class     Continue to take this medication on your normal schedule  If this is an oral medication and you take it in the morning, then you may take this medicine with a sip of water  Thyroxine Med Class     Continue to take this medication on your normal schedule  If this is an oral medication and you take it in the morning, then you may take this medicine with a sip of water  Pre op instructions reviewed with patient on 10/19    Meds ,bathing and hospital instructions reviewed with understanding at this time is following MD instructions for xarelto

## 2018-10-22 ENCOUNTER — TELEPHONE (OUTPATIENT)
Dept: VASCULAR SURGERY | Facility: CLINIC | Age: 68
End: 2018-10-22

## 2018-10-22 NOTE — TELEPHONE ENCOUNTER
Pt called and said that she has a dye allergy and she normally get prepped prior to given dye  I told her it was not listed in her chart  She again said she is allergic but she could not tell me her reaction as she did not remember  She asked that I list it in her chart and to prep her with the dye prep which I did

## 2018-10-23 ENCOUNTER — ANESTHESIA EVENT (OUTPATIENT)
Dept: PERIOP | Facility: HOSPITAL | Age: 68
End: 2018-10-23
Payer: MEDICARE

## 2018-10-25 ENCOUNTER — APPOINTMENT (OUTPATIENT)
Dept: RADIOLOGY | Facility: HOSPITAL | Age: 68
End: 2018-10-25
Payer: MEDICARE

## 2018-10-25 ENCOUNTER — HOSPITAL ENCOUNTER (OUTPATIENT)
Facility: HOSPITAL | Age: 68
Setting detail: OUTPATIENT SURGERY
Discharge: HOME | End: 2018-10-26
Attending: SURGERY | Admitting: SURGERY
Payer: MEDICARE

## 2018-10-25 ENCOUNTER — ANESTHESIA (OUTPATIENT)
Dept: PERIOP | Facility: HOSPITAL | Age: 68
End: 2018-10-25
Payer: MEDICARE

## 2018-10-25 VITALS
SYSTOLIC BLOOD PRESSURE: 124 MMHG | HEIGHT: 62 IN | RESPIRATION RATE: 18 BRPM | OXYGEN SATURATION: 98 % | HEART RATE: 87 BPM | DIASTOLIC BLOOD PRESSURE: 59 MMHG | TEMPERATURE: 97.7 F | WEIGHT: 163 LBS | BODY MASS INDEX: 30 KG/M2

## 2018-10-25 DIAGNOSIS — I77.1 ILIAC ARTERY STENOSIS, LEFT (HCC): Primary | ICD-10-CM

## 2018-10-25 DIAGNOSIS — S75.002S: ICD-10-CM

## 2018-10-25 PROCEDURE — C1894 INTRO/SHEATH, NON-LASER: HCPCS | Performed by: SURGERY

## 2018-10-25 PROCEDURE — C1769 GUIDE WIRE: HCPCS | Performed by: SURGERY

## 2018-10-25 PROCEDURE — 75625 CONTRAST EXAM ABDOMINL AORTA: CPT

## 2018-10-25 PROCEDURE — C1725 CATH, TRANSLUMIN NON-LASER: HCPCS | Performed by: SURGERY

## 2018-10-25 PROCEDURE — 37221 PR REVASCULARIZE ILIAC ARTERY,ANGIOPLASTY/STENT, INITIAL VESSEL: CPT | Performed by: SURGERY

## 2018-10-25 PROCEDURE — 75710 ARTERY X-RAYS ARM/LEG: CPT | Performed by: SURGERY

## 2018-10-25 PROCEDURE — C1876 STENT, NON-COA/NON-COV W/DEL: HCPCS | Performed by: SURGERY

## 2018-10-25 PROCEDURE — C1887 CATHETER, GUIDING: HCPCS | Performed by: SURGERY

## 2018-10-25 PROCEDURE — 76937 US GUIDE VASCULAR ACCESS: CPT

## 2018-10-25 DEVICE — IMPLANTABLE DEVICE: Type: IMPLANTABLE DEVICE | Site: ARTERIAL | Status: FUNCTIONAL

## 2018-10-25 RX ORDER — CLOPIDOGREL BISULFATE 75 MG/1
75 TABLET ORAL DAILY
Qty: 60 TABLET | Refills: 0 | Status: SHIPPED | OUTPATIENT
Start: 2018-10-25 | End: 2019-01-01 | Stop reason: ALTCHOICE

## 2018-10-25 RX ORDER — LABETALOL HYDROCHLORIDE 5 MG/ML
INJECTION, SOLUTION INTRAVENOUS AS NEEDED
Status: DISCONTINUED | OUTPATIENT
Start: 2018-10-25 | End: 2018-10-25 | Stop reason: SURG

## 2018-10-25 RX ORDER — MIDAZOLAM HYDROCHLORIDE 1 MG/ML
INJECTION INTRAMUSCULAR; INTRAVENOUS AS NEEDED
Status: DISCONTINUED | OUTPATIENT
Start: 2018-10-25 | End: 2018-10-25 | Stop reason: SURG

## 2018-10-25 RX ORDER — OXYCODONE HYDROCHLORIDE AND ACETAMINOPHEN 5; 325 MG/1; MG/1
1 TABLET ORAL EVERY 6 HOURS PRN
Status: DISCONTINUED | OUTPATIENT
Start: 2018-10-25 | End: 2018-10-26 | Stop reason: HOSPADM

## 2018-10-25 RX ORDER — FENTANYL CITRATE/PF 50 MCG/ML
50 SYRINGE (ML) INJECTION
Status: DISCONTINUED | OUTPATIENT
Start: 2018-10-25 | End: 2018-10-25 | Stop reason: HOSPADM

## 2018-10-25 RX ORDER — ALBUTEROL SULFATE 2.5 MG/3ML
2.5 SOLUTION RESPIRATORY (INHALATION) ONCE AS NEEDED
Status: DISCONTINUED | OUTPATIENT
Start: 2018-10-25 | End: 2018-10-25 | Stop reason: HOSPADM

## 2018-10-25 RX ORDER — ONDANSETRON 2 MG/ML
INJECTION INTRAMUSCULAR; INTRAVENOUS AS NEEDED
Status: DISCONTINUED | OUTPATIENT
Start: 2018-10-25 | End: 2018-10-25 | Stop reason: SURG

## 2018-10-25 RX ORDER — HEPARIN SODIUM 1000 [USP'U]/ML
INJECTION, SOLUTION INTRAVENOUS; SUBCUTANEOUS AS NEEDED
Status: DISCONTINUED | OUTPATIENT
Start: 2018-10-25 | End: 2018-10-25 | Stop reason: SURG

## 2018-10-25 RX ORDER — LIDOCAINE HYDROCHLORIDE 10 MG/ML
INJECTION, SOLUTION INFILTRATION; PERINEURAL AS NEEDED
Status: DISCONTINUED | OUTPATIENT
Start: 2018-10-25 | End: 2018-10-25 | Stop reason: HOSPADM

## 2018-10-25 RX ORDER — SODIUM CHLORIDE, SODIUM LACTATE, POTASSIUM CHLORIDE, CALCIUM CHLORIDE 600; 310; 30; 20 MG/100ML; MG/100ML; MG/100ML; MG/100ML
125 INJECTION, SOLUTION INTRAVENOUS CONTINUOUS
Status: DISCONTINUED | OUTPATIENT
Start: 2018-10-25 | End: 2018-10-26 | Stop reason: HOSPADM

## 2018-10-25 RX ORDER — SODIUM CHLORIDE 9 MG/ML
100 INJECTION, SOLUTION INTRAVENOUS CONTINUOUS
Status: DISCONTINUED | OUTPATIENT
Start: 2018-10-25 | End: 2018-10-26 | Stop reason: HOSPADM

## 2018-10-25 RX ORDER — ONDANSETRON 2 MG/ML
4 INJECTION INTRAMUSCULAR; INTRAVENOUS ONCE AS NEEDED
Status: DISCONTINUED | OUTPATIENT
Start: 2018-10-25 | End: 2018-10-25 | Stop reason: HOSPADM

## 2018-10-25 RX ORDER — CLOPIDOGREL BISULFATE 75 MG/1
150 TABLET ORAL ONCE
Status: COMPLETED | OUTPATIENT
Start: 2018-10-25 | End: 2018-10-25

## 2018-10-25 RX ORDER — FENTANYL CITRATE 50 UG/ML
INJECTION, SOLUTION INTRAMUSCULAR; INTRAVENOUS AS NEEDED
Status: DISCONTINUED | OUTPATIENT
Start: 2018-10-25 | End: 2018-10-25 | Stop reason: SURG

## 2018-10-25 RX ADMIN — SODIUM CHLORIDE 100 ML/HR: 0.9 INJECTION, SOLUTION INTRAVENOUS at 17:57

## 2018-10-25 RX ADMIN — FENTANYL CITRATE 25 MCG: 50 INJECTION, SOLUTION INTRAMUSCULAR; INTRAVENOUS at 15:28

## 2018-10-25 RX ADMIN — LABETALOL HYDROCHLORIDE 5 MG: 5 INJECTION, SOLUTION INTRAVENOUS at 15:40

## 2018-10-25 RX ADMIN — ONDANSETRON 4 MG: 2 INJECTION INTRAMUSCULAR; INTRAVENOUS at 16:14

## 2018-10-25 RX ADMIN — LABETALOL HYDROCHLORIDE 5 MG: 5 INJECTION, SOLUTION INTRAVENOUS at 16:00

## 2018-10-25 RX ADMIN — LABETALOL HYDROCHLORIDE 5 MG: 5 INJECTION, SOLUTION INTRAVENOUS at 15:50

## 2018-10-25 RX ADMIN — CLOPIDOGREL BISULFATE 150 MG: 75 TABLET ORAL at 17:55

## 2018-10-25 RX ADMIN — HEPARIN SODIUM 4000 UNITS: 1000 INJECTION INTRAVENOUS; SUBCUTANEOUS at 15:48

## 2018-10-25 RX ADMIN — FENTANYL CITRATE 25 MCG: 50 INJECTION, SOLUTION INTRAMUSCULAR; INTRAVENOUS at 15:37

## 2018-10-25 RX ADMIN — SODIUM CHLORIDE, SODIUM LACTATE, POTASSIUM CHLORIDE, AND CALCIUM CHLORIDE: .6; .31; .03; .02 INJECTION, SOLUTION INTRAVENOUS at 14:50

## 2018-10-25 RX ADMIN — FENTANYL CITRATE 50 MCG: 50 INJECTION, SOLUTION INTRAMUSCULAR; INTRAVENOUS at 16:11

## 2018-10-25 RX ADMIN — FENTANYL CITRATE 50 MCG: 50 INJECTION, SOLUTION INTRAMUSCULAR; INTRAVENOUS at 16:05

## 2018-10-25 RX ADMIN — FENTANYL CITRATE 25 MCG: 50 INJECTION, SOLUTION INTRAMUSCULAR; INTRAVENOUS at 15:09

## 2018-10-25 RX ADMIN — FENTANYL CITRATE 25 MCG: 50 INJECTION, SOLUTION INTRAMUSCULAR; INTRAVENOUS at 15:43

## 2018-10-25 RX ADMIN — MIDAZOLAM 2 MG: 1 INJECTION INTRAMUSCULAR; INTRAVENOUS at 14:52

## 2018-10-25 NOTE — ANESTHESIA PREPROCEDURE EVALUATION
Review of Systems/Medical History  Patient summary reviewed  Chart reviewed  No history of anesthetic complications     Cardiovascular  Hyperlipidemia, Hypertension , Valve replacement aortic valve  replacement, CAD , Aortic disease,   Comment: ASC AO ANEURYSM; S/P TAVR  ,  Pulmonary  COPD moderate- medication dependent , Shortness of breath,        GI/Hepatic  Negative GI/hepatic ROS          Negative  ROS        Endo/Other  History of thyroid disease , hypothyroidism,      GYN       Hematology   Musculoskeletal  Negative musculoskeletal ROS        Neurology  Negative neurology ROS      Psychology         Lab Results   Component Value Date    WBC 9 00 09/23/2018    HGB 10 6 (L) 09/23/2018     09/23/2018     Lab Results   Component Value Date     10/16/2018    K 3 3 (L) 10/16/2018    BUN 15 10/16/2018    CREATININE 1 11 10/16/2018     Lab Results   Component Value Date    PTT 81 (H) 09/24/2018      Lab Results   Component Value Date    INR 1 13 09/22/2018       Blood type AB    No results found for: HGBA1C    Physical Exam    Airway    Mallampati score: II  TM Distance: >3 FB       Dental       Cardiovascular      Pulmonary      Other Findings  Missing most      Anesthesia Plan  ASA Score- 3     Anesthesia Type- IV sedation with anesthesia with ASA Monitors  Additional Monitors: arterial line  Airway Plan:     Comment: IV sedation, GA back up; standard ASA monitors  Risks and benefits discussed with patient; patient consented and agrees to proceed  I saw and evaluated the patient  If seen with CRNA, we have discussed the anesthetic plan and I am in agreement that the plan is appropriate for the patient        Plan Factors-    Induction- intravenous  Postoperative Plan- Plan for postoperative opioid use  Planned trial extubation    Informed Consent- Anesthetic plan and risks discussed with patient  I personally reviewed this patient with the CRNA   Discussed and agreed on the Anesthesia Plan with the ZAYRA [place], [place] [address]  [phone]     Transthoracic Echocardiogram  Limited 2D, Doppler, and Color Doppler     Study date:  2018     Patient: Cb Hung  MR number: [id number]  Account number: [de-identified]  : 1950  Age: 74 years  Gender: Female  Status: Inpatient  Location: Bedside  Height: 62 in  Weight: 162 lb  BP: 123/ 96 mmHg     Indications: Aortic valve disease, Evaluate suspected aortic stenosis      Diagnoses: I35 8 - Other nonrheumatic aortic valve disorders     Sonographer:  Alla Delacruz RDCS  Interpreting Physician:  Fidel Suh MD  Referring Physician:  Fidel Suh MD  Group:  [facility]     SUMMARY     PROCEDURE INFORMATION:  This was a limited study performed to evaluate the aortic valve and the LVOT  For the remaining echo, please refer to the study dated 18  This was a technically difficult study      LEFT VENTRICLE:  Systolic function was normal  Ejection fraction was estimated to be 60 %      AORTIC VALVE:  Pt is s/p TAVR  The bioprosthesis was not seen well  Peak and mean pressure gradients across the valve were 19 and 10 mm Hg respectively

## 2018-10-25 NOTE — DISCHARGE INSTRUCTIONS
DISCHARGE INSTRUCTIONS  ARTERIOGRAM/ANGIOPLASTY/STENT    Following discharge from the hospital, you may have some questions about your procedure, your activities or your general condition  These instructions may answer some of your questions and help you adjust during the first few weeks following your operation  ACTIVITY: The evening following the procedure you should be sure someone remains with you until the next morning  Rest as much as possible, sitting, lying or reclining  Use the stairs as little as possible  The following day, limit your activity to walking  Avoid stooping or heavy lifting (no more than 30 lbs) for the first three days  Walking up steps and normal activities may be resumed as you feel ready  You should not drive a car for at least two days following discharge from the hospital  You may ride in a car  If you have any questions regarding a particular activity, please discuss with your doctor or nurse before you are discharged  DIET:  Resume your normal diet  Try to eat low fat and low cholesterol foods  Drink more liquids than usual for the next 24 hours  INCISION: Your doctor may have chosen to use a type of adhesive glue, to close your incision  The glue is used to cover the incision, assist in closure, and prevent contamination  This adhesive will darken and peel away on its own within one to two weeks  You may shower after the procedure, but do not scrub the incision  Sitting in a tub is not recommended for the two days following the procedure or if you have any open wounds  It is normal to have some bruising, swelling or mild discoloration around the incision  IF increasing redness or pain develops, call our office immediately  If present, you may remove the band-aid or steri-strips over your wound after two days  If you notice any active bleeding at the site, apply pressure to the site and call our office ([phone])      FOLLOW UP STUDIES:  Your doctor will discuss whether further treatments or follow-up studies are necessary at your first post procedure visit  PLEASE CALL THE OFFICE IF YOU HAVE ANY QUESTIONS  [phone] [facility] FREE [phone]  [address] [place] [address] , [address], [place], [address]  [address], [place], [address]  [address], [place], [address]  [address] [facility], [facility], [facility], [address]    [address], [address]  [address], Cheryl Munoz [facility] [address]   [address] [facility], [address] [facility], [address] [facility]  [address] [facility], [address] [place] [address], [place] [address]

## 2018-10-25 NOTE — INTERVAL H&P NOTE
H&P reviewed  After examining the patient I find no changes in the patients condition since the H&P had been written      For left bnb leg angiogram and right femoral access

## 2018-10-25 NOTE — H&P (VIEW-ONLY)
Assessment/Plan:    Occlusion of common femoral artery (HCC)  S/p left common femoral artery repair/ closure device related occlusion  Her left foot is well perfused with excellent doppler sounds  However upon review of duplex there is a residual stenosis in the left external iliac artery and CANDIE is 0 69  I will continue Xarelto and follow up with repeat duplex in 1 month and return for office visit  As xarelto is not affordable I have given her samples today  She does have left leg claudication, if this continues to be an issue she will need arteriogram with angioplasty of the left external iliac, possible stent  Would do that only after the endarterectomy is well healed in about next 6-8 weeks  Diagnoses and all orders for this visit:    Common femoral artery injury, left, sequela  -     VAS lower limb arterial duplex, limited/unilateral; Future    Occlusion of common femoral artery (HCC)          Subjective:      Patient ID: Jeff Nath is a 79 y o  female  Patient is status post EMBOLECTOMY/THROMBECTOMY LOWER EXTREMITY,  ENDARTERECTOMY ARTERIAL FEMORAL of the left leg by Dr Mitzy Dumont on 9/21/2018  Patient has soreness in her groin area, she has "gina Horses" in the calf of left leg, her right buttocks at night  Patient denies fever and chills  Patient takes ASA and xarelto            The following portions of the patient's history were reviewed and updated as appropriate: allergies, current medications, past family history, past medical history, past social history, past surgical history and problem list     Review of Systems      Objective:      /72 (BP Location: Left arm, Patient Position: Sitting)   Pulse 76   Temp 98 1 °F (36 7 °C)   Resp 18   Ht 5' 2" (1 575 m)   Wt 73 9 kg (163 lb)   BMI 29 81 kg/m²          Physical Exam   Constitutional: She appears well-developed and well-nourished  Nursing note and vitals reviewed      strong biphasic left PT signal   Left groin incision is well healed, staples removed and steristrips applied  Right foot has 2+ DP pulse

## 2018-10-25 NOTE — ANESTHESIA POSTPROCEDURE EVALUATION
Post-Op Assessment Note      CV Status:  Stable    Mental Status:  Alert and awake    Hydration Status:  Euvolemic    PONV Controlled:  Controlled    Airway Patency:  Patent    Post Op Vitals Reviewed: Yes          Staff: CRNA       Comments: Patient resting in PACU, no c/o pain or nausea   Airway patent, VSS          /66 (10/25/18 1636)    Temp 98 9 °F (37 2 °C) (10/25/18 1636)    Pulse 91 (10/25/18 1636)   Resp 14 (10/25/18 1636)    SpO2   94% on 2L NC

## 2018-11-20 ENCOUNTER — OFFICE VISIT (OUTPATIENT)
Dept: VASCULAR SURGERY | Facility: CLINIC | Age: 68
End: 2018-11-20

## 2018-11-20 VITALS
DIASTOLIC BLOOD PRESSURE: 90 MMHG | HEART RATE: 72 BPM | SYSTOLIC BLOOD PRESSURE: 140 MMHG | HEIGHT: 62 IN | WEIGHT: 163 LBS | TEMPERATURE: 98.1 F | BODY MASS INDEX: 30 KG/M2

## 2018-11-20 DIAGNOSIS — I70.213 INTERMITTENT CLAUDICATION OF BOTH LOWER EXTREMITIES DUE TO ATHEROSCLEROSIS (HCC): Primary | ICD-10-CM

## 2018-11-20 DIAGNOSIS — Z98.890 H/O ENDARTERECTOMY: ICD-10-CM

## 2018-11-20 PROCEDURE — 99024 POSTOP FOLLOW-UP VISIT: CPT | Performed by: PHYSICIAN ASSISTANT

## 2018-11-20 RX ORDER — FLUTICASONE PROPIONATE 50 MCG
SPRAY, SUSPENSION (ML) NASAL
COMMUNITY
End: 2020-01-01 | Stop reason: HOSPADM

## 2018-11-20 NOTE — PATIENT INSTRUCTIONS
Intermittent claudication of both lower extremities due to atherosclerosis [facility])    Status post embolectomy thrombectomy left leg 9/21/18  S/p left common femoral artery endarterectomy  S/p left external iliac artery PTA with stent 10/25/18    Mild complaints of left groin discomfort and left thigh numbness since CF a surgery which are slowly improving  Patient reports that she is overall doing well  She is walking further without any claudication with activities of daily living  She reports limitation due to emphysema  Exam:  L CFA site is healed  Feet are warm  Trace edema  Cap refill about 2 seconds  No wounds  Motor sensation intact  R  1-2+ DP  L   Monophasic DP signal; Mono-biphasic PT    Recommendations:  -Continue with aspirin and clopidogrel for now  -Patient education provided for peripheral arterial disease  -Recommend regular, progressive walking program  -Check baseline post intervention SIMÓN and the next 4-6 weeks  Peripheral Arterial Disease  -Regular exercise / walking program  -Follow good, heart healthy diet which is low in fat and cholesterol    -Maintain healthy weight   -Routine arterial duplex in 4 weeks  -Follow up office visit in about 3 months  -Call or return sooner for increased pain in your legs; constant numbness, tingling or coldness in the legs; or if you develop wounds on your toes/feet that do not heal        Peripheral Artery Disease   AMBULATORY CARE:   Peripheral artery disease (PAD)  is narrow, weak, or blocked arteries  It may affect any arteries outside of your heart and brain  PAD is usually the result of a buildup of fat and cholesterol, also called plaque, along your artery walls  Inflammation, a blood clot, or abnormal cell growth could also block your arteries  PAD prevents normal blood flow to your legs and arms  You are at risk of an amputation if poor blood flow keeps wounds from healing or causes gangrene (tissue death)   Without treatment, PAD can also cause a heart attack or stroke  Common symptoms include:  Mild PAD usually does not cause symptoms  As the disease worsens over time, you may have the following:  · Pain or cramps in your leg or hip while you walk     · A numb, weak, or heavy feeling in your legs     · Dry, scaly, red, or pale skin on your legs     · Thick or brittle nails, or hair loss on your arms and legs     · Foot sores that will not heal     · Burning or aching in your feet and toes while resting (this may be worse when you lie down)  Call 911 for the following:   · You have any of the following signs of a heart attack:      ¨ Squeezing, pressure, or pain in your chest that lasts longer than 5 minutes or returns    ¨ Discomfort or pain in your back, neck, jaw, stomach, or arm     ¨ Trouble breathing    ¨ Nausea or vomiting    ¨ Lightheadedness or a sudden cold sweat, especially with chest pain or trouble breathing    · You have any of the following signs of a stroke:      ¨ Numbness or drooping on one side of your face     ¨ Weakness in an arm or leg    ¨ Confusion or difficulty speaking    ¨ Dizziness, a severe headache, or vision loss  Seek care immediately if:   · You have sores or wounds that will not heal      · You notice black or discolored skin on your arm or leg  · Your skin is cool to the touch  Contact your healthcare provider if:   · You have leg pain when you walk 1/8 mile (200 meters) or less, even with treatment  · Your legs are red, dry, or pale, even with treatment  · You have questions or concerns about your condition or care  Treatment for PAD  can help reduce your risk of a heart attack, stroke, or amputation  You may need more than one of the following:  · Medicines  may be given to prevent blood clots and reduce the risk of a heart attack or stroke  You may be given medicine to help prevent your PAD from getting worse      · A supervised exercise program  helps you stay active in normal daily activities and may prevent disability  Healthcare providers will help you safely walk or do strength training exercises 3 times a week for 30 to 60 minutes  You will do this for several months, then transition to walking on your own  · Angioplasty  is a procedure to open your artery so blood can flow through normally  A thin tube called a catheter is used to insert a small balloon into your artery  The balloon is inflated to open your blocked artery, and then removed  A tube called a stent may be placed in your artery to hold it open  · Bypass surgery  is used to make a new connection to your artery with a vein from another part of your body, or an artificial graft  The vein or graft is attached to your artery above and below your blockage  This allows blood to flow around the blocked portion of your artery  Manage and prevent PAD:   · Walk for 30 to 60 minutes at least 4 times a week  Your healthcare provider may also refer you to an supervised exercise program  The program helps increase how far you can walk without pain  It also helps you stay active in normal daily activities and may prevent disability caused by PAD  · Do not smoke  Nicotine and other chemicals in cigarettes and cigars can worsen PAD  They can also increase your risk for a heart attack or stroke  Ask your healthcare provider for information if you currently smoke and need help to quit  E-cigarettes or smokeless tobacco still contain nicotine  Talk to your healthcare provider before you use these products  · Manage other health conditions  Take your medicines as directed and follow your healthcare provider's instructions if you have high blood pressure or high cholesterol  Perform foot care and check your blood sugar levels as directed if you have diabetes  · Eat heart healthy foods  Eat whole grains, fruits, and vegetables every day  Limit salt and high-fat foods   Ask your healthcare provider for more information on a heart healthy diet  Ask if you need to lose weight  Your healthcare provider can help you create a healthy weight-loss plan  Follow up with your healthcare provider as directed:  Write down your questions so you remember to ask them during your visits  © 2017 [employer] Information is for End User's use only and may not be sold, redistributed or otherwise used for commercial purposes  All illustrations and images included in CareNotes® are the copyrighted property of A D A M [employer] Inc  or [employer]  The above information is an  only  It is not intended as medical advice for individual conditions or treatments  Talk to your doctor, nurse or pharmacist before following any medical regimen to see if it is safe and effective for you

## 2018-11-20 NOTE — LETTER
November 20, 2018     Mel Hanks, [address] [address]    Patient: Antonio Dunbar   YOB: 1950   Date of Visit: 11/20/2018     Dear Dr Efren Uriostegui      Thank you for referring Antonio Dunbar to me for evaluation  Below are the relevant portions of my assessment and plan of care  If you have questions, please do not hesitate to call me  I look forward to following Nela Durham along with you  Sincerely,        Efrem Miller MD        CC: Winnie Ortiz MD    Progress Notes:    LEFT common femoral endarterectomy  -Left groin procedure site is closed; well-healed  -Mild tenderness at groin site and mild thigh numbness which may slowly improve over time    Intermittent claudication of both lower extremities due to atherosclerosis [facility])  Status post embolectomy thrombectomy left leg 9/21/18  S/p left common femoral artery endarterectomy  S/p left external iliac artery PTA with stent 10/25/18    Mild complaints of left groin discomfort and left thigh numbness since CF a surgery which are slowly improving  Patient reports that she is overall doing well  She is walking further without any claudication with activities of daily living  She reports limitation due to emphysema  Exam:  L CFA site is healed  Feet are warm  Trace edema  Cap refill about 2 seconds  No wounds  Motor sensation intact  R  1+ DP  L   No palpable DP pulses  Monophasic DP signal; Mono-biphasic PT    Recommendations:  -Continue with aspirin and clopidogrel   -Check baseline post intervention SIMÓN; next available is 12/18  -Patient education provided for peripheral arterial disease  -Recommend regular, progressive walking program  -Follow up in about 3 months or sooner if problems

## 2018-11-20 NOTE — PROGRESS NOTES
Assessment/Plan:    LEFT common femoral endarterectomy  -Left groin procedure site is closed; well-healed  -Mild tenderness at groin site and mild thigh numbness which may slowly improve over time    Intermittent claudication of both lower extremities due to atherosclerosis [facility])  Status post embolectomy thrombectomy left leg 9/21/18  S/p left common femoral artery endarterectomy  S/p left external iliac artery PTA with stent 10/25/18    Mild complaints of left groin discomfort and left thigh numbness since CF a surgery which are slowly improving  Patient reports that she is overall doing well  She is walking further without any claudication with activities of daily living  She reports limitation due to emphysema  Exam:  L CFA site is healed  Feet are warm  Trace edema  Cap refill about 2 seconds  No wounds  Motor sensation intact  R  1+ DP  L   No palpable DP pulses  Monophasic DP signal; Mono-biphasic PT    Recommendations:  -Continue with aspirin and clopidogrel   -Check baseline post intervention SIMÓN; next available is 12/18  -Patient education provided for peripheral arterial disease  -Recommend regular, progressive walking program  -Follow up in about 3 months or sooner if problems  Subjective:      Patient ID: Anita Gifford is a 79 y o  female  Pt is s/p A-gram 10/25 with L EIA and Fem angioplasty and L EIA/CFA stenting  She feels better  Has numbness in leg  HPI   62 y/o F Aortic stenosis S/P TAVR 2013, peripheral arterial disease/claudication, HTN, HLD and emphysema  She came to vascular 9/21/2018 with LEFT LE ischemia for which she underwent L femoral thrombectomy, endarterectomy with patch angioplasty  Due to ongoing claudication and low CANDIE, she returned for LEFT EIA percutananeous intervention with angioplasty and stent 10/25/18  Patient reports mild pain/tenderness at the L CFA endart site which is slowly improving   After the surgery she developed mild thigh numbness which I explained may go away in time  In terms of activity, she is rather sedentary, but overall doing well  She has no complaints of claudication at low level  She is limited due to emphysema  No wounds  No fever, chills  No CP  I encouraged her to walk as tolerated  I do not find a pulse in the L foot  We will check duplex at next available  She is taking ASA, Plavix as directed  Off rivaroxaban  The following portions of the patient's history were reviewed and updated as appropriate: allergies, current medications, past family history, past medical history, past social history, past surgical history and problem list     Review of Systems   Constitutional: Negative  HENT: Negative  Eyes: Negative  Respiratory: Negative  Cardiovascular: Negative  Gastrointestinal: Negative  Endocrine: Negative  Genitourinary: Negative  Musculoskeletal: Negative  Skin: Negative  Allergic/Immunologic: Negative  Neurological: Negative  Hematological: Negative  Psychiatric/Behavioral: Negative  Objective:    /90 (BP Location: Left arm, Patient Position: Sitting, Cuff Size: Adult)   Pulse 72   Temp 98 1 °F (36 7 °C) (Tympanic)   Ht 5' 2" (1 575 m)   Wt 73 9 kg (163 lb)   BMI 29 81 kg/m²      Physical Exam   Constitutional: She is oriented to person, place, and time  She appears well-developed and well-nourished  She is cooperative  HENT:   Head: Normocephalic and atraumatic  Eyes: Pupils are equal, round, and reactive to light  EOM are normal    Neck: Trachea normal  Neck supple  No JVD present  No thyromegaly present  Cardiovascular: Normal rate, regular rhythm, S1 normal and S2 normal   Exam reveals no gallop and no friction rub  Murmur (soft sm) heard  Pulses:       Carotid pulses are 2+ on the right side, and 2+ on the left side  Radial pulses are 2+ on the right side, and 2+ on the left side          Dorsalis pedis pulses are 1+ on the right side, and 0 on the left side  L CFA site is healed  Feet are warm  Trace edema  Cap refill about 2 seconds  No wounds  Motor sensation intact  R  1+ DP  L   No palpable DP pulses  Monophasic DP signal; Mono-biphasic PT     Pulmonary/Chest: Effort normal and breath sounds normal  No accessory muscle usage  No respiratory distress  She has no wheezes  She has no rales  Abdominal: Soft  Bowel sounds are normal  She exhibits no distension  There is no hepatosplenomegaly  There is no tenderness  Musculoskeletal: Normal range of motion  She exhibits edema (trace)  She exhibits no deformity  Neurological: She is alert and oriented to person, place, and time  Grossly normal    Skin: Skin is warm and dry  No lesion and no rash noted  No cyanosis  Nails show no clubbing  Psychiatric: She has a normal mood and affect  Nursing note and vitals reviewed            Vitals:    11/20/18 0930   BP: 140/90   BP Location: Left arm   Patient Position: Sitting   Cuff Size: Adult   Pulse: 72   Temp: 98 1 °F (36 7 °C)   TempSrc: Tympanic   Weight: 73 9 kg (163 lb)   Height: 5' 2" (1 575 m)       Patient Active Problem List   Diagnosis    Chest pain    Hypertension    Morbid obesity due to excess calories (HCC)    Coronary artery disease    Chronic radicular pain of lower back    Urinary tract infection    Centrilobular emphysema (HCC)    Dyspnea on exertion    Mixed hyperlipidemia    Peripheral artery disease (HCC)    Intermittent claudication of both lower extremities due to atherosclerosis ([address] )    S/p TAVR (transcatheter aortic valve replacement), bioprosthetic    Occlusion of common femoral artery ([address] )    Common femoral artery injury, left, sequela    Injury of left femoral artery    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HCC)    Iliac artery stenosis, left (HCC)    LEFT common femoral endarterectomy       Past Surgical History:   Procedure Laterality Date    CARDIAC SURGERY      aortic valve replacement    CARDIAC VALVE REPLACEMENT      CHOLECYSTECTOMY      ESOPHAGUS SURGERY N/A     IR ABDOMINAL ANGIOGRAPHY / INTERVENTION  10/25/2018    [place] SLCTV CATHJ 3RD+ ORD SLCTV ABDL PEL/LXTR [facility] Left 10/25/2018    Procedure: LEFT LEG ANGIOGRAM WITH PLACEMENT OF LEFT EXTERNAL ILIAC ARTERY / LEFT COMMON FEMORAL ARTERIAL STENT, RIGHT FEMORAL ACCESS;  Surgeon: Rosalina Ortiz MD;  [facility];  Service: Vascular    [place] THROMBOENDARTECTMY FEMORAL COMMON Left 9/21/2018    Procedure: ENDARTERECTOMY ARTERIAL FEMORAL;  Surgeon: Rosalina Ortiz MD;  [facility];  Service: Vascular    REPLACEMENT AORTIC VALVE TRANSCATHETER (TAVR)      THROMBECTOMY W/ EMBOLECTOMY Left 9/21/2018    Procedure: EMBOLECTOMY/THROMBECTOMY LOWER EXTREMITY (GROIN EXPLORATION); Surgeon: Rosalina Ortiz MD;  [facility];  Service: Vascular    VASCULAR SURGERY         Family History   Problem Relation Age of Onset    Heart disease Mother     Diabetes Mother     Heart disease Father     Diabetes Father        Social History     Social History    Marital status:      Spouse name: N/A    Number of children: N/A    Years of education: N/A     Occupational History    Not on file  Social History Main Topics    Smoking status: Former Smoker    Smokeless tobacco: Never Used    Alcohol use 0 6 - 1 2 oz/week     1 - 2 Cans of beer per week      Comment: social    Drug use: No    Sexual activity: Not Currently     Other Topics Concern    Not on file     Social History Narrative    No narrative on file       Allergies   Allergen Reactions    Clopidogrel     Iohexol     Iv Contrast [Iodinated Diagnostic Agents]      Pt states that she was told many years ago that when she was given contrast dye she had a reaction, but does not know what  She said she is always prepped prior to having dye and she asked that I list this as an allergy in her chart      Statins Other (See Comments)     Severe muscle cramps-- cannot move         Current Outpatient Prescriptions:     acetaminophen (TYLENOL) 325 mg tablet, Take 2 tablets (650 mg total) by mouth every 6 (six) hours as needed for mild pain or fever, Disp: 30 tablet, Rfl: 0    albuterol (PROVENTIL HFA,VENTOLIN HFA) 90 mcg/act inhaler, Inhale 2 puffs 2 (two) times a day  , Disp: , Rfl:     aspirin 81 mg chewable tablet, Chew 81 mg daily, Disp: , Rfl:     clopidogrel (PLAVIX) 75 mg tablet, Take 1 tablet (75 mg total) by mouth daily, Disp: 60 tablet, Rfl: 0    co-enzyme Q-10 30 MG capsule, Take 100 mg by mouth daily  , Disp: , Rfl:     docusate sodium (COLACE) 100 mg capsule, Take 1 capsule (100 mg total) by mouth 2 (two) times a day as needed for constipation, Disp: 10 capsule, Rfl: 0    fluticasone (FLONASE) 50 mcg/act nasal spray, USE 1 SPRAY(S) IN EACH NOSTRIL ONCE DAILY, Disp: , Rfl:     furosemide (LASIX) 20 mg tablet, Take 1 tablet (20 mg total) by mouth 2 (two) times a day, Disp: 60 tablet, Rfl: 0    levothyroxine 50 mcg tablet, Take 50 mcg by mouth daily, Disp: , Rfl:     metoprolol tartrate (LOPRESSOR) 25 mg tablet, Take 1 tablet (25 mg total) by mouth every 12 (twelve) hours, Disp: 30 tablet, Rfl: 0    pantoprazole (PROTONIX) 40 mg tablet, Take 40 mg by mouth daily, Disp: , Rfl:     POTASSIUM CHLORIDE PO, Take 250 mg by mouth daily, Disp: , Rfl:     rivaroxaban (XARELTO STARTER PACK) 15 & 20 MG starter pack, Take 1 Package by mouth see administration instructions, Disp: , Rfl: 0    Tiotropium Bromide-Olodaterol (STIOLTO RESPIMAT) 2 5-2 5 MCG/ACT AERS, Inhale 2 puffs daily, Disp: 1 Inhaler, Rfl: 5

## 2018-11-20 NOTE — ASSESSMENT & PLAN NOTE
Status post embolectomy thrombectomy left leg 9/21/18  S/p left common femoral artery endarterectomy  S/p left external iliac artery PTA with stent 10/25/18    Mild complaints of left groin discomfort and left thigh numbness since CF a surgery which are slowly improving  Patient reports that she is overall doing well  She is walking further without any claudication with activities of daily living  She reports limitation due to emphysema  Exam:  L CFA site is healed  Feet are warm  Trace edema  Cap refill about 2 seconds  No wounds  Motor sensation intact  R  1+ DP  L   No palpable DP pulses  Monophasic DP signal; Mono-biphasic PT    Recommendations:  -Continue with aspirin and clopidogrel   -Check baseline post intervention SIMÓN; next available is 12/18  -Patient education provided for peripheral arterial disease  -Recommend regular, progressive walking program  -Follow up in about 3 months or sooner if problems

## 2018-11-20 NOTE — ASSESSMENT & PLAN NOTE
-Left groin procedure site is closed; well-healed  -Mild tenderness at groin site and mild thigh numbness which may slowly improve over time

## 2018-12-05 ENCOUNTER — HOSPITAL ENCOUNTER (OUTPATIENT)
Dept: CT IMAGING | Facility: HOSPITAL | Age: 68
Discharge: HOME | End: 2018-12-05
Attending: INTERNAL MEDICINE
Payer: MEDICARE

## 2018-12-05 DIAGNOSIS — R91.1 LUNG NODULE: ICD-10-CM

## 2018-12-05 PROCEDURE — 71250 CT THORAX DX C-: CPT

## 2018-12-07 ENCOUNTER — PATIENT OUTREACH (OUTPATIENT)
Dept: OTHER | Facility: HOSPITAL | Age: 68
End: 2018-12-07

## 2018-12-13 ENCOUNTER — OFFICE VISIT (OUTPATIENT)
Dept: CARDIOLOGY CLINIC | Facility: CLINIC | Age: 68
End: 2018-12-13
Payer: MEDICARE

## 2018-12-13 VITALS
WEIGHT: 163 LBS | DIASTOLIC BLOOD PRESSURE: 78 MMHG | SYSTOLIC BLOOD PRESSURE: 140 MMHG | HEART RATE: 107 BPM | HEIGHT: 62 IN | OXYGEN SATURATION: 98 % | BODY MASS INDEX: 30 KG/M2

## 2018-12-13 DIAGNOSIS — I77.1 ILIAC ARTERY STENOSIS, LEFT (HCC): ICD-10-CM

## 2018-12-13 DIAGNOSIS — Z95.3 S/P TAVR (TRANSCATHETER AORTIC VALVE REPLACEMENT), BIOPROSTHETIC: ICD-10-CM

## 2018-12-13 DIAGNOSIS — I25.10 CORONARY ARTERY DISEASE INVOLVING NATIVE CORONARY ARTERY OF NATIVE HEART WITHOUT ANGINA PECTORIS: Primary | ICD-10-CM

## 2018-12-13 DIAGNOSIS — I10 ESSENTIAL HYPERTENSION: ICD-10-CM

## 2018-12-13 DIAGNOSIS — E78.2 MIXED HYPERLIPIDEMIA: ICD-10-CM

## 2018-12-13 DIAGNOSIS — R06.00 DYSPNEA ON EXERTION: ICD-10-CM

## 2018-12-13 PROCEDURE — 99214 OFFICE O/P EST MOD 30 MIN: CPT | Performed by: INTERNAL MEDICINE

## 2018-12-13 NOTE — PROGRESS NOTES
Cardiology Consultation     Mckinley Stewart  [id number]  1950  [address]  [address] [place] [address]    Chief complaint:  Hospital follow-up after left common femoral endarterectomy      History of present illness:  60-year-old female patient with past medical history of aortic stenosis status post TAVR in 2013, peripheral artery disease status post right external iliac artery stent, left CFA arthrectomy and ascending aortic aneurysm is here for follow-up  Patient complains of shortness of breath on minimum exertion which he thinks is getting worse over last few weeks  She denies having any lower extremity edema, orthopnea paroxysmal nocturnal dyspnea  She had CT scan of the lungs which showed some pulmonary nodules and moderate emphysema  Ascending aortic aneurysm was 4 1 cm which is unchanged from previous CT scan  She denies having any chest pain  Patient also complains of left leg numbness at the incision site  She denies having any claudication      Patient Active Problem List   Diagnosis    Chest pain    Hypertension    Morbid obesity due to excess calories (HCC)    Coronary artery disease    Chronic radicular pain of lower back    Urinary tract infection    Centrilobular emphysema (HCC)    Dyspnea on exertion    Mixed hyperlipidemia    Peripheral artery disease (HCC)    Intermittent claudication of both lower extremities due to atherosclerosis ([address] )    S/p TAVR (transcatheter aortic valve replacement), bioprosthetic    Occlusion of common femoral artery ([address] )    Common femoral artery injury, left, sequela    Injury of left femoral artery    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HCC)    Iliac artery stenosis, left ([address] )    LEFT common femoral endarterectomy     Past Medical History:   Diagnosis Date    Aneurysm ([address] )     abdominal aortic aneurysm    Aortic valve disease  Aortic valve replaced     TAVR    Bronchiolitis     Cardiac disease     Chest pain     COPD (chronic obstructive pulmonary disease) (HCC)     Disease of thyroid gland     Dyspnea     Hypertension     Hypokalemia     Morbid obesity (HCC)     PAD (peripheral artery disease) (HCC)     Rheumatic fever     SOB (shortness of breath)     Tachycardia      Social History     Social History    Marital status:      Spouse name: N/A    Number of children: N/A    Years of education: N/A     Occupational History    Not on file  Social History Main Topics    Smoking status: Former Smoker    Smokeless tobacco: Never Used    Alcohol use 0 6 - 1 2 oz/week     1 - 2 Cans of beer per week      Comment: social    Drug use: No    Sexual activity: Not Currently     Other Topics Concern    Not on file     Social History Narrative    No narrative on file      Family History   Problem Relation Age of Onset    Heart disease Mother     Diabetes Mother     Heart disease Father     Diabetes Father      Past Surgical History:   Procedure Laterality Date    CARDIAC SURGERY      aortic valve replacement    CARDIAC VALVE REPLACEMENT      CHOLECYSTECTOMY      ESOPHAGUS SURGERY N/A     IR ABDOMINAL ANGIOGRAPHY / INTERVENTION  10/25/2018    [place] 3RD+ ORD SLCTV ABDL PEL/LXTR [facility] Left 10/25/2018    Procedure: LEFT LEG ANGIOGRAM WITH PLACEMENT OF LEFT EXTERNAL ILIAC ARTERY / LEFT COMMON FEMORAL ARTERIAL STENT, RIGHT FEMORAL ACCESS;  Surgeon: Lin Ortiz MD;  [facility];  Service: Vascular    [place] THROMBOENDARTECTMY FEMORAL COMMON Left 9/21/2018    Procedure: ENDARTERECTOMY ARTERIAL FEMORAL;  Surgeon: Lin Ortiz MD;  [facility];  Service: Vascular    REPLACEMENT AORTIC VALVE TRANSCATHETER (TAVR)      THROMBECTOMY W/ EMBOLECTOMY Left 9/21/2018    Procedure: EMBOLECTOMY/THROMBECTOMY LOWER EXTREMITY (GROIN EXPLORATION);   Surgeon: Kika Bell MD;  [facility]; Service: Vascular    VASCULAR SURGERY         Current Outpatient Prescriptions:     albuterol (PROVENTIL HFA,VENTOLIN HFA) 90 mcg/act inhaler, Inhale 2 puffs 2 (two) times a day  , Disp: , Rfl:     aspirin 81 mg chewable tablet, Chew 81 mg daily, Disp: , Rfl:     co-enzyme Q-10 30 MG capsule, Take 100 mg by mouth daily  , Disp: , Rfl:     docusate sodium (COLACE) 100 mg capsule, Take 1 capsule (100 mg total) by mouth 2 (two) times a day as needed for constipation, Disp: 10 capsule, Rfl: 0    fluticasone (FLONASE) 50 mcg/act nasal spray, USE 1 SPRAY(S) IN EACH NOSTRIL ONCE DAILY, Disp: , Rfl:     furosemide (LASIX) 20 mg tablet, Take 1 tablet (20 mg total) by mouth 2 (two) times a day, Disp: 60 tablet, Rfl: 0    levothyroxine 50 mcg tablet, Take 50 mcg by mouth daily, Disp: , Rfl:     metoprolol tartrate (LOPRESSOR) 25 mg tablet, Take 1 tablet (25 mg total) by mouth every 12 (twelve) hours, Disp: 30 tablet, Rfl: 0    pantoprazole (PROTONIX) 40 mg tablet, Take 40 mg by mouth daily, Disp: , Rfl:     POTASSIUM CHLORIDE PO, Take 250 mg by mouth daily, Disp: , Rfl:     Tiotropium Bromide-Olodaterol (STIOLTO RESPIMAT) 2 5-2 5 MCG/ACT AERS, Inhale 2 puffs daily, Disp: 1 Inhaler, Rfl: 5    acetaminophen (TYLENOL) 325 mg tablet, Take 2 tablets (650 mg total) by mouth every 6 (six) hours as needed for mild pain or fever (Patient not taking: Reported on 12/13/2018 ), Disp: 30 tablet, Rfl: 0    clopidogrel (PLAVIX) 75 mg tablet, Take 1 tablet (75 mg total) by mouth daily, Disp: 60 tablet, Rfl: 0    rivaroxaban (XARELTO STARTER PACK) 15 & 20 MG starter pack, Take 1 Package by mouth see administration instructions (Patient not taking: Reported on 12/13/2018 ), Disp: , Rfl: 0  Allergies   Allergen Reactions    Clopidogrel     Iohexol     Iv Contrast [Iodinated Diagnostic Agents]      Pt states that she was told many years ago that when she was given contrast dye she had a reaction, but does not know what   She said she is always prepped prior to having dye and she asked that I list this as an allergy in her chart   Statins Other (See Comments)     Severe muscle cramps-- cannot move     Vitals:    12/13/18 1135   BP: 140/78   BP Location: Left arm   Patient Position: Sitting   Cuff Size: Adult   Pulse: (!) 107   SpO2: 98%   Weight: 73 9 kg (163 lb)   Height: 5' 2" (1 575 m)       Labs:  Appointment on 10/16/2018   Component Date Value    Sodium 10/16/2018 144     Potassium 10/16/2018 3 3*    Chloride 10/16/2018 104     CO2 10/16/2018 32     ANION GAP 10/16/2018 8     BUN 10/16/2018 15     Creatinine 10/16/2018 1 11     Glucose, Fasting 10/16/2018 123*    Calcium 10/16/2018 8 8     eGFR 10/16/2018 52      Imaging: Ct Chest Without Contrast    Result Date: 12/6/2018  Narrative: CT CHEST WITHOUT IV CONTRAST INDICATION:   R91 1: Solitary pulmonary nodule  COMPARISON:  CT chest 5/29/2018 and 2/9/2018 TECHNIQUE: CT examination of the chest was performed without intravenous contrast   Axial, sagittal, and coronal 2D reformatted images were created from the source data and submitted for interpretation  Radiation dose length product (DLP) for this visit:  148 mGy-cm   This examination, like all CT scans performed in the [facility], was performed utilizing techniques to minimize radiation dose exposure, including the use of iterative reconstruction and automated exposure control  FINDINGS: LUNGS:  Lungs are clear  Moderate biapical centrilobular and paraseptal emphysema  There is no tracheal or endobronchial lesion  There are several subcentimeter pulmonary nodules, the more pronounced representatives are described in series 3: Right upper lobe 3 mm solid nodule (image 51) Right lower lobe subpleural average axial dimension 6 mm solid nodule (image 108) Right upper lobe subpleural 3 mm solid nodule (image 30) Left lower lobe 2 mm calcified granuloma  No new suspicious pulmonary nodule   PLEURA: Unremarkable  HEART/GREAT VESSELS:  Postsurgical changes of aortic valve replacement  Stable dilated ascending thoracic aorta measuring up to 4 1 cm MEDIASTINUM AND MARIELA:  Unremarkable  CHEST WALL AND LOWER NECK:   Unremarkable  VISUALIZED STRUCTURES IN THE UPPER ABDOMEN:  Unremarkable  OSSEOUS STRUCTURES:  No acute fracture or destructive osseous lesion  Impression: 1  Subcentimeter pulmonary nodules measuring up to 6 mm are stable from CT 2/9/2018  Based on current Fleischner Society 2017 Guidelines on incidental pulmonary nodule, followup non-contrast CT is recommended at 6-12 months from the initial examination and, if stable at that time, an additional followup is recommended for 18-24 months from the initial examination  Therefore recommend next follow-up low-dose chest CT in about 8-14 months from current exam  2   Moderate emphysema 3  Stable dilated ascending thoracic aorta measuring up to 4 1 cm  Workstation performed: [id number]       Review of Systems:  Review of Systems   Constitutional: Negative for diaphoresis and fatigue  HENT: Negative for congestion and facial swelling  Eyes: Negative for photophobia and visual disturbance  Respiratory: Negative for chest tightness and shortness of breath  Cardiovascular: Negative for chest pain, palpitations and leg swelling  Gastrointestinal: Negative for abdominal pain and nausea  Endocrine: Negative for cold intolerance and heat intolerance  Musculoskeletal: Negative for arthralgias and myalgias  Skin: Negative for pallor and rash  Neurological: Negative for dizziness and tremors  Psychiatric/Behavioral: Negative for sleep disturbance  The patient is not nervous/anxious  Physical Exam:  Physical Exam   Constitutional: She is oriented to person, place, and time  She appears well-developed and well-nourished  HENT:   Head: Normocephalic and atraumatic  Eyes: Pupils are equal, round, and reactive to light   Conjunctivae and EOM are normal    Neck: Normal range of motion  Neck supple  No JVD present  No thyromegaly present  Cardiovascular: Normal rate, regular rhythm, S1 normal, S2 normal, normal heart sounds and intact distal pulses  Exam reveals no gallop and no friction rub  No murmur heard  Pulses:       Carotid pulses are 2+ on the right side, and 2+ on the left side  Pulmonary/Chest: Effort normal and breath sounds normal  No respiratory distress  She has no wheezes  She has no rales  Abdominal: Soft  Bowel sounds are normal  She exhibits no distension  There is no tenderness  There is no rebound and no guarding  Musculoskeletal: Normal range of motion  She exhibits edema  Neurological: She is alert and oriented to person, place, and time  She has normal reflexes  No cranial nerve deficit  Skin: Skin is warm and dry  Psychiatric: She has a normal mood and affect  Discussion/Summary:   1  Aortic stenosis status post TAVR   shortness of breath is probably noncardiac in due to COPD       2    Peripheral artery disease, right external iliac artery stent patent, status post left CF endarterectomy /left Angio-Seal foot plate thrombus   Patient has no significant peripheral artery disease on aortogram with bilateral infrainguinal runoff  On aspirin and Plavix      3   Ascending aortic aneurysm measuring 4 2 x 4 cm as seen on CT scan-unclear if this is new   Continue BB   Follow-up CT scan showed ascending aortic aneurysm manage measuring 4 1 cm  Continue blood pressure control      4   Hypertension--stable  Mildly elevated, probably secondary to pain  Will continue to monitor for now if the blood pressure is persistently high will consider changing medications        5  Shortness of breath, probably due to COPD  A get echocardiogram to evaluate for any structural heart disease which could explain new onset shortness of breath    If patient continues to have shortness of breath in spite of good medical management of COPD he may have to consider right and left heart catheterization to evaluate for the cause of shortness of breath      Follow-up in 3 months

## 2018-12-17 ENCOUNTER — OFFICE VISIT (OUTPATIENT)
Dept: PULMONOLOGY | Facility: CLINIC | Age: 68
End: 2018-12-17
Payer: MEDICARE

## 2018-12-17 VITALS
SYSTOLIC BLOOD PRESSURE: 120 MMHG | WEIGHT: 165 LBS | HEART RATE: 79 BPM | OXYGEN SATURATION: 96 % | BODY MASS INDEX: 30.36 KG/M2 | HEIGHT: 62 IN | DIASTOLIC BLOOD PRESSURE: 80 MMHG

## 2018-12-17 DIAGNOSIS — R06.02 SOB (SHORTNESS OF BREATH): Primary | ICD-10-CM

## 2018-12-17 DIAGNOSIS — J41.0 SIMPLE CHRONIC BRONCHITIS (HCC): ICD-10-CM

## 2018-12-17 PROCEDURE — 99214 OFFICE O/P EST MOD 30 MIN: CPT | Performed by: INTERNAL MEDICINE

## 2018-12-17 RX ORDER — ALBUTEROL SULFATE 2.5 MG/3ML
2.5 SOLUTION RESPIRATORY (INHALATION) EVERY 6 HOURS PRN
Qty: 1080 ML | Refills: 0 | Status: SHIPPED | OUTPATIENT
Start: 2018-12-17 | End: 2020-01-01 | Stop reason: HOSPADM

## 2018-12-18 ENCOUNTER — HOSPITAL ENCOUNTER (OUTPATIENT)
Dept: NON INVASIVE DIAGNOSTICS | Facility: CLINIC | Age: 68
Discharge: HOME | End: 2018-12-18
Payer: MEDICARE

## 2018-12-18 DIAGNOSIS — Z98.890 H/O ENDARTERECTOMY: ICD-10-CM

## 2018-12-18 DIAGNOSIS — I70.213 INTERMITTENT CLAUDICATION OF BOTH LOWER EXTREMITIES DUE TO ATHEROSCLEROSIS (HCC): ICD-10-CM

## 2018-12-18 PROCEDURE — 93922 UPR/L XTREMITY ART 2 LEVELS: CPT | Performed by: SURGERY

## 2018-12-18 PROCEDURE — 93923 UPR/LXTR ART STDY 3+ LVLS: CPT

## 2018-12-18 PROCEDURE — 93925 LOWER EXTREMITY STUDY: CPT

## 2018-12-18 PROCEDURE — 93925 LOWER EXTREMITY STUDY: CPT | Performed by: SURGERY

## 2018-12-19 DIAGNOSIS — J43.2 CENTRILOBULAR EMPHYSEMA (HCC): ICD-10-CM

## 2018-12-19 RX ORDER — TIOTROPIUM BROMIDE AND OLODATEROL 3.124; 2.736 UG/1; UG/1
SPRAY, METERED RESPIRATORY (INHALATION)
Qty: 1 INHALER | Refills: 5 | Status: SHIPPED | OUTPATIENT
Start: 2018-12-19 | End: 2019-01-01

## 2018-12-20 ENCOUNTER — PATIENT OUTREACH (OUTPATIENT)
Dept: CASE MANAGEMENT | Facility: OTHER | Age: 68
End: 2018-12-20

## 2018-12-24 DIAGNOSIS — I70.213 ATHEROSCLEROSIS OF NATIVE ARTERY OF BOTH LOWER EXTREMITIES WITH INTERMITTENT CLAUDICATION (HCC): Primary | ICD-10-CM

## 2018-12-27 ENCOUNTER — PATIENT OUTREACH (OUTPATIENT)
Dept: CASE MANAGEMENT | Facility: OTHER | Age: 68
End: 2018-12-27

## 2018-12-28 NOTE — PROGRESS NOTES
Assessment/Plan:   Diagnoses and all orders for this visit:    SOB (shortness of breath)  -     Six minute walk; Future    Simple chronic bronchitis (HCC)  -     albuterol (2 5 mg/3 mL) 0 083 % nebulizer solution; Take 1 vial (2 5 mg total) by nebulization every 6 (six) hours as needed for wheezing or shortness of breath         Shortness of breath and dyspnea on exertion probably secondary to her centrilobular emphysema  PFTs demonstrating, moderate obstructive ventilatory limitation with no appreciable response to the bronchodilator with moderately decreased DLCO consistent with COPD emphysema  She is continuing to use her stiolto 2 puffs daily  Ventolin MDI 2 puffs every 6 hours as needed only  Discussed regarding using albuterol via the nebulizer 4 times daily as needed  And also she'll get a 6 minute walk test and follow-up  Lung nodule prior CT of the chest demonstrating 8 mm for which she had a repeat CT of the chest, its currently around 6 mm, as per the radiologist given the prior one was around 6 mm going back in measuring, definitely the size hasn't increased  Also there is a new 3 mm lung nodule  From the prior study  Repeat CT of the chest 12/5/2018 with a right lower lobe lung nodule around 6 mm, the other 2 lung nodules have been stable at 3 mm  Given stability of the lung nodules, we'll repeat CT of the chest in one year from now  Vaccinations up-to-date  Return in about 3 months (around 3/17/2019)  All questions are answered to the patient's satisfaction and understanding  She verbalizes understanding  She is encouraged to call with any further questions or concerns  Portions of the record may have been created with voice recognition software  Occasional wrong word or "sound a like" substitutions may have occurred due to the inherent limitations of voice recognition software  Read the chart carefully and recognize, using context, where substitutions have occurred      Electronically Signed by Koby Foreman MD    ______________________________________________________________________    Chief Complaint:   Chief Complaint   Patient presents with    Shortness of Breath     fup       Patient ID: Keenan Boeck is a 79 y o  y o  female has a past medical history of Aneurysm ([address] ); Aortic valve disease; Aortic valve replaced; Bronchiolitis; Cardiac disease; Chest pain; COPD (chronic obstructive pulmonary disease) ([facility] [address] ); Disease of thyroid gland; Dyspnea; Hypertension; Hypokalemia; Morbid obesity ([facility] [address] ); PAD (peripheral artery disease) ([facility] [address] ); Rheumatic fever; SOB (shortness of breath); and Tachycardia  12/17/2018  Patient presents today for follow-up visit  Patient is a very pleasant 70-year-old lady, with significant smoking history greater than 50-pack-year smoking history who quit a few years ago, diagnosed with COPD 2 years ago, used to follow-up with the pulmonologist at [place], recently moved into this area the [place]  Recently she was admitted at [address] for shortness of breath and was treated for COPD exacerbation  She states she does feel better since the hospital admission, but still with shortness of breath and dyspnea on exertion  She was on long-acting bronchodilators in the past, has not been using it for the past several months since she moved into the [facility]        Review of Systems   Constitutional: Positive for fatigue  Negative for appetite change, chills, diaphoresis, fever and unexpected weight change  HENT: Negative for congestion, ear discharge, ear pain, nosebleeds, postnasal drip, rhinorrhea, sinus pain, sore throat and voice change  Eyes: Negative for pain, discharge and visual disturbance  Respiratory: Positive for cough and shortness of breath  Negative for apnea, choking, chest tightness, wheezing and stridor  Cardiovascular: Negative for chest pain, palpitations and leg swelling     Gastrointestinal: Negative for abdominal pain, blood in stool, constipation, diarrhea and vomiting  Endocrine: Negative for cold intolerance, heat intolerance, polydipsia, polyphagia and polyuria  Genitourinary: Negative for difficulty urinating and dysuria  Musculoskeletal: Negative for arthralgias and neck pain  Skin: Negative for pallor and rash  Allergic/Immunologic: Negative for environmental allergies and food allergies  Neurological: Negative for dizziness, speech difficulty, weakness and light-headedness  Hematological: Negative for adenopathy  Does not bruise/bleed easily  Psychiatric/Behavioral: Negative for agitation, confusion and sleep disturbance  The patient is not nervous/anxious  Smoking history: She reports that she has quit smoking  She has never used smokeless tobacco     The following portions of the patient's history were reviewed and updated as appropriate: allergies, current medications, past family history, past medical history, past social history, past surgical history and problem list       There is no immunization history on file for this patient    Current Outpatient Prescriptions   Medication Sig Dispense Refill    acetaminophen (TYLENOL) 325 mg tablet Take 2 tablets (650 mg total) by mouth every 6 (six) hours as needed for mild pain or fever 30 tablet 0    albuterol (PROVENTIL HFA,VENTOLIN HFA) 90 mcg/act inhaler Inhale 2 puffs 2 (two) times a day        aspirin 81 mg chewable tablet Chew 81 mg daily      clopidogrel (PLAVIX) 75 mg tablet Take 1 tablet (75 mg total) by mouth daily 60 tablet 0    co-enzyme Q-10 30 MG capsule Take 100 mg by mouth daily        docusate sodium (COLACE) 100 mg capsule Take 1 capsule (100 mg total) by mouth 2 (two) times a day as needed for constipation 10 capsule 0    fluticasone (FLONASE) 50 mcg/act nasal spray USE 1 SPRAY(S) IN EACH NOSTRIL ONCE DAILY      furosemide (LASIX) 20 mg tablet Take 1 tablet (20 mg total) by mouth 2 (two) times a day 60 tablet 0    levothyroxine 50 mcg tablet Take 50 mcg by mouth daily      metoprolol tartrate (LOPRESSOR) 25 mg tablet Take 1 tablet (25 mg total) by mouth every 12 (twelve) hours 30 tablet 0    pantoprazole (PROTONIX) 40 mg tablet Take 40 mg by mouth daily      POTASSIUM CHLORIDE PO Take 250 mg by mouth daily      albuterol (2 5 mg/3 mL) 0 083 % nebulizer solution Take 1 vial (2 5 mg total) by nebulization every 6 (six) hours as needed for wheezing or shortness of breath 1080 mL 0    rivaroxaban (XARELTO STARTER PACK) 15 & 20 MG starter pack Take 1 Package by mouth see administration instructions (Patient not taking: Reported on 12/13/2018 )  0    STIOLTO RESPIMAT 2 5-2 5 MCG/ACT inhaler INHALE 2 PUFFS BY MOUTH ONCE DAILY 1 Inhaler 5     No current facility-administered medications for this visit  Allergies: Clopidogrel; Iohexol; Iv contrast [iodinated diagnostic agents]; and Statins    Objective:  Vitals:    12/17/18 0845   BP: 120/80   Pulse: 79   SpO2: 96%   Weight: 74 8 kg (165 lb)   Height: 5' 2" (1 575 m)   Oxygen Therapy  SpO2: 96 %    Wt Readings from Last 3 Encounters:   12/17/18 74 8 kg (165 lb)   12/13/18 73 9 kg (163 lb)   11/20/18 73 9 kg (163 lb)     Body mass index is 30 18 kg/m²  Physical Exam   Constitutional: She is oriented to person, place, and time  She appears well-developed and well-nourished  HENT:   Head: Normocephalic and atraumatic  Eyes: Pupils are equal, round, and reactive to light  Conjunctivae are normal    Neck: Normal range of motion  Neck supple  No JVD present  No thyromegaly present  Cardiovascular: Normal rate, regular rhythm and normal heart sounds  Exam reveals no gallop and no friction rub  No murmur heard  Pulmonary/Chest: Effort normal and breath sounds normal  No respiratory distress  She has no wheezes  She has no rales  She exhibits no tenderness  Abdominal: Soft  Bowel sounds are normal    Musculoskeletal: Normal range of motion   She exhibits no edema, tenderness or deformity  Lymphadenopathy:     She has no cervical adenopathy  Neurological: She is alert and oriented to person, place, and time  Skin: Skin is warm and dry  Psychiatric: She has a normal mood and affect  Nursing note and vitals reviewed  Office Spirometry Results:     ESS:    Ct Chest Without Contrast    Result Date: 12/6/2018  Narrative: CT CHEST WITHOUT IV CONTRAST INDICATION:   R91 1: Solitary pulmonary nodule  COMPARISON:  CT chest 5/29/2018 and 2/9/2018 TECHNIQUE: CT examination of the chest was performed without intravenous contrast   Axial, sagittal, and coronal 2D reformatted images were created from the source data and submitted for interpretation  Radiation dose length product (DLP) for this visit:  148 mGy-cm   This examination, like all CT scans performed in the [facility], was performed utilizing techniques to minimize radiation dose exposure, including the use of iterative reconstruction and automated exposure control  FINDINGS: LUNGS:  Lungs are clear  Moderate biapical centrilobular and paraseptal emphysema  There is no tracheal or endobronchial lesion  There are several subcentimeter pulmonary nodules, the more pronounced representatives are described in series 3: Right upper lobe 3 mm solid nodule (image 51) Right lower lobe subpleural average axial dimension 6 mm solid nodule (image 108) Right upper lobe subpleural 3 mm solid nodule (image 30) Left lower lobe 2 mm calcified granuloma  No new suspicious pulmonary nodule  PLEURA:  Unremarkable  HEART/GREAT VESSELS:  Postsurgical changes of aortic valve replacement  Stable dilated ascending thoracic aorta measuring up to 4 1 cm MEDIASTINUM AND MARIELA:  Unremarkable  CHEST WALL AND LOWER NECK:   Unremarkable  VISUALIZED STRUCTURES IN THE UPPER ABDOMEN:  Unremarkable  OSSEOUS STRUCTURES:  No acute fracture or destructive osseous lesion  Impression: 1    Subcentimeter pulmonary nodules measuring up to 6 mm are stable from CT 2/9/2018  Based on current Fleischner Society 2017 Guidelines on incidental pulmonary nodule, followup non-contrast CT is recommended at 6-12 months from the initial examination and, if stable at that time, an additional followup is recommended for 18-24 months from the initial examination  Therefore recommend next follow-up low-dose chest CT in about 8-14 months from current exam  2   Moderate emphysema 3  Stable dilated ascending thoracic aorta measuring up to 4 1 cm  Workstation performed: [id number]     Vas Lower Limb Arterial Duplex, Complete Bilateral    Result Date: 12/18/2018  Narrative:  [facility] REPORT CLINICAL: Indications: Atherosclerosis of native arteries of extremities with intermittent claudication, bilateral legs [I70 213]  Patient is s/p left EIA thromboembolectomy/CFA endarterectomy (9-21-18) and left EIA stent on 10-25-18 Routine surveillance  Patient reports on going numbness of the left groin that is slowly resolving  History of right EIA stent  Operative History: 2018-09-21 Left thrombectomy and left CFA endarterectomy Risk Factors The patient has history of HTN, HLD, PAD and previous smoking (quit >10yrs ago)  She has no history of Diabetes  Clinical Right Pressure:  149/ mm Hg, Left Pressure:  153/ mm Hg  FINDINGS:  Segment                Right               Left                                          Waveform  PSV  EDV  Impression  Waveform  PSV  EDV  Prox   EIA - Stent                154    0                        246       Dist EIA                         154                             145       Common Femoral Artery            215    0                        180    0  Prox Profunda                     81    0                         73    0  Prox SFA                         152   12  50-75%                398    0  Mid SFA                          101    0                         63    0  Dist SFA                          81    0 33    0  Proximal Pop                      51    0                         34    0  Distal Pop                        67    0                         41    0  Tibioperoneal                     60                              32       Prox Post Tibial                  57    0                         32       Dist Post Tibial       Biphasic   37    0              Biphasic   35    0  Prox  Ant  Tibial                 51    0                         25    0  Dist  Ant  Tibial      Biphasic   54                   Biphasic   21    0  Prox Peroneal                     35    0                         27    0  Dist Peroneal          Biphasic   33    0              Biphasic   24    7     CONCLUSION:  Impression: RIGHT LOWER LIMB: Patent external iliac artery stent  The femoral -popliteal arterial segment is patent without evidence of focal significant stenosis  Ankle/Brachial index:  0 93 Normal Prior 0 91 PVR/ PPG tracings are normal  Metatarsal pressure of 114 mmHg Great toe pressure of 65 mmHg, within the healing range LEFT LOWER LIMB: Patent external iliac artery stent  Patent common femoral artery post endarterectomy  50-75% stenosis in the proximal superficial femoral artery  Ankle/Brachial index:   0 72 Moderate claudication range  Prior 0 69 PVR/ PPG tracings are dampened  Metatarsal pressure of 67 mmHg Great toe pressure of 50 mmHg, within the healing range  Compared to previous study on 9-22-18 , there is no significant change Recommend repeat testing in 6 months as per protocol unless otherwise indicated    SIGNATURE: Electronically Signed by: Henry Odonnell on 2018-12-18 02:54:37 PM

## 2019-01-01 ENCOUNTER — APPOINTMENT (EMERGENCY)
Dept: RADIOLOGY | Facility: HOSPITAL | Age: 69
DRG: 189 | End: 2019-01-01
Payer: MEDICARE

## 2019-01-01 ENCOUNTER — NURSE TRIAGE (OUTPATIENT)
Dept: PHYSICAL THERAPY | Facility: OTHER | Age: 69
End: 2019-01-01

## 2019-01-01 ENCOUNTER — OFFICE VISIT (OUTPATIENT)
Dept: VASCULAR SURGERY | Facility: CLINIC | Age: 69
End: 2019-01-01
Payer: MEDICARE

## 2019-01-01 ENCOUNTER — TELEPHONE (OUTPATIENT)
Dept: PULMONOLOGY | Facility: CLINIC | Age: 69
End: 2019-01-01

## 2019-01-01 ENCOUNTER — HOSPITAL ENCOUNTER (EMERGENCY)
Facility: HOSPITAL | Age: 69
Discharge: HOME | End: 2019-02-04
Attending: EMERGENCY MEDICINE
Payer: MEDICARE

## 2019-01-01 ENCOUNTER — OFFICE VISIT (OUTPATIENT)
Dept: PULMONOLOGY | Facility: CLINIC | Age: 69
End: 2019-01-01
Payer: MEDICARE

## 2019-01-01 ENCOUNTER — APPOINTMENT (EMERGENCY)
Dept: CT IMAGING | Facility: HOSPITAL | Age: 69
DRG: 189 | End: 2019-01-01
Payer: MEDICARE

## 2019-01-01 ENCOUNTER — TELEPHONE (OUTPATIENT)
Dept: VASCULAR SURGERY | Facility: CLINIC | Age: 69
End: 2019-01-01

## 2019-01-01 ENCOUNTER — APPOINTMENT (EMERGENCY)
Dept: RADIOLOGY | Facility: HOSPITAL | Age: 69
End: 2019-01-01
Payer: MEDICARE

## 2019-01-01 ENCOUNTER — HOSPITAL ENCOUNTER (OUTPATIENT)
Dept: PULMONOLOGY | Facility: HOSPITAL | Age: 69
Discharge: HOME | End: 2019-07-19
Payer: MEDICARE

## 2019-01-01 ENCOUNTER — HOSPITAL ENCOUNTER (OUTPATIENT)
Facility: HOSPITAL | Age: 69
Setting detail: OUTPATIENT SURGERY
Discharge: HOME | End: 2019-06-14
Attending: SURGERY | Admitting: SURGERY
Payer: MEDICARE

## 2019-01-01 ENCOUNTER — HOSPITAL ENCOUNTER (OUTPATIENT)
Dept: CT IMAGING | Facility: HOSPITAL | Age: 69
Discharge: HOME | End: 2019-06-05
Payer: MEDICARE

## 2019-01-01 ENCOUNTER — OFFICE VISIT (OUTPATIENT)
Dept: PAIN MEDICINE | Facility: CLINIC | Age: 69
End: 2019-01-01
Payer: MEDICARE

## 2019-01-01 ENCOUNTER — HOSPITAL ENCOUNTER (OUTPATIENT)
Dept: NON INVASIVE DIAGNOSTICS | Facility: CLINIC | Age: 69
Discharge: HOME | End: 2019-10-08
Payer: MEDICARE

## 2019-01-01 ENCOUNTER — HOSPITAL ENCOUNTER (OUTPATIENT)
Dept: NON INVASIVE DIAGNOSTICS | Facility: CLINIC | Age: 69
Discharge: HOME | End: 2019-05-16
Payer: MEDICARE

## 2019-01-01 ENCOUNTER — ANESTHESIA (OUTPATIENT)
Dept: PERIOP | Facility: HOSPITAL | Age: 69
End: 2019-01-01
Payer: MEDICARE

## 2019-01-01 ENCOUNTER — HOSPITAL ENCOUNTER (OUTPATIENT)
Dept: RADIOLOGY | Facility: HOSPITAL | Age: 69
Discharge: HOME | End: 2019-06-14
Attending: SURGERY
Payer: MEDICARE

## 2019-01-01 ENCOUNTER — OFFICE VISIT (OUTPATIENT)
Dept: CARDIOLOGY CLINIC | Facility: CLINIC | Age: 69
End: 2019-01-01
Payer: MEDICARE

## 2019-01-01 ENCOUNTER — HOSPITAL ENCOUNTER (INPATIENT)
Facility: HOSPITAL | Age: 69
LOS: 2 days | Discharge: HOME | DRG: 189 | End: 2019-06-01
Attending: EMERGENCY MEDICINE | Admitting: INTERNAL MEDICINE
Payer: MEDICARE

## 2019-01-01 ENCOUNTER — ANESTHESIA EVENT (OUTPATIENT)
Dept: PERIOP | Facility: HOSPITAL | Age: 69
End: 2019-01-01
Payer: MEDICARE

## 2019-01-01 VITALS
RESPIRATION RATE: 16 BRPM | TEMPERATURE: 97.3 F | HEIGHT: 62 IN | DIASTOLIC BLOOD PRESSURE: 78 MMHG | BODY MASS INDEX: 32.76 KG/M2 | HEART RATE: 62 BPM | SYSTOLIC BLOOD PRESSURE: 144 MMHG | WEIGHT: 178 LBS

## 2019-01-01 VITALS
HEART RATE: 70 BPM | WEIGHT: 172 LBS | BODY MASS INDEX: 31.65 KG/M2 | TEMPERATURE: 98.5 F | DIASTOLIC BLOOD PRESSURE: 70 MMHG | HEIGHT: 62 IN | SYSTOLIC BLOOD PRESSURE: 140 MMHG

## 2019-01-01 VITALS
HEIGHT: 62 IN | WEIGHT: 176 LBS | DIASTOLIC BLOOD PRESSURE: 70 MMHG | OXYGEN SATURATION: 98 % | SYSTOLIC BLOOD PRESSURE: 130 MMHG | BODY MASS INDEX: 32.39 KG/M2 | HEART RATE: 53 BPM

## 2019-01-01 VITALS
WEIGHT: 170 LBS | OXYGEN SATURATION: 94 % | SYSTOLIC BLOOD PRESSURE: 98 MMHG | TEMPERATURE: 98.6 F | HEART RATE: 83 BPM | DIASTOLIC BLOOD PRESSURE: 53 MMHG | HEIGHT: 62 IN | RESPIRATION RATE: 20 BRPM | BODY MASS INDEX: 31.28 KG/M2

## 2019-01-01 VITALS
BODY MASS INDEX: 31.28 KG/M2 | HEIGHT: 62 IN | TEMPERATURE: 99.8 F | DIASTOLIC BLOOD PRESSURE: 84 MMHG | WEIGHT: 170 LBS | SYSTOLIC BLOOD PRESSURE: 140 MMHG | HEART RATE: 118 BPM

## 2019-01-01 VITALS
HEIGHT: 62 IN | HEART RATE: 80 BPM | BODY MASS INDEX: 31.1 KG/M2 | OXYGEN SATURATION: 95 % | DIASTOLIC BLOOD PRESSURE: 60 MMHG | WEIGHT: 169 LBS | SYSTOLIC BLOOD PRESSURE: 120 MMHG

## 2019-01-01 VITALS
BODY MASS INDEX: 32.2 KG/M2 | HEIGHT: 62 IN | DIASTOLIC BLOOD PRESSURE: 70 MMHG | OXYGEN SATURATION: 96 % | WEIGHT: 175 LBS | SYSTOLIC BLOOD PRESSURE: 120 MMHG | HEART RATE: 71 BPM

## 2019-01-01 VITALS
HEIGHT: 62 IN | HEART RATE: 81 BPM | SYSTOLIC BLOOD PRESSURE: 120 MMHG | WEIGHT: 170 LBS | DIASTOLIC BLOOD PRESSURE: 70 MMHG | OXYGEN SATURATION: 92 % | BODY MASS INDEX: 31.28 KG/M2

## 2019-01-01 VITALS
SYSTOLIC BLOOD PRESSURE: 113 MMHG | DIASTOLIC BLOOD PRESSURE: 60 MMHG | HEIGHT: 62 IN | OXYGEN SATURATION: 90 % | RESPIRATION RATE: 18 BRPM | HEART RATE: 62 BPM | TEMPERATURE: 98.1 F | BODY MASS INDEX: 32.01 KG/M2

## 2019-01-01 VITALS
WEIGHT: 172.62 LBS | DIASTOLIC BLOOD PRESSURE: 67 MMHG | HEART RATE: 80 BPM | SYSTOLIC BLOOD PRESSURE: 152 MMHG | TEMPERATURE: 97.8 F | RESPIRATION RATE: 18 BRPM | OXYGEN SATURATION: 98 % | BODY MASS INDEX: 31.57 KG/M2

## 2019-01-01 VITALS
HEIGHT: 62 IN | BODY MASS INDEX: 31.28 KG/M2 | DIASTOLIC BLOOD PRESSURE: 80 MMHG | HEART RATE: 98 BPM | TEMPERATURE: 99.8 F | SYSTOLIC BLOOD PRESSURE: 144 MMHG | WEIGHT: 170 LBS

## 2019-01-01 VITALS
TEMPERATURE: 97.1 F | SYSTOLIC BLOOD PRESSURE: 160 MMHG | DIASTOLIC BLOOD PRESSURE: 60 MMHG | HEART RATE: 71 BPM | HEIGHT: 62 IN | BODY MASS INDEX: 32.2 KG/M2 | WEIGHT: 175 LBS

## 2019-01-01 VITALS
BODY MASS INDEX: 32.56 KG/M2 | DIASTOLIC BLOOD PRESSURE: 77 MMHG | SYSTOLIC BLOOD PRESSURE: 176 MMHG | WEIGHT: 178 LBS | HEART RATE: 74 BPM

## 2019-01-01 DIAGNOSIS — M53.3 SACROILIAC JOINT PAIN: ICD-10-CM

## 2019-01-01 DIAGNOSIS — I70.213 INTERMITTENT CLAUDICATION OF BOTH LOWER EXTREMITIES DUE TO ATHEROSCLEROSIS (HCC): Primary | ICD-10-CM

## 2019-01-01 DIAGNOSIS — I77.1 ILIAC ARTERY STENOSIS, LEFT (HCC): ICD-10-CM

## 2019-01-01 DIAGNOSIS — J44.1 COPD EXACERBATION (HCC): Primary | ICD-10-CM

## 2019-01-01 DIAGNOSIS — Z91.041 CONTRAST MEDIA ALLERGY: ICD-10-CM

## 2019-01-01 DIAGNOSIS — Z91.041 CONTRAST MEDIA ALLERGY: Primary | ICD-10-CM

## 2019-01-01 DIAGNOSIS — Z98.890 H/O ENDARTERECTOMY: ICD-10-CM

## 2019-01-01 DIAGNOSIS — R91.1 LUNG NODULE: ICD-10-CM

## 2019-01-01 DIAGNOSIS — J96.11 CHRONIC RESPIRATORY FAILURE WITH HYPOXIA (HCC): ICD-10-CM

## 2019-01-01 DIAGNOSIS — R91.1 PULMONARY NODULE: ICD-10-CM

## 2019-01-01 DIAGNOSIS — M54.16 CHRONIC RADICULAR PAIN OF LOWER BACK: ICD-10-CM

## 2019-01-01 DIAGNOSIS — I70.213 ATHEROSCLEROSIS OF NATIVE ARTERIES OF EXTREMITIES WITH INTERMITTENT CLAUDICATION, BILATERAL LEGS (HCC): ICD-10-CM

## 2019-01-01 DIAGNOSIS — R06.00 DYSPNEA, UNSPECIFIED TYPE: Primary | ICD-10-CM

## 2019-01-01 DIAGNOSIS — I70.213 ATHEROSCLEROSIS OF NATIVE ARTERIES OF EXTREMITIES WITH INTERMITTENT CLAUDICATION, BILATERAL LEGS (HCC): Primary | ICD-10-CM

## 2019-01-01 DIAGNOSIS — J43.2 CENTRILOBULAR EMPHYSEMA (HCC): ICD-10-CM

## 2019-01-01 DIAGNOSIS — R06.02 SOB (SHORTNESS OF BREATH): ICD-10-CM

## 2019-01-01 DIAGNOSIS — M54.41 CHRONIC BILATERAL LOW BACK PAIN WITH BILATERAL SCIATICA: Primary | ICD-10-CM

## 2019-01-01 DIAGNOSIS — J43.2 CENTRILOBULAR EMPHYSEMA (HCC): Primary | ICD-10-CM

## 2019-01-01 DIAGNOSIS — E78.2 MIXED HYPERLIPIDEMIA: ICD-10-CM

## 2019-01-01 DIAGNOSIS — R09.02 HYPOXIA: ICD-10-CM

## 2019-01-01 DIAGNOSIS — I70.209 OCCLUSION OF COMMON FEMORAL ARTERY (HCC): ICD-10-CM

## 2019-01-01 DIAGNOSIS — J44.9 CHRONIC OBSTRUCTIVE PULMONARY DISEASE, UNSPECIFIED COPD TYPE (HCC): Primary | ICD-10-CM

## 2019-01-01 DIAGNOSIS — S75.002S: ICD-10-CM

## 2019-01-01 DIAGNOSIS — I70.213 INTERMITTENT CLAUDICATION OF BOTH LOWER EXTREMITIES DUE TO ATHEROSCLEROSIS (HCC): ICD-10-CM

## 2019-01-01 DIAGNOSIS — R79.89 ELEVATED D-DIMER: ICD-10-CM

## 2019-01-01 DIAGNOSIS — I70.722: ICD-10-CM

## 2019-01-01 DIAGNOSIS — Z98.890 H/O ENDARTERECTOMY: Primary | ICD-10-CM

## 2019-01-01 DIAGNOSIS — R06.02 SOB (SHORTNESS OF BREATH): Primary | ICD-10-CM

## 2019-01-01 DIAGNOSIS — I25.10 CORONARY ARTERY DISEASE INVOLVING NATIVE CORONARY ARTERY OF NATIVE HEART WITHOUT ANGINA PECTORIS: ICD-10-CM

## 2019-01-01 DIAGNOSIS — J41.0 SIMPLE CHRONIC BRONCHITIS (HCC): ICD-10-CM

## 2019-01-01 DIAGNOSIS — S75.002S: Primary | ICD-10-CM

## 2019-01-01 DIAGNOSIS — G89.29 CHRONIC RADICULAR PAIN OF LOWER BACK: ICD-10-CM

## 2019-01-01 DIAGNOSIS — Z95.3 S/P TAVR (TRANSCATHETER AORTIC VALVE REPLACEMENT), BIOPROSTHETIC: ICD-10-CM

## 2019-01-01 DIAGNOSIS — G89.29 CHRONIC BILATERAL LOW BACK PAIN WITH BILATERAL SCIATICA: Primary | ICD-10-CM

## 2019-01-01 DIAGNOSIS — M54.42 CHRONIC BILATERAL LOW BACK PAIN WITH BILATERAL SCIATICA: Primary | ICD-10-CM

## 2019-01-01 DIAGNOSIS — J06.9 VIRAL URI: ICD-10-CM

## 2019-01-01 DIAGNOSIS — J44.9 COPD, SEVERE (HCC): Primary | ICD-10-CM

## 2019-01-01 DIAGNOSIS — I71.2 THORACIC AORTIC ANEURYSM WITHOUT RUPTURE (HCC): ICD-10-CM

## 2019-01-01 DIAGNOSIS — E66.01 MORBID OBESITY DUE TO EXCESS CALORIES (HCC): ICD-10-CM

## 2019-01-01 DIAGNOSIS — J96.11 CHRONIC HYPOXEMIC RESPIRATORY FAILURE (HCC): ICD-10-CM

## 2019-01-01 DIAGNOSIS — I73.9 PERIPHERAL ARTERY DISEASE (HCC): ICD-10-CM

## 2019-01-01 DIAGNOSIS — E11.8 TYPE 2 DIABETES MELLITUS WITH COMPLICATION, WITHOUT LONG-TERM CURRENT USE OF INSULIN (HCC): ICD-10-CM

## 2019-01-01 DIAGNOSIS — I70.722: Primary | ICD-10-CM

## 2019-01-01 DIAGNOSIS — J44.1 COPD WITH ACUTE EXACERBATION (HCC): Primary | ICD-10-CM

## 2019-01-01 LAB
ALBUMIN SERPL BCP-MCNC: 3.8 G/DL (ref 3.5–5)
ALP SERPL-CCNC: 88 U/L (ref 46–116)
ALT SERPL W P-5'-P-CCNC: 30 U/L (ref 12–78)
ANION GAP SERPL CALCULATED.3IONS-SCNC: 12 MMOL/L (ref 4–13)
ANION GAP SERPL CALCULATED.3IONS-SCNC: 12 MMOL/L (ref 4–13)
ANION GAP SERPL CALCULATED.3IONS-SCNC: 6 MMOL/L (ref 4–13)
AST SERPL W P-5'-P-CCNC: 23 U/L (ref 5–45)
ATRIAL RATE: 72 BPM
ATRIAL RATE: 85 BPM
BASOPHILS # BLD AUTO: 0.04 THOUSANDS/ΜL (ref 0–0.1)
BASOPHILS # BLD AUTO: 0.06 THOUSANDS/ΜL (ref 0–0.1)
BASOPHILS NFR BLD AUTO: 0 % (ref 0–1)
BASOPHILS NFR BLD AUTO: 1 % (ref 0–1)
BILIRUB SERPL-MCNC: 0.7 MG/DL (ref 0.2–1)
BUN SERPL-MCNC: 11 MG/DL (ref 5–25)
BUN SERPL-MCNC: 14 MG/DL (ref 5–25)
BUN SERPL-MCNC: 19 MG/DL (ref 8–27)
BUN SERPL-MCNC: 9 MG/DL (ref 5–25)
BUN/CREAT SERPL: 21 (ref 12–28)
CALCIUM SERPL-MCNC: 8.6 MG/DL (ref 8.3–10.1)
CALCIUM SERPL-MCNC: 8.8 MG/DL (ref 8.3–10.1)
CALCIUM SERPL-MCNC: 9.2 MG/DL (ref 8.3–10.1)
CALCIUM SERPL-MCNC: 9.4 MG/DL (ref 8.7–10.3)
CHLORIDE SERPL-SCNC: 101 MMOL/L (ref 100–108)
CHLORIDE SERPL-SCNC: 105 MMOL/L (ref 100–108)
CHLORIDE SERPL-SCNC: 98 MMOL/L (ref 100–108)
CHLORIDE SERPL-SCNC: 99 MMOL/L (ref 96–106)
CHOLEST SERPL-MCNC: 188 MG/DL (ref 100–199)
CO2 SERPL-SCNC: 26 MMOL/L (ref 21–32)
CO2 SERPL-SCNC: 28 MMOL/L (ref 20–29)
CO2 SERPL-SCNC: 28 MMOL/L (ref 21–32)
CO2 SERPL-SCNC: 34 MMOL/L (ref 21–32)
CREAT SERPL-MCNC: 0.9 MG/DL (ref 0.57–1)
CREAT SERPL-MCNC: 0.99 MG/DL (ref 0.6–1.3)
CREAT SERPL-MCNC: 1 MG/DL (ref 0.6–1.3)
CREAT SERPL-MCNC: 1.11 MG/DL (ref 0.6–1.3)
DEPRECATED D DIMER PPP: 729 NG/ML (FEU)
EOSINOPHIL # BLD AUTO: 0.37 THOUSAND/ΜL (ref 0–0.61)
EOSINOPHIL # BLD AUTO: 0.41 THOUSAND/ΜL (ref 0–0.61)
EOSINOPHIL NFR BLD AUTO: 4 % (ref 0–6)
EOSINOPHIL NFR BLD AUTO: 5 % (ref 0–6)
ERYTHROCYTE [DISTWIDTH] IN BLOOD BY AUTOMATED COUNT: 13.4 % (ref 11.6–15.1)
ERYTHROCYTE [DISTWIDTH] IN BLOOD BY AUTOMATED COUNT: 13.5 % (ref 11.6–15.1)
ERYTHROCYTE [DISTWIDTH] IN BLOOD BY AUTOMATED COUNT: 13.5 % (ref 11.6–15.1)
EST. AVERAGE GLUCOSE BLD GHB EST-MCNC: 163 MG/DL
GFR SERPL CREATININE-BSD FRML MDRD: 52 ML/MIN/1.73SQ M
GFR SERPL CREATININE-BSD FRML MDRD: 59 ML/MIN/1.73SQ M
GFR SERPL CREATININE-BSD FRML MDRD: 60 ML/MIN/1.73SQ M
GLUCOSE SERPL-MCNC: 134 MG/DL (ref 65–140)
GLUCOSE SERPL-MCNC: 238 MG/DL (ref 65–140)
GLUCOSE SERPL-MCNC: 253 MG/DL (ref 65–140)
GLUCOSE SERPL-MCNC: 261 MG/DL (ref 65–140)
GLUCOSE SERPL-MCNC: 275 MG/DL (ref 65–140)
GLUCOSE SERPL-MCNC: 285 MG/DL (ref 65–140)
GLUCOSE SERPL-MCNC: 88 MG/DL (ref 65–140)
GLUCOSE SERPL-MCNC: 96 MG/DL (ref 65–99)
HBA1C MFR BLD: 7.3 % (ref 4.2–6.3)
HCT VFR BLD AUTO: 42 % (ref 34.8–46.1)
HCT VFR BLD AUTO: 42.6 % (ref 34.8–46.1)
HCT VFR BLD AUTO: 47.3 % (ref 34.8–46.1)
HDLC SERPL-MCNC: 40 MG/DL
HGB BLD-MCNC: 13.6 G/DL (ref 11.5–15.4)
HGB BLD-MCNC: 13.7 G/DL (ref 11.5–15.4)
HGB BLD-MCNC: 15.1 G/DL (ref 11.5–15.4)
IMM GRANULOCYTES # BLD AUTO: 0.01 THOUSAND/UL (ref 0–0.2)
IMM GRANULOCYTES # BLD AUTO: 0.04 THOUSAND/UL (ref 0–0.2)
IMM GRANULOCYTES NFR BLD AUTO: 0 % (ref 0–2)
IMM GRANULOCYTES NFR BLD AUTO: 0 % (ref 0–2)
LDLC SERPL CALC-MCNC: 119 MG/DL (ref 0–99)
LIPASE SERPL-CCNC: 108 U/L (ref 73–393)
LYMPHOCYTES # BLD AUTO: 1.64 THOUSANDS/ΜL (ref 0.6–4.47)
LYMPHOCYTES # BLD AUTO: 1.97 THOUSANDS/ΜL (ref 0.6–4.47)
LYMPHOCYTES NFR BLD AUTO: 18 % (ref 14–44)
LYMPHOCYTES NFR BLD AUTO: 24 % (ref 14–44)
MAGNESIUM SERPL-MCNC: 2.4 MG/DL (ref 1.6–2.6)
MCH RBC QN AUTO: 27 PG (ref 26.8–34.3)
MCH RBC QN AUTO: 27.5 PG (ref 26.8–34.3)
MCH RBC QN AUTO: 27.8 PG (ref 26.8–34.3)
MCHC RBC AUTO-ENTMCNC: 31.9 G/DL (ref 31.4–37.4)
MCHC RBC AUTO-ENTMCNC: 31.9 G/DL (ref 31.4–37.4)
MCHC RBC AUTO-ENTMCNC: 32.6 G/DL (ref 31.4–37.4)
MCV RBC AUTO: 85 FL (ref 82–98)
MCV RBC AUTO: 85 FL (ref 82–98)
MCV RBC AUTO: 86 FL (ref 82–98)
MONOCYTES # BLD AUTO: 0.71 THOUSAND/ΜL (ref 0.17–1.22)
MONOCYTES # BLD AUTO: 0.92 THOUSAND/ΜL (ref 0.17–1.22)
MONOCYTES NFR BLD AUTO: 11 % (ref 4–12)
MONOCYTES NFR BLD AUTO: 8 % (ref 4–12)
NEUTROPHILS # BLD AUTO: 4.92 THOUSANDS/ΜL (ref 1.85–7.62)
NEUTROPHILS # BLD AUTO: 6.41 THOUSANDS/ΜL (ref 1.85–7.62)
NEUTS SEG NFR BLD AUTO: 59 % (ref 43–75)
NEUTS SEG NFR BLD AUTO: 70 % (ref 43–75)
NRBC BLD AUTO-RTO: 0 /100 WBCS
NRBC BLD AUTO-RTO: 0 /100 WBCS
NT-PROBNP SERPL-MCNC: 411 PG/ML
P AXIS: 61 DEGREES
P AXIS: 76 DEGREES
PHOSPHATE SERPL-MCNC: 3.3 MG/DL (ref 2.3–4.1)
PLATELET # BLD AUTO: 238 THOUSANDS/UL (ref 149–390)
PLATELET # BLD AUTO: 246 THOUSANDS/UL (ref 149–390)
PLATELET # BLD AUTO: 248 THOUSANDS/UL (ref 149–390)
PMV BLD AUTO: 9.4 FL (ref 8.9–12.7)
PMV BLD AUTO: 9.6 FL (ref 8.9–12.7)
PMV BLD AUTO: 9.7 FL (ref 8.9–12.7)
POTASSIUM SERPL-SCNC: 3.2 MMOL/L (ref 3.5–5.2)
POTASSIUM SERPL-SCNC: 3.2 MMOL/L (ref 3.5–5.3)
POTASSIUM SERPL-SCNC: 3.4 MMOL/L (ref 3.5–5.3)
POTASSIUM SERPL-SCNC: 3.7 MMOL/L (ref 3.5–5.3)
PR INTERVAL: 142 MS
PR INTERVAL: 152 MS
PROT SERPL-MCNC: 7.6 G/DL (ref 6.4–8.2)
QRS AXIS: 67 DEGREES
QRS AXIS: 76 DEGREES
QRSD INTERVAL: 84 MS
QRSD INTERVAL: 84 MS
QT INTERVAL: 370 MS
QT INTERVAL: 390 MS
QTC INTERVAL: 427 MS
QTC INTERVAL: 440 MS
RBC # BLD AUTO: 4.93 MILLION/UL (ref 3.81–5.12)
RBC # BLD AUTO: 5.03 MILLION/UL (ref 3.81–5.12)
RBC # BLD AUTO: 5.49 MILLION/UL (ref 3.81–5.12)
SL AMB EGFR AFRICAN AMERICAN: 77 ML/MIN/1.73
SL AMB EGFR NON AFRICAN AMERICAN: 67 ML/MIN/1.73
SODIUM SERPL-SCNC: 138 MMOL/L (ref 136–145)
SODIUM SERPL-SCNC: 141 MMOL/L (ref 136–145)
SODIUM SERPL-SCNC: 143 MMOL/L (ref 134–144)
SODIUM SERPL-SCNC: 143 MMOL/L (ref 136–145)
T WAVE AXIS: 164 DEGREES
T WAVE AXIS: 78 DEGREES
TRIGL SERPL-MCNC: 143 MG/DL (ref 0–149)
TROPONIN I SERPL-MCNC: <0.02 NG/ML
TROPONIN I SERPL-MCNC: <0.02 NG/ML
VENTRICULAR RATE: 72 BPM
VENTRICULAR RATE: 85 BPM
WBC # BLD AUTO: 13.69 THOUSAND/UL (ref 4.31–10.16)
WBC # BLD AUTO: 8.29 THOUSAND/UL (ref 4.31–10.16)
WBC # BLD AUTO: 9.21 THOUSAND/UL (ref 4.31–10.16)

## 2019-01-01 PROCEDURE — 94640 AIRWAY INHALATION TREATMENT: CPT

## 2019-01-01 PROCEDURE — 80053 COMPREHEN METABOLIC PANEL: CPT | Performed by: EMERGENCY MEDICINE

## 2019-01-01 PROCEDURE — 93923 UPR/LXTR ART STDY 3+ LVLS: CPT

## 2019-01-01 PROCEDURE — 94760 N-INVAS EAR/PLS OXIMETRY 1: CPT

## 2019-01-01 PROCEDURE — 84484 ASSAY OF TROPONIN QUANT: CPT | Performed by: EMERGENCY MEDICINE

## 2019-01-01 PROCEDURE — 80048 BASIC METABOLIC PNL TOTAL CA: CPT | Performed by: FAMILY MEDICINE

## 2019-01-01 PROCEDURE — 94761 N-INVAS EAR/PLS OXIMETRY MLT: CPT

## 2019-01-01 PROCEDURE — 84100 ASSAY OF PHOSPHORUS: CPT | Performed by: INTERNAL MEDICINE

## 2019-01-01 PROCEDURE — 99232 SBSQ HOSP IP/OBS MODERATE 35: CPT | Performed by: PHYSICIAN ASSISTANT

## 2019-01-01 PROCEDURE — 99213 OFFICE O/P EST LOW 20 MIN: CPT | Performed by: SURGERY

## 2019-01-01 PROCEDURE — 85025 COMPLETE CBC W/AUTO DIFF WBC: CPT | Performed by: EMERGENCY MEDICINE

## 2019-01-01 PROCEDURE — 76937 US GUIDE VASCULAR ACCESS: CPT

## 2019-01-01 PROCEDURE — C1725 CATH, TRANSLUMIN NON-LASER: HCPCS | Performed by: SURGERY

## 2019-01-01 PROCEDURE — 96375 TX/PRO/DX INJ NEW DRUG ADDON: CPT

## 2019-01-01 PROCEDURE — 93010 ELECTROCARDIOGRAM REPORT: CPT | Performed by: INTERNAL MEDICINE

## 2019-01-01 PROCEDURE — C1887 CATHETER, GUIDING: HCPCS | Performed by: SURGERY

## 2019-01-01 PROCEDURE — 75625 CONTRAST EXAM ABDOMINL AORTA: CPT

## 2019-01-01 PROCEDURE — 75635 CT ANGIO ABDOMINAL ARTERIES: CPT

## 2019-01-01 PROCEDURE — 99214 OFFICE O/P EST MOD 30 MIN: CPT | Performed by: INTERNAL MEDICINE

## 2019-01-01 PROCEDURE — 99215 OFFICE O/P EST HI 40 MIN: CPT | Performed by: PHYSICIAN ASSISTANT

## 2019-01-01 PROCEDURE — 83036 HEMOGLOBIN GLYCOSYLATED A1C: CPT | Performed by: PHYSICIAN ASSISTANT

## 2019-01-01 PROCEDURE — 93922 UPR/L XTREMITY ART 2 LEVELS: CPT | Performed by: SURGERY

## 2019-01-01 PROCEDURE — C1769 GUIDE WIRE: HCPCS | Performed by: SURGERY

## 2019-01-01 PROCEDURE — C1894 INTRO/SHEATH, NON-LASER: HCPCS | Performed by: SURGERY

## 2019-01-01 PROCEDURE — 99214 OFFICE O/P EST MOD 30 MIN: CPT | Performed by: NURSE PRACTITIONER

## 2019-01-01 PROCEDURE — 94010 BREATHING CAPACITY TEST: CPT | Performed by: PHYSICIAN ASSISTANT

## 2019-01-01 PROCEDURE — 83735 ASSAY OF MAGNESIUM: CPT | Performed by: INTERNAL MEDICINE

## 2019-01-01 PROCEDURE — 93926 LOWER EXTREMITY STUDY: CPT | Performed by: SURGERY

## 2019-01-01 PROCEDURE — 85379 FIBRIN DEGRADATION QUANT: CPT | Performed by: EMERGENCY MEDICINE

## 2019-01-01 PROCEDURE — 83880 ASSAY OF NATRIURETIC PEPTIDE: CPT | Performed by: EMERGENCY MEDICINE

## 2019-01-01 PROCEDURE — 82948 REAGENT STRIP/BLOOD GLUCOSE: CPT

## 2019-01-01 PROCEDURE — 94644 CONT INHLJ TX 1ST HOUR: CPT

## 2019-01-01 PROCEDURE — 94645 CONT INHLJ TX EACH ADDL HOUR: CPT

## 2019-01-01 PROCEDURE — 71046 X-RAY EXAM CHEST 2 VIEWS: CPT

## 2019-01-01 PROCEDURE — 93978 VASCULAR STUDY: CPT | Performed by: SURGERY

## 2019-01-01 PROCEDURE — 83690 ASSAY OF LIPASE: CPT | Performed by: EMERGENCY MEDICINE

## 2019-01-01 PROCEDURE — 99222 1ST HOSP IP/OBS MODERATE 55: CPT | Performed by: INTERNAL MEDICINE

## 2019-01-01 PROCEDURE — 99285 EMERGENCY DEPT VISIT HI MDM: CPT

## 2019-01-01 PROCEDURE — C1760 CLOSURE DEV, VASC: HCPCS | Performed by: SURGERY

## 2019-01-01 PROCEDURE — 94726 PLETHYSMOGRAPHY LUNG VOLUMES: CPT | Performed by: INTERNAL MEDICINE

## 2019-01-01 PROCEDURE — 36415 COLL VENOUS BLD VENIPUNCTURE: CPT | Performed by: EMERGENCY MEDICINE

## 2019-01-01 PROCEDURE — 1124F ACP DISCUSS-NO DSCNMKR DOCD: CPT | Performed by: PHYSICIAN ASSISTANT

## 2019-01-01 PROCEDURE — 93925 LOWER EXTREMITY STUDY: CPT

## 2019-01-01 PROCEDURE — 37225 PR REVSC OPN/PRQ FEM/POP W/ATHRC/ANGIOP SM VSL: CPT | Performed by: SURGERY

## 2019-01-01 PROCEDURE — 94060 EVALUATION OF WHEEZING: CPT

## 2019-01-01 PROCEDURE — 99223 1ST HOSP IP/OBS HIGH 75: CPT | Performed by: FAMILY MEDICINE

## 2019-01-01 PROCEDURE — 93978 VASCULAR STUDY: CPT

## 2019-01-01 PROCEDURE — 94729 DIFFUSING CAPACITY: CPT

## 2019-01-01 PROCEDURE — C1885 CATH, TRANSLUMIN ANGIO LASER: HCPCS

## 2019-01-01 PROCEDURE — 94727 GAS DIL/WSHOT DETER LNG VOL: CPT

## 2019-01-01 PROCEDURE — 96374 THER/PROPH/DIAG INJ IV PUSH: CPT

## 2019-01-01 PROCEDURE — 99214 OFFICE O/P EST MOD 30 MIN: CPT | Performed by: PHYSICIAN ASSISTANT

## 2019-01-01 PROCEDURE — 94729 DIFFUSING CAPACITY: CPT | Performed by: INTERNAL MEDICINE

## 2019-01-01 PROCEDURE — NC001 PR NO CHARGE: Performed by: SURGERY

## 2019-01-01 PROCEDURE — 99285 EMERGENCY DEPT VISIT HI MDM: CPT | Performed by: EMERGENCY MEDICINE

## 2019-01-01 PROCEDURE — 94060 EVALUATION OF WHEEZING: CPT | Performed by: INTERNAL MEDICINE

## 2019-01-01 PROCEDURE — 93005 ELECTROCARDIOGRAM TRACING: CPT

## 2019-01-01 PROCEDURE — 99238 HOSP IP/OBS DSCHRG MGMT 30/<: CPT | Performed by: PHYSICIAN ASSISTANT

## 2019-01-01 PROCEDURE — 71275 CT ANGIOGRAPHY CHEST: CPT

## 2019-01-01 PROCEDURE — 93926 LOWER EXTREMITY STUDY: CPT

## 2019-01-01 PROCEDURE — 80048 BASIC METABOLIC PNL TOTAL CA: CPT | Performed by: EMERGENCY MEDICINE

## 2019-01-01 PROCEDURE — 37220 PR REVASCULARIZE ILIAC ARTERY,ANGIOPLASTY, INITIAL VESSEL: CPT | Performed by: SURGERY

## 2019-01-01 PROCEDURE — 75822 VEIN X-RAY ARMS/LEGS: CPT

## 2019-01-01 PROCEDURE — 85027 COMPLETE CBC AUTOMATED: CPT | Performed by: FAMILY MEDICINE

## 2019-01-01 PROCEDURE — 93925 LOWER EXTREMITY STUDY: CPT | Performed by: SURGERY

## 2019-01-01 PROCEDURE — 99204 OFFICE O/P NEW MOD 45 MIN: CPT | Performed by: PHYSICIAN ASSISTANT

## 2019-01-01 DEVICE — DEVICE CLOSURE MYNX CONTROL 6F/7FR: Type: IMPLANTABLE DEVICE | Site: GROIN | Status: FUNCTIONAL

## 2019-01-01 RX ORDER — PENTOXIFYLLINE 400 MG/1
400 TABLET, EXTENDED RELEASE ORAL 2 TIMES DAILY WITH MEALS
Qty: 60 TABLET | Refills: 0 | Status: SHIPPED | OUTPATIENT
Start: 2019-01-01 | End: 2019-01-01 | Stop reason: ALTCHOICE

## 2019-01-01 RX ORDER — GABAPENTIN 100 MG/1
CAPSULE ORAL
Qty: 300 CAPSULE | Refills: 3 | Status: SHIPPED | OUTPATIENT
Start: 2019-01-01 | End: 2019-01-01

## 2019-01-01 RX ORDER — FLUTICASONE FUROATE AND VILANTEROL 100; 25 UG/1; UG/1
1 POWDER RESPIRATORY (INHALATION) DAILY
Qty: 1 INHALER | Refills: 3 | Status: SHIPPED | OUTPATIENT
Start: 2019-01-01 | End: 2020-01-01 | Stop reason: HOSPADM

## 2019-01-01 RX ORDER — GABAPENTIN 300 MG/1
300 CAPSULE ORAL
Qty: 90 CAPSULE | Refills: 1 | Status: SHIPPED | OUTPATIENT
Start: 2019-01-01 | End: 2019-01-01

## 2019-01-01 RX ORDER — SODIUM CHLORIDE FOR INHALATION 0.9 %
3 VIAL, NEBULIZER (ML) INHALATION ONCE
Status: COMPLETED | OUTPATIENT
Start: 2019-01-01 | End: 2019-01-01

## 2019-01-01 RX ORDER — GLYCOPYRROLATE 0.2 MG/ML
INJECTION INTRAMUSCULAR; INTRAVENOUS AS NEEDED
Status: DISCONTINUED | OUTPATIENT
Start: 2019-01-01 | End: 2019-01-01 | Stop reason: SURG

## 2019-01-01 RX ORDER — SODIUM CHLORIDE 9 MG/ML
125 INJECTION, SOLUTION INTRAVENOUS CONTINUOUS
Status: DISCONTINUED | OUTPATIENT
Start: 2019-01-01 | End: 2019-01-01 | Stop reason: HOSPADM

## 2019-01-01 RX ORDER — METOCLOPRAMIDE HYDROCHLORIDE 5 MG/ML
10 INJECTION INTRAMUSCULAR; INTRAVENOUS ONCE AS NEEDED
Status: DISCONTINUED | OUTPATIENT
Start: 2019-01-01 | End: 2019-01-01 | Stop reason: HOSPADM

## 2019-01-01 RX ORDER — CEFAZOLIN SODIUM 2 G/50ML
SOLUTION INTRAVENOUS AS NEEDED
Status: DISCONTINUED | OUTPATIENT
Start: 2019-01-01 | End: 2019-01-01 | Stop reason: SURG

## 2019-01-01 RX ORDER — ONDANSETRON 2 MG/ML
INJECTION INTRAMUSCULAR; INTRAVENOUS AS NEEDED
Status: DISCONTINUED | OUTPATIENT
Start: 2019-01-01 | End: 2019-01-01 | Stop reason: SURG

## 2019-01-01 RX ORDER — GABAPENTIN 100 MG/1
CAPSULE ORAL
Refills: 2 | COMMUNITY
Start: 2019-01-01 | End: 2019-01-01

## 2019-01-01 RX ORDER — DIPHENHYDRAMINE HYDROCHLORIDE 50 MG/ML
12.5 INJECTION INTRAMUSCULAR; INTRAVENOUS ONCE AS NEEDED
Status: DISCONTINUED | OUTPATIENT
Start: 2019-01-01 | End: 2019-01-01 | Stop reason: HOSPADM

## 2019-01-01 RX ORDER — ACETAMINOPHEN 325 MG/1
650 TABLET ORAL EVERY 6 HOURS PRN
Status: DISCONTINUED | OUTPATIENT
Start: 2019-01-01 | End: 2019-01-01

## 2019-01-01 RX ORDER — ACETAMINOPHEN AND CODEINE PHOSPHATE 300; 15 MG/1; MG/1
TABLET ORAL
Qty: 30 TABLET | Refills: 0 | OUTPATIENT
Start: 2019-01-01

## 2019-01-01 RX ORDER — ALBUTEROL SULFATE 2.5 MG/3ML
2.5 SOLUTION RESPIRATORY (INHALATION) ONCE
Status: COMPLETED | OUTPATIENT
Start: 2019-01-01 | End: 2019-01-01

## 2019-01-01 RX ORDER — METHYLPREDNISOLONE SODIUM SUCCINATE 125 MG/2ML
125 INJECTION, POWDER, LYOPHILIZED, FOR SOLUTION INTRAMUSCULAR; INTRAVENOUS ONCE
Status: COMPLETED | OUTPATIENT
Start: 2019-01-01 | End: 2019-01-01

## 2019-01-01 RX ORDER — GABAPENTIN 600 MG/1
600 TABLET ORAL
Qty: 90 TABLET | Refills: 0 | Status: SHIPPED | OUTPATIENT
Start: 2019-01-01 | End: 2020-01-01 | Stop reason: HOSPADM

## 2019-01-01 RX ORDER — DEXAMETHASONE SODIUM PHOSPHATE 10 MG/ML
INJECTION, SOLUTION INTRAMUSCULAR; INTRAVENOUS AS NEEDED
Status: DISCONTINUED | OUTPATIENT
Start: 2019-01-01 | End: 2019-01-01 | Stop reason: SURG

## 2019-01-01 RX ORDER — HYDROMORPHONE HCL/PF 1 MG/ML
0.2 SYRINGE (ML) INJECTION
Status: DISCONTINUED | OUTPATIENT
Start: 2019-01-01 | End: 2019-01-01 | Stop reason: HOSPADM

## 2019-01-01 RX ORDER — CEFAZOLIN SODIUM 1 G/50ML
1000 SOLUTION INTRAVENOUS ONCE
Status: DISCONTINUED | OUTPATIENT
Start: 2019-01-01 | End: 2019-01-01 | Stop reason: HOSPADM

## 2019-01-01 RX ORDER — OXYCODONE HYDROCHLORIDE AND ACETAMINOPHEN 5; 325 MG/1; MG/1
1 TABLET ORAL EVERY 4 HOURS PRN
COMMUNITY
Start: 2019-01-01 | End: 2020-01-01 | Stop reason: HOSPADM

## 2019-01-01 RX ORDER — METHYLPREDNISOLONE SODIUM SUCCINATE 40 MG/ML
40 INJECTION, POWDER, LYOPHILIZED, FOR SOLUTION INTRAMUSCULAR; INTRAVENOUS EVERY 12 HOURS SCHEDULED
Status: DISCONTINUED | OUTPATIENT
Start: 2019-01-01 | End: 2019-01-01 | Stop reason: HOSPADM

## 2019-01-01 RX ORDER — DIPHENHYDRAMINE HYDROCHLORIDE 50 MG/ML
25 INJECTION INTRAMUSCULAR; INTRAVENOUS ONCE
Status: COMPLETED | OUTPATIENT
Start: 2019-01-01 | End: 2019-01-01

## 2019-01-01 RX ORDER — PREDNISONE 10 MG/1
TABLET ORAL
Qty: 30 TABLET | Refills: 0 | Status: SHIPPED | OUTPATIENT
Start: 2019-01-01 | End: 2019-01-01

## 2019-01-01 RX ORDER — ALBUTEROL SULFATE 2.5 MG/3ML
2.5 SOLUTION RESPIRATORY (INHALATION) EVERY 6 HOURS PRN
Status: DISCONTINUED | OUTPATIENT
Start: 2019-01-01 | End: 2019-01-01

## 2019-01-01 RX ORDER — LIDOCAINE 50 MG/G
OINTMENT TOPICAL AS NEEDED
Qty: 50 G | Refills: 2 | Status: SHIPPED | OUTPATIENT
Start: 2019-01-01 | End: 2019-01-01 | Stop reason: ALTCHOICE

## 2019-01-01 RX ORDER — ALBUTEROL SULFATE 90 UG/1
2 AEROSOL, METERED RESPIRATORY (INHALATION) 2 TIMES DAILY
Status: DISCONTINUED | OUTPATIENT
Start: 2019-01-01 | End: 2019-01-01

## 2019-01-01 RX ORDER — IBUPROFEN 600 MG/1
600 TABLET ORAL EVERY 6 HOURS PRN
COMMUNITY
Start: 2019-01-01 | End: 2020-01-01

## 2019-01-01 RX ORDER — PREDNISONE 20 MG/1
60 TABLET ORAL ONCE
Status: COMPLETED | OUTPATIENT
Start: 2019-01-01 | End: 2019-01-01

## 2019-01-01 RX ORDER — LIDOCAINE 50 MG/G
OINTMENT TOPICAL AS NEEDED
Qty: 35.44 G | Refills: 0 | Status: SHIPPED | OUTPATIENT
Start: 2019-01-01 | End: 2020-01-01 | Stop reason: HOSPADM

## 2019-01-01 RX ORDER — LIDOCAINE HYDROCHLORIDE 10 MG/ML
INJECTION, SOLUTION INFILTRATION; PERINEURAL AS NEEDED
Status: DISCONTINUED | OUTPATIENT
Start: 2019-01-01 | End: 2019-01-01 | Stop reason: HOSPADM

## 2019-01-01 RX ORDER — PANTOPRAZOLE SODIUM 40 MG/1
40 TABLET, DELAYED RELEASE ORAL DAILY
Status: DISCONTINUED | OUTPATIENT
Start: 2019-01-01 | End: 2019-01-01 | Stop reason: HOSPADM

## 2019-01-01 RX ORDER — PROPOFOL 10 MG/ML
INJECTION, EMULSION INTRAVENOUS AS NEEDED
Status: DISCONTINUED | OUTPATIENT
Start: 2019-01-01 | End: 2019-01-01 | Stop reason: SURG

## 2019-01-01 RX ORDER — GABAPENTIN 100 MG/1
CAPSULE ORAL
Qty: 60 CAPSULE | Refills: 3 | Status: SHIPPED | OUTPATIENT
Start: 2019-01-01 | End: 2019-01-01

## 2019-01-01 RX ORDER — OXYCODONE HYDROCHLORIDE 5 MG/1
5 TABLET ORAL EVERY 4 HOURS PRN
Status: DISCONTINUED | OUTPATIENT
Start: 2019-01-01 | End: 2019-01-01

## 2019-01-01 RX ORDER — CILOSTAZOL 50 MG/1
50 TABLET ORAL 2 TIMES DAILY
Qty: 60 TABLET | Refills: 11 | Status: SHIPPED | OUTPATIENT
Start: 2019-01-01 | End: 2019-01-01

## 2019-01-01 RX ORDER — FLUTICASONE PROPIONATE 50 MCG
1 SPRAY, SUSPENSION (ML) NASAL DAILY
Status: DISCONTINUED | OUTPATIENT
Start: 2019-01-01 | End: 2019-01-01 | Stop reason: HOSPADM

## 2019-01-01 RX ORDER — ONDANSETRON 2 MG/ML
4 INJECTION INTRAMUSCULAR; INTRAVENOUS EVERY 6 HOURS PRN
Status: DISCONTINUED | OUTPATIENT
Start: 2019-01-01 | End: 2019-01-01 | Stop reason: HOSPADM

## 2019-01-01 RX ORDER — MIDAZOLAM HYDROCHLORIDE 1 MG/ML
INJECTION INTRAMUSCULAR; INTRAVENOUS AS NEEDED
Status: DISCONTINUED | OUTPATIENT
Start: 2019-01-01 | End: 2019-01-01 | Stop reason: SURG

## 2019-01-01 RX ORDER — GABAPENTIN 100 MG/1
100 CAPSULE ORAL 2 TIMES DAILY
Qty: 60 CAPSULE | Refills: 3 | Status: SHIPPED | OUTPATIENT
Start: 2019-01-01 | End: 2019-01-01

## 2019-01-01 RX ORDER — DOCUSATE SODIUM 100 MG/1
100 CAPSULE, LIQUID FILLED ORAL 2 TIMES DAILY PRN
Status: DISCONTINUED | OUTPATIENT
Start: 2019-01-01 | End: 2019-01-01 | Stop reason: HOSPADM

## 2019-01-01 RX ORDER — IPRATROPIUM BROMIDE AND ALBUTEROL SULFATE 2.5; .5 MG/3ML; MG/3ML
3 SOLUTION RESPIRATORY (INHALATION)
Status: DISCONTINUED | OUTPATIENT
Start: 2019-01-01 | End: 2019-01-01

## 2019-01-01 RX ORDER — OXYCODONE HYDROCHLORIDE 10 MG/1
10 TABLET ORAL EVERY 4 HOURS PRN
Status: DISCONTINUED | OUTPATIENT
Start: 2019-01-01 | End: 2019-01-01

## 2019-01-01 RX ORDER — DEXMEDETOMIDINE HYDROCHLORIDE 100 UG/ML
INJECTION, SOLUTION INTRAVENOUS AS NEEDED
Status: DISCONTINUED | OUTPATIENT
Start: 2019-01-01 | End: 2019-01-01 | Stop reason: SURG

## 2019-01-01 RX ORDER — ACETAMINOPHEN 325 MG/1
650 TABLET ORAL EVERY 6 HOURS PRN
Status: DISCONTINUED | OUTPATIENT
Start: 2019-01-01 | End: 2019-01-01 | Stop reason: HOSPADM

## 2019-01-01 RX ORDER — FENTANYL CITRATE/PF 50 MCG/ML
12.5 SYRINGE (ML) INJECTION
Status: DISCONTINUED | OUTPATIENT
Start: 2019-01-01 | End: 2019-01-01 | Stop reason: HOSPADM

## 2019-01-01 RX ORDER — CHLORHEXIDINE GLUCONATE 0.12 MG/ML
15 RINSE ORAL ONCE
Status: COMPLETED | OUTPATIENT
Start: 2019-01-01 | End: 2019-01-01

## 2019-01-01 RX ORDER — METHYLPREDNISOLONE 32 MG/1
32 TABLET ORAL DAILY
Qty: 2 TABLET | Refills: 0 | Status: SHIPPED | OUTPATIENT
Start: 2019-01-01 | End: 2019-01-01

## 2019-01-01 RX ORDER — CEFAZOLIN SODIUM 1 G/50ML
1000 SOLUTION INTRAVENOUS ONCE
Status: CANCELLED | OUTPATIENT
Start: 2019-01-01 | End: 2019-01-01

## 2019-01-01 RX ORDER — SODIUM CHLORIDE, SODIUM LACTATE, POTASSIUM CHLORIDE, CALCIUM CHLORIDE 600; 310; 30; 20 MG/100ML; MG/100ML; MG/100ML; MG/100ML
125 INJECTION, SOLUTION INTRAVENOUS CONTINUOUS
Status: DISCONTINUED | OUTPATIENT
Start: 2019-01-01 | End: 2019-01-01 | Stop reason: HOSPADM

## 2019-01-01 RX ORDER — LIDOCAINE 50 MG/G
2 PATCH TOPICAL DAILY
Qty: 30 PATCH | Refills: 0 | Status: SHIPPED | OUTPATIENT
Start: 2019-01-01 | End: 2019-01-01

## 2019-01-01 RX ORDER — LEVALBUTEROL 1.25 MG/.5ML
1.25 SOLUTION, CONCENTRATE RESPIRATORY (INHALATION)
Status: DISCONTINUED | OUTPATIENT
Start: 2019-01-01 | End: 2019-01-01 | Stop reason: HOSPADM

## 2019-01-01 RX ORDER — CLOPIDOGREL BISULFATE 75 MG/1
300 TABLET ORAL ONCE
Status: COMPLETED | OUTPATIENT
Start: 2019-01-01 | End: 2019-01-01

## 2019-01-01 RX ORDER — EPHEDRINE SULFATE 50 MG/ML
INJECTION INTRAVENOUS AS NEEDED
Status: DISCONTINUED | OUTPATIENT
Start: 2019-01-01 | End: 2019-01-01 | Stop reason: SURG

## 2019-01-01 RX ORDER — SODIUM CHLORIDE, SODIUM LACTATE, POTASSIUM CHLORIDE, CALCIUM CHLORIDE 600; 310; 30; 20 MG/100ML; MG/100ML; MG/100ML; MG/100ML
INJECTION, SOLUTION INTRAVENOUS CONTINUOUS PRN
Status: DISCONTINUED | OUTPATIENT
Start: 2019-01-01 | End: 2019-01-01 | Stop reason: SURG

## 2019-01-01 RX ORDER — FUROSEMIDE 20 MG/1
20 TABLET ORAL 2 TIMES DAILY
Status: DISCONTINUED | OUTPATIENT
Start: 2019-01-01 | End: 2019-01-01 | Stop reason: HOSPADM

## 2019-01-01 RX ORDER — LEVOTHYROXINE SODIUM 0.05 MG/1
50 TABLET ORAL DAILY
Status: DISCONTINUED | OUTPATIENT
Start: 2019-01-01 | End: 2019-01-01 | Stop reason: HOSPADM

## 2019-01-01 RX ORDER — LIDOCAINE 50 MG/G
2 PATCH TOPICAL DAILY
Status: DISCONTINUED | OUTPATIENT
Start: 2019-01-01 | End: 2019-01-01 | Stop reason: HOSPADM

## 2019-01-01 RX ORDER — ACETAMINOPHEN AND CODEINE PHOSPHATE 300; 15 MG/1; MG/1
1 TABLET ORAL EVERY 4 HOURS PRN
Qty: 30 TABLET | Refills: 0 | Status: SHIPPED | OUTPATIENT
Start: 2019-01-01 | End: 2019-01-01

## 2019-01-01 RX ORDER — CLOPIDOGREL BISULFATE 75 MG/1
75 TABLET ORAL DAILY
Qty: 90 TABLET | Refills: 2 | Status: SHIPPED | OUTPATIENT
Start: 2019-01-01 | End: 2019-01-01

## 2019-01-01 RX ORDER — PREDNISONE 10 MG/1
TABLET ORAL
Status: ON HOLD | COMMUNITY
Start: 2019-01-01 | End: 2019-01-01 | Stop reason: SDUPTHER

## 2019-01-01 RX ORDER — DIPHENHYDRAMINE HCL 50 MG
50 CAPSULE ORAL EVERY 12 HOURS
Qty: 2 CAPSULE | Refills: 0 | Status: SHIPPED | OUTPATIENT
Start: 2019-01-01 | End: 2020-01-01

## 2019-01-01 RX ORDER — ACETAMINOPHEN 325 MG/1
650 TABLET ORAL EVERY 4 HOURS PRN
Status: DISCONTINUED | OUTPATIENT
Start: 2019-01-01 | End: 2019-01-01 | Stop reason: HOSPADM

## 2019-01-01 RX ORDER — HEPARIN SODIUM 200 [USP'U]/100ML
INJECTION, SOLUTION INTRAVENOUS
Status: COMPLETED | OUTPATIENT
Start: 2019-01-01 | End: 2019-01-01

## 2019-01-01 RX ORDER — ASPIRIN 81 MG/1
81 TABLET, CHEWABLE ORAL DAILY
Status: DISCONTINUED | OUTPATIENT
Start: 2019-01-01 | End: 2019-01-01 | Stop reason: HOSPADM

## 2019-01-01 RX ORDER — ALPRAZOLAM 0.25 MG/1
0.25 TABLET ORAL 2 TIMES DAILY PRN
Refills: 2 | COMMUNITY
Start: 2019-01-01 | End: 2020-01-01 | Stop reason: HOSPADM

## 2019-01-01 RX ORDER — MAGNESIUM SULFATE HEPTAHYDRATE 40 MG/ML
2 INJECTION, SOLUTION INTRAVENOUS ONCE
Status: COMPLETED | OUTPATIENT
Start: 2019-01-01 | End: 2019-01-01

## 2019-01-01 RX ORDER — LIDOCAINE HYDROCHLORIDE 10 MG/ML
0.5 INJECTION, SOLUTION EPIDURAL; INFILTRATION; INTRACAUDAL; PERINEURAL ONCE AS NEEDED
Status: COMPLETED | OUTPATIENT
Start: 2019-01-01 | End: 2019-01-01

## 2019-01-01 RX ORDER — ALBUTEROL SULFATE 90 UG/1
2 AEROSOL, METERED RESPIRATORY (INHALATION) EVERY 4 HOURS PRN
Status: DISCONTINUED | OUTPATIENT
Start: 2019-01-01 | End: 2019-01-01 | Stop reason: HOSPADM

## 2019-01-01 RX ORDER — HEPARIN SODIUM 1000 [USP'U]/ML
INJECTION, SOLUTION INTRAVENOUS; SUBCUTANEOUS AS NEEDED
Status: DISCONTINUED | OUTPATIENT
Start: 2019-01-01 | End: 2019-01-01 | Stop reason: SURG

## 2019-01-01 RX ORDER — OXYCODONE HYDROCHLORIDE AND ACETAMINOPHEN 5; 325 MG/1; MG/1
2 TABLET ORAL EVERY 6 HOURS PRN
Status: DISCONTINUED | OUTPATIENT
Start: 2019-01-01 | End: 2019-01-01 | Stop reason: HOSPADM

## 2019-01-01 RX ORDER — GABAPENTIN 100 MG/1
100 CAPSULE ORAL 2 TIMES DAILY
Qty: 180 CAPSULE | Refills: 0 | Status: SHIPPED | OUTPATIENT
Start: 2019-01-01 | End: 2020-01-01 | Stop reason: HOSPADM

## 2019-01-01 RX ORDER — PREDNISONE 50 MG/1
50 TABLET ORAL DAILY
Qty: 4 TABLET | Refills: 0 | Status: SHIPPED | OUTPATIENT
Start: 2019-01-01 | End: 2019-01-01 | Stop reason: ALTCHOICE

## 2019-01-01 RX ORDER — GABAPENTIN 300 MG/1
CAPSULE ORAL
Qty: 90 CAPSULE | Refills: 1 | Status: SHIPPED | OUTPATIENT
Start: 2019-01-01 | End: 2019-01-01

## 2019-01-01 RX ORDER — GLIPIZIDE 5 MG/1
TABLET ORAL DAILY
COMMUNITY
End: 2020-01-01 | Stop reason: HOSPADM

## 2019-01-01 RX ORDER — GABAPENTIN 100 MG/1
100 CAPSULE ORAL 2 TIMES DAILY
Status: DISCONTINUED | OUTPATIENT
Start: 2019-01-01 | End: 2019-01-01 | Stop reason: HOSPADM

## 2019-01-01 RX ORDER — CLOPIDOGREL BISULFATE 75 MG/1
75 TABLET ORAL DAILY
Qty: 90 TABLET | Refills: 2 | Status: SHIPPED | OUTPATIENT
Start: 2019-01-01 | End: 2020-01-01 | Stop reason: HOSPADM

## 2019-01-01 RX ORDER — CHLORHEXIDINE GLUCONATE 0.12 MG/ML
15 RINSE ORAL EVERY 12 HOURS SCHEDULED
Status: CANCELLED | OUTPATIENT
Start: 2019-01-01

## 2019-01-01 RX ORDER — OXYCODONE HYDROCHLORIDE AND ACETAMINOPHEN 5; 325 MG/1; MG/1
1 TABLET ORAL EVERY 4 HOURS PRN
Status: DISCONTINUED | OUTPATIENT
Start: 2019-01-01 | End: 2019-01-01 | Stop reason: HOSPADM

## 2019-01-01 RX ORDER — PENTOXIFYLLINE 400 MG/1
400 TABLET, EXTENDED RELEASE ORAL 2 TIMES DAILY WITH MEALS
Status: DISCONTINUED | OUTPATIENT
Start: 2019-01-01 | End: 2019-01-01 | Stop reason: HOSPADM

## 2019-01-01 RX ORDER — METHYLPREDNISOLONE SODIUM SUCCINATE 40 MG/ML
40 INJECTION, POWDER, LYOPHILIZED, FOR SOLUTION INTRAMUSCULAR; INTRAVENOUS EVERY 8 HOURS SCHEDULED
Status: DISCONTINUED | OUTPATIENT
Start: 2019-01-01 | End: 2019-01-01

## 2019-01-01 RX ORDER — CILOSTAZOL 100 MG/1
100 TABLET ORAL
Status: DISCONTINUED | OUTPATIENT
Start: 2019-01-01 | End: 2019-01-01

## 2019-01-01 RX ADMIN — IPRATROPIUM BROMIDE 0.5 MG: 0.5 SOLUTION RESPIRATORY (INHALATION) at 08:05

## 2019-01-01 RX ADMIN — FLUTICASONE PROPIONATE 1 SPRAY: 50 SPRAY, METERED NASAL at 09:31

## 2019-01-01 RX ADMIN — ASPIRIN 81 MG 81 MG: 81 TABLET ORAL at 09:29

## 2019-01-01 RX ADMIN — IPRATROPIUM BROMIDE 0.5 MG: 0.5 SOLUTION RESPIRATORY (INHALATION) at 19:56

## 2019-01-01 RX ADMIN — DIPHENHYDRAMINE HYDROCHLORIDE 25 MG: 50 INJECTION INTRAMUSCULAR; INTRAVENOUS at 14:06

## 2019-01-01 RX ADMIN — MIDAZOLAM 2 MG: 1 INJECTION INTRAMUSCULAR; INTRAVENOUS at 13:12

## 2019-01-01 RX ADMIN — PROPOFOL 200 MG: 10 INJECTION, EMULSION INTRAVENOUS at 13:48

## 2019-01-01 RX ADMIN — Medication 90 MG: at 09:29

## 2019-01-01 RX ADMIN — DEXMEDETOMIDINE HYDROCHLORIDE 0.2 MCG/KG/HR: 100 INJECTION, SOLUTION INTRAVENOUS at 13:23

## 2019-01-01 RX ADMIN — OXYCODONE HYDROCHLORIDE AND ACETAMINOPHEN 1 TABLET: 5; 325 TABLET ORAL at 22:22

## 2019-01-01 RX ADMIN — DEXAMETHASONE SODIUM PHOSPHATE 10 MG: 10 INJECTION, SOLUTION INTRAMUSCULAR; INTRAVENOUS at 14:02

## 2019-01-01 RX ADMIN — METHYLPREDNISOLONE SODIUM SUCCINATE 125 MG: 125 INJECTION, POWDER, FOR SOLUTION INTRAMUSCULAR; INTRAVENOUS at 11:41

## 2019-01-01 RX ADMIN — PENTOXIFYLLINE 400 MG: 400 TABLET, EXTENDED RELEASE ORAL at 18:12

## 2019-01-01 RX ADMIN — FUROSEMIDE 20 MG: 20 TABLET ORAL at 08:30

## 2019-01-01 RX ADMIN — OXYCODONE HYDROCHLORIDE AND ACETAMINOPHEN 1 TABLET: 5; 325 TABLET ORAL at 06:00

## 2019-01-01 RX ADMIN — IPRATROPIUM BROMIDE AND ALBUTEROL SULFATE 3 ML: 2.5; .5 SOLUTION RESPIRATORY (INHALATION) at 19:59

## 2019-01-01 RX ADMIN — METHYLPREDNISOLONE SODIUM SUCCINATE 40 MG: 40 INJECTION, POWDER, FOR SOLUTION INTRAMUSCULAR; INTRAVENOUS at 18:05

## 2019-01-01 RX ADMIN — FUROSEMIDE 20 MG: 20 TABLET ORAL at 18:12

## 2019-01-01 RX ADMIN — LEVALBUTEROL HYDROCHLORIDE 1.25 MG: 1.25 SOLUTION, CONCENTRATE RESPIRATORY (INHALATION) at 08:05

## 2019-01-01 RX ADMIN — ENOXAPARIN SODIUM 40 MG: 40 INJECTION SUBCUTANEOUS at 08:30

## 2019-01-01 RX ADMIN — FENTANYL CITRATE 12.5 MCG: 50 INJECTION, SOLUTION INTRAMUSCULAR; INTRAVENOUS at 15:13

## 2019-01-01 RX ADMIN — CLOPIDOGREL BISULFATE 300 MG: 75 TABLET ORAL at 15:20

## 2019-01-01 RX ADMIN — SODIUM CHLORIDE, SODIUM LACTATE, POTASSIUM CHLORIDE, AND CALCIUM CHLORIDE: .6; .31; .03; .02 INJECTION, SOLUTION INTRAVENOUS at 14:23

## 2019-01-01 RX ADMIN — ALBUTEROL SULFATE 2.5 MG: 2.5 SOLUTION RESPIRATORY (INHALATION) at 15:12

## 2019-01-01 RX ADMIN — MIDAZOLAM 1 MG: 1 INJECTION INTRAMUSCULAR; INTRAVENOUS at 13:54

## 2019-01-01 RX ADMIN — OXYCODONE HYDROCHLORIDE 10 MG: 10 TABLET ORAL at 00:54

## 2019-01-01 RX ADMIN — OXYCODONE HYDROCHLORIDE 10 MG: 10 TABLET ORAL at 19:54

## 2019-01-01 RX ADMIN — ASPIRIN 81 MG 81 MG: 81 TABLET ORAL at 08:30

## 2019-01-01 RX ADMIN — FUROSEMIDE 20 MG: 20 TABLET ORAL at 09:29

## 2019-01-01 RX ADMIN — METHYLPREDNISOLONE SODIUM SUCCINATE 40 MG: 40 INJECTION, POWDER, FOR SOLUTION INTRAMUSCULAR; INTRAVENOUS at 22:18

## 2019-01-01 RX ADMIN — METOPROLOL TARTRATE 25 MG: 25 TABLET, FILM COATED ORAL at 09:29

## 2019-01-01 RX ADMIN — LEVALBUTEROL HYDROCHLORIDE 1.25 MG: 1.25 SOLUTION, CONCENTRATE RESPIRATORY (INHALATION) at 19:56

## 2019-01-01 RX ADMIN — METOPROLOL TARTRATE 25 MG: 25 TABLET, FILM COATED ORAL at 08:29

## 2019-01-01 RX ADMIN — PANTOPRAZOLE SODIUM 40 MG: 40 TABLET, DELAYED RELEASE ORAL at 09:29

## 2019-01-01 RX ADMIN — IPRATROPIUM BROMIDE 0.5 MG: 0.5 SOLUTION RESPIRATORY (INHALATION) at 13:48

## 2019-01-01 RX ADMIN — IPRATROPIUM BROMIDE 1 MG: 0.5 SOLUTION RESPIRATORY (INHALATION) at 11:42

## 2019-01-01 RX ADMIN — PENTOXIFYLLINE 400 MG: 400 TABLET, EXTENDED RELEASE ORAL at 08:30

## 2019-01-01 RX ADMIN — ALBUTEROL SULFATE 10 MG: 2.5 SOLUTION RESPIRATORY (INHALATION) at 16:24

## 2019-01-01 RX ADMIN — GABAPENTIN 100 MG: 100 CAPSULE ORAL at 08:30

## 2019-01-01 RX ADMIN — HEPARIN SODIUM 2000 UNITS: 1000 INJECTION INTRAVENOUS; SUBCUTANEOUS at 13:53

## 2019-01-01 RX ADMIN — METOPROLOL TARTRATE 25 MG: 25 TABLET, FILM COATED ORAL at 22:16

## 2019-01-01 RX ADMIN — LEVOTHYROXINE SODIUM 50 MCG: 50 TABLET ORAL at 06:00

## 2019-01-01 RX ADMIN — FLUTICASONE PROPIONATE 1 SPRAY: 50 SPRAY, METERED NASAL at 08:31

## 2019-01-01 RX ADMIN — EPHEDRINE SULFATE 15 MG: 50 INJECTION, SOLUTION INTRAVENOUS at 14:13

## 2019-01-01 RX ADMIN — ROFLUMILAST 250 MCG: 250 TABLET ORAL at 08:31

## 2019-01-01 RX ADMIN — OXYCODONE HYDROCHLORIDE 10 MG: 10 TABLET ORAL at 14:13

## 2019-01-01 RX ADMIN — SODIUM CHLORIDE, SODIUM LACTATE, POTASSIUM CHLORIDE, AND CALCIUM CHLORIDE 125 ML/HR: .6; .31; .03; .02 INJECTION, SOLUTION INTRAVENOUS at 12:35

## 2019-01-01 RX ADMIN — FUROSEMIDE 20 MG: 20 TABLET ORAL at 18:04

## 2019-01-01 RX ADMIN — IOHEXOL 85 ML: 350 INJECTION, SOLUTION INTRAVENOUS at 14:22

## 2019-01-01 RX ADMIN — CHLORHEXIDINE GLUCONATE 0.12% ORAL RINSE 15 ML: 1.2 LIQUID ORAL at 11:59

## 2019-01-01 RX ADMIN — METHYLPREDNISOLONE SODIUM SUCCINATE 40 MG: 40 INJECTION, POWDER, FOR SOLUTION INTRAMUSCULAR; INTRAVENOUS at 05:36

## 2019-01-01 RX ADMIN — METHYLPREDNISOLONE SODIUM SUCCINATE 40 MG: 40 INJECTION, POWDER, FOR SOLUTION INTRAMUSCULAR; INTRAVENOUS at 08:30

## 2019-01-01 RX ADMIN — IPRATROPIUM BROMIDE 0.5 MG: 0.5 SOLUTION RESPIRATORY (INHALATION) at 15:12

## 2019-01-01 RX ADMIN — SODIUM CHLORIDE, SODIUM LACTATE, POTASSIUM CHLORIDE, AND CALCIUM CHLORIDE: .6; .31; .03; .02 INJECTION, SOLUTION INTRAVENOUS at 12:34

## 2019-01-01 RX ADMIN — METOPROLOL TARTRATE 25 MG: 25 TABLET, FILM COATED ORAL at 21:17

## 2019-01-01 RX ADMIN — ACETAMINOPHEN 650 MG: 325 TABLET, FILM COATED ORAL at 19:13

## 2019-01-01 RX ADMIN — GABAPENTIN 100 MG: 100 CAPSULE ORAL at 09:29

## 2019-01-01 RX ADMIN — Medication 90 MG: at 08:29

## 2019-01-01 RX ADMIN — LIDOCAINE HYDROCHLORIDE 0.5 ML: 10 INJECTION, SOLUTION EPIDURAL; INFILTRATION; INTRACAUDAL; PERINEURAL at 12:35

## 2019-01-01 RX ADMIN — ONDANSETRON 4 MG: 2 INJECTION INTRAMUSCULAR; INTRAVENOUS at 14:06

## 2019-01-01 RX ADMIN — ISODIUM CHLORIDE 3 ML: 0.03 SOLUTION RESPIRATORY (INHALATION) at 16:23

## 2019-01-01 RX ADMIN — EPHEDRINE SULFATE 15 MG: 50 INJECTION, SOLUTION INTRAVENOUS at 14:30

## 2019-01-01 RX ADMIN — IOHEXOL 120 ML: 350 INJECTION, SOLUTION INTRAVENOUS at 12:48

## 2019-01-01 RX ADMIN — PANTOPRAZOLE SODIUM 40 MG: 40 TABLET, DELAYED RELEASE ORAL at 06:00

## 2019-01-01 RX ADMIN — IPRATROPIUM BROMIDE AND ALBUTEROL SULFATE 3 ML: 2.5; .5 SOLUTION RESPIRATORY (INHALATION) at 07:30

## 2019-01-01 RX ADMIN — MIDAZOLAM 1 MG: 1 INJECTION INTRAMUSCULAR; INTRAVENOUS at 13:52

## 2019-01-01 RX ADMIN — ACETAMINOPHEN 650 MG: 325 TABLET, FILM COATED ORAL at 09:27

## 2019-01-01 RX ADMIN — FENTANYL CITRATE 12.5 MCG: 50 INJECTION, SOLUTION INTRAMUSCULAR; INTRAVENOUS at 15:00

## 2019-01-01 RX ADMIN — HEPARIN SODIUM 6000 UNITS: 1000 INJECTION INTRAVENOUS; SUBCUTANEOUS at 13:39

## 2019-01-01 RX ADMIN — ALBUTEROL SULFATE 2 PUFF: 90 AEROSOL, METERED RESPIRATORY (INHALATION) at 18:05

## 2019-01-01 RX ADMIN — ROFLUMILAST 250 MCG: 250 TABLET ORAL at 09:31

## 2019-01-01 RX ADMIN — LEVOTHYROXINE SODIUM 50 MCG: 50 TABLET ORAL at 09:29

## 2019-01-01 RX ADMIN — METHYLPREDNISOLONE SODIUM SUCCINATE 40 MG: 40 INJECTION, POWDER, FOR SOLUTION INTRAMUSCULAR; INTRAVENOUS at 21:17

## 2019-01-01 RX ADMIN — ALBUTEROL SULFATE 2.5 MG: 2.5 SOLUTION RESPIRATORY (INHALATION) at 07:54

## 2019-01-01 RX ADMIN — ISODIUM CHLORIDE 3 ML: 0.03 SOLUTION RESPIRATORY (INHALATION) at 11:42

## 2019-01-01 RX ADMIN — LIDOCAINE 2 PATCH: 50 PATCH TOPICAL at 09:46

## 2019-01-01 RX ADMIN — PREDNISONE 60 MG: 20 TABLET ORAL at 15:11

## 2019-01-01 RX ADMIN — LEVALBUTEROL HYDROCHLORIDE 1.25 MG: 1.25 SOLUTION, CONCENTRATE RESPIRATORY (INHALATION) at 13:48

## 2019-01-01 RX ADMIN — CEFAZOLIN SODIUM 2000 MG: 2 SOLUTION INTRAVENOUS at 13:29

## 2019-01-01 RX ADMIN — LIDOCAINE 2 PATCH: 50 PATCH TOPICAL at 18:11

## 2019-01-01 RX ADMIN — ALBUTEROL SULFATE 2.5 MG: 2.5 SOLUTION RESPIRATORY (INHALATION) at 13:40

## 2019-01-01 RX ADMIN — GABAPENTIN 100 MG: 100 CAPSULE ORAL at 18:04

## 2019-01-01 RX ADMIN — MAGNESIUM SULFATE HEPTAHYDRATE 2 G: 40 INJECTION, SOLUTION INTRAVENOUS at 18:04

## 2019-01-01 RX ADMIN — ENOXAPARIN SODIUM 40 MG: 40 INJECTION SUBCUTANEOUS at 09:29

## 2019-01-01 RX ADMIN — IPRATROPIUM BROMIDE 0.5 MG: 0.5 SOLUTION RESPIRATORY (INHALATION) at 16:23

## 2019-01-01 RX ADMIN — GABAPENTIN 100 MG: 100 CAPSULE ORAL at 18:12

## 2019-01-01 RX ADMIN — IPRATROPIUM BROMIDE 0.5 MG: 0.5 SOLUTION RESPIRATORY (INHALATION) at 13:40

## 2019-01-01 RX ADMIN — GLYCOPYRROLATE 0.2 MG: 0.2 INJECTION, SOLUTION INTRAMUSCULAR; INTRAVENOUS at 13:12

## 2019-01-01 RX ADMIN — DEXMEDETOMIDINE HYDROCHLORIDE 50 MCG: 100 INJECTION, SOLUTION INTRAVENOUS at 13:23

## 2019-01-01 RX ADMIN — ALBUTEROL SULFATE 10 MG: 2.5 SOLUTION RESPIRATORY (INHALATION) at 11:43

## 2019-01-02 ENCOUNTER — TELEPHONE (OUTPATIENT)
Dept: VASCULAR SURGERY | Facility: CLINIC | Age: 69
End: 2019-01-02

## 2019-01-02 NOTE — TELEPHONE ENCOUNTER
Patient should have completed her 2 mo course of plavix for iliac stenting on 12/25/18    However, doesn't look like she has followed up with me either after her initial surgery or after her angiogram   Please have her come in for followup so that I can see how she is doing and discuss recent LEADs      Thanks

## 2019-01-02 NOTE — TELEPHONE ENCOUNTER
Patient called to see if her plavix should be refilled  , she had femoral endart 10/25  Please advise if she is to continue

## 2019-01-03 ENCOUNTER — HOSPITAL ENCOUNTER (OUTPATIENT)
Dept: PULMONOLOGY | Facility: HOSPITAL | Age: 69
Discharge: HOME | End: 2019-01-03
Attending: INTERNAL MEDICINE
Payer: MEDICARE

## 2019-01-03 DIAGNOSIS — R06.02 SOB (SHORTNESS OF BREATH): ICD-10-CM

## 2019-01-03 PROCEDURE — 94618 PULMONARY STRESS TESTING: CPT

## 2019-01-03 PROCEDURE — 94618 PULMONARY STRESS TESTING: CPT | Performed by: INTERNAL MEDICINE

## 2019-01-07 ENCOUNTER — HOSPITAL ENCOUNTER (OUTPATIENT)
Dept: NON INVASIVE DIAGNOSTICS | Facility: CLINIC | Age: 69
Discharge: HOME | End: 2019-01-07
Payer: MEDICARE

## 2019-01-07 DIAGNOSIS — R06.00 DYSPNEA ON EXERTION: ICD-10-CM

## 2019-01-07 DIAGNOSIS — I25.10 CORONARY ARTERY DISEASE INVOLVING NATIVE CORONARY ARTERY OF NATIVE HEART WITHOUT ANGINA PECTORIS: ICD-10-CM

## 2019-01-07 DIAGNOSIS — Z95.3 S/P TAVR (TRANSCATHETER AORTIC VALVE REPLACEMENT), BIOPROSTHETIC: ICD-10-CM

## 2019-01-07 PROCEDURE — 93306 TTE W/DOPPLER COMPLETE: CPT

## 2019-01-08 PROCEDURE — 93325 DOPPLER ECHO COLOR FLOW MAPG: CPT | Performed by: INTERNAL MEDICINE

## 2019-01-08 PROCEDURE — 93308 TTE F-UP OR LMTD: CPT | Performed by: INTERNAL MEDICINE

## 2019-01-08 PROCEDURE — 93321 DOPPLER ECHO F-UP/LMTD STD: CPT | Performed by: INTERNAL MEDICINE

## 2019-02-04 NOTE — ED PROVIDER NOTES
History  Chief Complaint   Patient presents with    Shortness of Breath     pt reports having sob froma sinis infection that started 2 days ago  HPI    Prior to Admission Medications   Prescriptions Last Dose Informant Patient Reported? Taking?    POTASSIUM CHLORIDE PO  Self Yes No   Sig: Take 250 mg by mouth daily   STIOLTO RESPIMAT 2 5-2 5 MCG/ACT inhaler   No No   Sig: INHALE 2 PUFFS BY MOUTH ONCE DAILY   acetaminophen (TYLENOL) 325 mg tablet  Self No No   Sig: Take 2 tablets (650 mg total) by mouth every 6 (six) hours as needed for mild pain or fever   albuterol (2 5 mg/3 mL) 0 083 % nebulizer solution   No No   Sig: Take 1 vial (2 5 mg total) by nebulization every 6 (six) hours as needed for wheezing or shortness of breath   albuterol (PROVENTIL HFA,VENTOLIN HFA) 90 mcg/act inhaler  Self Yes No   Sig: Inhale 2 puffs 2 (two) times a day     aspirin 81 mg chewable tablet  Self Yes No   Sig: Chew 81 mg daily   clopidogrel (PLAVIX) 75 mg tablet  Self No No   Sig: Take 1 tablet (75 mg total) by mouth daily   co-enzyme Q-10 30 MG capsule  Self Yes No   Sig: Take 100 mg by mouth daily     docusate sodium (COLACE) 100 mg capsule  Self No No   Sig: Take 1 capsule (100 mg total) by mouth 2 (two) times a day as needed for constipation   fluticasone (FLONASE) 50 mcg/act nasal spray  Self Yes No   Sig: USE 1 SPRAY(S) IN EACH NOSTRIL ONCE DAILY   furosemide (LASIX) 20 mg tablet  Self No No   Sig: Take 1 tablet (20 mg total) by mouth 2 (two) times a day   levothyroxine 50 mcg tablet  Self Yes No   Sig: Take 50 mcg by mouth daily   metoprolol tartrate (LOPRESSOR) 25 mg tablet  Self No No   Sig: Take 1 tablet (25 mg total) by mouth every 12 (twelve) hours   pantoprazole (PROTONIX) 40 mg tablet  Self Yes No   Sig: Take 40 mg by mouth daily   rivaroxaban (XARELTO STARTER PACK) 15 & 20 MG starter pack  Self No No   Sig: Take 1 Package by mouth see administration instructions   Patient not taking: Reported on 12/13/2018 Facility-Administered Medications: None       Past Medical History:   Diagnosis Date    Aneurysm ([address] )     abdominal aortic aneurysm    Aortic valve disease     Aortic valve replaced     TAVR    Bronchiolitis     Cardiac disease     Chest pain     COPD (chronic obstructive pulmonary disease) (HCC)     Disease of thyroid gland     Dyspnea     Hypertension     Hypokalemia     Morbid obesity (HCC)     PAD (peripheral artery disease) (HCC)     Rheumatic fever     SOB (shortness of breath)     Tachycardia        Past Surgical History:   Procedure Laterality Date    CARDIAC SURGERY      aortic valve replacement    CARDIAC VALVE REPLACEMENT      CHOLECYSTECTOMY      ESOPHAGUS SURGERY N/A     IR ABDOMINAL ANGIOGRAPHY / INTERVENTION  10/25/2018    MI SLCTV CATHJ 3RD+ ORD SLCTV ABDL PEL/LXTR [facility] Left 10/25/2018    Procedure: LEFT LEG ANGIOGRAM WITH PLACEMENT OF LEFT EXTERNAL ILIAC ARTERY / LEFT COMMON FEMORAL ARTERIAL STENT, RIGHT FEMORAL ACCESS;  Surgeon: Mackenzie Ortiz MD;  [facility];  Service: Vascular    MI THROMBOENDARTECTMY FEMORAL COMMON Left 9/21/2018    Procedure: ENDARTERECTOMY ARTERIAL FEMORAL;  Surgeon: Mackenzie Ortiz MD;  [facility];  Service: Vascular    REPLACEMENT AORTIC VALVE TRANSCATHETER (TAVR)      THROMBECTOMY W/ EMBOLECTOMY Left 9/21/2018    Procedure: EMBOLECTOMY/THROMBECTOMY LOWER EXTREMITY (GROIN EXPLORATION); Surgeon: Mackenzie Ortiz MD;  [facility];  Service: Vascular    VASCULAR SURGERY         Family History   Problem Relation Age of Onset    Heart disease Mother     Diabetes Mother     Heart disease Father     Diabetes Father      I have reviewed and agree with the history as documented      Social History   Substance Use Topics    Smoking status: Former Smoker    Smokeless tobacco: Never Used    Alcohol use 0 6 - 1 2 oz/week     1 - 2 Cans of beer per week      Comment: social        Review of Systems    Physical Exam  Physical Exam Constitutional: She is oriented to person, place, and time  She appears well-developed and well-nourished  No distress  HENT:   Head: Normocephalic and atraumatic  Right Ear: Tympanic membrane, external ear and ear canal normal    Left Ear: Tympanic membrane, external ear and ear canal normal    Nose: Mucosal edema and rhinorrhea present  Mouth/Throat: Oropharynx is clear and moist and mucous membranes are normal  No oral lesions  No trismus in the jaw  Tonsils are 0 on the right  Tonsils are 0 on the left  No tonsillar exudate  Eyes: Pupils are equal, round, and reactive to light  Conjunctivae are normal    Neck: Normal range of motion  Neck supple  No tracheal deviation present  Cardiovascular: Normal rate, regular rhythm, normal heart sounds and intact distal pulses  Pulmonary/Chest: Effort normal  No accessory muscle usage  No tachypnea  No respiratory distress  She has decreased breath sounds  She has no wheezes  Abdominal: Soft  She exhibits no distension  There is no tenderness  Musculoskeletal: She exhibits no edema  Lymphadenopathy:     She has no cervical adenopathy  Neurological: She is alert and oriented to person, place, and time  She has normal strength  GCS eye subscore is 4  GCS verbal subscore is 5  GCS motor subscore is 6  Skin: Skin is warm and dry  Psychiatric: She has a normal mood and affect  Her behavior is normal    Nursing note and vitals reviewed        Vital Signs  ED Triage Vitals [02/04/19 1251]   Temperature Pulse Respirations Blood Pressure SpO2   97 8 °F (36 6 °C) 72 20 (!) 179/76 96 %      Temp Source Heart Rate Source Patient Position - Orthostatic VS BP Location FiO2 (%)   Oral Monitor Sitting Right arm --      Pain Score       No Pain           Vitals:    02/04/19 1251 02/04/19 1404   BP: (!) 179/76 132/60   Pulse: 72 67   Patient Position - Orthostatic VS: Sitting Lying       Visual Acuity      ED Medications  Medications   albuterol inhalation solution 2 5 mg (2 5 mg Nebulization Given 2/4/19 1340)   ipratropium (ATROVENT) 0 02 % inhalation solution 0 5 mg (0 5 mg Nebulization Given 2/4/19 1340)       Diagnostic Studies  Results Reviewed     Procedure Component Value Units Date/Time    Basic metabolic panel [[id number]]  (Abnormal) Collected:  02/04/19 1351    Lab Status:  Final result Specimen:  Blood from Arm, Left Updated:  02/04/19 1424     Sodium 143 mmol/L      Potassium 3 2 (L) mmol/L      Chloride 105 mmol/L      CO2 26 mmol/L      ANION GAP 12 mmol/L      BUN 11 mg/dL      Creatinine 0 99 mg/dL      Glucose 88 mg/dL      Calcium 8 8 mg/dL      eGFR 60 ml/min/1 73sq m     Narrative:         National Kidney Disease Education Program recommendations are as follows:  GFR calculation is accurate only with a steady state creatinine  Chronic Kidney disease less than 60 ml/min/1 73 sq  meters  Kidney failure less than 15 ml/min/1 73 sq  meters      B-type natriuretic peptide [[id number]]  (Abnormal) Collected:  02/04/19 1351    Lab Status:  Final result Specimen:  Blood from Arm, Left Updated:  02/04/19 1424     NT-proBNP 411 (H) pg/mL     Troponin I [[id number]]  (Normal) Collected:  02/04/19 1351    Lab Status:  Final result Specimen:  Blood from Arm, Left Updated:  02/04/19 1418     Troponin I <0 02 ng/mL     CBC and differential [[id number]] Collected:  02/04/19 1351    Lab Status:  Final result Specimen:  Blood from Arm, Left Updated:  02/04/19 1402     WBC 8 29 Thousand/uL      RBC 5 03 Million/uL      Hemoglobin 13 6 g/dL      Hematocrit 42 6 %      MCV 85 fL      MCH 27 0 pg      MCHC 31 9 g/dL      RDW 13 5 %      MPV 9 7 fL      Platelets 704 Thousands/uL      nRBC 0 /100 WBCs      Neutrophils Relative 59 %      Immat GRANS % 0 %      Lymphocytes Relative 24 %      Monocytes Relative 11 %      Eosinophils Relative 5 %      Basophils Relative 1 %      Neutrophils Absolute 4 92 Thousands/µL      Immature Grans Absolute 0 01 Thousand/uL      Lymphocytes Absolute 1 97 Thousands/µL      Monocytes Absolute 0 92 Thousand/µL      Eosinophils Absolute 0 41 Thousand/µL      Basophils Absolute 0 06 Thousands/µL                  XR chest 2 views    (Results Pending)              Procedures  ECG 12 Lead Documentation  Date/Time: 2/4/2019 2:40 PM  Performed by: Josesito Smith  Authorized by: Josesito Smith     Indications / Diagnosis:  SOB  ECG reviewed by me, the ED Provider: yes    Patient location:  ED  Previous ECG:     Previous ECG:  Compared to current    Comparison ECG info:  09/23/18    Similarity:  No change  Interpretation:     Interpretation: abnormal    Rate:     ECG rate:  72    ECG rate assessment: normal    Rhythm:     Rhythm: sinus rhythm    Ectopy:     Ectopy: none    QRS:     QRS axis:  Normal    QRS intervals:  Normal  Conduction:     Conduction: normal    ST segments:     ST segments:  Abnormal    Depression:  II, III, aVF, V6, V5 and V4  T waves:     T waves: flattening      Flattening:  I and aVL           Phone Contacts  ED Phone Contact    ED Course           Identification of Seniors at Risk      Most Recent Value   (ISAR) Identification of Seniors at Risk   Before the illness or injury that brought you to the Emergency, did you need someone to help you on a regular basis? 1 Filed at: 02/04/2019 1256   In the last 24 hours, have you needed more help than usual?  0 Filed at: 02/04/2019 1256   Have you been hospitalized for one or more nights during the past 6 months? 0 Filed at: 02/04/2019 1256   In general, do you see well?  0 Filed at: 02/04/2019 1256   In general, do you have serious problems with your memory? 0 Filed at: 02/04/2019 1256   Do you take more than three different medications every day?   1 Filed at: 02/04/2019 1256   ISAR Score  2 Filed at: 02/04/2019 1256                          MDM  Number of Diagnoses or Management Options  COPD exacerbation ([facility] [address] ): new and requires workup  Viral URI: new and requires workup  Diagnosis management comments: This is a 57-year-old female who presents here today with shortness of breath  She states about two days ago she developed nasal congestion and postnasal drip with mild worsening of baseline cough  She states since this morning she has had trouble breathing  She says it feels like she has to struggle to take a deep breath  She used her albuterol which did not help her symptoms  She denies any fevers, chest pain, palpitations, lower extremity edema, weight gain  She has not taken anything specifically for her URI symptoms  She denies any known specific sick contacts  She denies any lightheadedness, syncope or presyncope, nausea, vomiting, other complaints  She has chronic dyspnea on exertion but this has not worsened  She denies any orthopnea, weight gain, lower extremity edema  She has an aortic valve replacement, and most recent echo done on 3/8/48 showed systolic function of 54-42% with no regional wall motion abnormalities grade 1 diastolic dysfunction  She had a 6 minutes walk test with pulmonary at the beginning of January, which showed desaturation to 90% while walking  ROS: Otherwise negative, unless stated as above  She is well-appearing, in no acute distress  She does have nasal congestion  She does not have wheezing, but does have diminished sounds diffusely  The remainder of her exam is unremarkable  This is most likely URI induced COPD exacerbation  However, given her history of cardiac disease we will check an EKG and lab work to evaluate for signs heart failure ACS contributing to this, get a chest x-ray to evaluate for signs of underlying pneumonia or fluid overload, and treat her with nebulizers here  The chest x-ray was reviewed by myself, and shows no acute abnormalities  Her lab work is unremarkable  She does feel better after nebulizers  Air movement has improved, and she appears to more easily be able to take a deep breath    I discussed with patient findings, treatment at home, follow-up with her primary care doctor and pulmonologist, and indications for return, and she expresses understanding with this plan  Amount and/or Complexity of Data Reviewed  Clinical lab tests: reviewed and ordered  Tests in the radiology section of CPT®: ordered and reviewed  Decide to obtain previous medical records or to obtain history from someone other than the patient: yes  Review and summarize past medical records: yes  Independent visualization of images, tracings, or specimens: yes        Disposition  Final diagnoses:   COPD exacerbation ([facility] )   Viral URI     Time reflects when diagnosis was documented in both MDM as applicable and the Disposition within this note     Time User Action Codes Description Comment    2/4/2019  3:59 PM Shanell Stephens Add [J44 1] COPD exacerbation ([facility] )     2/4/2019  3:59 PM Shanell Stephens Add [J06 9] Viral URI       ED Disposition     ED Disposition Condition Date/Time Comment    Discharge  Mon Feb 4, 2019  3:59 PM Kunal Gather discharge to home/self care  Condition at discharge: Good        Follow-up Information     Follow up With Specialties Details Why Tip Fonseca MD Pulmonology, Neurology, Pulmonary Disease, Sleep Medicine Schedule an appointment as soon as possible for a visit to follow up on your symptoms [address] [facility] [address] [phone]      [place]  Schedule an appointment as soon as possible for a visit As needed [address]  [phone]            Patient's Medications   Discharge Prescriptions    No medications on file     No discharge procedures on file      ED Provider  Electronically Signed by           Haley Emerson MD  02/04/19 1924

## 2019-02-04 NOTE — ED NOTES
D/c discussed with pt prior to departure  Pt ambulatory with steady gait       Thai Mcgee RN  02/04/19 2617

## 2019-02-04 NOTE — DISCHARGE INSTRUCTIONS
Take the steroids as prescribed  Use your Flonase  Follow up with your pulmonologist to make sure your breathing is doing better, and for discussion of further management of your symptoms, or possible need for oxygen  Return for worsening or changing symptoms, or for any other concerns  COPD (Chronic Obstructive Pulmonary Disease)   WHAT YOU NEED TO KNOW:   Chronic obstructive pulmonary disease (COPD) is a lung disease that makes it hard for you to breathe  It is usually a result of lung damage caused by years of irritation and inflammation in your lungs  DISCHARGE INSTRUCTIONS:   Call 911 if:   · You feel lightheaded, short of breath, and have chest pain  Return to the emergency department if:   · You are confused, dizzy, or feel faint  · Your arm or leg feels warm, tender, and painful  It may look swollen and red  · You cough up blood  Contact your healthcare provider if:   · You have more shortness of breath than usual      · You need more medicine than usual to control your symptoms  · You are coughing or wheezing more than usual      · You are coughing up more mucus, or it is a different color or has a different odor  · You gain more than 3 pounds in a week  · You have a fever, a runny or stuffy nose, and a sore throat, or other cold or flu symptoms  · Your skin, lips, or nails start to turn blue  · You have swelling in your legs or ankles  · You are very tired or weak for more than a day  · You notice changes in your mood, or changes in your ability to think or concentrate  · You have questions or concerns about your condition or care  Medicines:   · Medicines  may be used to open your airways, decrease swelling and inflammation in your lungs, or treat an infection  You may need 2 or more medicines  A short-acting medicine relieves symptoms quickly  Long-acting medicines will control or prevent symptoms   Ask for more information about the medicines you are given and how to use them safely  · Take your medicine as directed  Contact your healthcare provider if you think your medicine is not helping or if you have side effects  Tell him or her if you are allergic to any medicine  Keep a list of the medicines, vitamins, and herbs you take  Include the amounts, and when and why you take them  Bring the list or the pill bottles to follow-up visits  Carry your medicine list with you in case of an emergency  Help make breathing easier:   · Use pursed-lip breathing any time you feel short of breath  Take a deep breath in through your nose  Slowly breathe out through your mouth with your lips pursed for twice as long as you inhaled  You can also practice this breathing pattern while you bend, lift, climb stairs, or exercise  It slows down your breathing and helps move more air in and out of your lungs  · Do not smoke, and avoid others who smoke  Nicotine and other substances can cause lung irritation or damage and make it harder for you to breathe  Do not use e-cigarettes or smokeless tobacco  They still contain nicotine  Ask your healthcare provider for information if you currently smoke and need help to quit  For support and more information:  ¨ [employer]  [place]  Phone: 2- 097 - 826-6656  Web Address: [employer]      · Be aware of and avoid anything that makes your symptoms worse  Stay out of high altitudes and places with high humidity  Stay inside, or cover your mouth and nose with a scarf when you are outside during cold weather  Stay inside on days when air pollution or pollen counts are high  Do not use aerosol sprays such as deodorant, bug spray, and hair spray  Manage COPD and help prevent exacerbations:  COPD is a serious condition that gets worse over time  A COPD exacerbation means your symptoms suddenly get worse  It is important to prevent exacerbations  An exacerbation can cause more lung damage   COPD cannot be cured, but you can take action to feel better and prevent COPD exacerbations:  · Protect yourself from germs  Germs can get into your lungs and cause an infection  An infection in your lungs can create more mucus and make it harder to breathe  An infection can also create swelling in your airways and prevent air from getting in  You can decrease your risk for infection by doing the following:     [facility] your hands often with soap and water  Carry germ-killing gel with you  You can use the gel to clean your hands when soap and water are not available  ¨ Do not touch your eyes, nose, or mouth unless you have washed your hands first      ¨ Always cover your mouth when you cough  Cough into a tissue or your shirtsleeve so you do not spread germs from your hands  ¨ Try to avoid people who have a cold or the flu  If you are sick, stay away from others as much as possible  · Drink more liquids  This will help to keep your air passages moist and help you cough up mucus  Ask how much liquid to drink each day and which liquids are best for you  · Exercise daily  Exercise for at least 20 minutes each day to help increase your energy and decrease shortness of breath  Walking or riding a bike are good ways to exercise  Talk to your healthcare provider about the best exercise plan for you  · Ask about vaccines  Your healthcare provider may recommend that you get regular flu and pneumonia vaccines  Pneumonia can become life-threatening for a person who has COPD  Ask about other vaccines you may need  Ask your healthcare provider about the flu and pneumonia vaccines  All adults should get the flu (influenza) vaccine every year as soon as it becomes available  The pneumonia vaccine is given to adults aged 72 or older to prevent pneumococcal disease, such as pneumonia  Adults aged 23 to 59 years who are at high risk for pneumococcal disease also should get the pneumococcal vaccine   It may need to be repeated 1 or 5 years later   Pulmonary rehabilitation:  Your healthcare provider may recommend a program to help you manage your symptoms and improve your quality of life  It may include nutritional counseling and exercise to strengthen your lungs  Make decisions about your choices for future treatment:  Ask for information about advanced medical directives and living taylor  These documents help you decide and write down your choices for treatment and end-of-life care  It is best to complete them when you feel well and can think clearly about your wishes  The information can then be kept for future use if you are in the hospital or become very ill  Follow up with your healthcare provider as directed: You may need more tests  Your healthcare provider may refer you to a pulmonary (lung) specialist  Write down your questions so you remember to ask them during your visits  © 2017 [facility] Information is for End User's use only and may not be sold, redistributed or otherwise used for commercial purposes  All illustrations and images included in CareNotes® are the copyrighted property of [employer]  or [employer]  The above information is an  only  It is not intended as medical advice for individual conditions or treatments  Talk to your doctor, nurse or pharmacist before following any medical regimen to see if it is safe and effective for you

## 2019-02-04 NOTE — ED NOTES
ECG performed  Signed by Dr Les Leija time of ECG 12:59  Located on pt   Chart     Farida Wong  02/04/19 3632

## 2019-03-19 NOTE — ASSESSMENT & PLAN NOTE
-LEAD results- B EIA stents patent, 50-75% stenosis L prox SFA; R CANDIE 0 93/114/65 and L CANDIE 0 72/67/50  -Patient experiencing near constant pain to bilateral lower extremities that becomes severe when ambulating short distances  Pain ongoing for 2 5-3 months  -Describes severe burning stabbing pain to left femoral incision site since surgery  Due to the unrelenting nature of this neuropathic incision site pain will trial on a course of Gabapentin  -She has nonpalpable left pedal pulses, 2+ R DP    She describes all symptoms worse to the left leg  -Her present symptoms are inconsistent with her arterial duplex results from Dec 2018 and have been ongoing since January, so will check repeat arterial images to evaluate for any change in disease process  -Followup with me after imaging to discuss results and further treatment options

## 2019-03-19 NOTE — PROGRESS NOTES
Assessment/Plan:    78 y/o F with HTN, HLD, s/p TAVR, CAD s/p PCI/stent (9/2018), and PAD w/ hx R EIA stenting, s/p LLE thromboembolectomy and CFA repair/endarterectomy due to closure device failure related occlusion post cardiac cath (Sept 2018), s/p L EIA stenting (Oct 2018) to tx residual stenosis w/ claudication, presents today for evaluation of worsening BLE pain  Intermittent claudication of both lower extremities due to atherosclerosis (HCC)  -LEAD results- B EIA stents patent, 50-75% stenosis L prox SFA; R CANDIE 0 93/114/65 and L CNADIE 0 72/67/50  -Patient experiencing near constant pain to bilateral lower extremities that becomes severe when ambulating short distances  Pain ongoing for 2 5-3 months  -Describes severe burning stabbing pain to left femoral incision site since surgery  Due to the unrelenting nature of this neuropathic incision site pain will trial on a course of Gabapentin  -She has nonpalpable left pedal pulses, 2+ R DP  She describes all symptoms worse to the left leg  -Her present symptoms are inconsistent with her arterial duplex results from Dec 2018 and have been ongoing since January, so will check repeat arterial images to evaluate for any change in disease process  -Followup with me after imaging to discuss results and further treatment options       Diagnoses and all orders for this visit:    Atherosclerosis of native arteries of extremities with intermittent claudication, bilateral legs ([address] )  -     VAS abdominal aorta/iliacs; complete study; Future  -     VAS lower limb arterial duplex, complete bilateral; Future  -     gabapentin (NEURONTIN) 100 mg capsule; Take 1 capsule (100 mg total) by mouth 2 (two) times a day    Intermittent claudication of both lower extremities due to atherosclerosis (HCC)    Chronic radicular pain of lower back    LEFT common femoral endarterectomy  -     gabapentin (NEURONTIN) 100 mg capsule;  Take 1 capsule (100 mg total) by mouth 2 (two) times a day      I have spent 30 minutes with Patient  today in which greater than 50% of this time was spent in counseling/coordination of care regarding Diagnostic results, Risks and benefits of tx options, Intructions for management and Importance of tx compliance       Subjective:      Patient ID: Jann Van is a 79 y o  female  Patient returns to office for evaluation of BLE pain  She had LEAD done 12/18/18  Today, patient describes near constant throbbing pain to bilateral lower extremities  She complains of burning stabbing to left groin incision site that has been going since femoral endarterectomy in Sept 2018  She states that she can barely walk because her leg pain intensifies severely at short distances  She states that she experienced worsening pain to her legs when walking from the waiting room into the exam room for her appointment today  No symptoms consistent with rest pain, able to sleep through the night without pain  Patient returns to the office today to discuss results of SIMÓN  She was last seen in the office on 11/20/18 with Dr Sylvester Joseph  Patient complains of worsening bilateral leg pain when walking, even at very short distances  She also complains of heaviness in both legs and rest pain  Denies any wounds or ulcers  The following portions of the patient's history were reviewed and updated as appropriate: allergies, current medications, past family history, past medical history, past social history, past surgical history and problem list     Review of Systems   Constitutional: Positive for activity change  HENT: Negative  Eyes: Negative  Respiratory: Positive for cough and shortness of breath  Cardiovascular: Negative  Gastrointestinal: Negative  Endocrine: Negative  Genitourinary: Negative  Musculoskeletal: Positive for gait problem  Leg pain   Skin: Negative  Allergic/Immunologic: Negative  Neurological: Positive for weakness  Hematological: Negative  Psychiatric/Behavioral: Negative  Objective:       Physical Exam   Constitutional: She is oriented to person, place, and time  She appears well-nourished  No distress  HENT:   Head: Normocephalic and atraumatic  Eyes: No scleral icterus  Neck: Neck supple  No JVD present  No carotid bruits   Cardiovascular: Normal rate and regular rhythm  Pulses:       Femoral pulses are 2+ on the right side, and 1+ on the left side  Dorsalis pedis pulses are 2+ on the right side, and 0 on the left side  Posterior tibial pulses are 0 on the left side  Dopplerable left fem, biphasic PT and monophasic AT   Pulmonary/Chest: Effort normal and breath sounds normal    Abdominal: Soft  She exhibits no distension  There is no tenderness  Musculoskeletal: She exhibits no edema  Neurological: She is alert and oriented to person, place, and time  Skin: Skin is warm and dry  Psychiatric: She has a normal mood and affect  I have reviewed and made appropriate changes to the review of systems input by the medical assistant      Vitals:    03/19/19 1141   BP: 140/70   BP Location: Right arm   Patient Position: Sitting   Pulse: 70   Temp: 98 5 °F (36 9 °C)   TempSrc: Tympanic   Weight: 78 kg (172 lb)   Height: 5' 2" (1 575 m)       Patient Active Problem List   Diagnosis    Chest pain    Hypertension    Morbid obesity due to excess calories (HCC)    Coronary artery disease    Chronic radicular pain of lower back    Urinary tract infection    Centrilobular emphysema (HCC)    SOB (shortness of breath)    Mixed hyperlipidemia    Peripheral artery disease (HCC)    Intermittent claudication of both lower extremities due to atherosclerosis ([address] )    S/p TAVR (transcatheter aortic valve replacement), bioprosthetic    Occlusion of common femoral artery ([address] )    Common femoral artery injury, left, sequela    Injury of left femoral artery    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[address] )    Iliac artery stenosis, left (HCC)    LEFT common femoral endarterectomy       Past Surgical History:   Procedure Laterality Date    CARDIAC SURGERY      aortic valve replacement    CARDIAC VALVE REPLACEMENT      CHOLECYSTECTOMY      ESOPHAGUS SURGERY N/A     IR ABDOMINAL ANGIOGRAPHY / INTERVENTION  10/25/2018    MD SLCTV CATHJ 3RD+ ORD SLCTV ABDL PEL/LXTR [facility] Left 10/25/2018    Procedure: LEFT LEG ANGIOGRAM WITH PLACEMENT OF LEFT EXTERNAL ILIAC ARTERY / LEFT COMMON FEMORAL ARTERIAL STENT, RIGHT FEMORAL ACCESS;  Surgeon: Vineet Ortiz MD;  [facility];  Service: Vascular    MD THROMBOENDARTECTMY FEMORAL COMMON Left 9/21/2018    Procedure: ENDARTERECTOMY ARTERIAL FEMORAL;  Surgeon: Vineet Ortiz MD;  [facility];  Service: Vascular    REPLACEMENT AORTIC VALVE TRANSCATHETER (TAVR)      THROMBECTOMY W/ EMBOLECTOMY Left 9/21/2018    Procedure: EMBOLECTOMY/THROMBECTOMY LOWER EXTREMITY (GROIN EXPLORATION); Surgeon: Vineet Ortiz MD;  [facility];  Service: Vascular    VASCULAR SURGERY         Family History   Problem Relation Age of Onset    Heart disease Mother     Diabetes Mother     Heart disease Father     Diabetes Father        Social History     Socioeconomic History    Marital status:      Spouse name: Not on file    Number of children: Not on file    Years of education: Not on file    Highest education level: Not on file   Occupational History    Not on file   Social Needs    Financial resource strain: Not on file    Food insecurity:     Worry: Not on file     Inability: Not on file    Transportation needs:     Medical: Not on file     Non-medical: Not on file   Tobacco Use    Smoking status: Former Smoker    Smokeless tobacco: Never Used   Substance and Sexual Activity    Alcohol use:  Yes     Alcohol/week: 0 6 - 1 2 oz     Types: 1 - 2 Cans of beer per week     Comment: social    Drug use: No    Sexual activity: Not Currently Lifestyle    Physical activity:     Days per week: Not on file     Minutes per session: Not on file    Stress: Not on file   Relationships    Social connections:     Talks on phone: Not on file     Gets together: Not on file     Attends [employer] service: Not on file     Active member of club or organization: Not on file     Attends meetings of clubs or organizations: Not on file     Relationship status: Not on file    Intimate partner violence:     Fear of current or ex partner: Not on file     Emotionally abused: Not on file     Physically abused: Not on file     Forced sexual activity: Not on file   Other Topics Concern    Not on file   Social History Narrative    Not on file       Allergies   Allergen Reactions    Clopidogrel     Iohexol     Iv Contrast [Iodinated Diagnostic Agents]      Pt states that she was told many years ago that when she was given contrast dye she had a reaction, but does not know what  She said she is always prepped prior to having dye and she asked that I list this as an allergy in her chart      Statins Other (See Comments)     Severe muscle cramps-- cannot move         Current Outpatient Medications:     acetaminophen (TYLENOL) 325 mg tablet, Take 2 tablets (650 mg total) by mouth every 6 (six) hours as needed for mild pain or fever, Disp: 30 tablet, Rfl: 0    albuterol (2 5 mg/3 mL) 0 083 % nebulizer solution, Take 1 vial (2 5 mg total) by nebulization every 6 (six) hours as needed for wheezing or shortness of breath, Disp: 1080 mL, Rfl: 0    albuterol (PROVENTIL HFA,VENTOLIN HFA) 90 mcg/act inhaler, Inhale 2 puffs 2 (two) times a day  , Disp: , Rfl:     aspirin 81 mg chewable tablet, Chew 81 mg daily, Disp: , Rfl:     co-enzyme Q-10 30 MG capsule, Take 100 mg by mouth daily  , Disp: , Rfl:     docusate sodium (COLACE) 100 mg capsule, Take 1 capsule (100 mg total) by mouth 2 (two) times a day as needed for constipation, Disp: 10 capsule, Rfl: 0    fluticasone (FLONASE) 50 mcg/act nasal spray, USE 1 SPRAY(S) IN EACH NOSTRIL ONCE DAILY, Disp: , Rfl:     furosemide (LASIX) 20 mg tablet, Take 1 tablet (20 mg total) by mouth 2 (two) times a day, Disp: 60 tablet, Rfl: 0    levothyroxine 50 mcg tablet, Take 50 mcg by mouth daily, Disp: , Rfl:     metoprolol tartrate (LOPRESSOR) 25 mg tablet, Take 1 tablet (25 mg total) by mouth every 12 (twelve) hours, Disp: 30 tablet, Rfl: 0    pantoprazole (PROTONIX) 40 mg tablet, Take 40 mg by mouth daily, Disp: , Rfl:     POTASSIUM CHLORIDE PO, Take 250 mg by mouth daily, Disp: , Rfl:     rivaroxaban (XARELTO STARTER PACK) 15 & 20 MG starter pack, Take 1 Package by mouth see administration instructions, Disp: , Rfl: 0    roflumilast (DALIRESP) 250 MCG tablet, Take 1 tablet (250 mcg total) by mouth daily, Disp: 30 tablet, Rfl: 5    STIOLTO RESPIMAT 2 5-2 5 MCG/ACT inhaler, INHALE 2 PUFFS BY MOUTH ONCE DAILY, Disp: 1 Inhaler, Rfl: 5    gabapentin (NEURONTIN) 100 mg capsule, Take 1 capsule (100 mg total) by mouth 2 (two) times a day, Disp: 60 capsule, Rfl: 3

## 2019-03-19 NOTE — PROGRESS NOTES
Assessment/Plan:   Diagnoses and all orders for this visit:    SOB (shortness of breath)    Simple chronic bronchitis (HCC)  -     roflumilast (DALIRESP) 250 MCG tablet; Take 1 tablet (250 mcg total) by mouth daily    Centrilobular emphysema (HCC)    Lung nodule           Shortness of breath and dyspnea on exertion probably secondary to her centrilobular emphysema  PFTs demonstrating, moderate obstructive ventilatory limitation with no appreciable response to the bronchodilator with moderately decreased DLCO consistent with COPD emphysema  She is continuing to use her stiolto 2 puffs daily  Ventolin MDI 2 puffs every 6 hours as needed only  Discussed regarding using albuterol via the nebulizer 4 times daily as needed  And also she'll get a 6 minute walk test and follow-up  Lung nodule prior CT of the chest demonstrating 8 mm for which she had a repeat CT of the chest, its currently around 6 mm, as per the radiologist given the prior one was around 6 mm going back in measuring, definitely the size hasn't increased  Also there is a new 3 mm lung nodule  From the prior study  Repeat CT of the chest 12/5/2018 with a right lower lobe lung nodule around 6 mm, the other 2 lung nodules have been stable at 3 mm  Given stability of the lung nodules, we'll repeat CT of the chest in one year from now  Vaccinations up-to-date  Follow-up in 3 months or when necessary earlier as needed  Return in about 3 months (around 6/19/2019)  All questions are answered to the patient's satisfaction and understanding  She verbalizes understanding  She is encouraged to call with any further questions or concerns  Portions of the record may have been created with voice recognition software  Occasional wrong word or "sound a like" substitutions may have occurred due to the inherent limitations of voice recognition software    Read the chart carefully and recognize, using context, where substitutions have occurred  Electronically Signed by Torito Munoz MD    ______________________________________________________________________    Chief Complaint: No chief complaint on file  Patient ID: Lian Garces is a 79 y o  y o  female has a past medical history of Aneurysm ([address] ), Aortic valve disease, Aortic valve replaced, Bronchiolitis, Cardiac disease, Chest pain, COPD (chronic obstructive pulmonary disease) ([address] ), Disease of thyroid gland, Dyspnea, Hypertension, Hypokalemia, Morbid obesity ([address] ), PAD (peripheral artery disease) ([address] ), Rheumatic fever, SOB (shortness of breath), and Tachycardia  3/19/2019  Patient presents today for follow-up visit  Patient is a very pleasant 80-year-old lady, with significant smoking history greater than 50-pack-year smoking history who quit a few years ago, diagnosed with COPD 2 years ago, used to follow-up with the pulmonologist at [place], recently moved into this area the  [address]  Recently she was admitted at [address] for shortness of breath and was treated for COPD exacerbation  She states she does feel better since the hospital admission, but still with shortness of breath and dyspnea on exertion  She was on long-acting bronchodilators in the past, has not been using it for the past several months since she moved into the [facility]             Review of Systems   Constitutional: Positive for fatigue  Negative for appetite change, chills, diaphoresis, fever and unexpected weight change  HENT: Negative for congestion, ear discharge, ear pain, nosebleeds, postnasal drip, rhinorrhea, sinus pain, sore throat and voice change  Eyes: Negative for pain, discharge and visual disturbance  Respiratory: Positive for cough, shortness of breath and wheezing  Negative for apnea, choking, chest tightness and stridor  Cardiovascular: Negative for chest pain, palpitations and leg swelling     Gastrointestinal: Negative for abdominal pain, blood in stool, constipation, diarrhea and vomiting  Endocrine: Negative for cold intolerance, heat intolerance, polydipsia, polyphagia and polyuria  Genitourinary: Negative for difficulty urinating and dysuria  Musculoskeletal: Negative for arthralgias and neck pain  Skin: Negative for pallor and rash  Allergic/Immunologic: Negative for environmental allergies and food allergies  Neurological: Negative for dizziness, speech difficulty, weakness and light-headedness  Hematological: Negative for adenopathy  Does not bruise/bleed easily  Psychiatric/Behavioral: Negative for agitation, confusion and sleep disturbance  The patient is not nervous/anxious  Smoking history: She reports that she has quit smoking  She has never used smokeless tobacco     The following portions of the patient's history were reviewed and updated as appropriate: allergies, current medications, past family history, past medical history, past social history, past surgical history and problem list       There is no immunization history on file for this patient    Current Outpatient Medications   Medication Sig Dispense Refill    acetaminophen (TYLENOL) 325 mg tablet Take 2 tablets (650 mg total) by mouth every 6 (six) hours as needed for mild pain or fever 30 tablet 0    albuterol (2 5 mg/3 mL) 0 083 % nebulizer solution Take 1 vial (2 5 mg total) by nebulization every 6 (six) hours as needed for wheezing or shortness of breath 1080 mL 0    albuterol (PROVENTIL HFA,VENTOLIN HFA) 90 mcg/act inhaler Inhale 2 puffs 2 (two) times a day        aspirin 81 mg chewable tablet Chew 81 mg daily      co-enzyme Q-10 30 MG capsule Take 100 mg by mouth daily        docusate sodium (COLACE) 100 mg capsule Take 1 capsule (100 mg total) by mouth 2 (two) times a day as needed for constipation 10 capsule 0    fluticasone (FLONASE) 50 mcg/act nasal spray USE 1 SPRAY(S) IN EACH NOSTRIL ONCE DAILY      furosemide (LASIX) 20 mg tablet Take 1 tablet (20 mg total) by mouth 2 (two) times a day 60 tablet 0    levothyroxine 50 mcg tablet Take 50 mcg by mouth daily      metoprolol tartrate (LOPRESSOR) 25 mg tablet Take 1 tablet (25 mg total) by mouth every 12 (twelve) hours 30 tablet 0    pantoprazole (PROTONIX) 40 mg tablet Take 40 mg by mouth daily      POTASSIUM CHLORIDE PO Take 250 mg by mouth daily      rivaroxaban (XARELTO STARTER PACK) 15 & 20 MG starter pack Take 1 Package by mouth see administration instructions  0    STIOLTO RESPIMAT 2 5-2 5 MCG/ACT inhaler INHALE 2 PUFFS BY MOUTH ONCE DAILY 1 Inhaler 5    gabapentin (NEURONTIN) 100 mg capsule Take 1 capsule (100 mg total) by mouth 2 (two) times a day 60 capsule 3    roflumilast (DALIRESP) 250 MCG tablet Take 1 tablet (250 mcg total) by mouth daily 30 tablet 5     No current facility-administered medications for this visit  Allergies: Clopidogrel; Iohexol; Iv contrast [iodinated diagnostic agents]; and Statins    Objective:  Vitals:    03/19/19 1027   BP: 120/70   Pulse: 71   SpO2: 96%   Weight: 79 4 kg (175 lb)   Height: 5' 2" (1 575 m)   Oxygen Therapy  SpO2: 96 %    Wt Readings from Last 3 Encounters:   03/19/19 78 kg (172 lb)   03/19/19 79 4 kg (175 lb)   02/04/19 78 3 kg (172 lb 9 9 oz)     Body mass index is 32 01 kg/m²  Physical Exam   Constitutional: She is oriented to person, place, and time  She appears well-developed and well-nourished  HENT:   Head: Normocephalic and atraumatic  Eyes: Pupils are equal, round, and reactive to light  EOM are normal    Neck: Normal range of motion  Neck supple  Cardiovascular: Normal rate and regular rhythm  Pulmonary/Chest: Effort normal  She has wheezes (occ expiratory rhonchi)  She has no rales  She exhibits no tenderness  Abdominal: Soft  Bowel sounds are normal    Musculoskeletal: Normal range of motion  Neurological: She is alert and oriented to person, place, and time  Skin: Skin is warm and dry     Psychiatric: She has a normal mood and affect  Her behavior is normal         ESS:    No results found

## 2019-03-19 NOTE — PATIENT INSTRUCTIONS
Peripheral arterial disease with history of bilateral iliac stents and left femoral endarterectomy  -Due to your worsening pain over the past 2 5-3 months that is inconsistent with your arterial duplexes done in December we will repeat arterial duplexes to evaluate for any disease progression  -Followup with me after imaging to discuss further treatment options  -Continue on aspirin and atorvastatin  -Your continued left groin pain is consistent with nerve pain at the incision site due to surgery    I will trial you on gabapentin for this pain

## 2019-05-30 PROBLEM — M54.50 LOW BACK PAIN: Status: ACTIVE | Noted: 2019-01-01

## 2019-06-01 PROBLEM — E11.8 TYPE 2 DIABETES MELLITUS WITH COMPLICATION, WITHOUT LONG-TERM CURRENT USE OF INSULIN (HCC): Status: ACTIVE | Noted: 2019-01-01

## 2019-06-11 PROBLEM — Z91.041 CONTRAST MEDIA ALLERGY: Status: ACTIVE | Noted: 2019-01-01

## 2019-06-14 PROBLEM — I70.722: Status: ACTIVE | Noted: 2019-01-01

## 2019-07-02 NOTE — PROGRESS NOTES
Assessment/Plan:    Atherosclerosis of other type of bypass graft(s) of the extremities with rest pain, left leg (HCC)  Significant improvement in symptoms after recent atherectomy and angioplasty  Also taking gabapentin as there is element of ischemic neuropathy  There is signficant improvement  Continue with aspirin and plavix  Increased dose of the gabapentin to 300mg at night  swelling is expected from reperfusion  It should improve over next few weeks  Diagnoses and all orders for this visit:    LEFT common femoral endarterectomy  -     gabapentin (NEURONTIN) 300 mg capsule; Take 1 capsule (300 mg total) by mouth daily at bedtime  -     VAS lower limb arterial duplex, limited/unilateral; Future    Atherosclerosis of native arteries of extremities with intermittent claudication, bilateral legs (HCC)  -     gabapentin (NEURONTIN) 300 mg capsule; Take 1 capsule (300 mg total) by mouth daily at bedtime    Occlusion of common femoral artery (HCC)    Common femoral artery injury, left, sequela    Atherosclerosis of other type of bypass graft(s) of the extremities with rest pain, left leg (HCC)          Subjective:      Patient ID: Jason Davila is a 79 y o  female  HPI  Significantly improved after recent procedure on left femoral artery  Denies any bruising or swelling in right groin  Left leg is feeling better with walking  No more claudication or rest pain  Does have numbness in foot  Also has shortness of breath due to COPD which is unchanged from prior  Tolerating plavix and aspirin  Does have some left foot and leg swelling  She also feels that gabapentin is giving her a lot of relief at night  The following portions of the patient's history were reviewed and updated as appropriate: allergies, current medications, past family history, past medical history, past social history, past surgical history and problem list     Review of Systems   Constitutional: Negative      HENT: Negative  Eyes: Negative  Respiratory: Positive for cough and shortness of breath  Cardiovascular: Positive for leg swelling  Gastrointestinal: Negative  Endocrine: Negative  Genitourinary: Negative  Musculoskeletal: Negative  Skin: Negative  Allergic/Immunologic: Negative  Neurological: Negative  Hematological: Negative  Psychiatric/Behavioral: Negative  I have reviewed the ROS as entered and made changes as necessary  Objective:      /80 (BP Location: Left arm, Patient Position: Sitting)   Pulse 98   Temp 99 8 °F (37 7 °C) (Tympanic)   Ht 5' 2" (1 575 m)   Wt 77 1 kg (170 lb)   BMI 31 09 kg/m²          Physical Exam   Constitutional: She is oriented to person, place, and time  She appears well-developed and well-nourished  No distress  Obese   HENT:   Head: Normocephalic and atraumatic  Right Ear: External ear normal    Eyes: Conjunctivae are normal  Right eye exhibits no discharge  Left eye exhibits no discharge  Neck: Neck supple  No JVD present  Cardiovascular: Normal rate and regular rhythm  Murmur (Prosthetic valve click) heard  Pulses:       Femoral pulses are 2+ on the right side  Dorsalis pedis pulses are 2+ on the right side, and 0 on the left side  Triphasic right DP and PT   triphasic LEFT PT signal which is significant improvement from before   Pulmonary/Chest: Effort normal and breath sounds normal  No stridor  No respiratory distress  She has no wheezes  Abdominal: Soft  She exhibits no distension  Left groin incision well healed   Musculoskeletal: She exhibits no edema, tenderness or deformity  Neurological: She is alert and oriented to person, place, and time  Skin: Skin is warm and dry  She is not diaphoretic  Psychiatric: She has a normal mood and affect  Nursing note and vitals reviewed

## 2019-07-02 NOTE — LETTER
July 2, 2019     Tammy Ventura, [address]  [address]    Patient: Abimael Baptiste   YOB: 1950   Date of Visit: 7/2/2019       Dear Dr Stark Running:    Please find attached a recent office visit summary of our mutual patient  Sincerely,        Jimmie Hickey MD        CC: MD Jimmie Sloan MD  7/2/2019 10:19 PM  Sign at close encounter  Assessment/Plan:    Atherosclerosis of other type of bypass graft(s) of the extremities with rest pain, left leg ([address] )  Significant improvement in symptoms after recent atherectomy and angioplasty  Also taking gabapentin as there is element of ischemic neuropathy  There is signficant improvement  Continue with aspirin and plavix  Increased dose of the gabapentin to 300mg at night  swelling is expected from reperfusion  It should improve over next few weeks  Diagnoses and all orders for this visit:    LEFT common femoral endarterectomy  -     gabapentin (NEURONTIN) 300 mg capsule; Take 1 capsule (300 mg total) by mouth daily at bedtime  -     VAS lower limb arterial duplex, limited/unilateral; Future    Atherosclerosis of native arteries of extremities with intermittent claudication, bilateral legs (HCC)  -     gabapentin (NEURONTIN) 300 mg capsule; Take 1 capsule (300 mg total) by mouth daily at bedtime    Occlusion of common femoral artery (HCC)    Common femoral artery injury, left, sequela    Atherosclerosis of other type of bypass graft(s) of the extremities with rest pain, left leg (HCC)          Subjective:      Patient ID: Abimael Baptiste is a 79 y o  female  HPI  Significantly improved after recent procedure on left femoral artery  Denies any bruising or swelling in right groin  Left leg is feeling better with walking  No more claudication or rest pain  Does have numbness in foot  Also has shortness of breath due to COPD which is unchanged from prior  Tolerating plavix and aspirin    Does have some left foot and leg swelling  She also feels that gabapentin is giving her a lot of relief at night  The following portions of the patient's history were reviewed and updated as appropriate: allergies, current medications, past family history, past medical history, past social history, past surgical history and problem list     Review of Systems   Constitutional: Negative  HENT: Negative  Eyes: Negative  Respiratory: Positive for cough and shortness of breath  Cardiovascular: Positive for leg swelling  Gastrointestinal: Negative  Endocrine: Negative  Genitourinary: Negative  Musculoskeletal: Negative  Skin: Negative  Allergic/Immunologic: Negative  Neurological: Negative  Hematological: Negative  Psychiatric/Behavioral: Negative  I have reviewed the ROS as entered and made changes as necessary  Objective:      /80 (BP Location: Left arm, Patient Position: Sitting)   Pulse 98   Temp 99 8 °F (37 7 °C) (Tympanic)   Ht 5' 2" (1 575 m)   Wt 77 1 kg (170 lb)   BMI 31 09 kg/m²           Physical Exam   Constitutional: She is oriented to person, place, and time  She appears well-developed and well-nourished  No distress  Obese   HENT:   Head: Normocephalic and atraumatic  Right Ear: External ear normal    Eyes: Conjunctivae are normal  Right eye exhibits no discharge  Left eye exhibits no discharge  Neck: Neck supple  No JVD present  Cardiovascular: Normal rate and regular rhythm  Murmur (Prosthetic valve click) heard  Pulses:       Femoral pulses are 2+ on the right side  Dorsalis pedis pulses are 2+ on the right side, and 0 on the left side  Triphasic right DP and PT   triphasic LEFT PT signal which is significant improvement from before   Pulmonary/Chest: Effort normal and breath sounds normal  No stridor  No respiratory distress  She has no wheezes  Abdominal: Soft  She exhibits no distension     Left groin incision well healed Musculoskeletal: She exhibits no edema, tenderness or deformity  Neurological: She is alert and oriented to person, place, and time  Skin: Skin is warm and dry  She is not diaphoretic  Psychiatric: She has a normal mood and affect  Nursing note and vitals reviewed

## 2019-07-03 NOTE — ASSESSMENT & PLAN NOTE
Significant improvement in symptoms after recent atherectomy and angioplasty  Also taking gabapentin as there is element of ischemic neuropathy  There is signficant improvement  Continue with aspirin and plavix  Increased dose of the gabapentin to 300mg at night

## 2019-07-15 NOTE — PROGRESS NOTES
Assessment:    1  Centrilobular emphysema (HCC)  POCT spirometry    Complete pulmonary function test    fluticasone-umeclidinium-vilanterol (TRELEGY ELLIPTA) 100-62 5-25 MCG/INH inhaler   2  SOB (shortness of breath)     3  Lung nodule           Plan:     Patient is here today for follow-up  She was hospitalized in June for COPD exacerbation  Though she does have some improvement from hospitalization she still has significant dyspnea on exertion  Does not feel that the Stiolto is helping  She does find some relief for short time with the nebulizer if she uses it twice Anoro  Spirometry was repeated today which shows very severe obstruction with an FEV1 of 22% which is significantly diminished from her PFT in April 2018 when her FEV1 was 45%  Will order complete PFTs given the significant drop  Will switch her on to Trelegy to see if any improvement in her symptoms  May benefit from switching to nebulized bronchodilators, will address at her next visit in 2 weeks once her PFT is done  She also has a 4 mm right lower lobe nodule which requires annual follow-up, will be due May 2020 for repeat CT scan  Follow-up in 2 weeks or sooner if necessary  Subjective:     Patient ID: Arely Abraham is a 79 y o  female  Chief Complaint:  Patient is a 81-year-old female with greater than 50 pack year smoking history who quit a few years ago, with past medical history of COPD  She was admitted in June for COPD exacerbation  She is here today for follow-up  She does feel some improvement from hospitalization but is still having significant dyspnea on exertion  She does fine relief for short period when she uses the nebulizer  Does not feel like the Stiolto is helping  The following portions of the patient's history were reviewed in this encounter and updated as appropriate:   Review of Systems   Constitutional: Negative  HENT: Negative  Respiratory: Positive for shortness of breath  Cardiovascular: Negative  Gastrointestinal: Negative  Genitourinary: Negative  Musculoskeletal: Negative  Skin: Negative  Allergic/Immunologic: Negative  Neurological: Negative  Psychiatric/Behavioral: Negative  Objective:  /70   Pulse 81   Ht 5' 2" (1 575 m)   Wt 77 1 kg (170 lb)   SpO2 92%   BMI 31 09 kg/m²   Physical Exam   Constitutional: She is oriented to person, place, and time  She appears well-developed and well-nourished  No distress  HENT:   Mouth/Throat: Oropharynx is clear and moist    Eyes: Pupils are equal, round, and reactive to light  Cardiovascular: Normal rate and regular rhythm  Pulmonary/Chest: Effort normal  No respiratory distress  She has no decreased breath sounds  She has no wheezes  She has no rhonchi  She has no rales  Abdominal: Soft  There is no tenderness  Musculoskeletal: Normal range of motion  She exhibits no edema  Neurological: She is alert and oriented to person, place, and time  Skin: Skin is warm and dry  Psychiatric: She has a normal mood and affect

## 2019-08-02 NOTE — PROGRESS NOTES
Assessment:    1  COPD, severe ([address] )     2  Chronic hypoxemic respiratory failure (HCC)  Home Oxygen with Portability   3  Centrilobular emphysema ([address] )  fluticasone-umeclidinium-vilanterol (TRELEGY ELLIPTA) 100-62 5-25 MCG/INH inhaler    Home Oxygen with Portability         Plan:     Patient is here today for follow-up of her PFT  PFT shows very severe obstruction with an FEV1 of 29%, there was response to the bronchodilator  Severely decreased DLCO  Her prior PFT done April 2018 showed moderate obstruction with an FEV1 of 45%  Will have her continue with the Trelegy as she has found an improvement in her breathing since using it  Continue with albuterol 4 times per day as needed, currently using it 2-3 times per day  She was walked in January did not qualify for oxygen at that time  Today on room air at rest she was saturating 95%  Upon ambulation on room air her O2 sats dropped to 81%  She was placed on 2 L nasal cannula and on ambulation her sats were 87%  She was then placed on 3 L nasal cannula and with ambulation her sats remained above 90%  Will order her 3 L nasal cannula to be used with exertion, currently uses 2 L at night which she will continue  She will follow-up with us in the office in 3 months or sooner if necessary  Subjective:     Patient ID: Macy Vyas is a 81 y o  female  Chief Complaint:  Patient is a 73-year-old female with greater than 50 pack year smoking history who quit a few years ago, with past medical history of COPD  She was admitted in June for COPD exacerbation  At her last visit we did a spirometry which showed a significant reduction in her FEV1 from prior PFT  We switched her from [facility] on to Trelegy and she did note improvement in her breathing but her sample ran out  She has been using her nebulizer 2 to 3 times per day    She continues to have dyspnea on exertion, does have an oxygen concentrator at home which she uses at night, she has been using it during the day which helps with her breathing  She was last walked in January and did not qualify for oxygen at that time  Review of Systems   Constitutional: Negative  HENT: Negative  Respiratory: Positive for cough and shortness of breath  Cardiovascular: Negative  Gastrointestinal: Negative  Genitourinary: Negative  Musculoskeletal: Negative  Skin: Negative  Allergic/Immunologic: Negative  Neurological: Positive for headaches  Psychiatric/Behavioral: Negative  Objective:  /60   Pulse 80   Ht 5' 2" (1 575 m)   Wt 76 7 kg (169 lb)   SpO2 95%   BMI 30 91 kg/m²   Physical Exam   Constitutional: She is oriented to person, place, and time  She appears well-developed and well-nourished  No distress  HENT:   Mouth/Throat: Oropharynx is clear and moist    Eyes: Pupils are equal, round, and reactive to light  Cardiovascular: Normal rate and regular rhythm  Pulmonary/Chest: Effort normal  No respiratory distress  She has decreased breath sounds  She has no wheezes  She has no rhonchi  She has no rales  Abdominal: Soft  There is no tenderness  Musculoskeletal: Normal range of motion  She exhibits no edema  Neurological: She is alert and oriented to person, place, and time  Skin: Skin is warm and dry  Psychiatric: She has a normal mood and affect

## 2019-08-21 NOTE — TELEPHONE ENCOUNTER
MEDICARE APPEAL FOR TRELEGY HAS BEEN REQUESTED  PAPERWORK WAS FAXED TO [address]  WILL AWAIT THEIR DECISION  PT AWARE

## 2019-08-21 NOTE — TELEPHONE ENCOUNTER
MEDICARE HAD DENIED Reva Godwin  PT IS ASKING THAT WE APPEAL  SHE STATES THAT TRELEGY REALLY HELPS HER  I SPOKE TO HER AT LENGTH AS WE HAVE NOT REAL DOCUMENTATION OF TRIAL AND FAILURE OLD NOTES JUST SAY "INHALER" NOT WORKING  PT TOLD ME SHE STARTED OUT ON SYMBICORT(WORKED FOR 6 MONTHS ) THEN BREO (COUGH GOT WORSE) THEN ANORO (DID NOT WORK AT ALL) SHE THEN WENT TO SEREVENT (DID NOT CONTROL COUGH)  THESE ARE ALL ON FORMULARY AND NEEDED TO BE TRIED AND FAILED  SEE DENIAL LETTER IN MEDIA  COULD/WOULD YOU DO A LETTER OR AMEND NOTE TO STATE WHAT PT RELAYED TO ME  I NEED MORE DOCUMENTATION TO PROCEED WITH APPEAL  LET ME KNOW WHAT YOU THINK   Thomas Murphy !

## 2019-09-10 NOTE — TELEPHONE ENCOUNTER
Spoke with patient and advised to continue Plavix  Dr Michelle Batista sent script to [facility] in [facility]  Patient verbalized understanding

## 2019-09-10 NOTE — TELEPHONE ENCOUNTER
Patient called stating that she is almost out of her Plavix prescription and is inquiring if she should continue to take it or if she is able to discontinue medication  Will need refill if she should continue  Will notify Dr Erica Amato to further advise

## 2019-09-16 NOTE — TELEPHONE ENCOUNTER
Pt called stating she has gone twice to [facility] in [facility] to  rx and was informed we never sent plavix rx in for her  Informed her Dr Yesika Cotton sent electronically on 9/10/19, rx for #90 w/ 2 refills  [address] and left vm on prescriber line informing of same and req they fill rx for pt, if any ? To call us

## 2019-11-25 NOTE — PATIENT INSTRUCTIONS
Peripheral Arterial Disease  Claudication  Back pain/rest pain  Quit smoking 15 year ago  S/p TAVR  Borderline DM      1 patient reports increased leg pain  She has back and thigh throbbing with sharp pains in the groin  Those symptoms occur at rest   When she is walking she has increased leg pain shooting down both legs to the anterior shins into the calves which then become tight  She complains of leg pain all the time  She reports that walking into the building today was difficult due to leg and calf pain  At times she gets foot pain  She has been taking gabapentin 100 mg in the morning and increased the evening dose to 600 which takes the edge off so that she can sleep  We reviewed her recent labs and testing  We reviewed her most recent lower extremity arterial duplex study  The right leg has mild disease and good blood flow  The left leg has an CANDIE of 0 59 met pressure 66 toe pressure 44  The CANDIE is a bit increased from prior to her last intervention  However, she now has 75% distal external iliac artery and femoral artery stenosis  We discussed that she will continue regular walking program   We will have her back evaluated and she should participate in physical therapy to see if conservative measures help her back pain  She has had multiple interventions to the legs  Her symptoms are equal on both legs and does not exactly correlate to her peripheral arterial disease/anatomy  She continues to have symptoms after being evaluated by Spine surgery and participating in physical therapy, we could consider further invasive procedures to the left lower extremity  Of note, she is not on a lipid lowering agent  LDL is 160  We discussed that she would benefit from lipid lowering  She will continue aspirin and clopidogrel for now          -Regular exercise / walking program  -Follow good, heart healthy diet which is low in fat and cholesterol    -Maintain healthy weight   -continue with good medical therapy on aspirin and clopidogrel   -she has had problems with statins in the past   Apparently cardiology try to Livalo but was not approved by her insurance    Consider sample trial of Livalo or injectable cholesterol medication as she cannot tolerate regular statin therapy   -Follow up office visit 2-3 months  -Routine CANDIE's prior to office visit  -Call or return sooner for increased pain in your legs; constant numbness, tingling or coldness in the legs; or if you develop wounds on your toes/feet that do not heal

## 2019-11-25 NOTE — PROGRESS NOTES
Assessment/Plan:    Intermittent claudication of both lower extremities due to atherosclerosis [facility])  Peripheral Arterial Disease  Claudication  Back pain/rest pain  Quit smoking 15 year ago  S/p TAVR  Borderline DM      Vaishali Jacky 81 y o  F PAD with claudication which is complicated by back and rest pain presents for evaluation of patient increased leg pain  EMCOR complains of leg pain which bothers her "all the time " She complains of rest pain with back and thigh throbbing and sharp pains into the groin  When she is walking she has increased leg pain shooting down both legs to the anterior shins into the calves which then become tight  Walking into the building today was difficult due to leg and calf pain  At times she also gets foot pain  She has been taking gabapentin 100 mg in the morning and increased the evening dose from 300 to 600 mg as suggest by Dr Yvonne Resendiz  She says that the increased gabapentin "takes the edge off" so that she can sleep at night  VAS SIMÓN 10/8/19  R 0 85/87/90; tracing normal  L 0 59 (prior 0 44)/66/49; dampened; > 70% distal EIA and  CFA/stent and prox SFA; 50-75% EIA prox to stent    Discussion:  We had a lengthy discussion regarding her symptoms and the treatment of peripheral arterial disease  She was seen and examined with Dr Yvonne Resendiz  We discussed with the patient that she should have her back evaluated and she should participate in physical therapy to see if conservative measures help her back pain prior to further interventions on her legs  She has had multiple interventions to the legs and her symptoms are equally painful on both legs which does not exactly correlate to her peripheral arterial disease/anatomy  She continues to have symptoms after being evaluated by Spine surgery and participating in physical therapy, we could consider further invasive procedures to the left lower extremity   In the meantime, she would benefit from physical therapy, regular walking program and optimally tolerated medical therapy      -Regular exercise / walking program  -Follow good, heart healthy diet which is low in fat and cholesterol    -Maintain healthy weight   -continue with good medical therapy on aspirin and clopidogrel  michael has had problems with statins in the past   Apparently cardiology try to Livalo but was not approved by her insurance  Consider sample trial of Livalo or injectable cholesterol medication as she cannot tolerate regular statin therapy   -Follow up office visit 2-3 months  -Routine CANDIE's prior to office visit  -We discussed reasons why she should be seen sooner  Subjective:      Patient ID: Ras Joseph is a 81 y o  female  Pt is here to review the results of her SIMÓN on 10/8/19  Pt gets bilateral thighs and leg pain at rest and with walking  30-40 feet  Pt denies any numbness or tingling in her legs  Pt was last seen on 7/2/19 for a follow up to her LLE Agram w/Stenting on 6/14/19  Pt is currently taking ASA and Plavix  HPI   Ras Joseph 81 y o  F Borderline DM, aortic stenosis S/P TAVR, PAD with claudication which is complicated by back and rest pain presents for evaluation of patient increased leg pain  Dionne Luevano complains of leg pain which bothers her "all the time " She complains of rest pain with back and thigh throbbing and sharp pains into the groin  When she is walking she has increased leg pain shooting down both legs to the anterior shins into the calves which then become tight  Walking into the building today was difficult due to leg and calf pain  At times she also gets foot pain  She has been taking gabapentin 100 mg in the morning and increased the evening dose from 300 to 600 mg as suggest by Dr Mariel Nicole  She says that the increased gabapentin "takes the edge off" so that she can sleep at night  No ischemic rest pain  No wounds       Patient underwent L angioplasty and stent of L EIA and CFA on 6/14/19 using laser with Dr Hudson Sharma  Prior to that, she underwent L thrombectomy and L CFA endarterecomy on 9/21/18  We reviewed her recent labs and testing  We reviewed her most recent lower extremity arterial duplex study  The right leg has mild disease with adequate blood flow, while the left leg has an CANDIE of 0 59 met pressure 66 toe pressure 44  The L CANDIE is a bit improved since her last intervention, but there are new stenoses -  75% distal external iliac artery and femoral artery stenosis  Of note, she is not on a lipid lowering agent  LDL is 160  We discussed that she would benefit from lipid lowering  She has had problems with statins in the past   Apparently cardiology try to get Livalo but was not approved by her insurance  Consider sample trial/samples of Livalo or injectable cholesterol medication as she cannot tolerate regular statin therapy  I will defer lipids to her cardiologist  She will continue aspirin and clopidogrel for now  Total cholesterol 235 triglycerides 115 HDL 52  (2/11/18)      The following portions of the patient's history were reviewed and updated as appropriate: allergies, current medications, past family history, past medical history, past social history, past surgical history and problem list       Review of Systems   Constitutional: Positive for activity change  HENT: Negative  Eyes: Negative  Respiratory: Negative  Cardiovascular: Negative  Gastrointestinal: Negative  Endocrine: Negative  Genitourinary: Negative  Musculoskeletal: Positive for back pain  Skin: Negative  Allergic/Immunologic: Negative  Neurological: Negative  Hematological: Bruises/bleeds easily  Psychiatric/Behavioral: Negative            Objective:    /78 (BP Location: Left arm, Patient Position: Sitting, Cuff Size: Adult)   Pulse 62   Temp (!) 97 3 °F (36 3 °C) (Tympanic)   Resp 16   Ht 5' 2" (1 575 m)   Wt 80 7 kg (178 lb)   BMI 32 56 kg/m² Physical Exam   Constitutional: She is oriented to person, place, and time  She appears well-developed and well-nourished  She is cooperative  HENT:   Head: Normocephalic and atraumatic  Eyes: Pupils are equal, round, and reactive to light  EOM are normal    Neck: Trachea normal  Neck supple  No JVD present  No thyromegaly present  Cardiovascular: Normal rate, regular rhythm, S1 normal, S2 normal and normal heart sounds  Exam reveals no gallop and no friction rub  No murmur heard  Pulses:       Carotid pulses are 2+ on the right side, and 2+ on the left side  Radial pulses are 2+ on the right side, and 2+ on the left side  Dorsalis pedis pulses are 2+ on the right side  Left groin well-healed but tender    + PT/DP signals    Feet warm, pink; no wounds     Pulmonary/Chest: Effort normal and breath sounds normal  No accessory muscle usage  No respiratory distress  She has no wheezes  She has no rales  Abdominal: Soft  Bowel sounds are normal  She exhibits no distension  There is no hepatosplenomegaly  There is no tenderness  Musculoskeletal: Normal range of motion  She exhibits no edema or deformity  Neurological: She is alert and oriented to person, place, and time  Grossly normal    Skin: Skin is warm and dry  No lesion and no rash noted  No cyanosis  Nails show no clubbing  Psychiatric: She has a normal mood and affect  Nursing note and vitals reviewed  VAS SIMÓN 10/8/19  RIGHT LOWER LIMB:  Ankle/Brachial Index:  0 85 which is mild claudication range range  Prior: 0 97  PVR/ PPG tracings are normal   Metatarsal pressure of 87 mmHg  Great toe pressure of 90 mmHg,  within the healing range  LEFT LOWER LIMB:  This evaluation shows >70% stenosis in the distal external iliac and common  femoral artery/stent and proximal superficial femoral artery  50-75% stenosis noted in the external iliac artery proximal to the stent  Ankle/Brachial Index: 0 59 which is severe range  Prior: 0 44  PVR/ PPG tracings are dampened  Metatarsal pressure of 66 mmHg  Great toe pressure of 49 mmHg,  within the healing range  A large anechoic structure without flow is visualized anterior to the common  femoral artery  In comparison th the study on 5/16/2019, there is decrease in the CANDIE noted on  the right  On the left, the CANDIE had increase s/p intervention but the is a  stenosis noted in the EIA and CFA stent  VAS SIMÓN 5/16/19  RIGHT LOWER LIMB:  Diffuse disease is visualized throughout the common femoral artery without  focal stenosis  Ankle/Brachial index:  0 97 Normal Prior 0 9  PVR/ PPG tracings are normal   Metatarsal pressure of 131  mmHg  Great toe pressure of 100 mmHg, within the healing range     LEFT LOWER LIMB:  There is a >75% stenosis in the common femoral artery  There is a >75 % stenosis in the proximal superficial femoral artery at the  bifurcation  Ankle/Brachial index:   0 44 Ischemic  range  Prior 0 72  PVR/ PPG tracings are dampened  Metatarsal pressure of 72  mmHg  Great toe pressure of 62 mmHg, within the healing range  A large anechoic structure without flow is visualized anterior to the common  femoral artery  Compared to previous study on 12-18-19, there is a significant increase in the  disease process in the left leg with a decrease in the CANDIE  I have reviewed and made appropriate changes to the review of systems input by the medical assistant      Vitals:    11/25/19 1050   BP: 144/78   BP Location: Left arm   Patient Position: Sitting   Cuff Size: Adult   Pulse: 62   Resp: 16   Temp: (!) 97 3 °F (36 3 °C)   TempSrc: Tympanic   Weight: 80 7 kg (178 lb)   Height: 5' 2" (1 575 m)       Patient Active Problem List   Diagnosis    COPD exacerbation ([address] )    Chest pain    Hypertension    Morbid obesity due to excess calories (HCC)    Coronary artery disease    Chronic radicular pain of lower back    Urinary tract infection    Centrilobular emphysema ([facility] )    SOB (shortness of breath)    Mixed hyperlipidemia    Peripheral artery disease (HCC)    Intermittent claudication of both lower extremities due to atherosclerosis ([facility] )    S/p TAVR (transcatheter aortic valve replacement), bioprosthetic    Occlusion of common femoral artery ([facility] )    Common femoral artery injury, left, sequela    Injury of left femoral artery    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HCC)    Iliac artery stenosis, left (HCC)    LEFT common femoral endarterectomy    Low back pain    Type 2 diabetes mellitus with complication, without long-term current use of insulin (HCC)    Contrast media allergy    Atherosclerosis of other type of bypass graft(s) of the extremities with rest pain, left leg ([facility])       Past Surgical History:   Procedure Laterality Date    CARDIAC SURGERY      aortic valve replacement    CARDIAC VALVE REPLACEMENT      CHOLECYSTECTOMY      ESOPHAGUS SURGERY N/A     IR ABDOMINAL ANGIOGRAPHY / INTERVENTION  10/25/2018    IR LOWER EXTREMITY / INTERVENTION  6/14/2019    AK SLCTV CATHJ 3RD+ ORD SLCTV ABDL PEL/LXTR [facility] Left 10/25/2018    Procedure: LEFT LEG ANGIOGRAM WITH PLACEMENT OF LEFT EXTERNAL ILIAC ARTERY / LEFT COMMON FEMORAL ARTERIAL STENT, RIGHT FEMORAL ACCESS;  Surgeon: Tori Ortiz MD;  [facility]: [facility];  Service: Vascular    AK Dionte Geovanny 3RD+ ORD SLCTV ABDL PEL/LXTR [facility] Left 6/14/2019    Procedure: Bilateral lower extremity arteriogram, left lower extremity intervention with laser atherectomy angioplasty and stent ;  Surgeon: Suly Garrett MD;  [facility];  Service: Vascular    AK THROMBOENDARTECTMY FEMORAL COMMON Left 9/21/2018    Procedure: ENDARTERECTOMY ARTERIAL FEMORAL;  Surgeon: Tori Ortiz MD;  [facility];  Service: Vascular    REPLACEMENT AORTIC VALVE TRANSCATHETER (TAVR)      THROMBECTOMY W/ EMBOLECTOMY Left 9/21/2018    Procedure: EMBOLECTOMY/THROMBECTOMY LOWER EXTREMITY (GROIN EXPLORATION);   Surgeon: Tori Hollingsworth Doctor, MD;  [facility];  Service: Vascular    VASCULAR SURGERY         Family History   Problem Relation Age of Onset    Heart disease Mother     Diabetes Mother     Heart disease Father     Diabetes Father        Social History     Socioeconomic History    Marital status:      Spouse name: Not on file    Number of children: Not on file    Years of education: Not on file    Highest education level: Not on file   Occupational History    Not on file   Social Needs    Financial resource strain: Not on file    Food insecurity:     Worry: Not on file     Inability: Not on file    Transportation needs:     Medical: Not on file     Non-medical: Not on file   Tobacco Use    Smoking status: Former Smoker     Last attempt to quit:      Years since quittin 9    Smokeless tobacco: Never Used   Substance and Sexual Activity    Alcohol use:  Yes     Alcohol/week: 1 0 - 2 0 standard drinks     Types: 1 - 2 Cans of beer per week     Frequency: Monthly or less     Drinks per session: 1 or 2     Binge frequency: Never     Comment: social    Drug use: Never    Sexual activity: Not Currently   Lifestyle    Physical activity:     Days per week: Not on file     Minutes per session: Not on file    Stress: Not on file   Relationships    Social connections:     Talks on phone: Not on file     Gets together: Not on file     Attends [employer] service: Not on file     Active member of club or organization: Not on file     Attends meetings of clubs or organizations: Not on file     Relationship status: Not on file    Intimate partner violence:     Fear of current or ex partner: Not on file     Emotionally abused: Not on file     Physically abused: Not on file     Forced sexual activity: Not on file   Other Topics Concern    Not on file   Social History Narrative    Not on file       Allergies   Allergen Reactions    Iv Contrast [Iodinated Diagnostic Agents]      Pt states that she was told many years ago that when she was given contrast dye she had a reaction, but does not know what  She said she is always prepped prior to having dye and she asked that I list this as an allergy in her chart      Pletal [Cilostazol] Diarrhea and Vomiting    Statins Other (See Comments)     Severe muscle cramps-- cannot move    Xarelto [Rivaroxaban] Other (See Comments)     Thinks she shouldn't take because she has an artificial valve  Also, made her "WOOZY"    Iohexol Other (See Comments)     Pt does not recall         Current Outpatient Medications:     acetaminophen (TYLENOL) 325 mg tablet, Take 2 tablets (650 mg total) by mouth every 6 (six) hours as needed for mild pain or fever, Disp: 30 tablet, Rfl: 0    acetaminophen-codeine (TYLENOL #2) 300-15 MG per tablet, Take 1 tablet by mouth every 4 (four) hours as needed for moderate pain, Disp: 30 tablet, Rfl: 0    albuterol (2 5 mg/3 mL) 0 083 % nebulizer solution, Take 1 vial (2 5 mg total) by nebulization every 6 (six) hours as needed for wheezing or shortness of breath, Disp: 1080 mL, Rfl: 0    albuterol (PROVENTIL HFA,VENTOLIN HFA) 90 mcg/act inhaler, Inhale 2 puffs 2 (two) times a day  , Disp: , Rfl:     ALPRAZolam (XANAX) 0 25 mg tablet, Take 0 25 mg by mouth 2 (two) times a day as needed , Disp: , Rfl: 2    aspirin 81 mg chewable tablet, Chew 81 mg daily, Disp: , Rfl:     clopidogrel (PLAVIX) 75 mg tablet, Take 1 tablet (75 mg total) by mouth daily, Disp: 90 tablet, Rfl: 2    co-enzyme Q-10 30 MG capsule, Take 100 mg by mouth daily  , Disp: , Rfl:     docusate sodium (COLACE) 100 mg capsule, Take 1 capsule (100 mg total) by mouth 2 (two) times a day as needed for constipation, Disp: 10 capsule, Rfl: 0    fluticasone (FLONASE) 50 mcg/act nasal spray, USE 1 SPRAY(S) IN EACH NOSTRIL ONCE DAILY, Disp: , Rfl:     fluticasone-umeclidinium-vilanterol (TRELEGY ELLIPTA) 100-62 5-25 MCG/INH inhaler, Inhale 1 puff daily Rinse mouth after use , Disp: 1 Inhaler, Rfl: 3    fluticasone-vilanterol (BREO ELLIPTA) 100-25 mcg/inh inhaler, Inhale 1 puff daily Rinse mouth after use , Disp: 1 Inhaler, Rfl: 3    furosemide (LASIX) 20 mg tablet, Take 1 tablet (20 mg total) by mouth 2 (two) times a day, Disp: 60 tablet, Rfl: 0    glipiZIDE (GLUCOTROL) 5 mg tablet, Daily, Disp: , Rfl:     ibuprofen (MOTRIN) 600 mg tablet, Take 600 mg by mouth every 6 (six) hours as needed , Disp: , Rfl:     levothyroxine 50 mcg tablet, Take 50 mcg by mouth daily, Disp: , Rfl:     lidocaine (XYLOCAINE) 5 % ointment, Apply topically as needed for mild pain, Disp: 50 g, Rfl: 2    metoprolol tartrate (LOPRESSOR) 25 mg tablet, Take 1 tablet (25 mg total) by mouth every 12 (twelve) hours, Disp: 30 tablet, Rfl: 0    oxyCODONE-acetaminophen (PERCOCET) 5-325 mg per tablet, Take 1 tablet by mouth every 4 (four) hours as needed , Disp: , Rfl:     pantoprazole (PROTONIX) 40 mg tablet, Take 40 mg by mouth daily, Disp: , Rfl:     Potassium 99 MG TABS, Take 1 tablet by mouth daily, Disp: , Rfl:     tiotropium (SPIRIVA RESPIMAT) 2 5 MCG/ACT AERS inhaler, Inhale 2 puffs daily, Disp: 1 Inhaler, Rfl: 3    diphenhydrAMINE (BENADRYL) 50 mg capsule, Take 1 capsule (50 mg total) by mouth every 12 (twelve) hours for 2 doses Take 1 capsule 12 hr before and 1 capsule 1 hr before angiogram (Patient not taking: Reported on 7/2/2019), Disp: 2 capsule, Rfl: 0    gabapentin (NEURONTIN) 100 mg capsule, Take 1 capsule (100 mg total) by mouth 2 (two) times a day Take 1 tablet in the morning and 1 tablet in the afternoon, Disp: 180 capsule, Rfl: 0    gabapentin (NEURONTIN) 300 mg capsule, TAKE 1 CAPSULE BY MOUTH ONCE DAILY AT BEDTIME, Disp: 90 capsule, Rfl: 1    gabapentin (NEURONTIN) 600 MG tablet, Take 1 tablet (600 mg total) by mouth daily at bedtime, Disp: 90 tablet, Rfl: 0

## 2019-11-25 NOTE — LETTER
November 26, 2019     Fleet Raid, [address] [address]    Patient: Yasmine Wilson   YOB: 1950   Date of Visit: 11/25/2019       Dear Dr Poncho Law: Thank you for referring Yasmine Wilson to me for evaluation  Below are my notes for this consultation  If you have questions, please do not hesitate to call me  I look forward to following your patient along with you  Sincerely,        Damian Mckeon PA-C        CC: No Recipients  Damian Mckeon PA-C  11/26/2019  2:16 PM  Incomplete  Assessment/Plan:    Intermittent claudication of both lower extremities due to atherosclerosis [facility])  Peripheral Arterial Disease  Claudication  Back pain/rest pain  Quit smoking 15 year ago  S/p TAVR  Borderline DM      Yasmine Wilson 81 y o  F PAD with claudication which is complicated by back and rest pain presents for evaluation of patient increased leg pain  [address] [facility] complains of leg pain which bothers her "all the time " She complains of rest pain with back and thigh throbbing and sharp pains into the groin  When she is walking she has increased leg pain shooting down both legs to the anterior shins into the calves which then become tight  Walking into the building today was difficult due to leg and calf pain  At times she also gets foot pain  She has been taking gabapentin 100 mg in the morning and increased the evening dose from 300 to 600 mg as suggest by Dr Ede Mendoza  She says that the increased gabapentin "takes the edge off" so that she can sleep at night  VAS SIMÓN 10/8/19  R 0 85/87/90; tracing normal  L 0 59 (prior 0 44)/66/49; dampened; > 70% distal EIA and  CFA/stent and prox SFA; 50-75% EIA prox to stent    Discussion:  We had a lengthy discussion regarding her symptoms and the treatment of peripheral arterial disease  She was seen and examined with Dr Ede Mendoza   We discussed with the patient that she should have her back evaluated and she should participate in physical therapy to see if conservative measures help her back pain prior to further interventions on her legs  She has had multiple interventions to the legs and her symptoms are equally painful on both legs which does not exactly correlate to her peripheral arterial disease/anatomy  She continues to have symptoms after being evaluated by Spine surgery and participating in physical therapy, we could consider further invasive procedures to the left lower extremity  In the meantime, she would benefit from physical therapy, regular walking program and optimally tolerated medical therapy      -Regular exercise / walking program  -Follow good, heart healthy diet which is low in fat and cholesterol    -Maintain healthy weight   -continue with good medical therapy on aspirin and clopidogrel  shhe has had problems with statins in the past   Apparently cardiology try to Livalo but was not approved by her insurance  Consider sample trial of Livalo or injectable cholesterol medication as she cannot tolerate regular statin therapy   -Follow up office visit 2-3 months  -Routine CANDIE's prior to office visit  -We discussed reasons why she should be seen sooner  Subjective:      Patient ID: Yasmine Wilson is a 81 y o  female  Pt is here to review the results of her SIMÓN on 10/8/19  Pt gets bilateral thighs and leg pain at rest and with walking  30-40 feet  Pt denies any numbness or tingling in her legs  Pt was last seen on 7/2/19 for a follow up to her LLE Agram w/Stenting on 6/14/19  Pt is currently taking ASA and Plavix  HPI   Yasmine Wilson 81 y o  F Borderline DM, aortic stenosis S/P TAVR, PAD with claudication which is complicated by back and rest pain presents for evaluation of patient increased leg pain  [place] complains of leg pain which bothers her "all the time " She complains of rest pain with back and thigh throbbing and sharp pains into the groin    When she is walking she has increased leg pain shooting down both legs to the anterior shins into the calves which then become tight  Walking into the building today was difficult due to leg and calf pain  At times she also gets foot pain  She has been taking gabapentin 100 mg in the morning and increased the evening dose from 300 to 600 mg as suggest by Dr Flo Ross  She says that the increased gabapentin "takes the edge off" so that she can sleep at night  No ischemic rest pain  No wounds  Patient underwent L angioplasty and stent of L EIA and CFA on 6/14/19 using laser with Dr Flo Ross  Prior to that, she underwent L thrombectomy and L CFA endarterecomy on 9/21/18  We reviewed her recent labs and testing  We reviewed her most recent lower extremity arterial duplex study  The right leg has mild disease with adequate blood flow, while the left leg has an CANDIE of 0 59 met pressure 66 toe pressure 44  The L CANDIE is a bit improved since her last intervention, but there are new stenoses -  75% distal external iliac artery and femoral artery stenosis  Of note, she is not on a lipid lowering agent  LDL is 160  We discussed that she would benefit from lipid lowering  She has had problems with statins in the past   Apparently cardiology try to get Livalo but was not approved by her insurance  Consider sample trial/samples of Livalo or injectable cholesterol medication as she cannot tolerate regular statin therapy  I will defer lipids to her cardiologist  She will continue aspirin and clopidogrel for now  Total cholesterol 235 triglycerides 115 HDL 52  (2/11/18)      The following portions of the patient's history were reviewed and updated as appropriate: allergies, current medications, past family history, past medical history, past social history, past surgical history and problem list       Review of Systems   Constitutional: Positive for activity change  HENT: Negative  Eyes: Negative  Respiratory: Negative  Cardiovascular: Negative  Gastrointestinal: Negative  Endocrine: Negative  Genitourinary: Negative  Musculoskeletal: Positive for back pain  Skin: Negative  Allergic/Immunologic: Negative  Neurological: Negative  Hematological: Bruises/bleeds easily  Psychiatric/Behavioral: Negative  Objective:    /78 (BP Location: Left arm, Patient Position: Sitting, Cuff Size: Adult)   Pulse 62   Temp (!) 97 3 °F (36 3 °C) (Tympanic)   Resp 16   Ht 5' 2" (1 575 m)   Wt 80 7 kg (178 lb)   BMI 32 56 kg/m²         Physical Exam   Constitutional: She is oriented to person, place, and time  She appears well-developed and well-nourished  She is cooperative  HENT:   Head: Normocephalic and atraumatic  Eyes: Pupils are equal, round, and reactive to light  EOM are normal    Neck: Trachea normal  Neck supple  No JVD present  No thyromegaly present  Cardiovascular: Normal rate, regular rhythm, S1 normal, S2 normal and normal heart sounds  Exam reveals no gallop and no friction rub  No murmur heard  Pulses:       Carotid pulses are 2+ on the right side, and 2+ on the left side  Radial pulses are 2+ on the right side, and 2+ on the left side  Dorsalis pedis pulses are 2+ on the right side  Left groin well-healed but tender    + PT/DP signals    Feet warm, pink; no wounds     Pulmonary/Chest: Effort normal and breath sounds normal  No accessory muscle usage  No respiratory distress  She has no wheezes  She has no rales  Abdominal: Soft  Bowel sounds are normal  She exhibits no distension  There is no hepatosplenomegaly  There is no tenderness  Musculoskeletal: Normal range of motion  She exhibits no edema or deformity  Neurological: She is alert and oriented to person, place, and time  Grossly normal    Skin: Skin is warm and dry  No lesion and no rash noted  No cyanosis  Nails show no clubbing  Psychiatric: She has a normal mood and affect     Nursing note and vitals reviewed  VAS SIMÓN 10/8/19  RIGHT LOWER LIMB:  Ankle/Brachial Index:  0 85 which is mild claudication range range  Prior: 0 97  PVR/ PPG tracings are normal   Metatarsal pressure of 87 mmHg  Great toe pressure of 90 mmHg,  within the healing range  LEFT LOWER LIMB:  This evaluation shows >70% stenosis in the distal external iliac and common  femoral artery/stent and proximal superficial femoral artery  50-75% stenosis noted in the external iliac artery proximal to the stent  Ankle/Brachial Index: 0 59 which is severe range  Prior: 0 44  PVR/ PPG tracings are dampened  Metatarsal pressure of 66 mmHg  Great toe pressure of 49 mmHg,  within the healing range  A large anechoic structure without flow is visualized anterior to the common  femoral artery  In comparison th the study on 5/16/2019, there is decrease in the CANDIE noted on  the right  On the left, the CANDIE had increase s/p intervention but the is a  stenosis noted in the EIA and CFA stent  VAS SIMÓN 5/16/19  RIGHT LOWER LIMB:  Diffuse disease is visualized throughout the common femoral artery without  focal stenosis  Ankle/Brachial index:  0 97 Normal Prior 0 9  PVR/ PPG tracings are normal   Metatarsal pressure of 131  mmHg  Great toe pressure of 100 mmHg, within the healing range     LEFT LOWER LIMB:  There is a >75% stenosis in the common femoral artery  There is a >75 % stenosis in the proximal superficial femoral artery at the  bifurcation  Ankle/Brachial index:   0 44 Ischemic  range  Prior 0 72  PVR/ PPG tracings are dampened  Metatarsal pressure of 72  mmHg  Great toe pressure of 62 mmHg, within the healing range  A large anechoic structure without flow is visualized anterior to the common  femoral artery  Compared to previous study on 12-18-19, there is a significant increase in the  disease process in the left leg with a decrease in the CANDIE        I have reviewed and made appropriate changes to the review of systems input by the medical assistant      Vitals:    11/25/19 1050   BP: 144/78   BP Location: Left arm   Patient Position: Sitting   Cuff Size: Adult   Pulse: 62   Resp: 16   Temp: (!) 97 3 °F (36 3 °C)   TempSrc: Tympanic   Weight: 80 7 kg (178 lb)   Height: 5' 2" (1 575 m)       Patient Active Problem List   Diagnosis    COPD exacerbation ([facility] [address]    Chest pain    Hypertension    Morbid obesity due to excess calories ([facility])    Coronary artery disease    Chronic radicular pain of lower back    Urinary tract infection    Centrilobular emphysema (HCC)    SOB (shortness of breath)    Mixed hyperlipidemia    Peripheral artery disease ([facility])    Intermittent claudication of both lower extremities due to atherosclerosis ([facility])    S/p TAVR (transcatheter aortic valve replacement), bioprosthetic    Occlusion of common femoral artery ([facility])    Common femoral artery injury, left, sequela    Injury of left femoral artery    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[facility])    Iliac artery stenosis, left ([facility])    LEFT common femoral endarterectomy    Low back pain    Type 2 diabetes mellitus with complication, without long-term current use of insulin ([facility])    Contrast media allergy    Atherosclerosis of other type of bypass graft(s) of the extremities with rest pain, left leg ([facility])       Past Surgical History:   Procedure Laterality Date    CARDIAC SURGERY      aortic valve replacement    CARDIAC VALVE REPLACEMENT      CHOLECYSTECTOMY      ESOPHAGUS SURGERY N/A     IR ABDOMINAL ANGIOGRAPHY / INTERVENTION  10/25/2018    IR LOWER EXTREMITY / INTERVENTION  6/14/2019    ME SLCDARA CATHJ 3RD+ ORD [facility] Left 10/25/2018    Procedure: LEFT LEG ANGIOGRAM WITH PLACEMENT OF LEFT EXTERNAL ILIAC ARTERY / LEFT COMMON FEMORAL ARTERIAL STENT, RIGHT FEMORAL ACCESS;  Surgeon: Kandace Ortiz MD;  [facility];  Service: Vascular    [place] S[facility]ero [place] [facility] [place] [place] [facility] Left 6/14/2019    Procedure: Bilateral lower extremity arteriogram, left lower extremity intervention with laser atherectomy angioplasty and stent ;  Surgeon: Davy Cohen MD;  [facility];  Service: Vascular    OK THROMBOENDARTECTMY FEMORAL COMMON Left 2018    Procedure: ENDARTERECTOMY ARTERIAL FEMORAL;  Surgeon: Jennifer Ortiz MD;  [facility];  Service: Vascular    REPLACEMENT AORTIC VALVE TRANSCATHETER (TAVR)      THROMBECTOMY W/ EMBOLECTOMY Left 2018    Procedure: EMBOLECTOMY/THROMBECTOMY LOWER EXTREMITY (GROIN EXPLORATION); Surgeon: Jennifer Ortiz MD;  [facility];  Service: Vascular    VASCULAR SURGERY         Family History   Problem Relation Age of Onset    Heart disease Mother     Diabetes Mother     Heart disease Father     Diabetes Father        Social History     Socioeconomic History    Marital status:      Spouse name: Not on file    Number of children: Not on file    Years of education: Not on file    Highest education level: Not on file   Occupational History    Not on file   Social Needs    Financial resource strain: Not on file    Food insecurity:     Worry: Not on file     Inability: Not on file    Transportation needs:     Medical: Not on file     Non-medical: Not on file   Tobacco Use    Smoking status: Former Smoker     Last attempt to quit:      Years since quittin 9    Smokeless tobacco: Never Used   Substance and Sexual Activity    Alcohol use:  Yes     Alcohol/week: 1 0 - 2 0 standard drinks     Types: 1 - 2 Cans of beer per week     Frequency: Monthly or less     Drinks per session: 1 or 2     Binge frequency: Never     Comment: social    Drug use: Never    Sexual activity: Not Currently   Lifestyle    Physical activity:     Days per week: Not on file     Minutes per session: Not on file    Stress: Not on file   Relationships    Social connections:     Talks on phone: Not on file     Gets together: Not on file     Attends Jainism service: Not on file     Active member of club or organization: Not on file     Attends meetings of clubs or organizations: Not on file     Relationship status: Not on file    Intimate partner violence:     Fear of current or ex partner: Not on file     Emotionally abused: Not on file     Physically abused: Not on file     Forced sexual activity: Not on file   Other Topics Concern    Not on file   Social History Narrative    Not on file       Allergies   Allergen Reactions    Iv Contrast [Iodinated Diagnostic Agents]      Pt states that she was told many years ago that when she was given contrast dye she had a reaction, but does not know what  She said she is always prepped prior to having dye and she asked that I list this as an allergy in her chart      Pletal [Cilostazol] Diarrhea and Vomiting    Statins Other (See Comments)     Severe muscle cramps-- cannot move    Xarelto [Rivaroxaban] Other (See Comments)     Thinks she shouldn't take because she has an artificial valve  Also, made her "WOOZY"    Iohexol Other (See Comments)     Pt does not recall         Current Outpatient Medications:     acetaminophen (TYLENOL) 325 mg tablet, Take 2 tablets (650 mg total) by mouth every 6 (six) hours as needed for mild pain or fever, Disp: 30 tablet, Rfl: 0    acetaminophen-codeine (TYLENOL #2) 300-15 MG per tablet, Take 1 tablet by mouth every 4 (four) hours as needed for moderate pain, Disp: 30 tablet, Rfl: 0    albuterol (2 5 mg/3 mL) 0 083 % nebulizer solution, Take 1 vial (2 5 mg total) by nebulization every 6 (six) hours as needed for wheezing or shortness of breath, Disp: 1080 mL, Rfl: 0    albuterol (PROVENTIL HFA,VENTOLIN HFA) 90 mcg/act inhaler, Inhale 2 puffs 2 (two) times a day  , Disp: , Rfl:     ALPRAZolam (XANAX) 0 25 mg tablet, Take 0 25 mg by mouth 2 (two) times a day as needed , Disp: , Rfl: 2    aspirin 81 mg chewable tablet, Chew 81 mg daily, Disp: , Rfl:     clopidogrel (PLAVIX) 75 mg tablet, Take 1 tablet (75 mg total) by mouth daily, Disp: 90 tablet, Rfl: 2    co-enzyme Q-10 30 MG capsule, Take 100 mg by mouth daily  , Disp: , Rfl:     docusate sodium (COLACE) 100 mg capsule, Take 1 capsule (100 mg total) by mouth 2 (two) times a day as needed for constipation, Disp: 10 capsule, Rfl: 0    fluticasone (FLONASE) 50 mcg/act nasal spray, USE 1 SPRAY(S) IN EACH NOSTRIL ONCE DAILY, Disp: , Rfl:     fluticasone-umeclidinium-vilanterol (TRELEGY ELLIPTA) 100-62 5-25 MCG/INH inhaler, Inhale 1 puff daily Rinse mouth after use , Disp: 1 Inhaler, Rfl: 3    fluticasone-vilanterol (BREO ELLIPTA) 100-25 mcg/inh inhaler, Inhale 1 puff daily Rinse mouth after use , Disp: 1 Inhaler, Rfl: 3    furosemide (LASIX) 20 mg tablet, Take 1 tablet (20 mg total) by mouth 2 (two) times a day, Disp: 60 tablet, Rfl: 0    glipiZIDE (GLUCOTROL) 5 mg tablet, Daily, Disp: , Rfl:     ibuprofen (MOTRIN) 600 mg tablet, Take 600 mg by mouth every 6 (six) hours as needed , Disp: , Rfl:     levothyroxine 50 mcg tablet, Take 50 mcg by mouth daily, Disp: , Rfl:     lidocaine (XYLOCAINE) 5 % ointment, Apply topically as needed for mild pain, Disp: 50 g, Rfl: 2    metoprolol tartrate (LOPRESSOR) 25 mg tablet, Take 1 tablet (25 mg total) by mouth every 12 (twelve) hours, Disp: 30 tablet, Rfl: 0    oxyCODONE-acetaminophen (PERCOCET) 5-325 mg per tablet, Take 1 tablet by mouth every 4 (four) hours as needed , Disp: , Rfl:     pantoprazole (PROTONIX) 40 mg tablet, Take 40 mg by mouth daily, Disp: , Rfl:     Potassium 99 MG TABS, Take 1 tablet by mouth daily, Disp: , Rfl:     tiotropium (SPIRIVA RESPIMAT) 2 5 MCG/ACT AERS inhaler, Inhale 2 puffs daily, Disp: 1 Inhaler, Rfl: 3    diphenhydrAMINE (BENADRYL) 50 mg capsule, Take 1 capsule (50 mg total) by mouth every 12 (twelve) hours for 2 doses Take 1 capsule 12 hr before and 1 capsule 1 hr before angiogram (Patient not taking: Reported on 7/2/2019), Disp: 2 capsule, Rfl: 0    gabapentin (NEURONTIN) 100 mg capsule, Take 1 capsule (100 mg total) by mouth 2 (two) times a day Take 1 tablet in the morning and 1 tablet in the afternoon, Disp: 180 capsule, Rfl: 0    gabapentin (NEURONTIN) 300 mg capsule, TAKE 1 CAPSULE BY MOUTH ONCE DAILY AT BEDTIME, Disp: 90 capsule, Rfl: 1    gabapentin (NEURONTIN) 600 MG tablet, Take 1 tablet (600 mg total) by mouth daily at bedtime, Disp: 90 tablet, Rfl: 0        Padmini Pearce PA-C  11/25/2019 12:09 PM  Sign at close encounter  Assessment/Plan:    No problem-specific Assessment & Plan notes found for this encounter  Subjective:      Patient ID: Yesenia Hou is a 81 y o  female  Pt is here to review the results of her SIMÓN on 10/8/19  Pt gets bilateral leg pain in her thighs and shins with walking 30-40 feet  Pt denies any numbness or tingling in her legs  Pt was last seen on 7/2/19 for a follow up to her LLE Agram w/Stenting on 6/14/19  Pt is currently taking ASA and Plavix  HPI             Total cholesterol 235 triglycerides 115 HDL 52  (2/11/18)      The following portions of the patient's history were reviewed and updated as appropriate: allergies, current medications, past family history, past medical history, past social history, past surgical history and problem list       Review of Systems   Constitutional: Positive for activity change  HENT: Negative  Eyes: Negative  Respiratory: Negative  Cardiovascular: Negative  Gastrointestinal: Negative  Endocrine: Negative  Genitourinary: Negative  Musculoskeletal: Positive for back pain  Skin: Negative  Allergic/Immunologic: Negative  Neurological: Negative  Hematological: Bruises/bleeds easily  Psychiatric/Behavioral: Negative            Objective:      /78 (BP Location: Left arm, Patient Position: Sitting, Cuff Size: Adult)   Pulse 62   Temp (!) 97 3 °F (36 3 °C) (Tympanic)   Resp 16   Ht 5' 2" (1 575 m)   Wt 80 7 kg (178 lb)   BMI 32 56 kg/m²           Physical Exam              VAS SIMÓN 10/8/19  RIGHT LOWER LIMB:  Ankle/Brachial Index:  0 85 which is mild claudication range range  Prior: 0 97  PVR/ PPG tracings are normal   Metatarsal pressure of 87 mmHg  Great toe pressure of 90 mmHg,  within the healing range  LEFT LOWER LIMB:  This evaluation shows >70% stenosis in the distal external iliac and common  femoral artery/stent and proximal superficial femoral artery  50-75% stenosis noted in the external iliac artery proximal to the stent  Ankle/Brachial Index: 0 59 which is severe range  Prior: 0 44  PVR/ PPG tracings are dampened  Metatarsal pressure of 66 mmHg  Great toe pressure of 49 mmHg,  within the healing range  A large anechoic structure without flow is visualized anterior to the common  femoral artery  In comparison th the study on 5/16/2019, there is decrease in the CANDIE noted on  the right  On the left, the CANDIE had increase s/p intervention but the is a  stenosis noted in the EIA and CFA stent  VAS SIMÓN 5/16/19  RIGHT LOWER LIMB:  Diffuse disease is visualized throughout the common femoral artery without  focal stenosis  Ankle/Brachial index:  0 97 Normal Prior 0 9  PVR/ PPG tracings are normal   Metatarsal pressure of 131  mmHg  Great toe pressure of 100 mmHg, within the healing range     LEFT LOWER LIMB:  There is a >75% stenosis in the common femoral artery  There is a >75 % stenosis in the proximal superficial femoral artery at the  bifurcation  Ankle/Brachial index:   0 44 Ischemic  range  Prior 0 72  PVR/ PPG tracings are dampened  Metatarsal pressure of 72  mmHg  Great toe pressure of 62 mmHg, within the healing range  A large anechoic structure without flow is visualized anterior to the common  femoral artery  Compared to previous study on 12-18-19, there is a significant increase in the  disease process in the left leg with a decrease in the CANDIE        I have reviewed and made appropriate changes to the review of systems input by the medical assistant      Vitals:    11/25/19 1050   BP: 144/78   BP Location: Left arm   Patient Position: Sitting   Cuff Size: Adult   Pulse: 62   Resp: 16   Temp: (!) 97 3 °F (36 3 °C)   TempSrc: Tympanic   Weight: 80 7 kg (178 lb)   Height: 5' 2" (1 575 m)       Patient Active Problem List   Diagnosis    COPD exacerbation ([facility] [address] )    Chest pain    Hypertension    [facility]bid obesity due to excess calories ([facility]    Coronary artery disease    Chronic radicular pain of lower back    Urinary tract infection    Centrilobular emphysema (HCC)    SOB (shortness of breath)    Mixed hyperlipidemia    Peripheral artery disease ([facility]    Intermittent claudication of both lower extremities due to atherosclerosis ([facility] [address] )    S/p TAVR (transcatheter aortic valve replacement), bioprosthetic    Occlusion of common femoral artery ([facility] [address] )    Common femoral artery injury, left, sequela    Injury of left femoral artery    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[facility]    Iliac artery stenosis, left (HCC)    LEFT common femoral endarterectomy    Low back pain    Type 2 diabetes mellitus with complication, without long-term current use of insulin ([facility])    Contrast media allergy    Atherosclerosis of other type of bypass graft(s) of the extremities with rest pain, left leg ([facility] [address] )       Past Surgical History:   Procedure Laterality Date    CARDIAC SURGERY      aortic valve replacement    CARDIAC VALVE REPLACEMENT      CHOLECYSTECTOMY      ESOPHAGUS SURGERY N/A     IR ABDOMINAL ANGIOGRAPHY / INTERVENTION  10/25/2018    IR LOWER EXTREMITY / INTERVENTION  6/14/2019    LA SLCTV CATHJ 3RD+ ORD SLCTV ABDL [facility] Left 10/25/2018    Procedure: LEFT LEG ANGIOGRAM WITH PLACEMENT OF LEFT EXTERNAL ILIAC ARTERY / LEFT COMMON FEMORAL ARTERIAL STENT, RIGHT FEMORAL ACCESS;  Surgeon: Joe Ortiz MD;  [facility];  Service: Vascular    LA Tretnon Ryan 3RD+ ORD SLCTV ABDL PEL/LXTR [address] [facility] Left 6/14/2019 Procedure: Bilateral lower extremity arteriogram, left lower extremity intervention with laser atherectomy angioplasty and stent ;  Surgeon: William Hansen MD;  [facility];  Service: Vascular    FL THROMBOENDARTECTMY FEMORAL COMMON Left 2018    Procedure: ENDARTERECTOMY ARTERIAL FEMORAL;  Surgeon: Kwesi Ortiz MD;  [facility];  Service: Vascular    REPLACEMENT AORTIC VALVE TRANSCATHETER (TAVR)      THROMBECTOMY W/ EMBOLECTOMY Left 2018    Procedure: EMBOLECTOMY/THROMBECTOMY LOWER EXTREMITY (GROIN EXPLORATION); Surgeon: Kwesi Ortiz MD;  [facility];  Service: Vascular    VASCULAR SURGERY         Family History   Problem Relation Age of Onset    Heart disease Mother     Diabetes Mother     Heart disease Father     Diabetes Father        Social History     Socioeconomic History    Marital status:      Spouse name: Not on file    Number of children: Not on file    Years of education: Not on file    Highest education level: Not on file   Occupational History    Not on file   Social Needs    Financial resource strain: Not on file    Food insecurity:     Worry: Not on file     Inability: Not on file    Transportation needs:     Medical: Not on file     Non-medical: Not on file   Tobacco Use    Smoking status: Former Smoker     Last attempt to quit:      Years since quittin 9    Smokeless tobacco: Never Used   Substance and Sexual Activity    Alcohol use:  Yes     Alcohol/week: 1 0 - 2 0 standard drinks     Types: 1 - 2 Cans of beer per week     Frequency: Monthly or less     Drinks per session: 1 or 2     Binge frequency: Never     Comment: social    Drug use: Never    Sexual activity: Not Currently   Lifestyle    Physical activity:     Days per week: Not on file     Minutes per session: Not on file    Stress: Not on file   Relationships    Social connections:     Talks on phone: Not on file     Gets together: Not on file     Attends Protestant service: Not on file     Active member of club or organization: Not on file     Attends meetings of clubs or organizations: Not on file     Relationship status: Not on file    Intimate partner violence:     Fear of current or ex partner: Not on file     Emotionally abused: Not on file     Physically abused: Not on file     Forced sexual activity: Not on file   Other Topics Concern    Not on file   Social History Narrative    Not on file       Allergies   Allergen Reactions    Iv Contrast [Iodinated Diagnostic Agents]      Pt states that she was told many years ago that when she was given contrast dye she had a reaction, but does not know what  She said she is always prepped prior to having dye and she asked that I list this as an allergy in her chart      Pletal [Cilostazol] Diarrhea and Vomiting    Statins Other (See Comments)     Severe muscle cramps-- cannot move    Xarelto [Rivaroxaban] Other (See Comments)     Thinks she shouldn't take because she has an artificial valve  Also, made her "WOOZY"    Iohexol Other (See Comments)     Pt does not recall         Current Outpatient Medications:     acetaminophen (TYLENOL) 325 mg tablet, Take 2 tablets (650 mg total) by mouth every 6 (six) hours as needed for mild pain or fever, Disp: 30 tablet, Rfl: 0    acetaminophen-codeine (TYLENOL #2) 300-15 MG per tablet, Take 1 tablet by mouth every 4 (four) hours as needed for moderate pain, Disp: 30 tablet, Rfl: 0    albuterol (2 5 mg/3 mL) 0 083 % nebulizer solution, Take 1 vial (2 5 mg total) by nebulization every 6 (six) hours as needed for wheezing or shortness of breath, Disp: 1080 mL, Rfl: 0    albuterol (PROVENTIL HFA,VENTOLIN HFA) 90 mcg/act inhaler, Inhale 2 puffs 2 (two) times a day  , Disp: , Rfl:     ALPRAZolam (XANAX) 0 25 mg tablet, Take 0 25 mg by mouth 2 (two) times a day as needed , Disp: , Rfl: 2    aspirin 81 mg chewable tablet, Chew 81 mg daily, Disp: , Rfl:     clopidogrel (PLAVIX) 75 mg tablet, Take 1 tablet (75 mg total) by mouth daily, Disp: 90 tablet, Rfl: 2    co-enzyme Q-10 30 MG capsule, Take 100 mg by mouth daily  , Disp: , Rfl:     docusate sodium (COLACE) 100 mg capsule, Take 1 capsule (100 mg total) by mouth 2 (two) times a day as needed for constipation, Disp: 10 capsule, Rfl: 0    fluticasone (FLONASE) 50 mcg/act nasal spray, USE 1 SPRAY(S) IN EACH NOSTRIL ONCE DAILY, Disp: , Rfl:     fluticasone-umeclidinium-vilanterol (TRELEGY ELLIPTA) 100-62 5-25 MCG/INH inhaler, Inhale 1 puff daily Rinse mouth after use , Disp: 1 Inhaler, Rfl: 3    fluticasone-vilanterol (BREO ELLIPTA) 100-25 mcg/inh inhaler, Inhale 1 puff daily Rinse mouth after use , Disp: 1 Inhaler, Rfl: 3    furosemide (LASIX) 20 mg tablet, Take 1 tablet (20 mg total) by mouth 2 (two) times a day, Disp: 60 tablet, Rfl: 0    gabapentin (NEURONTIN) 100 mg capsule, TAKE 1 CAPSULE BY MOUTH ONCE DAILY IN THE MORNING AND 3 ONCE DAILY AT BEDTIME, Disp: , Rfl: 2    gabapentin (NEURONTIN) 300 mg capsule, Take 1 capsule (300 mg total) by mouth daily at bedtime, Disp: 90 capsule, Rfl: 1    glipiZIDE (GLUCOTROL) 5 mg tablet, Daily, Disp: , Rfl:     ibuprofen (MOTRIN) 600 mg tablet, Take 600 mg by mouth every 6 (six) hours as needed , Disp: , Rfl:     levothyroxine 50 mcg tablet, Take 50 mcg by mouth daily, Disp: , Rfl:     lidocaine (XYLOCAINE) 5 % ointment, Apply topically as needed for mild pain, Disp: 50 g, Rfl: 2    metoprolol tartrate (LOPRESSOR) 25 mg tablet, Take 1 tablet (25 mg total) by mouth every 12 (twelve) hours, Disp: 30 tablet, Rfl: 0    oxyCODONE-acetaminophen (PERCOCET) 5-325 mg per tablet, Take 1 tablet by mouth every 4 (four) hours as needed , Disp: , Rfl:     pantoprazole (PROTONIX) 40 mg tablet, Take 40 mg by mouth daily, Disp: , Rfl:     Potassium 99 MG TABS, Take 1 tablet by mouth daily, Disp: , Rfl:     tiotropium (SPIRIVA RESPIMAT) 2 5 MCG/ACT AERS inhaler, Inhale 2 puffs daily, Disp: 1 Inhaler, Rfl: 3    diphenhydrAMINE (BENADRYL) 50 mg capsule, Take 1 capsule (50 mg total) by mouth every 12 (twelve) hours for 2 doses Take 1 capsule 12 hr before and 1 capsule 1 hr before angiogram (Patient not taking: Reported on 7/2/2019), Disp: 2 capsule, Rfl: 0

## 2019-11-25 NOTE — TELEPHONE ENCOUNTER
Additional Information   Negative: Is the patient experiencing acute drop foot or paralysis? Pt does walk better with an assistive device  She does like to walk with a shopping cart for support, relieves pain with ambulation  Background - Initial Assessment  Clinical complaint: Chronic low back pain,midline, has pain down doth thighs and lower legs, Pt believes that sh has bilateral sciatica  Her pain does go down into her arches of her feet  Pt does take Neurontin for the back pain, and using heating pad with little effect to controlling her pain  Pt has had this pain for the past 12 years, and did fall 17 yrs ago on her tail bone, but that pain did resolve over time  Date of onset: 12 yrs  Frequency of pain: constant  Quality of pain: burning/hot constant pain  Protocols used: [facility] PRO[facility] PROTOCOL    This nurse did review in detail the comp spine program and what we can provide for the pt for their back pain  Pt is agreeable to being triaged by this nurse and would like to have physical therapy  Referrals were placed to the following:  Physical Therapy at the [facility]     site  Pt was given the phone number to that site  PM&R with Jose Armando JACKSON at [address] [facility] [address] at the [facility]  Pt is aware that they will be receiving a phone call from that office to make their appointments  Pt is aware of who they will be seeing and location of that office  No further questions voiced at this time and Pt did state understanding of the referral that was placed

## 2019-11-26 NOTE — ASSESSMENT & PLAN NOTE
Peripheral Arterial Disease  Claudication  Back pain/rest pain  Quit smoking 15 year ago  S/p TAVR  Borderline DM      Nicol Sor 81 y o  F PAD with claudication which is complicated by back and rest pain presents for evaluation of patient increased leg pain  Jarrett Bhat complains of leg pain which bothers her "all the time " She complains of rest pain with back and thigh throbbing and sharp pains into the groin  When she is walking she has increased leg pain shooting down both legs to the anterior shins into the calves which then become tight  Walking into the building today was difficult due to leg and calf pain  At times she also gets foot pain  She has been taking gabapentin 100 mg in the morning and increased the evening dose from 300 to 600 mg as suggest by Dr Farhana Dowell  She says that the increased gabapentin "takes the edge off" so that she can sleep at night  VAS SIMÓN 10/8/19  R 0 85/87/90; tracing normal  L 0 59 (prior 0 44)/66/49; dampened; > 70% distal EIA and  CFA/stent and prox SFA; 50-75% EIA prox to stent    Discussion:  We had a lengthy discussion regarding her symptoms and the treatment of peripheral arterial disease  She was seen and examined with Dr Farhana Dowell  We discussed with the patient that she should have her back evaluated and she should participate in physical therapy to see if conservative measures help her back pain prior to further interventions on her legs  She has had multiple interventions to the legs and her symptoms are equally painful on both legs which does not exactly correlate to her peripheral arterial disease/anatomy  She continues to have symptoms after being evaluated by Spine surgery and participating in physical therapy, we could consider further invasive procedures to the left lower extremity   In the meantime, she would benefit from physical therapy, regular walking program and optimally tolerated medical therapy      -Regular exercise / walking program  -Follow good, heart healthy diet which is low in fat and cholesterol    -Maintain healthy weight   -continue with good medical therapy on aspirin and clopidogrel  michael has had problems with statins in the past   Apparently cardiology try to Livalo but was not approved by her insurance  Consider sample trial of Livalo or injectable cholesterol medication as she cannot tolerate regular statin therapy   -Follow up office visit 2-3 months  -Routine CANDIE's prior to office visit  -We discussed reasons why she should be seen sooner

## 2019-12-10 NOTE — PROGRESS NOTES
Assessment/Plan:      Diagnoses and all orders for this visit:    Chronic bilateral low back pain with bilateral sciatica  -     XR spine lumbar minimum 4 views non injury; Future  -     lidocaine (XYLOCAINE) 5 % ointment; Apply topically as needed for mild pain    Sacroiliac joint pain  -     XR sacroiliac joints < 3 views; Future  -     lidocaine (XYLOCAINE) 5 % ointment; Apply topically as needed for mild pain      discussion: This is a 82-year-old female presenting for chronic back and leg pain  Patient was encouraged to start physical therapy as scheduled  I will additionally order plain films of the lumbar spine and sacroiliac joints she does exhibit some tenderness to palpation today  Patient is to follow up in 4-6 weeks should pain not improve or worsen after physical therapy  At that time to consider further imaging  She was advised that I will call her with results of x-rays  She cannot take NSAIDs I will prescribe a topical pain reliever to be applied to site up to 4 times daily as needed for pain relief  Patient agrees to above plan  Subjective:     Patient ID: Delmar Tiwari is a 81 y o  female  HPI  Referral from [facility] for chronic low back and leg pain for 12+ years without specific trauma or MOA  Patient states she has a history of peripheral vascular disease with additional chronic bilateral groin pain secondary to numerous inguinal stent procedures  Patient states she was advised from vascular to be evaluated for possible lumbar spine component to her pain  Patient describes axial low back pain exacerbated specifically with prolonged sitting  She additionally admits to bilateral lower extremity pain after walking  It does improve with rest   Walking specifically causes burning and numbness and tingling into the anterior shins  She finds that extension, not flexion relieves her pain    She denies any specific weakness, bowel or bladder changes, or saddle anesthesia  She does admit to history of fall on her tailbone but denies previous known fractures  She denies any previous evaluation for her low back  She was ordered physical therapy through the [facility] but states she wanted to complete office evaluation prior to scheduling appointment  Patient is currently taking gabapentin 100 mg in the a m  and 600 mg at bedtime for groin and leg pain  She states that higher doses she experiences too much fatigue  She has additionally prescribed opiate medication and muscle relaxers which she uses very sparingly primarily at night as again it causes significant fatigue  She admits previous ibuprofen provided moderate to significant pain relief however due to cardiovascular issues is unable to use NSAIDs  She is on current anticoagulation therapy        PAST MEDICAL HISTORY  Past Medical History:   Diagnosis Date    Aneurysm [facility])     abdominal aortic aneurysm    Aortic valve disease     Aortic valve replaced     TAVR    Atherosclerosis of other type of bypass graft(s) of the extremities with rest pain, left leg (HCC) 6/14/2019    Bronchiolitis     Cardiac disease     Chest pain     Contrast media allergy 6/11/2019    COPD (chronic obstructive pulmonary disease) ([facility] [address] [facility]     [facility]     Disease of thyroid gland     Dyspnea     Hyperlipidemia     Hypertension     Hypokalemia     Iliac artery stenosis, left (HCC)     Low back pain     [facility]     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[facility])     PAD [facility]peripheral artery disease) ([facility]     Rheumatic fever     S/P TAVR (transcatheter aortic valve replacement)     SOB (shortness of breath)     SOB (shortness of breath)     Tachycardia      PAST SURGICAL HISTORY  Past Surgical History:   Procedure Laterality Date    CARDIAC SURGERY      aortic valve replacement    CARDIAC VALVE REPLACEMENT      CHOLECYSTECTOMY      ESOPHAGUS SURGERY N/A     IR ABDOMINAL ANGIOGRAPHY / INTERVENTION  10/25/2018    IR LOWER EXTREMITY / INTERVENTION  6/14/2019    MD SLCTV CATHJ 3RD+ ORD SLCTV ABDL PEL/LXTR [facility] Left 10/25/2018    Procedure: LEFT LEG ANGIOGRAM WITH PLACEMENT OF LEFT EXTERNAL ILIAC ARTERY / LEFT COMMON FEMORAL ARTERIAL STENT, RIGHT FEMORAL ACCESS;  Surgeon: Nette Ortiz MD;  [facility];  Service: Vascular    MD Chalino De Leon 3RD+ ORD SLCTV ABDL PEL/LXTR [facility] Left 6/14/2019    Procedure: Bilateral lower extremity arteriogram, left lower extremity intervention with laser atherectomy angioplasty and stent ;  Surgeon: Shereen Valentine MD;  [facility];  Service: Vascular    MD THROMBOENDARTECTMY FEMORAL COMMON Left 9/21/2018    Procedure: ENDARTERECTOMY ARTERIAL FEMORAL;  Surgeon: Nette Ortiz MD;  [facility];  Service: Vascular    REPLACEMENT AORTIC VALVE TRANSCATHETER (TAVR)      THROMBECTOMY W/ EMBOLECTOMY Left 9/21/2018    Procedure: EMBOLECTOMY/THROMBECTOMY LOWER EXTREMITY (GROIN EXPLORATION);   Surgeon: Nette Ortiz MD;  [facility];  Service: Vascular    VASCULAR SURGERY         FAMILY HISTORY  Family History   Problem Relation Age of Onset    Heart disease Mother     Diabetes Mother     Heart disease Father     Diabetes Father      HOME MEDICATIONS  Current Outpatient Medications   Medication Sig Dispense Refill    acetaminophen (TYLENOL) 325 mg tablet Take 2 tablets (650 mg total) by mouth every 6 (six) hours as needed for mild pain or fever 30 tablet 0    albuterol (2 5 mg/3 mL) 0 083 % nebulizer solution Take 1 vial (2 5 mg total) by nebulization every 6 (six) hours as needed for wheezing or shortness of breath 1080 mL 0    albuterol (PROVENTIL HFA,VENTOLIN HFA) 90 mcg/act inhaler Inhale 2 puffs 2 (two) times a day        ALPRAZolam (XANAX) 0 25 mg tablet Take 0 25 mg by mouth 2 (two) times a day as needed   2    aspirin 81 mg chewable tablet Chew 81 mg daily      clopidogrel (PLAVIX) 75 mg tablet Take 1 tablet (75 mg total) by mouth daily 90 tablet 2    co-enzyme Q-10 30 MG capsule Take 100 mg by mouth daily        docusate sodium (COLACE) 100 mg capsule Take 1 capsule (100 mg total) by mouth 2 (two) times a day as needed for constipation 10 capsule 0    fluticasone (FLONASE) 50 mcg/act nasal spray USE 1 SPRAY(S) IN EACH NOSTRIL ONCE DAILY      fluticasone-vilanterol (BREO ELLIPTA) 100-25 mcg/inh inhaler Inhale 1 puff daily Rinse mouth after use  1 Inhaler 3    furosemide (LASIX) 20 mg tablet Take 1 tablet (20 mg total) by mouth 2 (two) times a day 60 tablet 0    gabapentin (NEURONTIN) 100 mg capsule Take 1 capsule (100 mg total) by mouth 2 (two) times a day Take 1 tablet in the morning and 1 tablet in the afternoon 180 capsule 0    gabapentin (NEURONTIN) 600 MG tablet Take 1 tablet (600 mg total) by mouth daily at bedtime 90 tablet 0    glipiZIDE (GLUCOTROL) 5 mg tablet Daily      ibuprofen (MOTRIN) 600 mg tablet Take 600 mg by mouth every 6 (six) hours as needed       levothyroxine 50 mcg tablet Take 50 mcg by mouth daily      metoprolol tartrate (LOPRESSOR) 25 mg tablet Take 1 tablet (25 mg total) by mouth every 12 (twelve) hours 30 tablet 0    oxyCODONE-acetaminophen (PERCOCET) 5-325 mg per tablet Take 1 tablet by mouth every 4 (four) hours as needed       pantoprazole (PROTONIX) 40 mg tablet Take 40 mg by mouth daily      Potassium 99 MG TABS Take 1 tablet by mouth daily      tiotropium (SPIRIVA RESPIMAT) 2 5 MCG/ACT AERS inhaler Inhale 2 puffs daily 1 Inhaler 3    diphenhydrAMINE (BENADRYL) 50 mg capsule Take 1 capsule (50 mg total) by mouth every 12 (twelve) hours for 2 doses Take 1 capsule 12 hr before and 1 capsule 1 hr before angiogram (Patient not taking: Reported on 7/2/2019) 2 capsule 0    fluticasone-umeclidinium-vilanterol (TRELEGY ELLIPTA) 100-62 5-25 MCG/INH inhaler Inhale 1 puff daily Rinse mouth after use   1 Inhaler 3    lidocaine (XYLOCAINE) 5 % ointment Apply topically as needed for mild pain 35 44 g 0     No current facility-administered medications for this visit  ALLERGIES  Iv contrast [iodinated diagnostic agents]; Pletal [cilostazol]; Statins; Xarelto [rivaroxaban]; and Iohexol      SOCIAL HISTORY  Social History     Substance and Sexual Activity   Alcohol Use Yes    Alcohol/week: 1 0 - 2 0 standard drinks    Types: 1 - 2 Cans of beer per week    Frequency: Monthly or less    Drinks per session: 1 or 2    Binge frequency: Never    Comment: social     Social History     Substance and Sexual Activity   Drug Use Never     Social History     Tobacco Use   Smoking Status Former Smoker    Last attempt to quit:     Years since quittin 9   Smokeless Tobacco Never Used       Review of Systems   Constitutional: Negative for activity change, chills, diaphoresis, fatigue, fever and unexpected weight change  HENT: Negative for trouble swallowing  Eyes: Negative for visual disturbance  Respiratory: Negative for shortness of breath  Cardiovascular: Negative for chest pain and leg swelling  Gastrointestinal: Negative for constipation and diarrhea  Endocrine: Negative for cold intolerance and heat intolerance  Genitourinary: Negative for decreased urine volume and difficulty urinating  Musculoskeletal: Positive for back pain and gait problem  Negative for arthralgias, joint swelling, myalgias, neck pain and neck stiffness  Skin: Negative for color change and rash  Allergic/Immunologic: Negative for immunocompromised state  Neurological: Positive for numbness  Negative for dizziness, syncope, weakness and light-headedness  Psychiatric/Behavioral: Negative for self-injury, sleep disturbance and suicidal ideas  Objective:  Vitals:    19 1315   BP: (!) 176/77   Pulse: 74       Imaging:  No images are attached to the encounter  Physical Exam   Constitutional: She is oriented to person, place, and time   She appears well-developed and well-nourished  No distress  HENT:   Head: Normocephalic and atraumatic  Eyes: Pupils are equal, round, and reactive to light  Conjunctivae are normal    Neck: Neck supple  Cardiovascular: Intact distal pulses  Pulmonary/Chest: Effort normal  No respiratory distress  Musculoskeletal:        Lumbar back: She exhibits decreased range of motion, tenderness and bony tenderness  She exhibits no swelling, no edema and no deformity  + VIC right, - left     - FADIR and log roll bilaterally    Neurological: She is alert and oriented to person, place, and time  She has normal strength  She displays no atrophy and normal reflexes  No cranial nerve deficit  She exhibits normal muscle tone  Coordination normal    Reflex Scores:       Patellar reflexes are 2+ on the right side and 2+ on the left side  Achilles reflexes are 2+ on the right side and 2+ on the left side   + SLR bilaterally     Able to heel stand toe stand   Skin: Skin is warm and dry  No rash noted  She is not diaphoretic  No erythema  No pallor  Psychiatric: She has a normal mood and affect  Her behavior is normal  Judgment and thought content normal    Vitals reviewed

## 2020-01-01 ENCOUNTER — APPOINTMENT (INPATIENT)
Dept: RADIOLOGY | Facility: HOSPITAL | Age: 70
DRG: 208 | End: 2020-01-01
Payer: MEDICARE

## 2020-01-01 ENCOUNTER — APPOINTMENT (EMERGENCY)
Dept: RADIOLOGY | Facility: HOSPITAL | Age: 70
DRG: 208 | End: 2020-01-01
Payer: MEDICARE

## 2020-01-01 ENCOUNTER — APPOINTMENT (INPATIENT)
Dept: CT IMAGING | Facility: HOSPITAL | Age: 70
DRG: 208 | End: 2020-01-01
Payer: MEDICARE

## 2020-01-01 ENCOUNTER — APPOINTMENT (INPATIENT)
Dept: NON INVASIVE DIAGNOSTICS | Facility: HOSPITAL | Age: 70
DRG: 208 | End: 2020-01-01
Payer: MEDICARE

## 2020-01-01 ENCOUNTER — APPOINTMENT (INPATIENT)
Dept: ULTRASOUND IMAGING | Facility: HOSPITAL | Age: 70
DRG: 208 | End: 2020-01-01
Payer: MEDICARE

## 2020-01-01 ENCOUNTER — HOSPITAL ENCOUNTER (INPATIENT)
Facility: HOSPITAL | Age: 70
LOS: 8 days | DRG: 208 | End: 2020-01-14
Attending: EMERGENCY MEDICINE | Admitting: STUDENT IN AN ORGANIZED HEALTH CARE EDUCATION/TRAINING PROGRAM
Payer: MEDICARE

## 2020-01-01 VITALS
DIASTOLIC BLOOD PRESSURE: 63 MMHG | TEMPERATURE: 97.7 F | HEIGHT: 63 IN | SYSTOLIC BLOOD PRESSURE: 106 MMHG | OXYGEN SATURATION: 71 % | WEIGHT: 207.23 LBS | BODY MASS INDEX: 36.72 KG/M2 | RESPIRATION RATE: 2 BRPM

## 2020-01-01 DIAGNOSIS — J43.2 CENTRILOBULAR EMPHYSEMA (HCC): ICD-10-CM

## 2020-01-01 DIAGNOSIS — B33.8 RSV INFECTION: ICD-10-CM

## 2020-01-01 DIAGNOSIS — J44.1 COPD EXACERBATION (HCC): Primary | ICD-10-CM

## 2020-01-01 LAB
ALBUMIN SERPL BCP-MCNC: 2.4 G/DL (ref 3.5–5)
ALBUMIN SERPL BCP-MCNC: 2.6 G/DL (ref 3.5–5)
ALBUMIN SERPL BCP-MCNC: 3.9 G/DL (ref 3.5–5)
ALP SERPL-CCNC: 54 U/L (ref 46–116)
ALP SERPL-CCNC: 54 U/L (ref 46–116)
ALP SERPL-CCNC: 59 U/L (ref 46–116)
ALP SERPL-CCNC: 60 U/L (ref 46–116)
ALP SERPL-CCNC: 70 U/L (ref 46–116)
ALP SERPL-CCNC: 99 U/L (ref 46–116)
ALT SERPL W P-5'-P-CCNC: 107 U/L (ref 12–78)
ALT SERPL W P-5'-P-CCNC: 125 U/L (ref 12–78)
ALT SERPL W P-5'-P-CCNC: 181 U/L (ref 12–78)
ALT SERPL W P-5'-P-CCNC: 33 U/L (ref 12–78)
ALT SERPL W P-5'-P-CCNC: 81 U/L (ref 12–78)
ALT SERPL W P-5'-P-CCNC: 96 U/L (ref 12–78)
ANION GAP SERPL CALCULATED.3IONS-SCNC: 1 MMOL/L (ref 4–13)
ANION GAP SERPL CALCULATED.3IONS-SCNC: 1 MMOL/L (ref 4–13)
ANION GAP SERPL CALCULATED.3IONS-SCNC: 10 MMOL/L (ref 4–13)
ANION GAP SERPL CALCULATED.3IONS-SCNC: 10 MMOL/L (ref 4–13)
ANION GAP SERPL CALCULATED.3IONS-SCNC: 2 MMOL/L (ref 4–13)
ANION GAP SERPL CALCULATED.3IONS-SCNC: 2 MMOL/L (ref 4–13)
ANION GAP SERPL CALCULATED.3IONS-SCNC: 3 MMOL/L (ref 4–13)
ANION GAP SERPL CALCULATED.3IONS-SCNC: 3 MMOL/L (ref 4–13)
ANION GAP SERPL CALCULATED.3IONS-SCNC: 4 MMOL/L (ref 4–13)
ANION GAP SERPL CALCULATED.3IONS-SCNC: 5 MMOL/L (ref 4–13)
ANION GAP SERPL CALCULATED.3IONS-SCNC: 7 MMOL/L (ref 4–13)
ANION GAP SERPL CALCULATED.3IONS-SCNC: 8 MMOL/L (ref 4–13)
APTT PPP: 25 SECONDS (ref 23–37)
ARTERIAL PATENCY WRIST A: YES
AST SERPL W P-5'-P-CCNC: 29 U/L (ref 5–45)
AST SERPL W P-5'-P-CCNC: 31 U/L (ref 5–45)
AST SERPL W P-5'-P-CCNC: 36 U/L (ref 5–45)
AST SERPL W P-5'-P-CCNC: 47 U/L (ref 5–45)
AST SERPL W P-5'-P-CCNC: 58 U/L (ref 5–45)
AST SERPL W P-5'-P-CCNC: 64 U/L (ref 5–45)
ATRIAL RATE: 113 BPM
ATRIAL RATE: 145 BPM
ATRIAL RATE: 326 BPM
ATRIAL RATE: 81 BPM
BACTERIA BLD CULT: NORMAL
BACTERIA BLD CULT: NORMAL
BACTERIA UR QL AUTO: ABNORMAL /HPF
BASE EX.OXY STD BLDV CALC-SCNC: 78.4 % (ref 60–80)
BASE EXCESS BLDA CALC-SCNC: -0.6 MMOL/L
BASE EXCESS BLDA CALC-SCNC: -1 MMOL/L
BASE EXCESS BLDA CALC-SCNC: -3.6 MMOL/L
BASE EXCESS BLDA CALC-SCNC: -4.2 MMOL/L
BASE EXCESS BLDA CALC-SCNC: -5 MMOL/L
BASE EXCESS BLDA CALC-SCNC: -5.5 MMOL/L
BASE EXCESS BLDA CALC-SCNC: -6.2 MMOL/L
BASE EXCESS BLDA CALC-SCNC: 10.9 MMOL/L
BASE EXCESS BLDA CALC-SCNC: 2.8 MMOL/L
BASE EXCESS BLDA CALC-SCNC: 5.3 MMOL/L
BASE EXCESS BLDA CALC-SCNC: 5.4 MMOL/L
BASE EXCESS BLDA CALC-SCNC: 7 MMOL/L (ref -2–3)
BASE EXCESS BLDA CALC-SCNC: 8 MMOL/L (ref -2–3)
BASE EXCESS BLDV CALC-SCNC: 1.1 MMOL/L
BASOPHILS # BLD AUTO: 0 THOUSANDS/ΜL (ref 0–0.1)
BASOPHILS # BLD AUTO: 0 THOUSANDS/ΜL (ref 0–0.1)
BASOPHILS # BLD AUTO: 0.01 THOUSANDS/ΜL (ref 0–0.1)
BASOPHILS # BLD AUTO: 0.02 THOUSANDS/ΜL (ref 0–0.1)
BASOPHILS # BLD AUTO: 0.02 THOUSANDS/ΜL (ref 0–0.1)
BASOPHILS # BLD AUTO: 0.04 THOUSANDS/ΜL (ref 0–0.1)
BASOPHILS NFR BLD AUTO: 0 % (ref 0–1)
BILIRUB DIRECT SERPL-MCNC: 0.16 MG/DL (ref 0–0.2)
BILIRUB DIRECT SERPL-MCNC: 0.18 MG/DL (ref 0–0.2)
BILIRUB SERPL-MCNC: 0.2 MG/DL (ref 0.2–1)
BILIRUB SERPL-MCNC: 0.2 MG/DL (ref 0.2–1)
BILIRUB SERPL-MCNC: 0.3 MG/DL (ref 0.2–1)
BILIRUB SERPL-MCNC: 0.4 MG/DL (ref 0.2–1)
BILIRUB SERPL-MCNC: 0.5 MG/DL (ref 0.2–1)
BILIRUB SERPL-MCNC: 0.5 MG/DL (ref 0.2–1)
BILIRUB UR QL STRIP: NEGATIVE
BODY TEMPERATURE: 99.3 DEGREES FEHRENHEIT
BODY TEMPERATURE: 99.5 DEGREES FEHRENHEIT
BODY TEMPERATURE: 99.5 DEGREES FEHRENHEIT
BODY TEMPERATURE: 99.9 DEGREES FEHRENHEIT
BUN SERPL-MCNC: 15 MG/DL (ref 5–25)
BUN SERPL-MCNC: 16 MG/DL (ref 5–25)
BUN SERPL-MCNC: 17 MG/DL (ref 5–25)
BUN SERPL-MCNC: 18 MG/DL (ref 5–25)
BUN SERPL-MCNC: 20 MG/DL (ref 5–25)
BUN SERPL-MCNC: 20 MG/DL (ref 5–25)
BUN SERPL-MCNC: 25 MG/DL (ref 5–25)
BUN SERPL-MCNC: 28 MG/DL (ref 5–25)
BUN SERPL-MCNC: 28 MG/DL (ref 5–25)
BUN SERPL-MCNC: 30 MG/DL (ref 5–25)
BUN SERPL-MCNC: 32 MG/DL (ref 5–25)
BUN SERPL-MCNC: 33 MG/DL (ref 5–25)
BUN SERPL-MCNC: 36 MG/DL (ref 5–25)
BUN SERPL-MCNC: 37 MG/DL (ref 5–25)
CA-I BLD-SCNC: 1.1 MMOL/L (ref 1.12–1.32)
CA-I BLD-SCNC: 1.1 MMOL/L (ref 1.12–1.32)
CA-I BLD-SCNC: 1.11 MMOL/L (ref 1.12–1.32)
CA-I BLD-SCNC: 1.13 MMOL/L (ref 1.12–1.32)
CA-I BLD-SCNC: 1.21 MMOL/L (ref 1.12–1.32)
CA-I BLD-SCNC: 1.27 MMOL/L (ref 1.12–1.32)
CALCIUM SERPL-MCNC: 7.5 MG/DL (ref 8.3–10.1)
CALCIUM SERPL-MCNC: 7.5 MG/DL (ref 8.3–10.1)
CALCIUM SERPL-MCNC: 7.6 MG/DL (ref 8.3–10.1)
CALCIUM SERPL-MCNC: 7.7 MG/DL (ref 8.3–10.1)
CALCIUM SERPL-MCNC: 7.9 MG/DL (ref 8.3–10.1)
CALCIUM SERPL-MCNC: 8.1 MG/DL (ref 8.3–10.1)
CALCIUM SERPL-MCNC: 8.2 MG/DL (ref 8.3–10.1)
CALCIUM SERPL-MCNC: 8.4 MG/DL (ref 8.3–10.1)
CHLORIDE SERPL-SCNC: 102 MMOL/L (ref 100–108)
CHLORIDE SERPL-SCNC: 104 MMOL/L (ref 100–108)
CHLORIDE SERPL-SCNC: 106 MMOL/L (ref 100–108)
CHLORIDE SERPL-SCNC: 107 MMOL/L (ref 100–108)
CHLORIDE SERPL-SCNC: 108 MMOL/L (ref 100–108)
CHLORIDE SERPL-SCNC: 109 MMOL/L (ref 100–108)
CLARITY UR: CLEAR
CO2 SERPL-SCNC: 25 MMOL/L (ref 21–32)
CO2 SERPL-SCNC: 27 MMOL/L (ref 21–32)
CO2 SERPL-SCNC: 28 MMOL/L (ref 21–32)
CO2 SERPL-SCNC: 30 MMOL/L (ref 21–32)
CO2 SERPL-SCNC: 32 MMOL/L (ref 21–32)
CO2 SERPL-SCNC: 32 MMOL/L (ref 21–32)
CO2 SERPL-SCNC: 34 MMOL/L (ref 21–32)
CO2 SERPL-SCNC: 34 MMOL/L (ref 21–32)
CO2 SERPL-SCNC: 35 MMOL/L (ref 21–32)
CO2 SERPL-SCNC: 38 MMOL/L (ref 21–32)
CO2 SERPL-SCNC: 40 MMOL/L (ref 21–32)
CO2 SERPL-SCNC: 41 MMOL/L (ref 21–32)
COLOR UR: ABNORMAL
CREAT SERPL-MCNC: 0.63 MG/DL (ref 0.6–1.3)
CREAT SERPL-MCNC: 0.67 MG/DL (ref 0.6–1.3)
CREAT SERPL-MCNC: 0.68 MG/DL (ref 0.6–1.3)
CREAT SERPL-MCNC: 0.76 MG/DL (ref 0.6–1.3)
CREAT SERPL-MCNC: 0.8 MG/DL (ref 0.6–1.3)
CREAT SERPL-MCNC: 0.81 MG/DL (ref 0.6–1.3)
CREAT SERPL-MCNC: 0.81 MG/DL (ref 0.6–1.3)
CREAT SERPL-MCNC: 0.84 MG/DL (ref 0.6–1.3)
CREAT SERPL-MCNC: 0.85 MG/DL (ref 0.6–1.3)
CREAT SERPL-MCNC: 0.86 MG/DL (ref 0.6–1.3)
CREAT SERPL-MCNC: 0.94 MG/DL (ref 0.6–1.3)
CREAT SERPL-MCNC: 1.09 MG/DL (ref 0.6–1.3)
EOSINOPHIL # BLD AUTO: 0 THOUSAND/ΜL (ref 0–0.61)
EOSINOPHIL # BLD AUTO: 0.02 THOUSAND/ΜL (ref 0–0.61)
EOSINOPHIL # BLD AUTO: 0.02 THOUSAND/ΜL (ref 0–0.61)
EOSINOPHIL # BLD AUTO: 0.03 THOUSAND/ΜL (ref 0–0.61)
EOSINOPHIL NFR BLD AUTO: 0 % (ref 0–6)
ERYTHROCYTE [DISTWIDTH] IN BLOOD BY AUTOMATED COUNT: 13.6 % (ref 11.6–15.1)
ERYTHROCYTE [DISTWIDTH] IN BLOOD BY AUTOMATED COUNT: 14 % (ref 11.6–15.1)
ERYTHROCYTE [DISTWIDTH] IN BLOOD BY AUTOMATED COUNT: 14.1 % (ref 11.6–15.1)
ERYTHROCYTE [DISTWIDTH] IN BLOOD BY AUTOMATED COUNT: 14.1 % (ref 11.6–15.1)
ERYTHROCYTE [DISTWIDTH] IN BLOOD BY AUTOMATED COUNT: 14.4 % (ref 11.6–15.1)
FLUAV RNA NPH QL NAA+PROBE: ABNORMAL
FLUBV RNA NPH QL NAA+PROBE: ABNORMAL
GFR SERPL CREATININE-BSD FRML MDRD: 53 ML/MIN/1.73SQ M
GFR SERPL CREATININE-BSD FRML MDRD: 63 ML/MIN/1.73SQ M
GFR SERPL CREATININE-BSD FRML MDRD: 70 ML/MIN/1.73SQ M
GFR SERPL CREATININE-BSD FRML MDRD: 71 ML/MIN/1.73SQ M
GFR SERPL CREATININE-BSD FRML MDRD: 72 ML/MIN/1.73SQ M
GFR SERPL CREATININE-BSD FRML MDRD: 75 ML/MIN/1.73SQ M
GFR SERPL CREATININE-BSD FRML MDRD: 75 ML/MIN/1.73SQ M
GFR SERPL CREATININE-BSD FRML MDRD: 77 ML/MIN/1.73SQ M
GFR SERPL CREATININE-BSD FRML MDRD: 81 ML/MIN/1.73SQ M
GFR SERPL CREATININE-BSD FRML MDRD: 91 ML/MIN/1.73SQ M
GFR SERPL CREATININE-BSD FRML MDRD: 91 ML/MIN/1.73SQ M
GFR SERPL CREATININE-BSD FRML MDRD: 93 ML/MIN/1.73SQ M
GLUCOSE SERPL-MCNC: 100 MG/DL (ref 65–140)
GLUCOSE SERPL-MCNC: 103 MG/DL (ref 65–140)
GLUCOSE SERPL-MCNC: 105 MG/DL (ref 65–140)
GLUCOSE SERPL-MCNC: 106 MG/DL (ref 65–140)
GLUCOSE SERPL-MCNC: 108 MG/DL (ref 65–140)
GLUCOSE SERPL-MCNC: 109 MG/DL (ref 65–140)
GLUCOSE SERPL-MCNC: 111 MG/DL (ref 65–140)
GLUCOSE SERPL-MCNC: 115 MG/DL (ref 65–140)
GLUCOSE SERPL-MCNC: 115 MG/DL (ref 65–140)
GLUCOSE SERPL-MCNC: 116 MG/DL (ref 65–140)
GLUCOSE SERPL-MCNC: 117 MG/DL (ref 65–140)
GLUCOSE SERPL-MCNC: 118 MG/DL (ref 65–140)
GLUCOSE SERPL-MCNC: 120 MG/DL (ref 65–140)
GLUCOSE SERPL-MCNC: 122 MG/DL (ref 65–140)
GLUCOSE SERPL-MCNC: 124 MG/DL (ref 65–140)
GLUCOSE SERPL-MCNC: 125 MG/DL (ref 65–140)
GLUCOSE SERPL-MCNC: 126 MG/DL (ref 65–140)
GLUCOSE SERPL-MCNC: 128 MG/DL (ref 65–140)
GLUCOSE SERPL-MCNC: 131 MG/DL (ref 65–140)
GLUCOSE SERPL-MCNC: 132 MG/DL (ref 65–140)
GLUCOSE SERPL-MCNC: 137 MG/DL (ref 65–140)
GLUCOSE SERPL-MCNC: 140 MG/DL (ref 65–140)
GLUCOSE SERPL-MCNC: 140 MG/DL (ref 65–140)
GLUCOSE SERPL-MCNC: 141 MG/DL (ref 65–140)
GLUCOSE SERPL-MCNC: 141 MG/DL (ref 65–140)
GLUCOSE SERPL-MCNC: 143 MG/DL (ref 65–140)
GLUCOSE SERPL-MCNC: 144 MG/DL (ref 65–140)
GLUCOSE SERPL-MCNC: 145 MG/DL (ref 65–140)
GLUCOSE SERPL-MCNC: 150 MG/DL (ref 65–140)
GLUCOSE SERPL-MCNC: 150 MG/DL (ref 65–140)
GLUCOSE SERPL-MCNC: 153 MG/DL (ref 65–140)
GLUCOSE SERPL-MCNC: 156 MG/DL (ref 65–140)
GLUCOSE SERPL-MCNC: 160 MG/DL (ref 65–140)
GLUCOSE SERPL-MCNC: 160 MG/DL (ref 65–140)
GLUCOSE SERPL-MCNC: 161 MG/DL (ref 65–140)
GLUCOSE SERPL-MCNC: 162 MG/DL (ref 65–140)
GLUCOSE SERPL-MCNC: 164 MG/DL (ref 65–140)
GLUCOSE SERPL-MCNC: 165 MG/DL (ref 65–140)
GLUCOSE SERPL-MCNC: 167 MG/DL (ref 65–140)
GLUCOSE SERPL-MCNC: 169 MG/DL (ref 65–140)
GLUCOSE SERPL-MCNC: 170 MG/DL (ref 65–140)
GLUCOSE SERPL-MCNC: 171 MG/DL (ref 65–140)
GLUCOSE SERPL-MCNC: 180 MG/DL (ref 65–140)
GLUCOSE SERPL-MCNC: 183 MG/DL (ref 65–140)
GLUCOSE SERPL-MCNC: 185 MG/DL (ref 65–140)
GLUCOSE SERPL-MCNC: 186 MG/DL (ref 65–140)
GLUCOSE SERPL-MCNC: 189 MG/DL (ref 65–140)
GLUCOSE SERPL-MCNC: 191 MG/DL (ref 65–140)
GLUCOSE SERPL-MCNC: 191 MG/DL (ref 65–140)
GLUCOSE SERPL-MCNC: 192 MG/DL (ref 65–140)
GLUCOSE SERPL-MCNC: 196 MG/DL (ref 65–140)
GLUCOSE SERPL-MCNC: 196 MG/DL (ref 65–140)
GLUCOSE SERPL-MCNC: 197 MG/DL (ref 65–140)
GLUCOSE SERPL-MCNC: 198 MG/DL (ref 65–140)
GLUCOSE SERPL-MCNC: 201 MG/DL (ref 65–140)
GLUCOSE SERPL-MCNC: 201 MG/DL (ref 65–140)
GLUCOSE SERPL-MCNC: 205 MG/DL (ref 65–140)
GLUCOSE SERPL-MCNC: 206 MG/DL (ref 65–140)
GLUCOSE SERPL-MCNC: 211 MG/DL (ref 65–140)
GLUCOSE SERPL-MCNC: 212 MG/DL (ref 65–140)
GLUCOSE SERPL-MCNC: 218 MG/DL (ref 65–140)
GLUCOSE SERPL-MCNC: 219 MG/DL (ref 65–140)
GLUCOSE SERPL-MCNC: 221 MG/DL (ref 65–140)
GLUCOSE SERPL-MCNC: 226 MG/DL (ref 65–140)
GLUCOSE SERPL-MCNC: 230 MG/DL (ref 65–140)
GLUCOSE SERPL-MCNC: 231 MG/DL (ref 65–140)
GLUCOSE SERPL-MCNC: 240 MG/DL (ref 65–140)
GLUCOSE SERPL-MCNC: 244 MG/DL (ref 65–140)
GLUCOSE SERPL-MCNC: 247 MG/DL (ref 65–140)
GLUCOSE SERPL-MCNC: 266 MG/DL (ref 65–140)
GLUCOSE SERPL-MCNC: 288 MG/DL (ref 65–140)
GLUCOSE SERPL-MCNC: 76 MG/DL (ref 65–140)
GLUCOSE SERPL-MCNC: 84 MG/DL (ref 65–140)
GLUCOSE SERPL-MCNC: 85 MG/DL (ref 65–140)
GLUCOSE SERPL-MCNC: 88 MG/DL (ref 65–140)
GLUCOSE SERPL-MCNC: 93 MG/DL (ref 65–140)
GLUCOSE SERPL-MCNC: 97 MG/DL (ref 65–140)
GLUCOSE SERPL-MCNC: 99 MG/DL (ref 65–140)
GLUCOSE SERPL-MCNC: 99 MG/DL (ref 65–140)
GLUCOSE UR STRIP-MCNC: NEGATIVE MG/DL
HCO3 BLDA-SCNC: 22.1 MMOL/L (ref 22–28)
HCO3 BLDA-SCNC: 24.1 MMOL/L (ref 22–28)
HCO3 BLDA-SCNC: 25.7 MMOL/L (ref 22–28)
HCO3 BLDA-SCNC: 26.1 MMOL/L (ref 22–28)
HCO3 BLDA-SCNC: 26.3 MMOL/L (ref 22–28)
HCO3 BLDA-SCNC: 27.8 MMOL/L (ref 22–28)
HCO3 BLDA-SCNC: 30.7 MMOL/L (ref 22–28)
HCO3 BLDA-SCNC: 31.9 MMOL/L (ref 22–28)
HCO3 BLDA-SCNC: 31.9 MMOL/L (ref 24–30)
HCO3 BLDA-SCNC: 32.6 MMOL/L (ref 22–28)
HCO3 BLDA-SCNC: 32.7 MMOL/L (ref 22–28)
HCO3 BLDA-SCNC: 37 MMOL/L (ref 22–28)
HCO3 BLDA-SCNC: 37.8 MMOL/L (ref 22–28)
HCO3 BLDV-SCNC: 30.7 MMOL/L (ref 24–30)
HCT VFR BLD AUTO: 36.4 % (ref 34.8–46.1)
HCT VFR BLD AUTO: 37 % (ref 34.8–46.1)
HCT VFR BLD AUTO: 37.6 % (ref 34.8–46.1)
HCT VFR BLD AUTO: 38.3 % (ref 34.8–46.1)
HCT VFR BLD AUTO: 38.5 % (ref 34.8–46.1)
HCT VFR BLD AUTO: 39.1 % (ref 34.8–46.1)
HCT VFR BLD AUTO: 49 % (ref 34.8–46.1)
HCT VFR BLD AUTO: 49.9 % (ref 34.8–46.1)
HCT VFR BLD AUTO: 50.1 % (ref 34.8–46.1)
HCT VFR BLD CALC: 33 % (ref 34.8–46.1)
HCT VFR BLD CALC: 44 % (ref 34.8–46.1)
HCT VFR BLD CALC: 46 % (ref 34.8–46.1)
HGB BLD-MCNC: 10.8 G/DL (ref 11.5–15.4)
HGB BLD-MCNC: 10.9 G/DL (ref 11.5–15.4)
HGB BLD-MCNC: 11 G/DL (ref 11.5–15.4)
HGB BLD-MCNC: 11.1 G/DL (ref 11.5–15.4)
HGB BLD-MCNC: 11.5 G/DL (ref 11.5–15.4)
HGB BLD-MCNC: 11.7 G/DL (ref 11.5–15.4)
HGB BLD-MCNC: 11.8 G/DL (ref 11.5–15.4)
HGB BLD-MCNC: 14.5 G/DL (ref 11.5–15.4)
HGB BLD-MCNC: 14.8 G/DL (ref 11.5–15.4)
HGB BLD-MCNC: 15 G/DL (ref 11.5–15.4)
HGB BLDA-MCNC: 11.2 G/DL (ref 11.5–15.4)
HGB BLDA-MCNC: 15 G/DL (ref 11.5–15.4)
HGB BLDA-MCNC: 15.6 G/DL (ref 11.5–15.4)
HGB UR QL STRIP.AUTO: NEGATIVE
HOROWITZ INDEX BLDA+IHG-RTO: 30 MM[HG]
HOROWITZ INDEX BLDA+IHG-RTO: 40 MM[HG]
HOROWITZ INDEX BLDA+IHG-RTO: 40 MM[HG]
HYALINE CASTS #/AREA URNS LPF: ABNORMAL /LPF
I-TIME: 0.64
I-TIME: 0.64
I-TIME: 0.66
IMM GRANULOCYTES # BLD AUTO: 0.04 THOUSAND/UL (ref 0–0.2)
IMM GRANULOCYTES # BLD AUTO: 0.05 THOUSAND/UL (ref 0–0.2)
IMM GRANULOCYTES # BLD AUTO: 0.06 THOUSAND/UL (ref 0–0.2)
IMM GRANULOCYTES # BLD AUTO: 0.06 THOUSAND/UL (ref 0–0.2)
IMM GRANULOCYTES # BLD AUTO: 0.23 THOUSAND/UL (ref 0–0.2)
IMM GRANULOCYTES # BLD AUTO: 0.3 THOUSAND/UL (ref 0–0.2)
IMM GRANULOCYTES NFR BLD AUTO: 0 % (ref 0–2)
IMM GRANULOCYTES NFR BLD AUTO: 1 % (ref 0–2)
IMM GRANULOCYTES NFR BLD AUTO: 2 % (ref 0–2)
INR PPP: 1.02 (ref 0.84–1.19)
IPAP: 14
IPAP: 18
KETONES UR STRIP-MCNC: NEGATIVE MG/DL
L PNEUMO1 AG UR QL IA.RAPID: NEGATIVE
LACTATE SERPL-SCNC: 1.1 MMOL/L (ref 0.5–2)
LACTATE SERPL-SCNC: 1.8 MMOL/L (ref 0.5–2)
LEUKOCYTE ESTERASE UR QL STRIP: ABNORMAL
LYMPHOCYTES # BLD AUTO: 0.55 THOUSANDS/ΜL (ref 0.6–4.47)
LYMPHOCYTES # BLD AUTO: 0.59 THOUSANDS/ΜL (ref 0.6–4.47)
LYMPHOCYTES # BLD AUTO: 0.92 THOUSANDS/ΜL (ref 0.6–4.47)
LYMPHOCYTES # BLD AUTO: 1.04 THOUSANDS/ΜL (ref 0.6–4.47)
LYMPHOCYTES # BLD AUTO: 1.29 THOUSANDS/ΜL (ref 0.6–4.47)
LYMPHOCYTES # BLD AUTO: 1.31 THOUSANDS/ΜL (ref 0.6–4.47)
LYMPHOCYTES NFR BLD AUTO: 11 % (ref 14–44)
LYMPHOCYTES NFR BLD AUTO: 12 % (ref 14–44)
LYMPHOCYTES NFR BLD AUTO: 13 % (ref 14–44)
LYMPHOCYTES NFR BLD AUTO: 2 % (ref 14–44)
LYMPHOCYTES NFR BLD AUTO: 6 % (ref 14–44)
LYMPHOCYTES NFR BLD AUTO: 8 % (ref 14–44)
MAGNESIUM SERPL-MCNC: 2.3 MG/DL (ref 1.6–2.6)
MAGNESIUM SERPL-MCNC: 2.4 MG/DL (ref 1.6–2.6)
MAGNESIUM SERPL-MCNC: 2.5 MG/DL (ref 1.6–2.6)
MAGNESIUM SERPL-MCNC: 2.5 MG/DL (ref 1.6–2.6)
MAGNESIUM SERPL-MCNC: 2.7 MG/DL (ref 1.6–2.6)
MCH RBC QN AUTO: 27.1 PG (ref 26.8–34.3)
MCH RBC QN AUTO: 27.2 PG (ref 26.8–34.3)
MCH RBC QN AUTO: 27.3 PG (ref 26.8–34.3)
MCH RBC QN AUTO: 27.4 PG (ref 26.8–34.3)
MCH RBC QN AUTO: 27.5 PG (ref 26.8–34.3)
MCH RBC QN AUTO: 27.8 PG (ref 26.8–34.3)
MCH RBC QN AUTO: 27.9 PG (ref 26.8–34.3)
MCHC RBC AUTO-ENTMCNC: 29.1 G/DL (ref 31.4–37.4)
MCHC RBC AUTO-ENTMCNC: 29.3 G/DL (ref 31.4–37.4)
MCHC RBC AUTO-ENTMCNC: 29.5 G/DL (ref 31.4–37.4)
MCHC RBC AUTO-ENTMCNC: 29.5 G/DL (ref 31.4–37.4)
MCHC RBC AUTO-ENTMCNC: 29.9 G/DL (ref 31.4–37.4)
MCHC RBC AUTO-ENTMCNC: 30.2 G/DL (ref 31.4–37.4)
MCHC RBC AUTO-ENTMCNC: 30.5 G/DL (ref 31.4–37.4)
MCHC RBC AUTO-ENTMCNC: 30.5 G/DL (ref 31.4–37.4)
MCHC RBC AUTO-ENTMCNC: 30.6 G/DL (ref 31.4–37.4)
MCV RBC AUTO: 88 FL (ref 82–98)
MCV RBC AUTO: 91 FL (ref 82–98)
MCV RBC AUTO: 93 FL (ref 82–98)
MCV RBC AUTO: 94 FL (ref 82–98)
MONOCYTES # BLD AUTO: 0.43 THOUSAND/ΜL (ref 0.17–1.22)
MONOCYTES # BLD AUTO: 0.54 THOUSAND/ΜL (ref 0.17–1.22)
MONOCYTES # BLD AUTO: 0.65 THOUSAND/ΜL (ref 0.17–1.22)
MONOCYTES # BLD AUTO: 0.7 THOUSAND/ΜL (ref 0.17–1.22)
MONOCYTES # BLD AUTO: 0.77 THOUSAND/ΜL (ref 0.17–1.22)
MONOCYTES # BLD AUTO: 1.37 THOUSAND/ΜL (ref 0.17–1.22)
MONOCYTES NFR BLD AUTO: 3 % (ref 4–12)
MONOCYTES NFR BLD AUTO: 5 % (ref 4–12)
MONOCYTES NFR BLD AUTO: 6 % (ref 4–12)
MONOCYTES NFR BLD AUTO: 7 % (ref 4–12)
MONOCYTES NFR BLD AUTO: 7 % (ref 4–12)
MONOCYTES NFR BLD AUTO: 9 % (ref 4–12)
NEUTROPHILS # BLD AUTO: 11.58 THOUSANDS/ΜL (ref 1.85–7.62)
NEUTROPHILS # BLD AUTO: 23.69 THOUSANDS/ΜL (ref 1.85–7.62)
NEUTROPHILS # BLD AUTO: 5.44 THOUSANDS/ΜL (ref 1.85–7.62)
NEUTROPHILS # BLD AUTO: 7.97 THOUSANDS/ΜL (ref 1.85–7.62)
NEUTROPHILS # BLD AUTO: 8.6 THOUSANDS/ΜL (ref 1.85–7.62)
NEUTROPHILS # BLD AUTO: 9.19 THOUSANDS/ΜL (ref 1.85–7.62)
NEUTS SEG NFR BLD AUTO: 77 % (ref 43–75)
NEUTS SEG NFR BLD AUTO: 80 % (ref 43–75)
NEUTS SEG NFR BLD AUTO: 82 % (ref 43–75)
NEUTS SEG NFR BLD AUTO: 87 % (ref 43–75)
NEUTS SEG NFR BLD AUTO: 87 % (ref 43–75)
NEUTS SEG NFR BLD AUTO: 92 % (ref 43–75)
NITRITE UR QL STRIP: POSITIVE
NON VENT- BIPAP: ABNORMAL
NON VENT- BIPAP: ABNORMAL
NON-SQ EPI CELLS URNS QL MICRO: ABNORMAL /HPF
NRBC BLD AUTO-RTO: 0 /100 WBCS
NT-PROBNP SERPL-MCNC: 3969 PG/ML
O2 CT BLDA-SCNC: 14.8 ML/DL (ref 16–23)
O2 CT BLDA-SCNC: 15.9 ML/DL (ref 16–23)
O2 CT BLDA-SCNC: 16.4 ML/DL (ref 16–23)
O2 CT BLDA-SCNC: 16.5 ML/DL (ref 16–23)
O2 CT BLDA-SCNC: 16.7 ML/DL (ref 16–23)
O2 CT BLDA-SCNC: 16.7 ML/DL (ref 16–23)
O2 CT BLDA-SCNC: 17.1 ML/DL (ref 16–23)
O2 CT BLDA-SCNC: 17.4 ML/DL (ref 16–23)
O2 CT BLDA-SCNC: 17.6 ML/DL (ref 16–23)
O2 CT BLDA-SCNC: 18.2 ML/DL (ref 16–23)
O2 CT BLDA-SCNC: 19.7 ML/DL (ref 16–23)
O2 CT BLDV-SCNC: 17.1 ML/DL
OXYHGB MFR BLDA: 84.5 % (ref 94–97)
OXYHGB MFR BLDA: 94.6 % (ref 94–97)
OXYHGB MFR BLDA: 94.7 % (ref 94–97)
OXYHGB MFR BLDA: 94.9 % (ref 94–97)
OXYHGB MFR BLDA: 95.2 % (ref 94–97)
OXYHGB MFR BLDA: 95.3 % (ref 94–97)
OXYHGB MFR BLDA: 95.4 % (ref 94–97)
OXYHGB MFR BLDA: 96 % (ref 94–97)
OXYHGB MFR BLDA: 96.7 % (ref 94–97)
OXYHGB MFR BLDA: 96.9 % (ref 94–97)
OXYHGB MFR BLDA: 97.1 % (ref 94–97)
P AXIS: 79 DEGREES
P AXIS: 84 DEGREES
PCO2 BLD: 33 MMOL/L (ref 21–32)
PCO2 BLD: 39 MMOL/L (ref 21–32)
PCO2 BLD: 40.8 MM HG (ref 42–50)
PCO2 BLD: 74.9 MM HG (ref 36–44)
PCO2 BLD: >103 MM HG (ref 42–50)
PCO2 BLDA: 184.5 MM HG (ref 36–44)
PCO2 BLDA: 49.7 MM HG (ref 36–44)
PCO2 BLDA: 52.2 MM HG (ref 36–44)
PCO2 BLDA: 53.7 MM HG (ref 36–44)
PCO2 BLDA: 56.9 MM HG (ref 36–44)
PCO2 BLDA: 58.6 MM HG (ref 36–44)
PCO2 BLDA: 59.9 MM HG (ref 36–44)
PCO2 BLDA: 61.6 MM HG (ref 36–44)
PCO2 BLDA: 64.8 MM HG (ref 36–44)
PCO2 BLDA: 83.9 MM HG (ref 36–44)
PCO2 BLDA: 83.9 MM HG (ref 36–44)
PCO2 BLDV: 71.4 MM HG (ref 42–50)
PEEP MAX SETTING VENT: 5 CM[H2O]
PEEP MAX SETTING VENT: 8 CM[H2O]
PEEP RESPIRATORY: 3 CM[H2O]
PEEP RESPIRATORY: 4 CM[H2O]
PEEP RESPIRATORY: 4 CM[H2O]
PH BLD: 6.95 [PH] (ref 7.3–7.4)
PH BLD: 7.3 [PH] (ref 7.35–7.45)
PH BLD: 7.5 [PH] (ref 7.3–7.4)
PH BLDA: 6.87 [PH] (ref 7.35–7.45)
PH BLDA: 7.11 [PH] (ref 7.35–7.45)
PH BLDA: 7.14 [PH] (ref 7.35–7.45)
PH BLDA: 7.23 [PH] (ref 7.35–7.45)
PH BLDA: 7.24 [PH] (ref 7.35–7.45)
PH BLDA: 7.29 [PH] (ref 7.35–7.45)
PH BLDA: 7.3 [PH] (ref 7.35–7.45)
PH BLDA: 7.33 [PH] (ref 7.35–7.45)
PH BLDA: 7.35 [PH] (ref 7.35–7.45)
PH BLDA: 7.37 [PH] (ref 7.35–7.45)
PH BLDA: 7.41 [PH] (ref 7.35–7.45)
PH BLDV: 7.25 [PH] (ref 7.3–7.4)
PH UR STRIP.AUTO: 5.5 [PH]
PHOSPHATE SERPL-MCNC: 2.1 MG/DL (ref 2.3–4.1)
PHOSPHATE SERPL-MCNC: 2.4 MG/DL (ref 2.3–4.1)
PHOSPHATE SERPL-MCNC: 3.1 MG/DL (ref 2.3–4.1)
PHOSPHATE SERPL-MCNC: 3.9 MG/DL (ref 2.3–4.1)
PLATELET # BLD AUTO: 224 THOUSANDS/UL (ref 149–390)
PLATELET # BLD AUTO: 225 THOUSANDS/UL (ref 149–390)
PLATELET # BLD AUTO: 240 THOUSANDS/UL (ref 149–390)
PLATELET # BLD AUTO: 246 THOUSANDS/UL (ref 149–390)
PLATELET # BLD AUTO: 251 THOUSANDS/UL (ref 149–390)
PLATELET # BLD AUTO: 266 THOUSANDS/UL (ref 149–390)
PLATELET # BLD AUTO: 346 THOUSANDS/UL (ref 149–390)
PLATELET # BLD AUTO: 357 THOUSANDS/UL (ref 149–390)
PLATELET # BLD AUTO: 423 THOUSANDS/UL (ref 149–390)
PMV BLD AUTO: 9.1 FL (ref 8.9–12.7)
PMV BLD AUTO: 9.1 FL (ref 8.9–12.7)
PMV BLD AUTO: 9.2 FL (ref 8.9–12.7)
PMV BLD AUTO: 9.3 FL (ref 8.9–12.7)
PMV BLD AUTO: 9.3 FL (ref 8.9–12.7)
PMV BLD AUTO: 9.4 FL (ref 8.9–12.7)
PMV BLD AUTO: 9.5 FL (ref 8.9–12.7)
PMV BLD AUTO: 9.5 FL (ref 8.9–12.7)
PMV BLD AUTO: 9.8 FL (ref 8.9–12.7)
PO2 BLD: 170 MM HG (ref 75–129)
PO2 BLD: 194 MM HG (ref 35–45)
PO2 BLD: 84 MM HG (ref 35–45)
PO2 BLDA: 100.7 MM HG (ref 75–129)
PO2 BLDA: 103.8 MM HG (ref 75–129)
PO2 BLDA: 77.6 MM HG (ref 75–129)
PO2 BLDA: 79.1 MM HG (ref 75–129)
PO2 BLDA: 81.2 MM HG (ref 75–129)
PO2 BLDA: 85.2 MM HG (ref 75–129)
PO2 BLDA: 87 MM HG (ref 75–129)
PO2 BLDA: 91.7 MM HG (ref 75–129)
PO2 BLDA: 92.1 MM HG (ref 75–129)
PO2 BLDA: 92.9 MM HG (ref 75–129)
PO2 BLDA: 98.5 MM HG (ref 75–129)
PO2 BLDV: 50.7 MM HG (ref 35–45)
POTASSIUM BLD-SCNC: 3.3 MMOL/L (ref 3.5–5.3)
POTASSIUM BLD-SCNC: 4.1 MMOL/L (ref 3.5–5.3)
POTASSIUM BLD-SCNC: 5 MMOL/L (ref 3.5–5.3)
POTASSIUM SERPL-SCNC: 3 MMOL/L (ref 3.5–5.3)
POTASSIUM SERPL-SCNC: 3.5 MMOL/L (ref 3.5–5.3)
POTASSIUM SERPL-SCNC: 3.5 MMOL/L (ref 3.5–5.3)
POTASSIUM SERPL-SCNC: 3.7 MMOL/L (ref 3.5–5.3)
POTASSIUM SERPL-SCNC: 3.7 MMOL/L (ref 3.5–5.3)
POTASSIUM SERPL-SCNC: 3.8 MMOL/L (ref 3.5–5.3)
POTASSIUM SERPL-SCNC: 4 MMOL/L (ref 3.5–5.3)
POTASSIUM SERPL-SCNC: 4.1 MMOL/L (ref 3.5–5.3)
POTASSIUM SERPL-SCNC: 4.2 MMOL/L (ref 3.5–5.3)
POTASSIUM SERPL-SCNC: 5.3 MMOL/L (ref 3.5–5.3)
POTASSIUM SERPL-SCNC: 5.3 MMOL/L (ref 3.5–5.3)
POTASSIUM SERPL-SCNC: 7.1 MMOL/L (ref 3.5–5.3)
PR INTERVAL: 142 MS
PR INTERVAL: 156 MS
PRESSURE CONTROL: 38
PRESSURE CONTROL: 40
PROCALCITONIN SERPL-MCNC: 0.09 NG/ML
PROCALCITONIN SERPL-MCNC: 0.11 NG/ML
PROCALCITONIN SERPL-MCNC: 0.38 NG/ML
PROCALCITONIN SERPL-MCNC: 0.5 NG/ML
PROT SERPL-MCNC: 5.4 G/DL (ref 6.4–8.2)
PROT SERPL-MCNC: 5.5 G/DL (ref 6.4–8.2)
PROT SERPL-MCNC: 5.6 G/DL (ref 6.4–8.2)
PROT SERPL-MCNC: 5.7 G/DL (ref 6.4–8.2)
PROT SERPL-MCNC: 5.9 G/DL (ref 6.4–8.2)
PROT SERPL-MCNC: 7.7 G/DL (ref 6.4–8.2)
PROT UR STRIP-MCNC: NEGATIVE MG/DL
PROTHROMBIN TIME: 13.4 SECONDS (ref 11.6–14.5)
PS CM H2O: 10
PS VENT FIO2: 40
PS VENT PEEP: 5
QRS AXIS: 52 DEGREES
QRS AXIS: 70 DEGREES
QRS AXIS: 73 DEGREES
QRS AXIS: 77 DEGREES
QRSD INTERVAL: 76 MS
QRSD INTERVAL: 76 MS
QRSD INTERVAL: 82 MS
QRSD INTERVAL: 84 MS
QT INTERVAL: 300 MS
QT INTERVAL: 310 MS
QT INTERVAL: 342 MS
QT INTERVAL: 396 MS
QTC INTERVAL: 427 MS
QTC INTERVAL: 450 MS
QTC INTERVAL: 460 MS
QTC INTERVAL: 531 MS
RBC # BLD AUTO: 4 MILLION/UL (ref 3.81–5.12)
RBC # BLD AUTO: 4 MILLION/UL (ref 3.81–5.12)
RBC # BLD AUTO: 4.04 MILLION/UL (ref 3.81–5.12)
RBC # BLD AUTO: 4.2 MILLION/UL (ref 3.81–5.12)
RBC # BLD AUTO: 4.22 MILLION/UL (ref 3.81–5.12)
RBC # BLD AUTO: 4.32 MILLION/UL (ref 3.81–5.12)
RBC # BLD AUTO: 5.3 MILLION/UL (ref 3.81–5.12)
RBC # BLD AUTO: 5.38 MILLION/UL (ref 3.81–5.12)
RBC # BLD AUTO: 5.54 MILLION/UL (ref 3.81–5.12)
RBC #/AREA URNS AUTO: ABNORMAL /HPF
RSV RNA NPH QL NAA+PROBE: DETECTED
S PNEUM AG UR QL: NEGATIVE
SAO2 % BLD FROM PO2: 100 % (ref 60–85)
SAO2 % BLD FROM PO2: 99 % (ref 60–85)
SODIUM BLD-SCNC: 140 MMOL/L (ref 136–145)
SODIUM BLD-SCNC: 141 MMOL/L (ref 136–145)
SODIUM BLD-SCNC: 144 MMOL/L (ref 136–145)
SODIUM SERPL-SCNC: 141 MMOL/L (ref 136–145)
SODIUM SERPL-SCNC: 141 MMOL/L (ref 136–145)
SODIUM SERPL-SCNC: 142 MMOL/L (ref 136–145)
SODIUM SERPL-SCNC: 142 MMOL/L (ref 136–145)
SODIUM SERPL-SCNC: 143 MMOL/L (ref 136–145)
SODIUM SERPL-SCNC: 144 MMOL/L (ref 136–145)
SODIUM SERPL-SCNC: 147 MMOL/L (ref 136–145)
SODIUM SERPL-SCNC: 148 MMOL/L (ref 136–145)
SODIUM SERPL-SCNC: 148 MMOL/L (ref 136–145)
SODIUM SERPL-SCNC: 149 MMOL/L (ref 136–145)
SODIUM SERPL-SCNC: 150 MMOL/L (ref 136–145)
SODIUM SERPL-SCNC: 151 MMOL/L (ref 136–145)
SP GR UR STRIP.AUTO: 1.01 (ref 1–1.03)
SPECIMEN SOURCE: ABNORMAL
T WAVE AXIS: -32 DEGREES
T WAVE AXIS: 265 DEGREES
T WAVE AXIS: 72 DEGREES
T WAVE AXIS: 73 DEGREES
T4 FREE SERPL-MCNC: 1.15 NG/DL (ref 0.76–1.46)
TROPONIN I SERPL-MCNC: 0.04 NG/ML
UROBILINOGEN UR QL STRIP.AUTO: 0.2 E.U./DL
VENT - PS: ABNORMAL
VENT AC: 18
VENT AC: 20
VENT AC: 22
VENT BIPAP FIO2: 100 %
VENT BIPAP FIO2: 40 %
VENT- AC: AC
VENTRICULAR RATE: 114 BPM
VENTRICULAR RATE: 135 BPM
VENTRICULAR RATE: 145 BPM
VENTRICULAR RATE: 81 BPM
VT SETTING VENT: 320 ML
VT SETTING VENT: 320 ML
WBC # BLD AUTO: 10.7 THOUSAND/UL (ref 4.31–10.16)
WBC # BLD AUTO: 11.36 THOUSAND/UL (ref 4.31–10.16)
WBC # BLD AUTO: 13.32 THOUSAND/UL (ref 4.31–10.16)
WBC # BLD AUTO: 25.93 THOUSAND/UL (ref 4.31–10.16)
WBC # BLD AUTO: 7.07 THOUSAND/UL (ref 4.31–10.16)
WBC # BLD AUTO: 7.21 THOUSAND/UL (ref 4.31–10.16)
WBC # BLD AUTO: 8.2 THOUSAND/UL (ref 4.31–10.16)
WBC # BLD AUTO: 8.82 THOUSAND/UL (ref 4.31–10.16)
WBC # BLD AUTO: 9.15 THOUSAND/UL (ref 4.31–10.16)
WBC #/AREA URNS AUTO: ABNORMAL /HPF

## 2020-01-01 PROCEDURE — 85730 THROMBOPLASTIN TIME PARTIAL: CPT | Performed by: EMERGENCY MEDICINE

## 2020-01-01 PROCEDURE — 82805 BLOOD GASES W/O2 SATURATION: CPT | Performed by: EMERGENCY MEDICINE

## 2020-01-01 PROCEDURE — 85014 HEMATOCRIT: CPT

## 2020-01-01 PROCEDURE — 82948 REAGENT STRIP/BLOOD GLUCOSE: CPT

## 2020-01-01 PROCEDURE — 83735 ASSAY OF MAGNESIUM: CPT | Performed by: NURSE PRACTITIONER

## 2020-01-01 PROCEDURE — 84295 ASSAY OF SERUM SODIUM: CPT

## 2020-01-01 PROCEDURE — 36620 INSERTION CATHETER ARTERY: CPT | Performed by: STUDENT IN AN ORGANIZED HEALTH CARE EDUCATION/TRAINING PROGRAM

## 2020-01-01 PROCEDURE — 03HY32Z INSERTION OF MONITORING DEVICE INTO UPPER ARTERY, PERCUTANEOUS APPROACH: ICD-10-PCS | Performed by: ANESTHESIOLOGY

## 2020-01-01 PROCEDURE — 82805 BLOOD GASES W/O2 SATURATION: CPT | Performed by: NURSE PRACTITIONER

## 2020-01-01 PROCEDURE — 93005 ELECTROCARDIOGRAM TRACING: CPT

## 2020-01-01 PROCEDURE — 80048 BASIC METABOLIC PNL TOTAL CA: CPT | Performed by: NURSE PRACTITIONER

## 2020-01-01 PROCEDURE — 82803 BLOOD GASES ANY COMBINATION: CPT

## 2020-01-01 PROCEDURE — 02HV33Z INSERTION OF INFUSION DEVICE INTO SUPERIOR VENA CAVA, PERCUTANEOUS APPROACH: ICD-10-PCS | Performed by: ANESTHESIOLOGY

## 2020-01-01 PROCEDURE — 94644 CONT INHLJ TX 1ST HOUR: CPT

## 2020-01-01 PROCEDURE — 99291 CRITICAL CARE FIRST HOUR: CPT | Performed by: STUDENT IN AN ORGANIZED HEALTH CARE EDUCATION/TRAINING PROGRAM

## 2020-01-01 PROCEDURE — NC001 PR NO CHARGE: Performed by: PHYSICIAN ASSISTANT

## 2020-01-01 PROCEDURE — 36415 COLL VENOUS BLD VENIPUNCTURE: CPT | Performed by: PHYSICIAN ASSISTANT

## 2020-01-01 PROCEDURE — 85025 COMPLETE CBC W/AUTO DIFF WBC: CPT | Performed by: EMERGENCY MEDICINE

## 2020-01-01 PROCEDURE — 82947 ASSAY GLUCOSE BLOOD QUANT: CPT

## 2020-01-01 PROCEDURE — 94640 AIRWAY INHALATION TREATMENT: CPT

## 2020-01-01 PROCEDURE — 84145 PROCALCITONIN (PCT): CPT | Performed by: INTERNAL MEDICINE

## 2020-01-01 PROCEDURE — 4A133J1 MONITORING OF ARTERIAL PULSE, PERIPHERAL, PERCUTANEOUS APPROACH: ICD-10-PCS | Performed by: ANESTHESIOLOGY

## 2020-01-01 PROCEDURE — 94003 VENT MGMT INPAT SUBQ DAY: CPT

## 2020-01-01 PROCEDURE — 80053 COMPREHEN METABOLIC PANEL: CPT | Performed by: PHYSICIAN ASSISTANT

## 2020-01-01 PROCEDURE — 93010 ELECTROCARDIOGRAM REPORT: CPT | Performed by: INTERNAL MEDICINE

## 2020-01-01 PROCEDURE — 96365 THER/PROPH/DIAG IV INF INIT: CPT

## 2020-01-01 PROCEDURE — 84132 ASSAY OF SERUM POTASSIUM: CPT

## 2020-01-01 PROCEDURE — 80053 COMPREHEN METABOLIC PANEL: CPT | Performed by: NURSE PRACTITIONER

## 2020-01-01 PROCEDURE — 83880 ASSAY OF NATRIURETIC PEPTIDE: CPT | Performed by: NURSE PRACTITIONER

## 2020-01-01 PROCEDURE — 84145 PROCALCITONIN (PCT): CPT | Performed by: NURSE PRACTITIONER

## 2020-01-01 PROCEDURE — 82805 BLOOD GASES W/O2 SATURATION: CPT | Performed by: STUDENT IN AN ORGANIZED HEALTH CARE EDUCATION/TRAINING PROGRAM

## 2020-01-01 PROCEDURE — 84100 ASSAY OF PHOSPHORUS: CPT | Performed by: NURSE PRACTITIONER

## 2020-01-01 PROCEDURE — 83605 ASSAY OF LACTIC ACID: CPT | Performed by: PHYSICIAN ASSISTANT

## 2020-01-01 PROCEDURE — 85018 HEMOGLOBIN: CPT | Performed by: NURSE PRACTITIONER

## 2020-01-01 PROCEDURE — 84145 PROCALCITONIN (PCT): CPT | Performed by: EMERGENCY MEDICINE

## 2020-01-01 PROCEDURE — 87449 NOS EACH ORGANISM AG IA: CPT | Performed by: STUDENT IN AN ORGANIZED HEALTH CARE EDUCATION/TRAINING PROGRAM

## 2020-01-01 PROCEDURE — 82330 ASSAY OF CALCIUM: CPT

## 2020-01-01 PROCEDURE — 82330 ASSAY OF CALCIUM: CPT | Performed by: NURSE PRACTITIONER

## 2020-01-01 PROCEDURE — 94760 N-INVAS EAR/PLS OXIMETRY 1: CPT

## 2020-01-01 PROCEDURE — 36415 COLL VENOUS BLD VENIPUNCTURE: CPT | Performed by: EMERGENCY MEDICINE

## 2020-01-01 PROCEDURE — 85027 COMPLETE CBC AUTOMATED: CPT | Performed by: NURSE PRACTITIONER

## 2020-01-01 PROCEDURE — 99223 1ST HOSP IP/OBS HIGH 75: CPT | Performed by: INTERNAL MEDICINE

## 2020-01-01 PROCEDURE — 31500 INSERT EMERGENCY AIRWAY: CPT | Performed by: PHYSICIAN ASSISTANT

## 2020-01-01 PROCEDURE — 71275 CT ANGIOGRAPHY CHEST: CPT

## 2020-01-01 PROCEDURE — 96361 HYDRATE IV INFUSION ADD-ON: CPT

## 2020-01-01 PROCEDURE — 96367 TX/PROPH/DG ADDL SEQ IV INF: CPT

## 2020-01-01 PROCEDURE — 36569 INSJ PICC 5 YR+ W/O IMAGING: CPT

## 2020-01-01 PROCEDURE — NC001 PR NO CHARGE: Performed by: STUDENT IN AN ORGANIZED HEALTH CARE EDUCATION/TRAINING PROGRAM

## 2020-01-01 PROCEDURE — 36600 WITHDRAWAL OF ARTERIAL BLOOD: CPT

## 2020-01-01 PROCEDURE — 71045 X-RAY EXAM CHEST 1 VIEW: CPT

## 2020-01-01 PROCEDURE — 94660 CPAP INITIATION&MGMT: CPT

## 2020-01-01 PROCEDURE — C8929 TTE W OR WO FOL WCON,DOPPLER: HCPCS

## 2020-01-01 PROCEDURE — 87040 BLOOD CULTURE FOR BACTERIA: CPT | Performed by: EMERGENCY MEDICINE

## 2020-01-01 PROCEDURE — 80053 COMPREHEN METABOLIC PANEL: CPT | Performed by: EMERGENCY MEDICINE

## 2020-01-01 PROCEDURE — 0BH17EZ INSERTION OF ENDOTRACHEAL AIRWAY INTO TRACHEA, VIA NATURAL OR ARTIFICIAL OPENING: ICD-10-PCS | Performed by: ANESTHESIOLOGY

## 2020-01-01 PROCEDURE — 85025 COMPLETE CBC W/AUTO DIFF WBC: CPT | Performed by: NURSE PRACTITIONER

## 2020-01-01 PROCEDURE — 83735 ASSAY OF MAGNESIUM: CPT | Performed by: PHYSICIAN ASSISTANT

## 2020-01-01 PROCEDURE — 4A133B1 MONITORING OF ARTERIAL PRESSURE, PERIPHERAL, PERCUTANEOUS APPROACH: ICD-10-PCS | Performed by: ANESTHESIOLOGY

## 2020-01-01 PROCEDURE — 80048 BASIC METABOLIC PNL TOTAL CA: CPT | Performed by: PHYSICIAN ASSISTANT

## 2020-01-01 PROCEDURE — 81001 URINALYSIS AUTO W/SCOPE: CPT | Performed by: EMERGENCY MEDICINE

## 2020-01-01 PROCEDURE — 85025 COMPLETE CBC W/AUTO DIFF WBC: CPT | Performed by: PHYSICIAN ASSISTANT

## 2020-01-01 PROCEDURE — 84439 ASSAY OF FREE THYROXINE: CPT | Performed by: STUDENT IN AN ORGANIZED HEALTH CARE EDUCATION/TRAINING PROGRAM

## 2020-01-01 PROCEDURE — 82805 BLOOD GASES W/O2 SATURATION: CPT | Performed by: PHYSICIAN ASSISTANT

## 2020-01-01 PROCEDURE — 99233 SBSQ HOSP IP/OBS HIGH 50: CPT | Performed by: ANESTHESIOLOGY

## 2020-01-01 PROCEDURE — 87631 RESP VIRUS 3-5 TARGETS: CPT | Performed by: EMERGENCY MEDICINE

## 2020-01-01 PROCEDURE — 85025 COMPLETE CBC W/AUTO DIFF WBC: CPT | Performed by: INTERNAL MEDICINE

## 2020-01-01 PROCEDURE — 5A1945Z RESPIRATORY VENTILATION, 24-96 CONSECUTIVE HOURS: ICD-10-PCS | Performed by: ANESTHESIOLOGY

## 2020-01-01 PROCEDURE — 76705 ECHO EXAM OF ABDOMEN: CPT

## 2020-01-01 PROCEDURE — 80076 HEPATIC FUNCTION PANEL: CPT | Performed by: STUDENT IN AN ORGANIZED HEALTH CARE EDUCATION/TRAINING PROGRAM

## 2020-01-01 PROCEDURE — C1751 CATH, INF, PER/CENT/MIDLINE: HCPCS

## 2020-01-01 PROCEDURE — 93306 TTE W/DOPPLER COMPLETE: CPT | Performed by: INTERNAL MEDICINE

## 2020-01-01 PROCEDURE — 84484 ASSAY OF TROPONIN QUANT: CPT | Performed by: EMERGENCY MEDICINE

## 2020-01-01 PROCEDURE — 82330 ASSAY OF CALCIUM: CPT | Performed by: PHYSICIAN ASSISTANT

## 2020-01-01 PROCEDURE — 80076 HEPATIC FUNCTION PANEL: CPT | Performed by: NURSE PRACTITIONER

## 2020-01-01 PROCEDURE — 80048 BASIC METABOLIC PNL TOTAL CA: CPT | Performed by: INTERNAL MEDICINE

## 2020-01-01 PROCEDURE — 99285 EMERGENCY DEPT VISIT HI MDM: CPT | Performed by: EMERGENCY MEDICINE

## 2020-01-01 PROCEDURE — 83605 ASSAY OF LACTIC ACID: CPT | Performed by: EMERGENCY MEDICINE

## 2020-01-01 PROCEDURE — 85610 PROTHROMBIN TIME: CPT | Performed by: EMERGENCY MEDICINE

## 2020-01-01 PROCEDURE — 94002 VENT MGMT INPAT INIT DAY: CPT

## 2020-01-01 PROCEDURE — 99238 HOSP IP/OBS DSCHRG MGMT 30/<: CPT | Performed by: PHYSICIAN ASSISTANT

## 2020-01-01 PROCEDURE — 84100 ASSAY OF PHOSPHORUS: CPT | Performed by: PHYSICIAN ASSISTANT

## 2020-01-01 PROCEDURE — 05HY33Z INSERTION OF INFUSION DEVICE INTO UPPER VEIN, PERCUTANEOUS APPROACH: ICD-10-PCS | Performed by: ANESTHESIOLOGY

## 2020-01-01 PROCEDURE — NC001 PR NO CHARGE: Performed by: NURSE PRACTITIONER

## 2020-01-01 PROCEDURE — NC001 PR NO CHARGE: Performed by: INTERNAL MEDICINE

## 2020-01-01 PROCEDURE — 99285 EMERGENCY DEPT VISIT HI MDM: CPT

## 2020-01-01 RX ORDER — QUETIAPINE FUMARATE 25 MG/1
50 TABLET, FILM COATED ORAL
Status: DISCONTINUED | OUTPATIENT
Start: 2020-01-01 | End: 2020-01-01

## 2020-01-01 RX ORDER — ALBUTEROL SULFATE 2.5 MG/3ML
5 SOLUTION RESPIRATORY (INHALATION) EVERY 4 HOURS
Status: DISCONTINUED | OUTPATIENT
Start: 2020-01-01 | End: 2020-01-01

## 2020-01-01 RX ORDER — METHYLPREDNISOLONE SODIUM SUCCINATE 40 MG/ML
40 INJECTION, POWDER, LYOPHILIZED, FOR SOLUTION INTRAMUSCULAR; INTRAVENOUS EVERY 12 HOURS SCHEDULED
Status: DISCONTINUED | OUTPATIENT
Start: 2020-01-01 | End: 2020-01-01

## 2020-01-01 RX ORDER — FAMOTIDINE 20 MG/1
20 TABLET, FILM COATED ORAL 2 TIMES DAILY
Status: DISCONTINUED | OUTPATIENT
Start: 2020-01-01 | End: 2020-01-01

## 2020-01-01 RX ORDER — FUROSEMIDE 10 MG/ML
20 INJECTION INTRAMUSCULAR; INTRAVENOUS ONCE
Status: COMPLETED | OUTPATIENT
Start: 2020-01-01 | End: 2020-01-01

## 2020-01-01 RX ORDER — VECURONIUM BROMIDE 1 MG/ML
10 INJECTION, POWDER, LYOPHILIZED, FOR SOLUTION INTRAVENOUS ONCE
Status: COMPLETED | OUTPATIENT
Start: 2020-01-01 | End: 2020-01-01

## 2020-01-01 RX ORDER — ASPIRIN 81 MG/1
81 TABLET, CHEWABLE ORAL DAILY
Status: DISCONTINUED | OUTPATIENT
Start: 2020-01-01 | End: 2020-01-01

## 2020-01-01 RX ORDER — LORAZEPAM 0.5 MG/1
0.5 TABLET ORAL EVERY 8 HOURS PRN
Status: DISCONTINUED | OUTPATIENT
Start: 2020-01-01 | End: 2020-01-01

## 2020-01-01 RX ORDER — SODIUM POLYSTYRENE SULFONATE 4.1 MEQ/G
15 POWDER, FOR SUSPENSION ORAL; RECTAL ONCE
Status: COMPLETED | OUTPATIENT
Start: 2020-01-01 | End: 2020-01-01

## 2020-01-01 RX ORDER — DOCUSATE SODIUM 100 MG/1
100 CAPSULE, LIQUID FILLED ORAL 2 TIMES DAILY PRN
Status: DISCONTINUED | OUTPATIENT
Start: 2020-01-01 | End: 2020-01-01

## 2020-01-01 RX ORDER — HYDRALAZINE HYDROCHLORIDE 20 MG/ML
10 INJECTION INTRAMUSCULAR; INTRAVENOUS EVERY 6 HOURS PRN
Status: DISCONTINUED | OUTPATIENT
Start: 2020-01-01 | End: 2020-01-01

## 2020-01-01 RX ORDER — IPRATROPIUM BROMIDE AND ALBUTEROL SULFATE .5; 3 MG/3ML; MG/3ML
2 SOLUTION RESPIRATORY (INHALATION) ONCE
Status: COMPLETED | OUTPATIENT
Start: 2020-01-01 | End: 2020-01-01

## 2020-01-01 RX ORDER — ACETAMINOPHEN 325 MG/1
975 TABLET ORAL ONCE
Status: COMPLETED | OUTPATIENT
Start: 2020-01-01 | End: 2020-01-01

## 2020-01-01 RX ORDER — HEPARIN SODIUM 5000 [USP'U]/ML
5000 INJECTION, SOLUTION INTRAVENOUS; SUBCUTANEOUS EVERY 8 HOURS SCHEDULED
Status: DISCONTINUED | OUTPATIENT
Start: 2020-01-01 | End: 2020-01-01

## 2020-01-01 RX ORDER — PROPOFOL 10 MG/ML
5-50 INJECTION, EMULSION INTRAVENOUS
Status: DISCONTINUED | OUTPATIENT
Start: 2020-01-01 | End: 2020-01-01

## 2020-01-01 RX ORDER — ALPRAZOLAM 0.25 MG/1
0.25 TABLET ORAL 2 TIMES DAILY PRN
Status: DISCONTINUED | OUTPATIENT
Start: 2020-01-01 | End: 2020-01-01

## 2020-01-01 RX ORDER — BENZONATATE 100 MG/1
100 CAPSULE ORAL 3 TIMES DAILY PRN
Status: DISCONTINUED | OUTPATIENT
Start: 2020-01-01 | End: 2020-01-01

## 2020-01-01 RX ORDER — ALBUTEROL SULFATE 2.5 MG/3ML
2.5 SOLUTION RESPIRATORY (INHALATION) EVERY 4 HOURS
Status: DISCONTINUED | OUTPATIENT
Start: 2020-01-01 | End: 2020-01-01

## 2020-01-01 RX ORDER — CALCIUM GLUCONATE 20 MG/ML
2 INJECTION, SOLUTION INTRAVENOUS ONCE
Status: COMPLETED | OUTPATIENT
Start: 2020-01-01 | End: 2020-01-01

## 2020-01-01 RX ORDER — METHYLPREDNISOLONE SODIUM SUCCINATE 40 MG/ML
40 INJECTION, POWDER, LYOPHILIZED, FOR SOLUTION INTRAMUSCULAR; INTRAVENOUS EVERY 8 HOURS SCHEDULED
Status: DISCONTINUED | OUTPATIENT
Start: 2020-01-01 | End: 2020-01-01

## 2020-01-01 RX ORDER — LORAZEPAM 2 MG/ML
0.5 INJECTION INTRAMUSCULAR
Status: DISCONTINUED | OUTPATIENT
Start: 2020-01-01 | End: 2020-01-01 | Stop reason: HOSPADM

## 2020-01-01 RX ORDER — LORAZEPAM 0.5 MG/1
0.25 TABLET ORAL EVERY 8 HOURS PRN
Status: DISCONTINUED | OUTPATIENT
Start: 2020-01-01 | End: 2020-01-01

## 2020-01-01 RX ORDER — DEXTROSE MONOHYDRATE 50 MG/ML
50 INJECTION, SOLUTION INTRAVENOUS CONTINUOUS
Status: DISCONTINUED | OUTPATIENT
Start: 2020-01-01 | End: 2020-01-01

## 2020-01-01 RX ORDER — LORAZEPAM 0.5 MG/1
0.25 TABLET ORAL 2 TIMES DAILY PRN
Status: DISCONTINUED | OUTPATIENT
Start: 2020-01-01 | End: 2020-01-01

## 2020-01-01 RX ORDER — LABETALOL 20 MG/4 ML (5 MG/ML) INTRAVENOUS SYRINGE
Status: COMPLETED
Start: 2020-01-01 | End: 2020-01-01

## 2020-01-01 RX ORDER — FLUTICASONE PROPIONATE 50 MCG
1 SPRAY, SUSPENSION (ML) NASAL 2 TIMES DAILY
Status: DISCONTINUED | OUTPATIENT
Start: 2020-01-01 | End: 2020-01-01

## 2020-01-01 RX ORDER — ALBUTEROL SULFATE 2.5 MG/3ML
2 SOLUTION RESPIRATORY (INHALATION) ONCE
Status: COMPLETED | OUTPATIENT
Start: 2020-01-01 | End: 2020-01-01

## 2020-01-01 RX ORDER — METHYLPREDNISOLONE SODIUM SUCCINATE 125 MG/2ML
60 INJECTION, POWDER, LYOPHILIZED, FOR SOLUTION INTRAMUSCULAR; INTRAVENOUS EVERY 6 HOURS SCHEDULED
Status: DISCONTINUED | OUTPATIENT
Start: 2020-01-01 | End: 2020-01-01

## 2020-01-01 RX ORDER — SENNOSIDES 8.8 MG/5ML
8.8 LIQUID ORAL
Status: DISCONTINUED | OUTPATIENT
Start: 2020-01-01 | End: 2020-01-01

## 2020-01-01 RX ORDER — ACETAMINOPHEN 325 MG/1
650 TABLET ORAL EVERY 6 HOURS PRN
Status: DISCONTINUED | OUTPATIENT
Start: 2020-01-01 | End: 2020-01-01

## 2020-01-01 RX ORDER — GUAIFENESIN 600 MG
600 TABLET, EXTENDED RELEASE 12 HR ORAL 2 TIMES DAILY
Status: DISCONTINUED | OUTPATIENT
Start: 2020-01-01 | End: 2020-01-01

## 2020-01-01 RX ORDER — METHYLPREDNISOLONE SODIUM SUCCINATE 125 MG/2ML
60 INJECTION, POWDER, LYOPHILIZED, FOR SOLUTION INTRAMUSCULAR; INTRAVENOUS EVERY 8 HOURS SCHEDULED
Status: DISCONTINUED | OUTPATIENT
Start: 2020-01-01 | End: 2020-01-01

## 2020-01-01 RX ORDER — ACETAMINOPHEN 160 MG/5ML
650 SUSPENSION, ORAL (FINAL DOSE FORM) ORAL EVERY 6 HOURS PRN
Status: DISCONTINUED | OUTPATIENT
Start: 2020-01-01 | End: 2020-01-01

## 2020-01-01 RX ORDER — DILTIAZEM HYDROCHLORIDE 5 MG/ML
15 INJECTION INTRAVENOUS ONCE
Status: COMPLETED | OUTPATIENT
Start: 2020-01-01 | End: 2020-01-01

## 2020-01-01 RX ORDER — LEVOTHYROXINE SODIUM 0.05 MG/1
50 TABLET ORAL DAILY
Status: DISCONTINUED | OUTPATIENT
Start: 2020-01-01 | End: 2020-01-01

## 2020-01-01 RX ORDER — CLONIDINE 0.1 MG/24H
0.1 PATCH, EXTENDED RELEASE TRANSDERMAL WEEKLY
Status: DISCONTINUED | OUTPATIENT
Start: 2020-01-01 | End: 2020-01-01

## 2020-01-01 RX ORDER — OXYCODONE HYDROCHLORIDE AND ACETAMINOPHEN 5; 325 MG/1; MG/1
1 TABLET ORAL EVERY 4 HOURS PRN
Status: DISCONTINUED | OUTPATIENT
Start: 2020-01-01 | End: 2020-01-01

## 2020-01-01 RX ORDER — IPRATROPIUM BROMIDE AND ALBUTEROL SULFATE 2.5; .5 MG/3ML; MG/3ML
3 SOLUTION RESPIRATORY (INHALATION)
Status: DISCONTINUED | OUTPATIENT
Start: 2020-01-01 | End: 2020-01-01

## 2020-01-01 RX ORDER — SODIUM CHLORIDE, SODIUM GLUCONATE, SODIUM ACETATE, POTASSIUM CHLORIDE, MAGNESIUM CHLORIDE, SODIUM PHOSPHATE, DIBASIC, AND POTASSIUM PHOSPHATE .53; .5; .37; .037; .03; .012; .00082 G/100ML; G/100ML; G/100ML; G/100ML; G/100ML; G/100ML; G/100ML
500 INJECTION, SOLUTION INTRAVENOUS ONCE
Status: COMPLETED | OUTPATIENT
Start: 2020-01-01 | End: 2020-01-01

## 2020-01-01 RX ORDER — MAGNESIUM SULFATE HEPTAHYDRATE 40 MG/ML
2 INJECTION, SOLUTION INTRAVENOUS ONCE
Status: COMPLETED | OUTPATIENT
Start: 2020-01-01 | End: 2020-01-01

## 2020-01-01 RX ORDER — ALBUTEROL SULFATE 2.5 MG/3ML
2.5 SOLUTION RESPIRATORY (INHALATION) EVERY 6 HOURS PRN
Status: DISCONTINUED | OUTPATIENT
Start: 2020-01-01 | End: 2020-01-01

## 2020-01-01 RX ORDER — GABAPENTIN 100 MG/1
100 CAPSULE ORAL 2 TIMES DAILY
Status: DISCONTINUED | OUTPATIENT
Start: 2020-01-01 | End: 2020-01-01

## 2020-01-01 RX ORDER — ETOMIDATE 2 MG/ML
INJECTION INTRAVENOUS CODE/TRAUMA/SEDATION MEDICATION
Status: COMPLETED | OUTPATIENT
Start: 2020-01-01 | End: 2020-01-01

## 2020-01-01 RX ORDER — INSULIN GLARGINE 100 [IU]/ML
15 INJECTION, SOLUTION SUBCUTANEOUS
Status: DISCONTINUED | OUTPATIENT
Start: 2020-01-01 | End: 2020-01-01

## 2020-01-01 RX ORDER — DIPHENHYDRAMINE HYDROCHLORIDE 50 MG/ML
50 INJECTION INTRAMUSCULAR; INTRAVENOUS ONCE
Status: COMPLETED | OUTPATIENT
Start: 2020-01-01 | End: 2020-01-01

## 2020-01-01 RX ORDER — FUROSEMIDE 10 MG/ML
40 INJECTION INTRAMUSCULAR; INTRAVENOUS
Status: DISCONTINUED | OUTPATIENT
Start: 2020-01-01 | End: 2020-01-01

## 2020-01-01 RX ORDER — PANTOPRAZOLE SODIUM 40 MG/1
40 TABLET, DELAYED RELEASE ORAL
Status: DISCONTINUED | OUTPATIENT
Start: 2020-01-01 | End: 2020-01-01

## 2020-01-01 RX ORDER — LIDOCAINE 50 MG/G
1 PATCH TOPICAL DAILY
Status: DISCONTINUED | OUTPATIENT
Start: 2020-01-01 | End: 2020-01-01

## 2020-01-01 RX ORDER — LORAZEPAM 2 MG/ML
1.5 INJECTION INTRAMUSCULAR ONCE
Status: COMPLETED | OUTPATIENT
Start: 2020-01-01 | End: 2020-01-01

## 2020-01-01 RX ORDER — CHLORHEXIDINE GLUCONATE 0.12 MG/ML
15 RINSE ORAL EVERY 12 HOURS SCHEDULED
Status: DISCONTINUED | OUTPATIENT
Start: 2020-01-01 | End: 2020-01-01

## 2020-01-01 RX ORDER — METOPROLOL TARTRATE 5 MG/5ML
5 INJECTION INTRAVENOUS ONCE
Status: COMPLETED | OUTPATIENT
Start: 2020-01-01 | End: 2020-01-01

## 2020-01-01 RX ORDER — METHYLPREDNISOLONE SOD SUCC 125 MG
1 VIAL (EA) INJECTION ONCE
Status: COMPLETED | OUTPATIENT
Start: 2020-01-01 | End: 2020-01-01

## 2020-01-01 RX ORDER — GABAPENTIN 100 MG/1
100 CAPSULE ORAL EVERY MORNING
Status: DISCONTINUED | OUTPATIENT
Start: 2020-01-01 | End: 2020-01-01

## 2020-01-01 RX ORDER — LABETALOL 20 MG/4 ML (5 MG/ML) INTRAVENOUS SYRINGE
10 ONCE
Status: COMPLETED | OUTPATIENT
Start: 2020-01-01 | End: 2020-01-01

## 2020-01-01 RX ORDER — ONDANSETRON 2 MG/ML
4 INJECTION INTRAMUSCULAR; INTRAVENOUS EVERY 8 HOURS PRN
Status: DISCONTINUED | OUTPATIENT
Start: 2020-01-01 | End: 2020-01-01 | Stop reason: HOSPADM

## 2020-01-01 RX ORDER — CALCIUM GLUCONATE 20 MG/ML
2 INJECTION, SOLUTION INTRAVENOUS ONCE
Status: DISCONTINUED | OUTPATIENT
Start: 2020-01-01 | End: 2020-01-01

## 2020-01-01 RX ORDER — MAGNESIUM SULFATE HEPTAHYDRATE 40 MG/ML
1 INJECTION, SOLUTION INTRAVENOUS ONCE
Status: COMPLETED | OUTPATIENT
Start: 2020-01-01 | End: 2020-01-01

## 2020-01-01 RX ORDER — FUROSEMIDE 20 MG/1
20 TABLET ORAL 2 TIMES DAILY
Status: DISCONTINUED | OUTPATIENT
Start: 2020-01-01 | End: 2020-01-01

## 2020-01-01 RX ORDER — FUROSEMIDE 10 MG/ML
40 INJECTION INTRAMUSCULAR; INTRAVENOUS ONCE
Status: COMPLETED | OUTPATIENT
Start: 2020-01-01 | End: 2020-01-01

## 2020-01-01 RX ORDER — FAMOTIDINE 40 MG/5ML
20 POWDER, FOR SUSPENSION ORAL DAILY
Status: DISCONTINUED | OUTPATIENT
Start: 2020-01-01 | End: 2020-01-01

## 2020-01-01 RX ORDER — SUCCINYLCHOLINE/SOD CL,ISO/PF 100 MG/5ML
SYRINGE (ML) INTRAVENOUS CODE/TRAUMA/SEDATION MEDICATION
Status: COMPLETED | OUTPATIENT
Start: 2020-01-01 | End: 2020-01-01

## 2020-01-01 RX ORDER — METOPROLOL TARTRATE 50 MG/1
50 TABLET, FILM COATED ORAL EVERY 12 HOURS SCHEDULED
Status: DISCONTINUED | OUTPATIENT
Start: 2020-01-01 | End: 2020-01-01

## 2020-01-01 RX ORDER — OLANZAPINE 5 MG/1
5 TABLET, ORALLY DISINTEGRATING ORAL ONCE
Status: COMPLETED | OUTPATIENT
Start: 2020-01-01 | End: 2020-01-01

## 2020-01-01 RX ORDER — CLOPIDOGREL BISULFATE 75 MG/1
75 TABLET ORAL DAILY
Status: DISCONTINUED | OUTPATIENT
Start: 2020-01-01 | End: 2020-01-01

## 2020-01-01 RX ORDER — SODIUM CHLORIDE FOR INHALATION 0.9 %
3 VIAL, NEBULIZER (ML) INHALATION ONCE
Status: DISCONTINUED | OUTPATIENT
Start: 2020-01-01 | End: 2020-01-01

## 2020-01-01 RX ORDER — SODIUM CHLORIDE, SODIUM GLUCONATE, SODIUM ACETATE, POTASSIUM CHLORIDE, MAGNESIUM CHLORIDE, SODIUM PHOSPHATE, DIBASIC, AND POTASSIUM PHOSPHATE .53; .5; .37; .037; .03; .012; .00082 G/100ML; G/100ML; G/100ML; G/100ML; G/100ML; G/100ML; G/100ML
75 INJECTION, SOLUTION INTRAVENOUS CONTINUOUS
Status: DISCONTINUED | OUTPATIENT
Start: 2020-01-01 | End: 2020-01-01

## 2020-01-01 RX ORDER — FENTANYL CITRATE/PF 50 MCG/ML
50 SYRINGE (ML) INJECTION EVERY 2 HOUR PRN
Status: DISCONTINUED | OUTPATIENT
Start: 2020-01-01 | End: 2020-01-01

## 2020-01-01 RX ORDER — ONDANSETRON HYDROCHLORIDE 4 MG/5ML
4 SOLUTION ORAL EVERY 8 HOURS PRN
Status: DISCONTINUED | OUTPATIENT
Start: 2020-01-01 | End: 2020-01-01

## 2020-01-01 RX ORDER — POLYETHYLENE GLYCOL 3350 17 G/17G
17 POWDER, FOR SOLUTION ORAL DAILY
Status: DISCONTINUED | OUTPATIENT
Start: 2020-01-01 | End: 2020-01-01

## 2020-01-01 RX ORDER — FLUTICASONE FUROATE AND VILANTEROL 100; 25 UG/1; UG/1
1 POWDER RESPIRATORY (INHALATION) DAILY
Status: DISCONTINUED | OUTPATIENT
Start: 2020-01-01 | End: 2020-01-01

## 2020-01-01 RX ORDER — LEVALBUTEROL 1.25 MG/.5ML
1.25 SOLUTION, CONCENTRATE RESPIRATORY (INHALATION)
Status: DISCONTINUED | OUTPATIENT
Start: 2020-01-01 | End: 2020-01-01

## 2020-01-01 RX ORDER — POTASSIUM CHLORIDE 20 MEQ/1
40 TABLET, EXTENDED RELEASE ORAL ONCE
Status: COMPLETED | OUTPATIENT
Start: 2020-01-01 | End: 2020-01-01

## 2020-01-01 RX ORDER — OLANZAPINE 5 MG/1
5 TABLET, ORALLY DISINTEGRATING ORAL
Status: DISCONTINUED | OUTPATIENT
Start: 2020-01-01 | End: 2020-01-01

## 2020-01-01 RX ORDER — POTASSIUM CHLORIDE 29.8 MG/ML
40 INJECTION INTRAVENOUS ONCE
Status: COMPLETED | OUTPATIENT
Start: 2020-01-01 | End: 2020-01-01

## 2020-01-01 RX ORDER — DEXTROSE MONOHYDRATE 25 G/50ML
25 INJECTION, SOLUTION INTRAVENOUS ONCE
Status: COMPLETED | OUTPATIENT
Start: 2020-01-01 | End: 2020-01-01

## 2020-01-01 RX ORDER — CALCIUM CARBONATE 200(500)MG
1000 TABLET,CHEWABLE ORAL DAILY PRN
Status: DISCONTINUED | OUTPATIENT
Start: 2020-01-01 | End: 2020-01-01

## 2020-01-01 RX ORDER — ALBUTEROL SULFATE 2.5 MG/3ML
10 SOLUTION RESPIRATORY (INHALATION) ONCE
Status: COMPLETED | OUTPATIENT
Start: 2020-01-01 | End: 2020-01-01

## 2020-01-01 RX ORDER — GABAPENTIN 300 MG/1
600 CAPSULE ORAL
Status: DISCONTINUED | OUTPATIENT
Start: 2020-01-01 | End: 2020-01-01

## 2020-01-01 RX ORDER — POTASSIUM CHLORIDE 14.9 MG/ML
20 INJECTION INTRAVENOUS
Status: COMPLETED | OUTPATIENT
Start: 2020-01-01 | End: 2020-01-01

## 2020-01-01 RX ORDER — SODIUM CHLORIDE FOR INHALATION 0.9 %
3 VIAL, NEBULIZER (ML) INHALATION ONCE
Status: COMPLETED | OUTPATIENT
Start: 2020-01-01 | End: 2020-01-01

## 2020-01-01 RX ORDER — CLONIDINE 0.2 MG/24H
0.2 PATCH, EXTENDED RELEASE TRANSDERMAL WEEKLY
Status: DISCONTINUED | OUTPATIENT
Start: 2020-01-18 | End: 2020-01-01

## 2020-01-01 RX ORDER — DILTIAZEM HYDROCHLORIDE 5 MG/ML
INJECTION INTRAVENOUS
Status: COMPLETED
Start: 2020-01-01 | End: 2020-01-01

## 2020-01-01 RX ORDER — QUETIAPINE FUMARATE 25 MG/1
25 TABLET, FILM COATED ORAL ONCE
Status: COMPLETED | OUTPATIENT
Start: 2020-01-01 | End: 2020-01-01

## 2020-01-01 RX ADMIN — CALCIUM GLUCONATE 2 G: 20 INJECTION, SOLUTION INTRAVENOUS at 07:06

## 2020-01-01 RX ADMIN — METHYLPREDNISOLONE SODIUM SUCCINATE 60 MG: 125 INJECTION, POWDER, FOR SOLUTION INTRAMUSCULAR; INTRAVENOUS at 23:56

## 2020-01-01 RX ADMIN — DILTIAZEM HYDROCHLORIDE 5 MG/HR: 5 INJECTION INTRAVENOUS at 09:15

## 2020-01-01 RX ADMIN — CLOPIDOGREL BISULFATE 75 MG: 75 TABLET ORAL at 08:27

## 2020-01-01 RX ADMIN — SODIUM CHLORIDE, SODIUM GLUCONATE, SODIUM ACETATE, POTASSIUM CHLORIDE, MAGNESIUM CHLORIDE, SODIUM PHOSPHATE, DIBASIC, AND POTASSIUM PHOSPHATE 75 ML/HR: .53; .5; .37; .037; .03; .012; .00082 INJECTION, SOLUTION INTRAVENOUS at 08:51

## 2020-01-01 RX ADMIN — MORPHINE SULFATE 2 MG: 2 INJECTION, SOLUTION INTRAMUSCULAR; INTRAVENOUS at 16:35

## 2020-01-01 RX ADMIN — MORPHINE SULFATE 2 MG: 2 INJECTION, SOLUTION INTRAMUSCULAR; INTRAVENOUS at 21:28

## 2020-01-01 RX ADMIN — METHYLPREDNISOLONE SODIUM SUCCINATE 60 MG: 125 INJECTION, POWDER, FOR SOLUTION INTRAMUSCULAR; INTRAVENOUS at 06:17

## 2020-01-01 RX ADMIN — CHLORHEXIDINE GLUCONATE 0.12% ORAL RINSE 15 ML: 1.2 LIQUID ORAL at 20:43

## 2020-01-01 RX ADMIN — LORAZEPAM 0.5 MG: 2 INJECTION INTRAMUSCULAR; INTRAVENOUS at 07:19

## 2020-01-01 RX ADMIN — INSULIN HUMAN 10 UNITS: 100 INJECTION, SOLUTION PARENTERAL at 08:02

## 2020-01-01 RX ADMIN — METHYLPREDNISOLONE SODIUM SUCCINATE 60 MG: 125 INJECTION, POWDER, FOR SOLUTION INTRAMUSCULAR; INTRAVENOUS at 17:42

## 2020-01-01 RX ADMIN — METOPROLOL TARTRATE 5 MG: 5 INJECTION INTRAVENOUS at 08:57

## 2020-01-01 RX ADMIN — POTASSIUM & SODIUM PHOSPHATES POWDER PACK 280-160-250 MG 2 PACKET: 280-160-250 PACK at 09:43

## 2020-01-01 RX ADMIN — SENNOSIDES A AND B 8.8 MG: 415.36 LIQUID ORAL at 21:54

## 2020-01-01 RX ADMIN — FAMOTIDINE 20 MG: 20 TABLET ORAL at 17:41

## 2020-01-01 RX ADMIN — SODIUM CHLORIDE 9 UNITS/HR: 9 INJECTION, SOLUTION INTRAVENOUS at 12:07

## 2020-01-01 RX ADMIN — IPRATROPIUM BROMIDE 0.5 MG: 0.5 SOLUTION RESPIRATORY (INHALATION) at 14:06

## 2020-01-01 RX ADMIN — FAMOTIDINE 20 MG: 20 TABLET ORAL at 17:49

## 2020-01-01 RX ADMIN — IPRATROPIUM BROMIDE 0.5 MG: 0.5 SOLUTION RESPIRATORY (INHALATION) at 11:12

## 2020-01-01 RX ADMIN — FAMOTIDINE 20 MG: 20 TABLET ORAL at 08:10

## 2020-01-01 RX ADMIN — HYDRALAZINE HYDROCHLORIDE 10 MG: 20 INJECTION INTRAMUSCULAR; INTRAVENOUS at 12:01

## 2020-01-01 RX ADMIN — PROPOFOL 35 MCG/KG/MIN: 10 INJECTION, EMULSION INTRAVENOUS at 01:15

## 2020-01-01 RX ADMIN — IPRATROPIUM BROMIDE AND ALBUTEROL SULFATE 3 ML: 2.5; .5 SOLUTION RESPIRATORY (INHALATION) at 00:51

## 2020-01-01 RX ADMIN — IPRATROPIUM BROMIDE 0.5 MG: 0.5 SOLUTION RESPIRATORY (INHALATION) at 11:06

## 2020-01-01 RX ADMIN — DEXTROSE 150 MG: 50 INJECTION, SOLUTION INTRAVENOUS at 10:48

## 2020-01-01 RX ADMIN — CHLORHEXIDINE GLUCONATE 0.12% ORAL RINSE 15 ML: 1.2 LIQUID ORAL at 20:04

## 2020-01-01 RX ADMIN — SODIUM CHLORIDE 0.5 MG/KG/HR: 0.9 INJECTION, SOLUTION INTRAVENOUS at 04:31

## 2020-01-01 RX ADMIN — CLONIDINE 0.1 MG: 0.1 PATCH TRANSDERMAL at 23:27

## 2020-01-01 RX ADMIN — IPRATROPIUM BROMIDE AND ALBUTEROL SULFATE 3 ML: 2.5; .5 SOLUTION RESPIRATORY (INHALATION) at 11:07

## 2020-01-01 RX ADMIN — IPRATROPIUM BROMIDE 0.5 MG: 0.5 SOLUTION RESPIRATORY (INHALATION) at 20:33

## 2020-01-01 RX ADMIN — DEXMEDETOMIDINE 1.2 MCG/KG/HR: 100 INJECTION, SOLUTION, CONCENTRATE INTRAVENOUS at 11:59

## 2020-01-01 RX ADMIN — IPRATROPIUM BROMIDE 0.5 MG: 0.5 SOLUTION RESPIRATORY (INHALATION) at 11:40

## 2020-01-01 RX ADMIN — HEPARIN SODIUM 5000 UNITS: 5000 INJECTION INTRAVENOUS; SUBCUTANEOUS at 08:23

## 2020-01-01 RX ADMIN — OLANZAPINE 5 MG: 5 TABLET, ORALLY DISINTEGRATING ORAL at 21:31

## 2020-01-01 RX ADMIN — IPRATROPIUM BROMIDE AND ALBUTEROL SULFATE 3 ML: 2.5; .5 SOLUTION RESPIRATORY (INHALATION) at 15:23

## 2020-01-01 RX ADMIN — HEPARIN SODIUM 5000 UNITS: 5000 INJECTION INTRAVENOUS; SUBCUTANEOUS at 14:30

## 2020-01-01 RX ADMIN — HEPARIN SODIUM 5000 UNITS: 5000 INJECTION INTRAVENOUS; SUBCUTANEOUS at 14:08

## 2020-01-01 RX ADMIN — GUAIFENESIN 600 MG: 600 TABLET ORAL at 17:17

## 2020-01-01 RX ADMIN — CALCIUM GLUCONATE 2 G: 20 INJECTION, SOLUTION INTRAVENOUS at 14:13

## 2020-01-01 RX ADMIN — AZITHROMYCIN MONOHYDRATE 500 MG: 500 INJECTION, POWDER, LYOPHILIZED, FOR SOLUTION INTRAVENOUS at 12:32

## 2020-01-01 RX ADMIN — LORAZEPAM 0.25 MG: 0.5 TABLET ORAL at 08:18

## 2020-01-01 RX ADMIN — METHYLPREDNISOLONE SODIUM SUCCINATE 60 MG: 125 INJECTION, POWDER, FOR SOLUTION INTRAMUSCULAR; INTRAVENOUS at 21:45

## 2020-01-01 RX ADMIN — ALBUTEROL SULFATE 5 MG: 2.5 SOLUTION RESPIRATORY (INHALATION) at 20:33

## 2020-01-01 RX ADMIN — FUROSEMIDE 20 MG: 10 INJECTION, SOLUTION INTRAMUSCULAR; INTRAVENOUS at 09:39

## 2020-01-01 RX ADMIN — SODIUM CHLORIDE 0.5 MG/KG/HR: 0.9 INJECTION, SOLUTION INTRAVENOUS at 18:34

## 2020-01-01 RX ADMIN — DIPHENHYDRAMINE HYDROCHLORIDE 50 MG: 50 INJECTION, SOLUTION INTRAMUSCULAR; INTRAVENOUS at 12:22

## 2020-01-01 RX ADMIN — ASPIRIN 81 MG 81 MG: 81 TABLET ORAL at 08:10

## 2020-01-01 RX ADMIN — HEPARIN SODIUM 5000 UNITS: 5000 INJECTION INTRAVENOUS; SUBCUTANEOUS at 21:38

## 2020-01-01 RX ADMIN — IPRATROPIUM BROMIDE AND ALBUTEROL SULFATE 3 ML: 2.5; .5 SOLUTION RESPIRATORY (INHALATION) at 20:35

## 2020-01-01 RX ADMIN — HEPARIN SODIUM 5000 UNITS: 5000 INJECTION INTRAVENOUS; SUBCUTANEOUS at 21:01

## 2020-01-01 RX ADMIN — DEXTROSE 50 ML/HR: 5 SOLUTION INTRAVENOUS at 09:37

## 2020-01-01 RX ADMIN — GABAPENTIN 100 MG: 100 CAPSULE ORAL at 08:27

## 2020-01-01 RX ADMIN — ALBUTEROL SULFATE 5 MG: 2.5 SOLUTION RESPIRATORY (INHALATION) at 07:21

## 2020-01-01 RX ADMIN — SODIUM CHLORIDE 0.5 MG/KG/HR: 0.9 INJECTION, SOLUTION INTRAVENOUS at 14:08

## 2020-01-01 RX ADMIN — IPRATROPIUM BROMIDE 1 MG: 0.5 SOLUTION RESPIRATORY (INHALATION) at 11:53

## 2020-01-01 RX ADMIN — DEXMEDETOMIDINE 0.8 MCG/KG/HR: 100 INJECTION, SOLUTION, CONCENTRATE INTRAVENOUS at 06:06

## 2020-01-01 RX ADMIN — IPRATROPIUM BROMIDE AND ALBUTEROL SULFATE 3 ML: 2.5; .5 SOLUTION RESPIRATORY (INHALATION) at 15:57

## 2020-01-01 RX ADMIN — IPRATROPIUM BROMIDE AND ALBUTEROL SULFATE 3 ML: 2.5; .5 SOLUTION RESPIRATORY (INHALATION) at 03:35

## 2020-01-01 RX ADMIN — DILTIAZEM HYDROCHLORIDE 15 MG: 5 INJECTION INTRAVENOUS at 08:21

## 2020-01-01 RX ADMIN — METOPROLOL TARTRATE 50 MG: 25 TABLET ORAL at 08:19

## 2020-01-01 RX ADMIN — AZITHROMYCIN MONOHYDRATE 500 MG: 500 INJECTION, POWDER, LYOPHILIZED, FOR SOLUTION INTRAVENOUS at 11:03

## 2020-01-01 RX ADMIN — ALBUTEROL SULFATE 5 MG: 2.5 SOLUTION RESPIRATORY (INHALATION) at 23:14

## 2020-01-01 RX ADMIN — PROPOFOL 40 MCG/KG/MIN: 10 INJECTION, EMULSION INTRAVENOUS at 07:02

## 2020-01-01 RX ADMIN — DEXTROSE 50 ML/HR: 5 SOLUTION INTRAVENOUS at 23:14

## 2020-01-01 RX ADMIN — POTASSIUM CHLORIDE 20 MEQ: 200 INJECTION, SOLUTION INTRAVENOUS at 22:00

## 2020-01-01 RX ADMIN — LEVOTHYROXINE SODIUM 50 MCG: 50 TABLET ORAL at 05:00

## 2020-01-01 RX ADMIN — ALBUTEROL SULFATE 5 MG: 2.5 SOLUTION RESPIRATORY (INHALATION) at 23:58

## 2020-01-01 RX ADMIN — IPRATROPIUM BROMIDE AND ALBUTEROL SULFATE 3 ML: 2.5; .5 SOLUTION RESPIRATORY (INHALATION) at 08:25

## 2020-01-01 RX ADMIN — VECURONIUM BROMIDE 10 MG: 1 INJECTION, POWDER, LYOPHILIZED, FOR SOLUTION INTRAVENOUS at 08:46

## 2020-01-01 RX ADMIN — CHLORHEXIDINE GLUCONATE 0.12% ORAL RINSE 15 ML: 1.2 LIQUID ORAL at 09:29

## 2020-01-01 RX ADMIN — DEXMEDETOMIDINE 0.5 MCG/KG/HR: 100 INJECTION, SOLUTION, CONCENTRATE INTRAVENOUS at 21:46

## 2020-01-01 RX ADMIN — HEPARIN SODIUM 5000 UNITS: 5000 INJECTION INTRAVENOUS; SUBCUTANEOUS at 13:29

## 2020-01-01 RX ADMIN — FAMOTIDINE 20 MG: 10 INJECTION, SOLUTION INTRAVENOUS at 20:43

## 2020-01-01 RX ADMIN — HYDRALAZINE HYDROCHLORIDE 10 MG: 20 INJECTION INTRAMUSCULAR; INTRAVENOUS at 18:50

## 2020-01-01 RX ADMIN — CALCIUM GLUCONATE 2 G: 20 INJECTION, SOLUTION INTRAVENOUS at 09:38

## 2020-01-01 RX ADMIN — IPRATROPIUM BROMIDE AND ALBUTEROL SULFATE 3 ML: 2.5; .5 SOLUTION RESPIRATORY (INHALATION) at 13:17

## 2020-01-01 RX ADMIN — SODIUM CHLORIDE 8 UNITS/HR: 9 INJECTION, SOLUTION INTRAVENOUS at 20:10

## 2020-01-01 RX ADMIN — ASPIRIN 81 MG 81 MG: 81 TABLET ORAL at 08:20

## 2020-01-01 RX ADMIN — METHYLPREDNISOLONE SODIUM SUCCINATE 60 MG: 125 INJECTION, POWDER, FOR SOLUTION INTRAMUSCULAR; INTRAVENOUS at 14:30

## 2020-01-01 RX ADMIN — CLOPIDOGREL BISULFATE 75 MG: 75 TABLET ORAL at 09:33

## 2020-01-01 RX ADMIN — PROPOFOL 50 MCG/KG/MIN: 10 INJECTION, EMULSION INTRAVENOUS at 14:17

## 2020-01-01 RX ADMIN — DEXMEDETOMIDINE 0.2 MCG/KG/HR: 100 INJECTION, SOLUTION, CONCENTRATE INTRAVENOUS at 00:08

## 2020-01-01 RX ADMIN — POLYETHYLENE GLYCOL 3350 17 G: 17 POWDER, FOR SOLUTION ORAL at 09:43

## 2020-01-01 RX ADMIN — IPRATROPIUM BROMIDE AND ALBUTEROL SULFATE 3 ML: 2.5; .5 SOLUTION RESPIRATORY (INHALATION) at 08:07

## 2020-01-01 RX ADMIN — METHYLPREDNISOLONE SODIUM SUCCINATE 40 MG: 40 INJECTION, POWDER, FOR SOLUTION INTRAMUSCULAR; INTRAVENOUS at 15:00

## 2020-01-01 RX ADMIN — CHLORHEXIDINE GLUCONATE 0.12% ORAL RINSE 15 ML: 1.2 LIQUID ORAL at 08:27

## 2020-01-01 RX ADMIN — METOPROLOL TARTRATE 25 MG: 25 TABLET ORAL at 20:04

## 2020-01-01 RX ADMIN — IPRATROPIUM BROMIDE AND ALBUTEROL SULFATE 3 ML: 2.5; .5 SOLUTION RESPIRATORY (INHALATION) at 16:06

## 2020-01-01 RX ADMIN — METHYLPREDNISOLONE SODIUM SUCCINATE 60 MG: 125 INJECTION, POWDER, FOR SOLUTION INTRAMUSCULAR; INTRAVENOUS at 14:35

## 2020-01-01 RX ADMIN — INSULIN LISPRO 2 UNITS: 100 INJECTION, SOLUTION INTRAVENOUS; SUBCUTANEOUS at 22:56

## 2020-01-01 RX ADMIN — METHYLPREDNISOLONE SODIUM SUCCINATE 60 MG: 125 INJECTION, POWDER, FOR SOLUTION INTRAMUSCULAR; INTRAVENOUS at 22:00

## 2020-01-01 RX ADMIN — ALBUTEROL SULFATE 2.5 MG: 2.5 SOLUTION RESPIRATORY (INHALATION) at 08:03

## 2020-01-01 RX ADMIN — ALBUTEROL SULFATE 10 MG: 2.5 SOLUTION RESPIRATORY (INHALATION) at 11:53

## 2020-01-01 RX ADMIN — CLOPIDOGREL BISULFATE 75 MG: 75 TABLET ORAL at 08:19

## 2020-01-01 RX ADMIN — ASPIRIN 81 MG 81 MG: 81 TABLET ORAL at 09:33

## 2020-01-01 RX ADMIN — ALBUTEROL SULFATE 5 MG: 2.5 SOLUTION RESPIRATORY (INHALATION) at 04:10

## 2020-01-01 RX ADMIN — IPRATROPIUM BROMIDE AND ALBUTEROL SULFATE 3 ML: 2.5; .5 SOLUTION RESPIRATORY (INHALATION) at 23:52

## 2020-01-01 RX ADMIN — IODIXANOL 100 ML: 320 INJECTION, SOLUTION INTRAVASCULAR at 13:32

## 2020-01-01 RX ADMIN — METHYLPREDNISOLONE SODIUM SUCCINATE 60 MG: 125 INJECTION, POWDER, FOR SOLUTION INTRAMUSCULAR; INTRAVENOUS at 12:32

## 2020-01-01 RX ADMIN — METHYLPREDNISOLONE SODIUM SUCCINATE 40 MG: 40 INJECTION, POWDER, FOR SOLUTION INTRAMUSCULAR; INTRAVENOUS at 05:44

## 2020-01-01 RX ADMIN — METHYLPREDNISOLONE SODIUM SUCCINATE 60 MG: 125 INJECTION, POWDER, FOR SOLUTION INTRAMUSCULAR; INTRAVENOUS at 21:55

## 2020-01-01 RX ADMIN — CHLORHEXIDINE GLUCONATE 0.12% ORAL RINSE 15 ML: 1.2 LIQUID ORAL at 20:15

## 2020-01-01 RX ADMIN — FAMOTIDINE 20 MG: 40 POWDER, FOR SUSPENSION ORAL at 10:26

## 2020-01-01 RX ADMIN — IPRATROPIUM BROMIDE AND ALBUTEROL SULFATE 3 ML: 2.5; .5 SOLUTION RESPIRATORY (INHALATION) at 03:57

## 2020-01-01 RX ADMIN — DEXMEDETOMIDINE 0.5 MCG/KG/HR: 100 INJECTION, SOLUTION, CONCENTRATE INTRAVENOUS at 05:22

## 2020-01-01 RX ADMIN — ACETAMINOPHEN 650 MG: 650 SUSPENSION ORAL at 09:32

## 2020-01-01 RX ADMIN — MORPHINE SULFATE 2 MG: 2 INJECTION, SOLUTION INTRAMUSCULAR; INTRAVENOUS at 07:24

## 2020-01-01 RX ADMIN — CHLORHEXIDINE GLUCONATE 0.12% ORAL RINSE 15 ML: 1.2 LIQUID ORAL at 08:35

## 2020-01-01 RX ADMIN — IPRATROPIUM BROMIDE 0.5 MG: 0.5 SOLUTION RESPIRATORY (INHALATION) at 08:34

## 2020-01-01 RX ADMIN — ALBUTEROL SULFATE 5 MG: 2.5 SOLUTION RESPIRATORY (INHALATION) at 11:06

## 2020-01-01 RX ADMIN — LEVOTHYROXINE SODIUM 50 MCG: 50 TABLET ORAL at 05:26

## 2020-01-01 RX ADMIN — LABETALOL 20 MG/4 ML (5 MG/ML) INTRAVENOUS SYRINGE 10 MG: at 04:35

## 2020-01-01 RX ADMIN — MAGNESIUM SULFATE HEPTAHYDRATE 2 G: 40 INJECTION, SOLUTION INTRAVENOUS at 11:34

## 2020-01-01 RX ADMIN — PROPOFOL 35 MCG/KG/MIN: 10 INJECTION, EMULSION INTRAVENOUS at 12:32

## 2020-01-01 RX ADMIN — INSULIN GLARGINE 15 UNITS: 100 INJECTION, SOLUTION SUBCUTANEOUS at 22:56

## 2020-01-01 RX ADMIN — METHYLPREDNISOLONE SODIUM SUCCINATE 40 MG: 40 INJECTION, POWDER, FOR SOLUTION INTRAMUSCULAR; INTRAVENOUS at 21:32

## 2020-01-01 RX ADMIN — SODIUM CHLORIDE 1 UNITS/HR: 9 INJECTION, SOLUTION INTRAVENOUS at 18:13

## 2020-01-01 RX ADMIN — WATER: 1 INJECTION INTRAMUSCULAR; INTRAVENOUS; SUBCUTANEOUS at 10:27

## 2020-01-01 RX ADMIN — EPINEPHRINE 2 MCG/MIN: 1 INJECTION, SOLUTION, CONCENTRATE INTRAVENOUS at 07:13

## 2020-01-01 RX ADMIN — CHLORHEXIDINE GLUCONATE 0.12% ORAL RINSE 15 ML: 1.2 LIQUID ORAL at 10:00

## 2020-01-01 RX ADMIN — LEVALBUTEROL HYDROCHLORIDE 1.25 MG: 1.25 SOLUTION, CONCENTRATE RESPIRATORY (INHALATION) at 02:56

## 2020-01-01 RX ADMIN — ISODIUM CHLORIDE 3 ML: 0.03 SOLUTION RESPIRATORY (INHALATION) at 11:53

## 2020-01-01 RX ADMIN — FUROSEMIDE 40 MG: 10 INJECTION, SOLUTION INTRAMUSCULAR; INTRAVENOUS at 12:07

## 2020-01-01 RX ADMIN — HEPARIN SODIUM 5000 UNITS: 5000 INJECTION INTRAVENOUS; SUBCUTANEOUS at 05:11

## 2020-01-01 RX ADMIN — FAMOTIDINE 20 MG: 20 TABLET ORAL at 08:27

## 2020-01-01 RX ADMIN — IPRATROPIUM BROMIDE 0.5 MG: 0.5 SOLUTION RESPIRATORY (INHALATION) at 08:03

## 2020-01-01 RX ADMIN — FAMOTIDINE 20 MG: 20 TABLET ORAL at 17:13

## 2020-01-01 RX ADMIN — HEPARIN SODIUM 5000 UNITS: 5000 INJECTION INTRAVENOUS; SUBCUTANEOUS at 21:55

## 2020-01-01 RX ADMIN — LORAZEPAM 0.25 MG: 0.5 TABLET ORAL at 05:15

## 2020-01-01 RX ADMIN — IPRATROPIUM BROMIDE AND ALBUTEROL SULFATE 3 ML: 2.5; .5 SOLUTION RESPIRATORY (INHALATION) at 08:19

## 2020-01-01 RX ADMIN — CLOPIDOGREL BISULFATE 75 MG: 75 TABLET ORAL at 08:24

## 2020-01-01 RX ADMIN — HEPARIN SODIUM 5000 UNITS: 5000 INJECTION INTRAVENOUS; SUBCUTANEOUS at 15:00

## 2020-01-01 RX ADMIN — Medication 90 MG: at 08:22

## 2020-01-01 RX ADMIN — INSULIN LISPRO 3 UNITS: 100 INJECTION, SOLUTION INTRAVENOUS; SUBCUTANEOUS at 17:50

## 2020-01-01 RX ADMIN — HEPARIN SODIUM 5000 UNITS: 5000 INJECTION INTRAVENOUS; SUBCUTANEOUS at 21:32

## 2020-01-01 RX ADMIN — IPRATROPIUM BROMIDE AND ALBUTEROL SULFATE 3 ML: 2.5; .5 SOLUTION RESPIRATORY (INHALATION) at 04:18

## 2020-01-01 RX ADMIN — SODIUM CHLORIDE 3 UNITS/HR: 9 INJECTION, SOLUTION INTRAVENOUS at 01:38

## 2020-01-01 RX ADMIN — LORAZEPAM 0.25 MG: 0.5 TABLET ORAL at 09:39

## 2020-01-01 RX ADMIN — GABAPENTIN 100 MG: 100 CAPSULE ORAL at 08:19

## 2020-01-01 RX ADMIN — ACETAMINOPHEN 650 MG: 325 TABLET, FILM COATED ORAL at 20:58

## 2020-01-01 RX ADMIN — DEXMEDETOMIDINE 1 MCG/KG/HR: 100 INJECTION, SOLUTION, CONCENTRATE INTRAVENOUS at 20:04

## 2020-01-01 RX ADMIN — ALPRAZOLAM 0.25 MG: 0.25 TABLET ORAL at 21:22

## 2020-01-01 RX ADMIN — DEXMEDETOMIDINE 1 MCG/KG/HR: 100 INJECTION, SOLUTION, CONCENTRATE INTRAVENOUS at 10:48

## 2020-01-01 RX ADMIN — MORPHINE SULFATE 2 MG: 2 INJECTION, SOLUTION INTRAMUSCULAR; INTRAVENOUS at 17:11

## 2020-01-01 RX ADMIN — CHLORHEXIDINE GLUCONATE 0.12% ORAL RINSE 15 ML: 1.2 LIQUID ORAL at 21:44

## 2020-01-01 RX ADMIN — SODIUM CHLORIDE 0.5 MG/KG/HR: 0.9 INJECTION, SOLUTION INTRAVENOUS at 15:52

## 2020-01-01 RX ADMIN — ALBUTEROL SULFATE 2.5 MG: 2.5 SOLUTION RESPIRATORY (INHALATION) at 01:46

## 2020-01-01 RX ADMIN — LEVOTHYROXINE SODIUM 50 MCG: 50 TABLET ORAL at 08:23

## 2020-01-01 RX ADMIN — METHYLPREDNISOLONE SODIUM SUCCINATE 60 MG: 125 INJECTION, POWDER, FOR SOLUTION INTRAMUSCULAR; INTRAVENOUS at 05:11

## 2020-01-01 RX ADMIN — HEPARIN SODIUM 5000 UNITS: 5000 INJECTION INTRAVENOUS; SUBCUTANEOUS at 15:09

## 2020-01-01 RX ADMIN — LEVOTHYROXINE SODIUM 50 MCG: 50 TABLET ORAL at 05:45

## 2020-01-01 RX ADMIN — QUETIAPINE FUMARATE 50 MG: 25 TABLET ORAL at 20:04

## 2020-01-01 RX ADMIN — DEXMEDETOMIDINE 0.2 MCG/KG/HR: 100 INJECTION, SOLUTION, CONCENTRATE INTRAVENOUS at 11:00

## 2020-01-01 RX ADMIN — DEXMEDETOMIDINE 0.9 MCG/KG/HR: 100 INJECTION, SOLUTION, CONCENTRATE INTRAVENOUS at 23:50

## 2020-01-01 RX ADMIN — SODIUM CHLORIDE, SODIUM GLUCONATE, SODIUM ACETATE, POTASSIUM CHLORIDE, MAGNESIUM CHLORIDE, SODIUM PHOSPHATE, DIBASIC, AND POTASSIUM PHOSPHATE 75 ML/HR: .53; .5; .37; .037; .03; .012; .00082 INJECTION, SOLUTION INTRAVENOUS at 21:58

## 2020-01-01 RX ADMIN — CALCIUM GLUCONATE 2 G: 20 INJECTION, SOLUTION INTRAVENOUS at 06:38

## 2020-01-01 RX ADMIN — SODIUM CHLORIDE 3 UNITS/HR: 9 INJECTION, SOLUTION INTRAVENOUS at 12:13

## 2020-01-01 RX ADMIN — SODIUM CHLORIDE, SODIUM GLUCONATE, SODIUM ACETATE, POTASSIUM CHLORIDE, MAGNESIUM CHLORIDE, SODIUM PHOSPHATE, DIBASIC, AND POTASSIUM PHOSPHATE 500 ML: .53; .5; .37; .037; .03; .012; .00082 INJECTION, SOLUTION INTRAVENOUS at 08:10

## 2020-01-01 RX ADMIN — LORAZEPAM 0.5 MG: 2 INJECTION INTRAMUSCULAR; INTRAVENOUS at 21:29

## 2020-01-01 RX ADMIN — IPRATROPIUM BROMIDE 0.5 MG: 0.5 SOLUTION RESPIRATORY (INHALATION) at 15:24

## 2020-01-01 RX ADMIN — ALBUTEROL SULFATE 5 MG: 2.5 SOLUTION RESPIRATORY (INHALATION) at 15:49

## 2020-01-01 RX ADMIN — METHYLPREDNISOLONE SODIUM SUCCINATE 40 MG: 40 INJECTION, POWDER, FOR SOLUTION INTRAMUSCULAR; INTRAVENOUS at 21:10

## 2020-01-01 RX ADMIN — FLUTICASONE PROPIONATE 1 SPRAY: 50 SPRAY, METERED NASAL at 17:17

## 2020-01-01 RX ADMIN — SODIUM CHLORIDE, SODIUM GLUCONATE, SODIUM ACETATE, POTASSIUM CHLORIDE, MAGNESIUM CHLORIDE, SODIUM PHOSPHATE, DIBASIC, AND POTASSIUM PHOSPHATE 500 ML: .53; .5; .37; .037; .03; .012; .00082 INJECTION, SOLUTION INTRAVENOUS at 14:13

## 2020-01-01 RX ADMIN — IPRATROPIUM BROMIDE AND ALBUTEROL SULFATE 3 ML: 2.5; .5 SOLUTION RESPIRATORY (INHALATION) at 20:30

## 2020-01-01 RX ADMIN — OLANZAPINE 5 MG: 5 TABLET, ORALLY DISINTEGRATING ORAL at 21:01

## 2020-01-01 RX ADMIN — FAMOTIDINE 20 MG: 10 INJECTION, SOLUTION INTRAVENOUS at 08:57

## 2020-01-01 RX ADMIN — QUETIAPINE FUMARATE 25 MG: 25 TABLET ORAL at 09:41

## 2020-01-01 RX ADMIN — METHYLPREDNISOLONE SODIUM SUCCINATE 60 MG: 125 INJECTION, POWDER, FOR SOLUTION INTRAMUSCULAR; INTRAVENOUS at 05:27

## 2020-01-01 RX ADMIN — FAMOTIDINE 20 MG: 20 TABLET ORAL at 08:18

## 2020-01-01 RX ADMIN — PROPOFOL 30 MCG/KG/MIN: 10 INJECTION, EMULSION INTRAVENOUS at 14:10

## 2020-01-01 RX ADMIN — LEVALBUTEROL HYDROCHLORIDE 1.25 MG: 1.25 SOLUTION, CONCENTRATE RESPIRATORY (INHALATION) at 08:34

## 2020-01-01 RX ADMIN — Medication 2 MG/HR: at 17:06

## 2020-01-01 RX ADMIN — METOPROLOL TARTRATE 25 MG: 25 TABLET ORAL at 08:10

## 2020-01-01 RX ADMIN — LORAZEPAM 0.5 MG: 2 INJECTION INTRAMUSCULAR; INTRAVENOUS at 16:36

## 2020-01-01 RX ADMIN — PROPOFOL 40 MCG/KG/MIN: 10 INJECTION, EMULSION INTRAVENOUS at 06:38

## 2020-01-01 RX ADMIN — HEPARIN SODIUM 5000 UNITS: 5000 INJECTION INTRAVENOUS; SUBCUTANEOUS at 05:00

## 2020-01-01 RX ADMIN — METHYLPREDNISOLONE SODIUM SUCCINATE 40 MG: 40 INJECTION, POWDER, FOR SOLUTION INTRAMUSCULAR; INTRAVENOUS at 13:29

## 2020-01-01 RX ADMIN — IPRATROPIUM BROMIDE AND ALBUTEROL SULFATE 3 ML: 2.5; .5 SOLUTION RESPIRATORY (INHALATION) at 15:01

## 2020-01-01 RX ADMIN — FLUTICASONE FUROATE AND VILANTEROL TRIFENATATE 1 PUFF: 100; 25 POWDER RESPIRATORY (INHALATION) at 09:29

## 2020-01-01 RX ADMIN — DEXMEDETOMIDINE 0.7 MCG/KG/HR: 100 INJECTION, SOLUTION, CONCENTRATE INTRAVENOUS at 19:34

## 2020-01-01 RX ADMIN — ACETAMINOPHEN 650 MG: 650 SUSPENSION ORAL at 21:54

## 2020-01-01 RX ADMIN — SODIUM CHLORIDE 1500 ML: 0.9 INJECTION, SOLUTION INTRAVENOUS at 13:08

## 2020-01-01 RX ADMIN — CEFTRIAXONE SODIUM 1000 MG: 10 INJECTION, POWDER, FOR SOLUTION INTRAVENOUS at 12:19

## 2020-01-01 RX ADMIN — METHYLPREDNISOLONE SODIUM SUCCINATE 60 MG: 125 INJECTION, POWDER, FOR SOLUTION INTRAMUSCULAR; INTRAVENOUS at 14:08

## 2020-01-01 RX ADMIN — Medication 100 MG: at 05:34

## 2020-01-01 RX ADMIN — LEVOTHYROXINE SODIUM 50 MCG: 50 TABLET ORAL at 05:59

## 2020-01-01 RX ADMIN — IPRATROPIUM BROMIDE 0.5 MG: 0.5 SOLUTION RESPIRATORY (INHALATION) at 03:06

## 2020-01-01 RX ADMIN — CLOPIDOGREL BISULFATE 75 MG: 75 TABLET ORAL at 08:35

## 2020-01-01 RX ADMIN — DEXMEDETOMIDINE 1.2 MCG/KG/HR: 100 INJECTION, SOLUTION, CONCENTRATE INTRAVENOUS at 11:26

## 2020-01-01 RX ADMIN — POTASSIUM CHLORIDE 40 MEQ: 400 INJECTION, SOLUTION INTRAVENOUS at 07:26

## 2020-01-01 RX ADMIN — IPRATROPIUM BROMIDE AND ALBUTEROL SULFATE 3 ML: 2.5; .5 SOLUTION RESPIRATORY (INHALATION) at 11:15

## 2020-01-01 RX ADMIN — ASPIRIN 81 MG 81 MG: 81 TABLET ORAL at 08:27

## 2020-01-01 RX ADMIN — IPRATROPIUM BROMIDE 0.5 MG: 0.5 SOLUTION RESPIRATORY (INHALATION) at 04:10

## 2020-01-01 RX ADMIN — SODIUM CHLORIDE 0.5 MG/KG/HR: 0.9 INJECTION, SOLUTION INTRAVENOUS at 03:30

## 2020-01-01 RX ADMIN — HEPARIN SODIUM 5000 UNITS: 5000 INJECTION INTRAVENOUS; SUBCUTANEOUS at 05:44

## 2020-01-01 RX ADMIN — AMIODARONE HYDROCHLORIDE 1 MG/MIN: 50 INJECTION, SOLUTION INTRAVENOUS at 11:03

## 2020-01-01 RX ADMIN — POTASSIUM CHLORIDE 20 MEQ: 200 INJECTION, SOLUTION INTRAVENOUS at 19:29

## 2020-01-01 RX ADMIN — FENTANYL CITRATE 50 MCG: 50 INJECTION, SOLUTION INTRAMUSCULAR; INTRAVENOUS at 12:32

## 2020-01-01 RX ADMIN — PERFLUTREN 0.8 ML/MIN: 6.52 INJECTION, SUSPENSION INTRAVENOUS at 15:26

## 2020-01-01 RX ADMIN — HEPARIN SODIUM 5000 UNITS: 5000 INJECTION INTRAVENOUS; SUBCUTANEOUS at 05:59

## 2020-01-01 RX ADMIN — LORAZEPAM 0.25 MG: 0.5 TABLET ORAL at 09:32

## 2020-01-01 RX ADMIN — FLUTICASONE FUROATE AND VILANTEROL TRIFENATATE 1 PUFF: 100; 25 POWDER RESPIRATORY (INHALATION) at 08:27

## 2020-01-01 RX ADMIN — GABAPENTIN 100 MG: 100 CAPSULE ORAL at 17:49

## 2020-01-01 RX ADMIN — IPRATROPIUM BROMIDE 0.5 MG: 0.5 SOLUTION RESPIRATORY (INHALATION) at 19:44

## 2020-01-01 RX ADMIN — SENNOSIDES A AND B 8.8 MG: 415.36 LIQUID ORAL at 21:39

## 2020-01-01 RX ADMIN — IPRATROPIUM BROMIDE 0.5 MG: 0.5 SOLUTION RESPIRATORY (INHALATION) at 08:40

## 2020-01-01 RX ADMIN — CHLORHEXIDINE GLUCONATE 0.12% ORAL RINSE 15 ML: 1.2 LIQUID ORAL at 20:40

## 2020-01-01 RX ADMIN — MORPHINE SULFATE 2 MG: 2 INJECTION, SOLUTION INTRAMUSCULAR; INTRAVENOUS at 11:24

## 2020-01-01 RX ADMIN — METOPROLOL TARTRATE 25 MG: 25 TABLET ORAL at 21:09

## 2020-01-01 RX ADMIN — AZITHROMYCIN MONOHYDRATE 500 MG: 500 INJECTION, POWDER, LYOPHILIZED, FOR SOLUTION INTRAVENOUS at 12:22

## 2020-01-01 RX ADMIN — DEXMEDETOMIDINE 0.7 MCG/KG/HR: 100 INJECTION, SOLUTION, CONCENTRATE INTRAVENOUS at 13:25

## 2020-01-01 RX ADMIN — SODIUM CHLORIDE 3 UNITS/HR: 9 INJECTION, SOLUTION INTRAVENOUS at 20:13

## 2020-01-01 RX ADMIN — HEPARIN SODIUM 5000 UNITS: 5000 INJECTION INTRAVENOUS; SUBCUTANEOUS at 06:13

## 2020-01-01 RX ADMIN — MORPHINE SULFATE 2 MG: 2 INJECTION, SOLUTION INTRAMUSCULAR; INTRAVENOUS at 18:28

## 2020-01-01 RX ADMIN — METHYLPREDNISOLONE SODIUM SUCCINATE 40 MG: 40 INJECTION, POWDER, FOR SOLUTION INTRAMUSCULAR; INTRAVENOUS at 15:09

## 2020-01-01 RX ADMIN — ALBUTEROL SULFATE 5 MG: 2.5 SOLUTION RESPIRATORY (INHALATION) at 11:12

## 2020-01-01 RX ADMIN — CHLORHEXIDINE GLUCONATE 0.12% ORAL RINSE 15 ML: 1.2 LIQUID ORAL at 08:22

## 2020-01-01 RX ADMIN — CHLORHEXIDINE GLUCONATE 0.12% ORAL RINSE 15 ML: 1.2 LIQUID ORAL at 08:10

## 2020-01-01 RX ADMIN — LEVALBUTEROL HYDROCHLORIDE 1.25 MG: 1.25 SOLUTION, CONCENTRATE RESPIRATORY (INHALATION) at 19:44

## 2020-01-01 RX ADMIN — ETOMIDATE 20 MG: 2 INJECTION, SOLUTION INTRAVENOUS at 05:34

## 2020-01-01 RX ADMIN — SODIUM CHLORIDE 0.3 MG/KG/HR: 0.9 INJECTION, SOLUTION INTRAVENOUS at 06:47

## 2020-01-01 RX ADMIN — AZITHROMYCIN MONOHYDRATE 500 MG: 500 INJECTION, POWDER, LYOPHILIZED, FOR SOLUTION INTRAVENOUS at 12:34

## 2020-01-01 RX ADMIN — METOPROLOL TARTRATE 25 MG: 25 TABLET ORAL at 08:27

## 2020-01-01 RX ADMIN — IPRATROPIUM BROMIDE AND ALBUTEROL SULFATE 3 ML: 2.5; .5 SOLUTION RESPIRATORY (INHALATION) at 21:22

## 2020-01-01 RX ADMIN — METOPROLOL TARTRATE 5 MG: 5 INJECTION INTRAVENOUS at 08:21

## 2020-01-01 RX ADMIN — SODIUM CHLORIDE 1000 ML: 0.9 INJECTION, SOLUTION INTRAVENOUS at 11:29

## 2020-01-01 RX ADMIN — ACETAMINOPHEN 975 MG: 325 TABLET, FILM COATED ORAL at 11:33

## 2020-01-01 RX ADMIN — DEXTROSE 50 ML/HR: 5 SOLUTION INTRAVENOUS at 04:16

## 2020-01-01 RX ADMIN — CALCIUM GLUCONATE 2 G: 20 INJECTION, SOLUTION INTRAVENOUS at 08:02

## 2020-01-01 RX ADMIN — IPRATROPIUM BROMIDE 0.5 MG: 0.5 SOLUTION RESPIRATORY (INHALATION) at 23:14

## 2020-01-01 RX ADMIN — ALBUTEROL SULFATE 5 MG: 2.5 SOLUTION RESPIRATORY (INHALATION) at 19:18

## 2020-01-01 RX ADMIN — HEPARIN SODIUM 5000 UNITS: 5000 INJECTION INTRAVENOUS; SUBCUTANEOUS at 22:00

## 2020-01-01 RX ADMIN — PROPOFOL 30 MCG/KG/MIN: 10 INJECTION, EMULSION INTRAVENOUS at 04:15

## 2020-01-01 RX ADMIN — DEXTROSE MONOHYDRATE 25 ML: 500 INJECTION PARENTERAL at 07:58

## 2020-01-01 RX ADMIN — HEPARIN SODIUM 5000 UNITS: 5000 INJECTION INTRAVENOUS; SUBCUTANEOUS at 06:02

## 2020-01-01 RX ADMIN — IPRATROPIUM BROMIDE AND ALBUTEROL SULFATE 3 ML: 2.5; .5 SOLUTION RESPIRATORY (INHALATION) at 23:30

## 2020-01-01 RX ADMIN — ALBUTEROL SULFATE 5 MG: 2.5 SOLUTION RESPIRATORY (INHALATION) at 08:40

## 2020-01-01 RX ADMIN — HEPARIN SODIUM 5000 UNITS: 5000 INJECTION INTRAVENOUS; SUBCUTANEOUS at 14:14

## 2020-01-01 RX ADMIN — SODIUM POLYSTYRENE SULFONATE 15 G: 1 POWDER ORAL; RECTAL at 10:26

## 2020-01-01 RX ADMIN — IPRATROPIUM BROMIDE 0.5 MG: 0.5 SOLUTION RESPIRATORY (INHALATION) at 19:19

## 2020-01-01 RX ADMIN — LEVOTHYROXINE SODIUM 50 MCG: 50 TABLET ORAL at 05:11

## 2020-01-01 RX ADMIN — IPRATROPIUM BROMIDE AND ALBUTEROL SULFATE 3 ML: 2.5; .5 SOLUTION RESPIRATORY (INHALATION) at 11:04

## 2020-01-01 RX ADMIN — ALBUTEROL SULFATE 5 MG: 2.5 SOLUTION RESPIRATORY (INHALATION) at 15:24

## 2020-01-01 RX ADMIN — ASPIRIN 81 MG 81 MG: 81 TABLET ORAL at 08:23

## 2020-01-01 RX ADMIN — HEPARIN SODIUM 5000 UNITS: 5000 INJECTION INTRAVENOUS; SUBCUTANEOUS at 21:44

## 2020-01-01 RX ADMIN — FAMOTIDINE 20 MG: 40 POWDER, FOR SUSPENSION ORAL at 08:35

## 2020-01-01 RX ADMIN — SODIUM CHLORIDE 0.5 MG/KG/HR: 0.9 INJECTION, SOLUTION INTRAVENOUS at 14:10

## 2020-01-01 RX ADMIN — METHYLPREDNISOLONE SODIUM SUCCINATE 40 MG: 40 INJECTION, POWDER, FOR SOLUTION INTRAMUSCULAR; INTRAVENOUS at 21:01

## 2020-01-01 RX ADMIN — ALBUTEROL SULFATE 2.5 MG: 2.5 SOLUTION RESPIRATORY (INHALATION) at 04:52

## 2020-01-01 RX ADMIN — INSULIN LISPRO 8 UNITS: 100 INJECTION, SOLUTION INTRAVENOUS; SUBCUTANEOUS at 12:31

## 2020-01-01 RX ADMIN — IPRATROPIUM BROMIDE 0.5 MG: 0.5 SOLUTION RESPIRATORY (INHALATION) at 07:21

## 2020-01-01 RX ADMIN — ENOXAPARIN SODIUM 40 MG: 40 INJECTION SUBCUTANEOUS at 16:59

## 2020-01-01 RX ADMIN — DEXMEDETOMIDINE 1.2 MCG/KG/HR: 100 INJECTION, SOLUTION, CONCENTRATE INTRAVENOUS at 14:30

## 2020-01-01 RX ADMIN — PROPOFOL 50 MCG/KG/MIN: 10 INJECTION, EMULSION INTRAVENOUS at 18:00

## 2020-01-01 RX ADMIN — LORAZEPAM 0.5 MG: 2 INJECTION INTRAMUSCULAR; INTRAVENOUS at 18:33

## 2020-01-01 RX ADMIN — LEVOTHYROXINE SODIUM 50 MCG: 50 TABLET ORAL at 06:15

## 2020-01-01 RX ADMIN — SODIUM CHLORIDE 9 UNITS/HR: 9 INJECTION, SOLUTION INTRAVENOUS at 08:58

## 2020-01-01 RX ADMIN — CLOPIDOGREL BISULFATE 75 MG: 75 TABLET ORAL at 08:10

## 2020-01-01 RX ADMIN — CEFTRIAXONE SODIUM 1000 MG: 10 INJECTION, POWDER, FOR SOLUTION INTRAVENOUS at 10:25

## 2020-01-01 RX ADMIN — ALBUTEROL SULFATE 5 MG: 2.5 SOLUTION RESPIRATORY (INHALATION) at 03:05

## 2020-01-01 RX ADMIN — POTASSIUM CHLORIDE 40 MEQ: 1500 TABLET, EXTENDED RELEASE ORAL at 13:06

## 2020-01-01 RX ADMIN — ALBUTEROL SULFATE 10 MG: 2.5 SOLUTION RESPIRATORY (INHALATION) at 06:50

## 2020-01-01 RX ADMIN — GABAPENTIN 600 MG: 300 CAPSULE ORAL at 22:55

## 2020-01-01 RX ADMIN — METHYLPREDNISOLONE SODIUM SUCCINATE 40 MG: 40 INJECTION, POWDER, FOR SOLUTION INTRAMUSCULAR; INTRAVENOUS at 05:59

## 2020-01-01 RX ADMIN — PROPOFOL 40 MCG/KG/MIN: 10 INJECTION, EMULSION INTRAVENOUS at 21:01

## 2020-01-01 RX ADMIN — AZITHROMYCIN MONOHYDRATE 500 MG: 500 INJECTION, POWDER, LYOPHILIZED, FOR SOLUTION INTRAVENOUS at 12:12

## 2020-01-01 RX ADMIN — Medication 90 MG: at 09:33

## 2020-01-01 RX ADMIN — OLANZAPINE 5 MG: 5 TABLET, ORALLY DISINTEGRATING ORAL at 22:52

## 2020-01-01 RX ADMIN — FUROSEMIDE 40 MG: 10 INJECTION, SOLUTION INTRAMUSCULAR; INTRAVENOUS at 16:32

## 2020-01-01 RX ADMIN — PROPOFOL 30 MCG/KG/MIN: 10 INJECTION, EMULSION INTRAVENOUS at 23:10

## 2020-01-01 RX ADMIN — ALBUTEROL SULFATE 2.5 MG: 2.5 SOLUTION RESPIRATORY (INHALATION) at 11:38

## 2020-01-01 RX ADMIN — METOPROLOL TARTRATE 25 MG: 25 TABLET ORAL at 20:39

## 2020-01-01 RX ADMIN — LORAZEPAM 1.5 MG: 2 INJECTION INTRAMUSCULAR; INTRAVENOUS at 17:11

## 2020-01-01 RX ADMIN — ASPIRIN 81 MG 81 MG: 81 TABLET ORAL at 08:35

## 2020-01-01 RX ADMIN — LEVALBUTEROL HYDROCHLORIDE 1.25 MG: 1.25 SOLUTION, CONCENTRATE RESPIRATORY (INHALATION) at 14:06

## 2020-01-01 RX ADMIN — SODIUM CHLORIDE, SODIUM GLUCONATE, SODIUM ACETATE, POTASSIUM CHLORIDE, MAGNESIUM CHLORIDE, SODIUM PHOSPHATE, DIBASIC, AND POTASSIUM PHOSPHATE 75 ML/HR: .53; .5; .37; .037; .03; .012; .00082 INJECTION, SOLUTION INTRAVENOUS at 11:15

## 2020-01-01 RX ADMIN — METHYLPREDNISOLONE SODIUM SUCCINATE 60 MG: 125 INJECTION, POWDER, FOR SOLUTION INTRAMUSCULAR; INTRAVENOUS at 05:00

## 2020-01-01 RX ADMIN — METHYLPREDNISOLONE SODIUM SUCCINATE 60 MG: 125 INJECTION, POWDER, FOR SOLUTION INTRAMUSCULAR; INTRAVENOUS at 05:23

## 2020-01-01 RX ADMIN — IPRATROPIUM BROMIDE 0.5 MG: 0.5 SOLUTION RESPIRATORY (INHALATION) at 23:58

## 2020-01-01 RX ADMIN — INSULIN LISPRO 8 UNITS: 100 INJECTION, SOLUTION INTRAVENOUS; SUBCUTANEOUS at 17:45

## 2020-01-01 RX ADMIN — METOPROLOL TARTRATE 25 MG: 25 TABLET ORAL at 09:35

## 2020-01-01 RX ADMIN — OLANZAPINE 5 MG: 5 TABLET, ORALLY DISINTEGRATING ORAL at 05:25

## 2020-01-01 RX ADMIN — IPRATROPIUM BROMIDE 0.5 MG: 0.5 SOLUTION RESPIRATORY (INHALATION) at 02:56

## 2020-01-01 RX ADMIN — POTASSIUM & SODIUM PHOSPHATES POWDER PACK 280-160-250 MG 2 PACKET: 280-160-250 PACK at 17:39

## 2020-01-01 RX ADMIN — HEPARIN SODIUM 5000 UNITS: 5000 INJECTION INTRAVENOUS; SUBCUTANEOUS at 14:35

## 2020-01-01 RX ADMIN — PROPOFOL 40 MCG/KG/MIN: 10 INJECTION, EMULSION INTRAVENOUS at 08:59

## 2020-01-01 RX ADMIN — IPRATROPIUM BROMIDE 0.5 MG: 0.5 SOLUTION RESPIRATORY (INHALATION) at 15:49

## 2020-01-06 NOTE — ASSESSMENT & PLAN NOTE
Due to RSV infection  Supportive care with IV steroids, nebulized bronchodilators, supplemental oxygen, antitussives and expectorants  IV azithromycin for COPD exacerbation  Wean steroids and oxygen as tolerated  Monitor procalcitonin    Continue trelegy

## 2020-01-06 NOTE — PLAN OF CARE
Problem: PAIN - ADULT  Goal: Verbalizes/displays adequate comfort level or baseline comfort level  Description  Interventions:  - Encourage patient to monitor pain and request assistance  - Assess pain using appropriate pain scale  - Administer analgesics based on type and severity of pain and evaluate response  - Implement non-pharmacological measures as appropriate and evaluate response  - Consider cultural and social influences on pain and pain management  - Notify physician/advanced practitioner if interventions unsuccessful or patient reports new pain  Outcome: Progressing     Problem: INFECTION - ADULT  Goal: Absence or prevention of progression during hospitalization  Description  INTERVENTIONS:  - Assess and monitor for signs and symptoms of infection  - Monitor lab/diagnostic results  - Monitor all insertion sites, i e  indwelling lines, tubes, and drains  - Monitor endotracheal if appropriate and nasal secretions for changes in amount and color  - [place] appropriate cooling/warming therapies per order  - Administer medications as ordered  - Instruct and encourage patient and family to use good hand hygiene technique  - Identify and instruct in appropriate isolation precautions for identified infection/condition  Outcome: Progressing  Goal: Absence of fever/infection during neutropenic period  Description  INTERVENTIONS:  - Monitor WBC    Outcome: Progressing     Problem: SAFETY ADULT  Goal: Patient will remain free of falls  Description  INTERVENTIONS:  - Assess patient frequently for physical needs  -  Identify cognitive and physical deficits and behaviors that affect risk of falls    -  [place] fall precautions as indicated by assessment   - Educate patient/family on patient safety including physical limitations  - Instruct patient to call for assistance with activity based on assessment  - Modify environment to reduce risk of injury  - Consider OT/PT consult to assist with strengthening/mobility  Outcome: Progressing  Goal: Maintain or return to baseline ADL function  Description  INTERVENTIONS:  -  Assess patient's ability to carry out ADLs; assess patient's baseline for ADL function and identify physical deficits which impact ability to perform ADLs (bathing, care of mouth/teeth, toileting, grooming, dressing, etc )  - Assess/evaluate cause of self-care deficits   - Assess range of motion  - Assess patient's mobility; develop plan if impaired  - Assess patient's need for assistive devices and provide as appropriate  - Encourage maximum independence but intervene and supervise when necessary  - Involve family in performance of ADLs  - Assess for home care needs following discharge   - Consider OT consult to assist with ADL evaluation and planning for discharge  - Provide patient education as appropriate  Outcome: Progressing  Goal: Maintain or return mobility status to optimal level  Description  INTERVENTIONS:  - Assess patient's baseline mobility status (ambulation, transfers, stairs, etc )    - Identify cognitive and physical deficits and behaviors that affect mobility  - Identify mobility aids required to assist with transfers and/or ambulation (gait belt, sit-to-stand, lift, walker, cane, etc )  - [place] fall precautions as indicated by assessment  - Record patient progress and toleration of activity level on Mobility SBAR; progress patient to next Phase/Stage  - Instruct patient to call for assistance with activity based on assessment  - Consider rehabilitation consult to assist with strengthening/weightbearing, etc   Outcome: Progressing     Problem: DISCHARGE PLANNING  Goal: Discharge to home or other facility with appropriate resources  Description  INTERVENTIONS:  - Identify barriers to discharge w/patient and caregiver  - Arrange for needed discharge resources and transportation as appropriate  - Identify discharge learning needs (meds, wound care, etc )  - Arrange for interpretive services to assist at discharge as needed  - Refer to Case Management Department for coordinating discharge planning if the patient needs post-hospital services based on physician/advanced practitioner order or complex needs related to functional status, cognitive ability, or social support system  Outcome: Progressing     Problem: Knowledge Deficit  Goal: Patient/family/caregiver demonstrates understanding of disease process, treatment plan, medications, and discharge instructions  Description  Complete learning assessment and assess knowledge base    Interventions:  - Provide teaching at level of understanding  - Provide teaching via preferred learning methods  Outcome: Progressing

## 2020-01-06 NOTE — SEPSIS NOTE
Sepsis Note   Cooper Acharya 81 y o  female MRN: [id number]  Unit/Bed#: ED 22 Encounter: [id number]      qSOFA     Row Name 01/06/20 1300 01/06/20 1245 01/06/20 1230 01/06/20 1215 01/06/20 1200    Altered mental status GCS < 15              Respiratory Rate > / =22  1  1  1  1  1    Systolic BP < / =913    1  0  0  0    Q Sofa Score  1  2  1  1  1    Row Name 01/06/20 1145 01/06/20 1130 01/06/20 1115 01/06/20 1108 01/06/20 1104    Altered mental status GCS < 15              Respiratory Rate > / =22  1  1  1  1  1    Systolic BP < / =345  0  0  0    0    Q Sofa Score  1  1  1  1  1        Initial Sepsis Screening     Row Name 01/06/20 1130                Is the patient's history suggestive of a new or worsening infection? (!) Yes (Proceed)  -SP        Suspected source of infection  pneumonia  -SP        Are two or more of the following signs & symptoms of infection both present and new to the patient? (!) Yes (Proceed)  -SP        Indicate SIRS criteria  Tachycardia > 90 bpm;Tachypnea > 20 resp per min  -SP        If the answer is yes to both questions, suspicion of sepsis is present          If severe sepsis is present AND tissue hypoperfusion perists in the hour after fluid resuscitation or lactate > 4, the patient meets criteria for SEPTIC SHOCK          Are any of the following organ dysfunction criteria present within 6 hours of suspected infection and SIRS criteria that are NOT considered to be chronic conditions? No  -SP        Organ dysfunction          Date of presentation of severe sepsis          Time of presentation of severe sepsis          Tissue hypoperfusion persists in the hour after crystalloid fluid administration, evidenced, by either:          Was hypotension present within one hour of the conclusion of crystalloid fluid administration?           Date of presentation of septic shock          Time of presentation of septic shock            User Key  (r) = Recorded By, (t) = Taken By, (c) = Cosigned By    [address] Name Provider Type    SP Frantz Mancuso DO Physician

## 2020-01-06 NOTE — H&P
H&P- Stiven Scott 1950, 81 y o  female MRN: [id number]    Unit/Bed#: ED 22 Encounter: [id number]    Primary Care Provider: HAVEN Kidd   Date and time admitted to hospital: 1/6/2020 11:02 AM        * COPD exacerbation ([facility] [address] )  Assessment & Plan  Due to RSV infection  Supportive care with IV steroids, nebulized bronchodilators, supplemental oxygen, antitussives and expectorants  IV azithromycin for COPD exacerbation  Wean steroids and oxygen as tolerated  Monitor procalcitonin  Continue trelegy    Atherosclerosis of other type of bypass graft(s) of the extremities with rest pain, left leg (HCC)  Assessment & Plan  Continue Plavix    Type 2 diabetes mellitus with complication, without long-term current use of insulin (HCC)  Assessment & Plan  Lab Results   Component Value Date    HGBA1C 7 3 (H) 05/31/2019       No results for input(s): POCGLU in the last 72 hours  Blood Sugar Average: Last 72 hrs:   hold glipizide due to admission  Start basal Lantus at 15 units at bedtime  Diabetic diet  Sliding scale coverage  Can restart glipizide upon discharge    Chronic radicular pain of lower back  Assessment & Plan  Continue Neurontin, oxycodone at home dose    Coronary artery disease  Assessment & Plan  Continue Plavix, metoprolol    Morbid obesity due to excess calories [facility])  Assessment & Plan  Nutrition consult    Hypertension  Assessment & Plan  BP is borderline low at this time  Continue Lasix metoprolol with holding parameters          History of Present Illness     HPI:  Stiven Scott is a 81 y o  female who presents with shortness of breath  Patient states that yesterday she felt as if she had a cold with nasal congestion rhinorrhea and odynophagia  This morning she woke up severely short of breath with cough with clear sputum  She came to the ED to be evaluated and was found to have significant work of breathing, did not tolerate BiPAP was briefly placed on high-flow oxygen  After a prolonged bronchodilator treatment shortness of breath is improving she is currently on 3 L of oxygen  Review of Systems   All other systems reviewed and are negative        Historical Information   Past Medical History:   Diagnosis Date    Aneurysm ([facility] )     abdominal aortic aneurysm    Aortic valve disease     Aortic valve replaced     TAVR    Atherosclerosis of other type of bypass graft(s) of the extremities with rest pain, left leg ([facility]) 6/14/2019    Bronchiolitis     Cardiac disease     Chest pain     Contrast media allergy 6/11/2019    COPD (chronic obstructive pulmonary disease) ([facility])     [facility] )     Disease of thyroid gland     Dyspnea     Hyperlipidemia     Hypertension     Hypokalemia     Iliac artery stenosis, left ([facility])     Low back pain     Morbid obesity ([facility])     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[facility])     PAD (peripheral artery disease) ([facility])     Rheumatic fever     S/P TAVR (transcatheter aortic valve replacement)     SOB (shortness of breath)     SOB (shortness of breath)     Tachycardia      Past Surgical History:   Procedure Laterality Date    CARDIAC SURGERY      aortic valve replacement    CARDIAC VALVE REPLACEMENT      CHOLECYSTECTOMY      ESOPHAGUS SURGERY N/A     IR ABDOMINAL ANGIOGRAPHY / INTERVENTION  10/25/2018    IR LOWER EXTREMITY / INTERVENTION  6/14/2019    TX SLCTV CATHJ 3RD+ ORD SLCTV ABDL PEL/LXTR [facility] Left 10/25/2018    Procedure: LEFT LEG ANGIOGRAM WITH PLACEMENT OF LEFT EXTERNAL ILIAC ARTERY / LEFT COMMON FEMORAL ARTERIAL STENT, RIGHT FEMORAL ACCESS;  Surgeon: Saadia Ortiz MD;  [facility]: [facility];  Service: Vascular    TX Tika Gale 3RD+ ORD SLCTV ABDL PEL/LXTR [facility] Left 6/14/2019    Procedure: Bilateral lower extremity arteriogram, left lower extremity intervention with laser atherectomy angioplasty and stent ;  Surgeon: Álvaro Ballard MD;  [facility];  Service: Vascular    TX THROMBOENDARTECTMY FEMORAL COMMON Left 9/21/2018    Procedure: ENDARTERECTOMY ARTERIAL FEMORAL;  Surgeon: Soto Land MD;  [facility]: [facility];  Service: Vascular    REPLACEMENT AORTIC VALVE TRANSCATHETER (TAVR)      THROMBECTOMY W/ EMBOLECTOMY Left 9/21/2018    Procedure: EMBOLECTOMY/THROMBECTOMY LOWER EXTREMITY (GROIN EXPLORATION); Surgeon: Carlyle Ortiz MD;  [facility];  Service: Vascular    VASCULAR SURGERY       Social History   Social History     Substance and Sexual Activity   Alcohol Use Yes    Alcohol/week: 1 0 - 2 0 standard drinks    Types: 1 - 2 Cans of beer per week    Frequency: Monthly or less    Drinks per session: 1 or 2    Binge frequency: Never    Comment: social     Social History     Substance and Sexual Activity   Drug Use Never     Social History     Tobacco Use   Smoking Status Former Smoker    Last attempt to quit: 2005    Years since quitting: 15 0   Smokeless Tobacco Never Used     Family History: non-contributory    Meds/Allergies   all medications and allergies reviewed  Allergies   Allergen Reactions    Iv Contrast [Iodinated Diagnostic Agents]      Pt states that she was told many years ago that when she was given contrast dye she had a reaction, but does not know what  She said she is always prepped prior to having dye and she asked that I list this as an allergy in her chart   Pletal [Cilostazol] Diarrhea and Vomiting    Statins Other (See Comments)     Severe muscle cramps-- cannot move    Xarelto [Rivaroxaban] Other (See Comments)     Thinks she shouldn't take because she has an artificial valve  Also, made her "WOOZY"    Iohexol Other (See Comments)     Pt does not recall       Objective   Vitals: Blood pressure 99/55, pulse 99, temperature 99 1 °F (37 3 °C), temperature source Oral, resp  rate (!) 25, height 5' 3" (1 6 m), weight 82 4 kg (181 lb 10 5 oz), SpO2 96 %, not currently breastfeeding        Intake/Output Summary (Last 24 hours) at 1/6/2020 1601  Last data filed at 1/6/2020 1115  Gross per 24 hour   Intake 300 ml   Output    Net 300 ml       Invasive Devices     Peripheral Intravenous Line            Peripheral IV 01/06/20 Left Antecubital less than 1 day    Peripheral IV 01/06/20 Right Antecubital less than 1 day                Physical Exam   Constitutional: She is oriented to person, place, and time  She appears well-developed and well-nourished  HENT:   Head: Normocephalic and atraumatic  Eyes: Pupils are equal, round, and reactive to light  EOM are normal    Neck: Neck supple  Cardiovascular: Normal rate and regular rhythm  Pulmonary/Chest: She is in respiratory distress  She has no wheezes  She has no rales  Abdominal: Soft  Musculoskeletal: She exhibits no edema  Neurological: She is alert and oriented to person, place, and time  Skin: Skin is warm and dry  Lab Results: I have personally reviewed pertinent reports  Imaging: I have personally reviewed pertinent reports  EKG, Pathology, and Other Studies: I have personally reviewed pertinent reports  Code Status: Level 1 - Full Code  Advance Directive and Living Will: Yes    Power of :    POLST:      Counseling / Coordination of Care  Total floor / unit time spent today 40 minutes  Greater than 50% of total time was spent with the patient and / or family counseling and / or coordination of care  A description of the counseling / coordination of care:  Discussed plan of care with the patient and her daughter at the bedside

## 2020-01-06 NOTE — ASSESSMENT & PLAN NOTE
Lab Results   Component Value Date    HGBA1C 7 3 (H) 05/31/2019       No results for input(s): POCGLU in the last 72 hours  Blood Sugar Average: Last 72 hrs:   hold glipizide due to admission  Start basal Lantus at 15 units at bedtime  Diabetic diet    Sliding scale coverage  Can restart glipizide upon discharge

## 2020-01-06 NOTE — SEPSIS NOTE
Sepsis Note   Nicko Lovell 81 y o  female MRN: [id number]  Unit/Bed#: ED 22 Encounter: [id number]      qSOFA     Row Name 01/06/20 1300 01/06/20 1245 01/06/20 1230 01/06/20 1215 01/06/20 1200    Altered mental status GCS < 15              Respiratory Rate > / =22  1  1  1  1  1    Systolic BP < / =885  0  1  0  0  0    Q Sofa Score  1  2  1  1  1    Row Name 01/06/20 1145 01/06/20 1130 01/06/20 1115 01/06/20 1108 01/06/20 1104    Altered mental status GCS < 15              Respiratory Rate > / =22  1  1  1  1  1    Systolic BP < / =483  0  0  0    0    Q Sofa Score  1  1  1  1  1        Initial Sepsis Screening     Row Name 01/06/20 1303 01/06/20 1130             Is the patient's history suggestive of a new or worsening infection?   (!) Yes (Proceed)  -SP       Suspected source of infection    pneumonia  -SP       Are two or more of the following signs & symptoms of infection both present and new to the patient?   (!) Yes (Proceed)  -SP       Indicate SIRS criteria    Tachycardia > 90 bpm;Tachypnea > 20 resp per min  -SP       If the answer is yes to both questions, suspicion of sepsis is present  [de-identified]         If severe sepsis is present AND tissue hypoperfusion perists in the hour after fluid resuscitation or lactate > 4, the patient meets criteria for SEPTIC SHOCK           Are any of the following organ dysfunction criteria present within 6 hours of suspected infection and SIRS criteria that are NOT considered to be chronic conditions?   (!) Yes  -SP  No  -SP       Organ dysfunction  Acute respiratory failure (new need for invasive or non-invasive mechanical ventilation);SBP decrease > 40 mmHg from baseline  -SP         Date of presentation of severe sepsis  01/06/20  -SP         Time of presentation of severe sepsis  1307  -SP         Tissue hypoperfusion persists in the hour after crystalloid fluid administration, evidenced, by either:           Was hypotension present within one hour of the conclusion of crystalloid fluid administration?   Milton Meng       Date of presentation of septic shock           Time of presentation of septic shock             User Key  (r) = Recorded By, (t) = Taken By, (c) = Cosigned By    [address] Name Provider Type    SP Meena Pathak DO Physician

## 2020-01-06 NOTE — RESPIRATORY THERAPY NOTE
01/06/20 1417   Respiratory Assessment   Resp Comments Pt taken off HFNC at this time and placed on 3L NC with humidification  Pt tolerating NC well  HFNC on stand by at bedside

## 2020-01-06 NOTE — RESPIRATORY THERAPY NOTE
01/06/20 1108   Non-Invasive Information   Interface HFNC prongs   Non-Invasive Ventilation Mode HFNC   $ CPAP/BiPAP Charge - Initial & Daily Mgmt Yes   SpO2 100 %   $ Pulse Oximetry Spot Check Charge Completed   Resp Comments Pt placed on HFNC 60L, 80% at this time  Pt tolerating HFNC well  Will continue to monitor pt  Plan: continue current support until further orders     Non-Invasive Settings   FiO2 (%) 80   Flow (lpm) 60   Temperature (Set) 34   Non-Invasive Readings   Heater Temperature (Obs) 34   Skin Intervention Skin intact   Maintenance   Water bag changed No

## 2020-01-06 NOTE — ED PROVIDER NOTES
History  Chief Complaint   Patient presents with    Respiratory Distress     Patient c/o respiratory distress that started this morning  80 y/o female, hx of COPD, DM, and CAD, presents to the ED for respiratory distress  Patient states that over the last few days she has had cough and congestion  States that this morning she woke up in respiratory distress  She reports that she has albuterol inhalers at home as needed and has been using them more frequently than usual   Also states that she is on home oxygen 1-2L as needed, which she reports she has been using it more frequently  She states that she has also had low-grade fevers  States that she has been around multiple sick contacts  States that she did get the flu shot this year  She was brought in by ambulance and given multiple breathing treatments as well as steroids en route  States that she feels slightly improved with breathing treatments  She denies any chest pain, abdominal pain, nausea/vomiting, diarrhea/constipation, or urinary symptoms  Denies any leg swelling  No recent hospitalizations, antibiotics, or steroid use  Denies any history of BiPAP use or intubations in the past   No other complaints  Quit smoking 15 years ago       History provided by:  Patient  Shortness of Breath   Severity:  Moderate  Onset quality:  Sudden  Timing:  Constant  Progression:  Worsening  Chronicity:  New  Context: URI    Relieved by:  Nothing  Worsened by:  Nothing  Ineffective treatments:  Inhaler and oxygen  Associated symptoms: cough and wheezing    Associated symptoms: no abdominal pain, no chest pain, no ear pain, no fever, no headaches, no neck pain, no rash, no sore throat and no vomiting    Risk factors: no hx of PE/DVT        Prior to Admission Medications   Prescriptions Last Dose Informant Patient Reported? Taking?    ALPRAZolam (XANAX) 0 25 mg tablet  Self Yes Yes   Sig: Take 0 25 mg by mouth 2 (two) times a day as needed    Potassium 99 MG TABS Self Yes Yes   Sig: Take 1 tablet by mouth daily   acetaminophen (TYLENOL) 325 mg tablet  Self No Yes   Sig: Take 2 tablets (650 mg total) by mouth every 6 (six) hours as needed for mild pain or fever   albuterol (2 5 mg/3 mL) 0 083 % nebulizer solution  Self No Yes   Sig: Take 1 vial (2 5 mg total) by nebulization every 6 (six) hours as needed for wheezing or shortness of breath   albuterol (PROVENTIL HFA,VENTOLIN HFA) 90 mcg/act inhaler  Self Yes Yes   Sig: Inhale 2 puffs 2 (two) times a day     aspirin 81 mg chewable tablet  Self Yes Yes   Sig: Chew 81 mg daily   clopidogrel (PLAVIX) 75 mg tablet  Self No Yes   Sig: Take 1 tablet (75 mg total) by mouth daily   co-enzyme Q-10 30 MG capsule  Self Yes Yes   Sig: Take 100 mg by mouth daily     docusate sodium (COLACE) 100 mg capsule  Self No Yes   Sig: Take 1 capsule (100 mg total) by mouth 2 (two) times a day as needed for constipation   fluticasone (FLONASE) 50 mcg/act nasal spray  Self Yes Yes   Sig: USE 1 SPRAY(S) IN EACH NOSTRIL ONCE DAILY   fluticasone-umeclidinium-vilanterol (TRELEGY ELLIPTA) 100-62 5-25 MCG/INH inhaler  Self No Yes   Sig: Inhale 1 puff daily Rinse mouth after use  fluticasone-vilanterol (BREO ELLIPTA) 100-25 mcg/inh inhaler  Self No No   Sig: Inhale 1 puff daily Rinse mouth after use     furosemide (LASIX) 20 mg tablet  Self No Yes   Sig: Take 1 tablet (20 mg total) by mouth 2 (two) times a day   gabapentin (NEURONTIN) 100 mg capsule   No Yes   Sig: Take 1 capsule (100 mg total) by mouth 2 (two) times a day Take 1 tablet in the morning and 1 tablet in the afternoon   Patient taking differently: Take 200 mg by mouth daily Take 1 tablet in the morning and 1 tablet in the afternoon   gabapentin (NEURONTIN) 600 MG tablet   No Yes   Sig: Take 1 tablet (600 mg total) by mouth daily at bedtime   glipiZIDE (GLUCOTROL) 5 mg tablet  Self Yes Yes   Sig: Daily   levothyroxine 50 mcg tablet  Self Yes Yes   Sig: Take 50 mcg by mouth daily   lidocaine (XYLOCAINE) 5 % ointment   No Yes   Sig: Apply topically as needed for mild pain   metoprolol tartrate (LOPRESSOR) 25 mg tablet  Self No No   Sig: Take 1 tablet (25 mg total) by mouth every 12 (twelve) hours   oxyCODONE-acetaminophen (PERCOCET) 5-325 mg per tablet  Self Yes Yes   Sig: Take 1 tablet by mouth every 4 (four) hours as needed    pantoprazole (PROTONIX) 40 mg tablet  Self Yes Yes   Sig: Take 40 mg by mouth daily   tiotropium (SPIRIVA RESPIMAT) 2 5 MCG/ACT AERS inhaler  Self No Yes   Sig: Inhale 2 puffs daily      Facility-Administered Medications: None       Past Medical History:   Diagnosis Date    Aneurysm ([address] )     abdominal aortic aneurysm    Aortic valve disease     Aortic valve replaced     TAVR    Atherosclerosis of other type of bypass graft(s) of the extremities with rest pain, left leg (HCC) 6/14/2019    Bronchiolitis     Cardiac disease     Chest pain     Contrast media allergy 6/11/2019    [facility]     Diabetes mellitus ([facility])     Disease of thyroid gland     Dyspnea     Hyperlipidemia     Hypertension     Hypokalemia     Iliac artery stenosis, left ([facility])     Low back pain     Morbid obesity ([facility])     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[facility])     PAD (peripheral artery disease) ([facility])     Rheumatic fever     S/P TAVR (transcatheter aortic valve replacement)     SOB (shortness of breath)     SOB (shortness of breath)     Tachycardia        Past Surgical History:   Procedure Laterality Date    CARDIAC SURGERY      aortic valve replacement    CARDIAC VALVE REPLACEMENT      CHOLECYSTECTOMY      ESOPHAGUS SURGERY N/A     IR ABDOMINAL ANGIOGRAPHY / INTERVENTION  10/25/2018    IR LOWER EXTREMITY / INTERVENTION  6/14/2019    GA SLCTV CATHJ 3RD+ ORD SLCTV ABDL PEL/LXTR [facility] Left 10/25/2018    Procedure: LEFT LEG ANGIOGRAM WITH PLACEMENT OF LEFT EXTERNAL ILIAC ARTERY / LEFT COMMON FEMORAL ARTERIAL STENT, RIGHT FEMORAL ACCESS;  Surgeon: Jennifer Ortiz MD;  [facility]: [facility];  Service: Vascular    GA Kel Ovalle 3RD+ ORD SLCTV ABDL PEL/LXTR [facility] Left 6/14/2019    Procedure: Bilateral lower extremity arteriogram, left lower extremity intervention with laser atherectomy angioplasty and stent ;  Surgeon: Davy Cohen MD;  [facility];  Service: Vascular    GA THROMBOENDARTECTMY FEMORAL COMMON Left 9/21/2018    Procedure: ENDARTERECTOMY ARTERIAL FEMORAL;  Surgeon: Danielle Smith MD;  [facility];  Service: Vascular    REPLACEMENT AORTIC VALVE TRANSCATHETER (TAVR)      THROMBECTOMY W/ EMBOLECTOMY Left 9/21/2018    Procedure: EMBOLECTOMY/THROMBECTOMY LOWER EXTREMITY (GROIN EXPLORATION); Surgeon: Jennifer Ortiz MD;  [facility];  Service: Vascular    VASCULAR SURGERY         Family History   Problem Relation Age of Onset    Heart disease Mother     Diabetes Mother     Heart disease Father     Diabetes Father      I have reviewed and agree with the history as documented  Social History     Tobacco Use    Smoking status: Former Smoker     Last attempt to quit: 2005     Years since quitting: 15 0    Smokeless tobacco: Never Used   Substance Use Topics    Alcohol use: Yes     Alcohol/week: 1 0 - 2 0 standard drinks     Types: 1 - 2 Cans of beer per week     Frequency: Monthly or less     Drinks per session: 1 or 2     Binge frequency: Never     Comment: social    Drug use: Never        Review of Systems   Constitutional: Negative for chills and fever  HENT: Negative for congestion, ear pain and sore throat  Eyes: Negative for pain and visual disturbance  Respiratory: Positive for cough, shortness of breath and wheezing  Cardiovascular: Negative for chest pain and leg swelling  Gastrointestinal: Negative for abdominal pain, diarrhea, nausea and vomiting  Genitourinary: Negative for dysuria, frequency, hematuria and urgency  Musculoskeletal: Negative for neck pain and neck stiffness     Skin: Negative for rash and wound  Neurological: Negative for weakness, numbness and headaches  Psychiatric/Behavioral: Negative for agitation and confusion  All other systems reviewed and are negative  Physical Exam  Physical Exam   Constitutional: She is oriented to person, place, and time  She appears well-developed and well-nourished  HENT:   Head: Normocephalic and atraumatic  Mouth/Throat: Oropharynx is clear and moist    Eyes: Pupils are equal, round, and reactive to light  EOM are normal    Neck: Normal range of motion  Neck supple  Cardiovascular: Normal rate and regular rhythm  Pulmonary/Chest: Effort normal  Tachypnea noted  She has decreased breath sounds in the right upper field, the right lower field, the left upper field and the left lower field  She has wheezes in the right upper field, the right lower field, the left upper field and the left lower field  Abdominal: Soft  Bowel sounds are normal  She exhibits no distension  There is no tenderness  Musculoskeletal: Normal range of motion  She exhibits no edema  Neurological: She is alert and oriented to person, place, and time  No focal deficits   Skin: Skin is warm and dry  Nursing note and vitals reviewed        Vital Signs  ED Triage Vitals   Temperature Pulse Respirations Blood Pressure SpO2   01/06/20 1104 01/06/20 1104 01/06/20 1104 01/06/20 1104 01/06/20 1104   99 6 °F (37 6 °C) (!) 144 (!) 40 141/91 (!) 86 %      Temp Source Heart Rate Source Patient Position - Orthostatic VS BP Location FiO2 (%)   01/06/20 1104 01/06/20 1104 -- -- --   Oral Monitor         Pain Score       01/06/20 1216       No Pain           Vitals:    01/06/20 1630 01/06/20 1645 01/06/20 1700 01/06/20 1715   BP: 101/54  105/69    Pulse: 101 96 100 98         Visual Acuity  Visual Acuity      Most Recent Value   L Pupil Size (mm)  3   R Pupil Size (mm)  3          ED Medications  Medications   acetaminophen (TYLENOL) tablet 650 mg (has no administration in time range)   albuterol inhalation solution 2 5 mg (has no administration in time range)   ALPRAZolam (XANAX) tablet 0 25 mg (has no administration in time range)   aspirin chewable tablet 81 mg (81 mg Oral Not Given 1/6/20 1600)   clopidogrel (PLAVIX) tablet 75 mg (75 mg Oral Not Given 1/6/20 1603)   co-enzyme Q-10 capsule 90 mg (90 mg Oral Not Given 1/6/20 1603)   docusate sodium (COLACE) capsule 100 mg (has no administration in time range)   fluticasone (FLONASE) 50 mcg/act nasal spray 1 spray (1 spray Each Nare Given 1/6/20 1717)   fluticasone-umeclidinium-vilanterol (TRELEGY) 100-62 5-25 MCG/INH inhaler 1 puff (1 puff Inhalation Not Given 1/6/20 1604)   furosemide (LASIX) tablet 20 mg (20 mg Oral Not Given 1/6/20 1719)   gabapentin (NEURONTIN) capsule 100 mg (has no administration in time range)   gabapentin (NEURONTIN) capsule 600 mg (has no administration in time range)   levothyroxine tablet 50 mcg (50 mcg Oral Not Given 1/6/20 1604)   metoprolol tartrate (LOPRESSOR) tablet 25 mg (has no administration in time range)   oxyCODONE-acetaminophen (PERCOCET) 5-325 mg per tablet 1 tablet (has no administration in time range)   pantoprazole (PROTONIX) EC tablet 40 mg (has no administration in time range)   calcium carbonate (TUMS) chewable tablet 1,000 mg (has no administration in time range)   guaiFENesin (MUCINEX) 12 hr tablet 600 mg (600 mg Oral Given 1/6/20 1717)   benzonatate (TESSALON PERLES) capsule 100 mg (has no administration in time range)   methylPREDNISolone sodium succinate (Solu-MEDROL) injection 40 mg (has no administration in time range)   azithromycin (ZITHROMAX) 500 mg in sodium chloride 0 9% 250mL IVPB 500 mg (has no administration in time range)   enoxaparin (LOVENOX) subcutaneous injection 40 mg (40 mg Subcutaneous Given 1/6/20 1659)   insulin lispro (HumaLOG) 100 units/mL subcutaneous injection 1-6 Units (has no administration in time range)   insulin glargine (LANTUS) subcutaneous injection 15 Units 0 15 mL (has no administration in time range)   insulin lispro (HumaLOG) 100 units/mL subcutaneous injection 1-6 Units (has no administration in time range)   albuterol (FOR EMS ONLY) (2 5 mg/3 mL) 0 083 % inhalation solution 5 mg (0 mg Does not apply Given to EMS 1/6/20 1114)   ipratropium-albuterol (FOR EMS ONLY) (DUO-NEB) 0 5-2 5 mg/3 mL inhalation solution 6 mL (0 mL Does not apply Given to EMS 1/6/20 1114)   magnesium sulfate (FOR EMS ONLY) 2 g/50 mL IVPB (premix) 2 g (0 g Does not apply Given to EMS 1/6/20 1115)   methylPREDNISolone sodium succinate (FOR EMS ONLY) (Solu-MEDROL) 125 MG injection 125 mg (0 mg Does not apply Given to EMS 1/6/20 1115)   magnesium sulfate 2 g/50 mL IVPB (premix) 2 g (0 g Intravenous Stopped 1/6/20 1220)   acetaminophen (TYLENOL) tablet 975 mg (975 mg Oral Given 1/6/20 1133)   sodium chloride 0 9 % bolus 1,000 mL (0 mL Intravenous Stopped 1/6/20 1222)   albuterol inhalation solution 10 mg (10 mg Nebulization Given 1/6/20 1153)     And   ipratropium (ATROVENT) 0 02 % inhalation solution 1 mg (1 mg Nebulization Given 1/6/20 1153)     And   sodium chloride 0 9 % inhalation solution 3 mL (3 mL Nebulization Given 1/6/20 1153)   ceftriaxone (ROCEPHIN) 1 g/50 mL in dextrose IVPB (0 mg Intravenous Stopped 1/6/20 1301)   azithromycin (ZITHROMAX) 500 mg in sodium chloride 0 9% 250mL IVPB 500 mg (0 mg Intravenous Stopped 1/6/20 1322)   potassium chloride (K-DUR,KLOR-CON) CR tablet 40 mEq (40 mEq Oral Given 1/6/20 1306)   sodium chloride 0 9 % bolus 1,500 mL (0 mL Intravenous Stopped 1/6/20 1509)       Diagnostic Studies  Results Reviewed     Procedure Component Value Units Date/Time    Fingerstick Glucose (POCT) [[id number]]  (Abnormal) Collected:  01/06/20 1706    Lab Status:  Final result Updated:  01/06/20 1707     POC Glucose 247 mg/dl     Urine Microscopic [[id number]]  (Abnormal) Collected:  01/06/20 1529    Lab Status:  Final result Specimen:  Urine, Clean Catch Updated:  01/06/20 1549     RBC, UA None Seen /hpf      WBC, UA 2-4 /hpf      Epithelial Cells Occasional /hpf      Bacteria, UA Occasional /hpf      Hyaline Casts, UA 1-2 /lpf     UA w Reflex to Microscopic w Reflex to Culture [[id number]]  (Abnormal) Collected:  01/06/20 1529    Lab Status:  Final result Specimen:  Urine, Clean Catch Updated:  01/06/20 1540     Color, UA Light Yellow     Clarity, UA Clear     Specific Gravity, UA 1 015     pH, UA 5 5     Leukocytes, UA Trace     Nitrite, UA Positive     Protein, UA Negative mg/dl      Glucose, UA Negative mg/dl      Ketones, UA Negative mg/dl      Urobilinogen, UA 0 2 E U /dl      Bilirubin, UA Negative     Blood, UA Negative    Influenza A/B and RSV PCR [[id number]]  (Abnormal) Collected:  01/06/20 1128    Lab Status:  Final result Specimen:  Nasopharyngeal Swab Updated:  01/06/20 1301     INFLUENZA A PCR None Detected     INFLUENZA B PCR None Detected     RSV PCR Detected    Comprehensive metabolic panel [[id number]]  (Abnormal) Collected:  01/06/20 1126    Lab Status:  Final result Specimen:  Blood from Arm, Right Updated:  01/06/20 1248     Sodium 144 mmol/L      Potassium 3 0 mmol/L      Chloride 102 mmol/L      CO2 32 mmol/L      ANION GAP 10 mmol/L      BUN 15 mg/dL      Creatinine 0 85 mg/dL      Glucose 153 mg/dL      Calcium 8 4 mg/dL      AST 29 U/L      ALT 33 U/L      Alkaline Phosphatase 99 U/L      Total Protein 7 7 g/dL      Albumin 3 9 g/dL      Total Bilirubin 0 50 mg/dL      eGFR 71 ml/min/1 73sq m     Narrative:       [facility] guidelines for Chronic Kidney Disease (CKD):     Stage 1 with normal or high GFR (GFR > 90 mL/min/1 73 square meters)    Stage 2 Mild CKD (GFR = 60-89 mL/min/1 73 square meters)    Stage 3A Moderate CKD (GFR = 45-59 mL/min/1 73 square meters)    Stage 3B Moderate CKD (GFR = 30-44 mL/min/1 73 square meters)    Stage 4 Severe CKD (GFR = 15-29 mL/min/1 73 square meters)    Stage 5 End Stage CKD (GFR <15 mL/min/1 73 square meters)  Note: GFR calculation is accurate only with a steady state creatinine    Lactic acid x2 [[id number]]  (Normal) Collected:  01/06/20 1126    Lab Status:  Final result Specimen:  Blood from Arm, Right Updated:  01/06/20 1241     LACTIC ACID 1 8 mmol/L     Narrative:       Result may be elevated if tourniquet was used during collection      Troponin I [[id number]]  (Normal) Collected:  01/06/20 1126    Lab Status:  Final result Specimen:  Blood from Arm, Right Updated:  01/06/20 1240     Troponin I 0 04 ng/mL     Protime-INR [[id number]]  (Normal) Collected:  01/06/20 1126    Lab Status:  Final result Specimen:  Blood from Arm, Right Updated:  01/06/20 1234     Protime 13 4 seconds      INR 1 02    APTT [[id number]]  (Normal) Collected:  01/06/20 1126    Lab Status:  Final result Specimen:  Blood from Arm, Right Updated:  01/06/20 1234     PTT 25 seconds     CBC and differential [[id number]]  (Abnormal) Collected:  01/06/20 1126    Lab Status:  Final result Specimen:  Blood from Arm, Right Updated:  01/06/20 1223     WBC 11 36 Thousand/uL      RBC 5 54 Million/uL      Hemoglobin 15 0 g/dL      Hematocrit 49 0 %      MCV 88 fL      MCH 27 1 pg      MCHC 30 6 g/dL      RDW 13 6 %      MPV 9 5 fL      Platelets 887 Thousands/uL      nRBC 0 /100 WBCs      Neutrophils Relative 82 %      Immat GRANS % 0 %      Lymphocytes Relative 11 %      Monocytes Relative 7 %      Eosinophils Relative 0 %      Basophils Relative 0 %      Neutrophils Absolute 9 19 Thousands/µL      Immature Grans Absolute 0 04 Thousand/uL      Lymphocytes Absolute 1 29 Thousands/µL      Monocytes Absolute 0 77 Thousand/µL      Eosinophils Absolute 0 03 Thousand/µL      Basophils Absolute 0 04 Thousands/µL     Blood gas, venous [[id number]]  (Abnormal) Collected:  01/06/20 1126    Lab Status:  Final result Specimen:  Blood from Arm, Right Updated:  01/06/20 1222     pH, Andrews 7 251     pCO2, Andrews 71 4 mm Hg      pO2, Andrews 50 7 mm Hg      HCO3, Andrews 30 7 mmol/L      Base Excess, Andrews 1 1 mmol/L      O2 Content, Andrews 17 1 ml/dL      O2 HGB, VENOUS 78 4 %     Procalcitonin [[id number]] Collected:  01/06/20 1126    Lab Status: In process Specimen:  Blood from Arm, Right Updated:  01/06/20 1216    Blood culture #2 [[id number]] Collected:  01/06/20 1126    Lab Status: In process Specimen:  Blood from Arm, Left Updated:  01/06/20 1216    Blood culture #1 [[id number]] Collected:  01/06/20 1126    Lab Status: In process Specimen:  Blood from Arm, Right Updated:  01/06/20 1215                 XR chest portable   Final Result by Lacy Randhawa MD (01/06 1429)      No acute cardiopulmonary disease  Workstation performed: [id number]                    Procedures  ECG 12 Lead Documentation Only  Date/Time: 1/6/2020 5:23 PM  Performed by: Efrem Avina DO  Authorized by: Efrem Avina DO     Indications / Diagnosis:  Sob   Patient location:  ED  Previous ECG:     Previous ECG:  Compared to current    Comparison ECG info:  5/30/19  Rate:     ECG rate:  145    ECG rate assessment: tachycardic    Rhythm:     Rhythm: sinus tachycardia    Ectopy:     Ectopy: none    QRS:     QRS axis:  Normal    QRS intervals:  Normal  ST segments:     ST segments:  Non-specific    Depression:  V4, V5, V6, II, III and aVF  T waves:     T waves: inverted      Inverted:  V5 and V6  Other findings:     Other findings: poor R wave progression               ED Course  ED Course as of Jan 06 1726   Mon Jan 06, 2020   1236 WBC(!): 11 36   1254 LACTIC ACID: 1 8   1303 RSV PCR(!): Detected   1305 Will do saline 30 ml/kg      Blood Pressure(!): 90/41                   Initial Sepsis Screening     Row Name 01/06/20 1303 01/06/20 1130             Is the patient's history suggestive of a new or worsening infection?     (!) Yes (Proceed)  -SP       Suspected source of infection    pneumonia  -SP       Are two or more of the following signs & symptoms of infection both present and new to the patient?   (!) Yes (Proceed)  -SP       Indicate SIRS criteria    Tachycardia > 90 bpm;Tachypnea > 20 resp per min  -SP       If the answer is yes to both questions, suspicion of sepsis is present  [de-identified]         If severe sepsis is present AND tissue hypoperfusion perists in the hour after fluid resuscitation or lactate > 4, the patient meets criteria for SEPTIC SHOCK           Are any of the following organ dysfunction criteria present within 6 hours of suspected infection and SIRS criteria that are NOT considered to be chronic conditions? (!) Yes  -SP  No  -SP       Organ dysfunction  Acute respiratory failure (new need for invasive or non-invasive mechanical ventilation);SBP decrease > 40 mmHg from baseline  -SP         Date of presentation of severe sepsis  01/06/20  -SP         Time of presentation of severe sepsis  1307  -SP         Tissue hypoperfusion persists in the hour after crystalloid fluid administration, evidenced, by either:           Was hypotension present within one hour of the conclusion of crystalloid fluid administration? Sang Vega       Date of presentation of septic shock           Time of presentation of septic shock             User Key  (r) = Recorded By, (t) = Taken By, (c) = Cosigned By    Initials Name Provider Type    SP Bhakti Busby DO Physician           Default Flowsheet Data (last 720 hours)      Sepsis Reassess     Row Name 01/06/20 1307                   Repeat Volume Status and Tissue Perfusion Assessment Performed    Repeat Volume Status and Tissue Perfusion Assessment Performed  Yes  -SP           Volume Status and Tissue Perfusion Post Fluid Resuscitation * Must Document All *    Vital Signs Reviewed (HR, RR, BP, T)  Yes  -SP        Shock Index Reviewed  Yes  -SP        Arterial Oxygen Saturation Reviewed (POx, SaO2 or SpO2)  Yes (comment %)  -SP        Cardio  Normal S1/S2; Regular rate and rhythm  -SP        Pulmonary  (!) Wheezes  -SP        Capillary Refill  Brisk  -SP        Peripheral Pulses  Radial;Dorsalis Pedis  -SP        Peripheral Pulse  +2  -SP        Dorsalis Pedis  +2  -SP        Skin  Warm;Dry  -SP        Urine output assessed  Adequate  -SP           *OR*   Intensive Monitoring- Must Document One of the Following Four *:    Vital Signs Reviewed          * Central Venous Pressure (CVP or RAP)          * Central Venous Oxygen (SVO2, ScvO2 or Oxygen saturation via central catheter)          * Bedside Cardiovascular US in IVC diameter and % collapse          * Passive Leg Raise OR Crystalloid Challenge            User Key  (r) = Recorded By, (t) = Taken By, (c) = Cosigned By    Initials Name Provider Type    SP Efrem Avina DO Physician                MDM  Number of Diagnoses or Management Options  COPD exacerbation ([facility] [address] ): new and requires workup  RSV infection: new and requires workup  Diagnosis management comments: Patient with respiratory distress and hx of COPD- will get cardiac workup, lactic, vbg, cxr, flu, and give mag/ heart neb  Patient was placed on hiflo with significant improvement  Will admit  Patient reevaluated and feels improved  Patient updated on results of tests and plan of care including admission to hospital for further evaluation of presenting complaint  Patient demonstrates verbal understanding and agrees with plan  Report to Dr Flores Prdiego with [facility] for continuation of patient care          Amount and/or Complexity of Data Reviewed  Clinical lab tests: ordered and reviewed  Tests in the radiology section of CPT®: ordered and reviewed  Tests in the medicine section of CPT®: ordered and reviewed  Discussion of test results with the performing providers: yes  Decide to obtain previous medical records or to obtain history from someone other than the patient: yes  Obtain history from someone other than the patient: yes  Review and summarize past medical records: yes  Discuss the patient with other providers: yes  Independent visualization of images, tracings, or specimens: yes    Patient Progress  Patient progress: improved        Disposition  Final diagnoses:   COPD exacerbation ([facility] )   RSV infection     Time reflects when diagnosis was documented in both MDM as applicable and the Disposition within this note     Time User Action Codes Description Comment    1/6/2020  2:30 PM Deion Monterroso Add [J44 1] [facility]     1/6/2020  2:30 PM Deion Monterroso Add [B97 4] RSV infection     1/6/2020  2:31 PM Deion Monterroso Add [J18 9] Pneumonia     1/6/2020  2:32 PM Deion Monterroso Remove [J18 9] Pneumonia       ED Disposition     ED Disposition Condition Date/Time Comment    Admit Stable Mon Jan 6, 2020  2:39 PM Case was discussed with MIGNON and the patient's admission status was agreed to be Admission Status: inpatient status to the service of Dr Lorraine Marmolejo   Follow-up Information    None         Patient's Medications   Discharge Prescriptions    No medications on file     No discharge procedures on file      ED Provider  Electronically Signed by           Jerri Zapata DO  01/06/20 7312

## 2020-01-06 NOTE — SEPSIS NOTE
Sepsis Note   Yesenia Hou 81 y o  female MRN: [id number]  Unit/Bed#: ED 22 Encounter: [id number]      qSOFA     Row Name 01/06/20 1300 01/06/20 1245 01/06/20 1230 01/06/20 1215 01/06/20 1200    Altered mental status GCS < 15              Respiratory Rate > / =22  1  1  1  1  1    Systolic BP < / =275  0  1  0  0  0    Q Sofa Score  1  2  1  1  1    Row Name 01/06/20 1145 01/06/20 1130 01/06/20 1115 01/06/20 1108 01/06/20 1104    Altered mental status GCS < 15              Respiratory Rate > / =22  1  1  1  1  1    Systolic BP < / =013  0  0  0    0    Q Sofa Score  1  1  1  1  1        Initial Sepsis Screening     Row Name 01/06/20 1303 01/06/20 1130             Is the patient's history suggestive of a new or worsening infection?   (!) Yes (Proceed)  -SP       Suspected source of infection    pneumonia  -SP       Are two or more of the following signs & symptoms of infection both present and new to the patient?   (!) Yes (Proceed)  -SP       Indicate SIRS criteria    Tachycardia > 90 bpm;Tachypnea > 20 resp per min  -SP       If the answer is yes to both questions, suspicion of sepsis is present  [de-identified]         If severe sepsis is present AND tissue hypoperfusion perists in the hour after fluid resuscitation or lactate > 4, the patient meets criteria for SEPTIC SHOCK           Are any of the following organ dysfunction criteria present within 6 hours of suspected infection and SIRS criteria that are NOT considered to be chronic conditions?   (!) Yes  -SP  No  -SP       Organ dysfunction  Acute respiratory failure (new need for invasive or non-invasive mechanical ventilation);SBP decrease > 40 mmHg from baseline  -SP         Date of presentation of severe sepsis  01/06/20  -SP         Time of presentation of severe sepsis  1307  -SP         Tissue hypoperfusion persists in the hour after crystalloid fluid administration, evidenced, by either:           Was hypotension present within one hour of the conclusion of crystalloid fluid administration? Bennie Marquis       Date of presentation of septic shock           Time of presentation of septic shock             User Key  (r) = Recorded By, (t) = Taken By, (c) = Cosigned By    Initials Name Provider Type    SP Naina Sanchez DO Physician               Default Flowsheet Data (last 720 hours)      Sepsis Reassess     Row Name 01/06/20 5336                   Repeat Volume Status and Tissue Perfusion Assessment Performed    Repeat Volume Status and Tissue Perfusion Assessment Performed  Yes  -SP           Volume Status and Tissue Perfusion Post Fluid Resuscitation * Must Document All *    Vital Signs Reviewed (HR, RR, BP, T)  Yes  -SP        Shock Index Reviewed  Yes  -SP        Arterial Oxygen Saturation Reviewed (POx, SaO2 or SpO2)  Yes (comment %)  -SP        Cardio  Normal S1/S2; Regular rate and rhythm  -SP        Pulmonary  (!) Wheezes  -SP        Capillary Refill  Brisk  -SP        Peripheral Pulses  Radial;Dorsalis Pedis  -SP        Peripheral Pulse  +2  -SP        Dorsalis Pedis  +2  -SP        Skin  Warm;Dry  -SP        Urine output assessed  Adequate  -SP           *OR*   Intensive Monitoring- Must Document One of the Following Four *:    Vital Signs Reviewed          * Central Venous Pressure (CVP or RAP)          * Central Venous Oxygen (SVO2, ScvO2 or Oxygen saturation via central catheter)          * Bedside Cardiovascular US in IVC diameter and % collapse          * Passive Leg Raise OR Crystalloid Challenge            User Key  (r) = Recorded By, (t) = Taken By, (c) = Cosigned By    Initials Name Provider Type    JITENDRA Sanchez DO Physician

## 2020-01-07 PROBLEM — J96.21 ACUTE ON CHRONIC RESPIRATORY FAILURE WITH HYPOXIA AND HYPERCAPNIA (HCC): Status: ACTIVE | Noted: 2020-01-01

## 2020-01-07 PROBLEM — J96.22 ACUTE ON CHRONIC RESPIRATORY FAILURE WITH HYPOXIA AND HYPERCAPNIA (HCC): Status: ACTIVE | Noted: 2020-01-01

## 2020-01-07 NOTE — PLAN OF CARE
Problem: PAIN - ADULT  Goal: Verbalizes/displays adequate comfort level or baseline comfort level  Description  Interventions:  - Encourage patient to monitor pain and request assistance  - Assess pain using appropriate pain scale  - Administer analgesics based on type and severity of pain and evaluate response  - Implement non-pharmacological measures as appropriate and evaluate response  - Consider cultural and social influences on pain and pain management  - Notify physician/advanced practitioner if interventions unsuccessful or patient reports new pain  Outcome: Progressing     Problem: INFECTION - ADULT  Goal: Absence or prevention of progression during hospitalization  Description  INTERVENTIONS:  - Assess and monitor for signs and symptoms of infection  - Monitor lab/diagnostic results  - Monitor all insertion sites, i e  indwelling lines, tubes, and drains  - Monitor endotracheal if appropriate and nasal secretions for changes in amount and color  - [place] appropriate cooling/warming therapies per order  - Administer medications as ordered  - Instruct and encourage patient and family to use good hand hygiene technique  - Identify and instruct in appropriate isolation precautions for identified infection/condition  Outcome: Progressing     Problem: SAFETY ADULT  Goal: Patient will remain free of falls  Description  INTERVENTIONS:  - Assess patient frequently for physical needs  -  Identify cognitive and physical deficits and behaviors that affect risk of falls    -  [place] fall precautions as indicated by assessment   - Educate patient/family on patient safety including physical limitations  - Instruct patient to call for assistance with activity based on assessment  - Modify environment to reduce risk of injury  - Consider OT/PT consult to assist with strengthening/mobility  Outcome: Progressing  Goal: Maintain or return to baseline ADL function  Description  INTERVENTIONS:  -  Assess patient's ability to carry out ADLs; assess patient's baseline for ADL function and identify physical deficits which impact ability to perform ADLs (bathing, care of mouth/teeth, toileting, grooming, dressing, etc )  - Assess/evaluate cause of self-care deficits   - Assess range of motion  - Assess patient's mobility; develop plan if impaired  - Assess patient's need for assistive devices and provide as appropriate  - Encourage maximum independence but intervene and supervise when necessary  - Involve family in performance of ADLs  - Assess for home care needs following discharge   - Consider OT consult to assist with ADL evaluation and planning for discharge  - Provide patient education as appropriate  Outcome: Progressing  Goal: Maintain or return mobility status to optimal level  Description  INTERVENTIONS:  - Assess patient's baseline mobility status (ambulation, transfers, stairs, etc )    - Identify cognitive and physical deficits and behaviors that affect mobility  - Identify mobility aids required to assist with transfers and/or ambulation (gait belt, sit-to-stand, lift, walker, cane, etc )  - [place] fall precautions as indicated by assessment  - Record patient progress and toleration of activity level on Mobility SBAR; progress patient to next Phase/Stage  - Instruct patient to call for assistance with activity based on assessment  - Consider rehabilitation consult to assist with strengthening/weightbearing, etc   Outcome: Progressing     Problem: DISCHARGE PLANNING  Goal: Discharge to home or other facility with appropriate resources  Description  INTERVENTIONS:  - Identify barriers to discharge w/patient and caregiver  - Arrange for needed discharge resources and transportation as appropriate  - Identify discharge learning needs (meds, wound care, etc )  - Arrange for interpretive services to assist at discharge as needed  - Refer to Case Management Department for coordinating discharge planning if the patient needs post-hospital services based on physician/advanced practitioner order or complex needs related to functional status, cognitive ability, or social support system  Outcome: Progressing     Problem: Knowledge Deficit  Goal: Patient/family/caregiver demonstrates understanding of disease process, treatment plan, medications, and discharge instructions  Description  Complete learning assessment and assess knowledge base    Interventions:  - Provide teaching at level of understanding  - Provide teaching via preferred learning methods  Outcome: Progressing     Problem: Prexisting or High Potential for Compromised Skin Integrity  Goal: Skin integrity is maintained or improved  Description  INTERVENTIONS:  - Identify patients at risk for skin breakdown  - Assess and monitor skin integrity  - Assess and monitor nutrition and hydration status  - Monitor labs   - Assess for incontinence   - Turn and reposition patient  - Assist with mobility/ambulation  - Relieve pressure over bony prominences  - Avoid friction and shearing  - Provide appropriate hygiene as needed including keeping skin clean and dry  - Evaluate need for skin moisturizer/barrier cream  - Collaborate with interdisciplinary team   - Patient/family teaching  - Consider wound care consult   Outcome: Progressing     Problem: RESPIRATORY - ADULT  Goal: Achieves optimal ventilation and oxygenation  Description  INTERVENTIONS:  - Assess for changes in respiratory status  - Assess for changes in mentation and behavior  - Position to facilitate oxygenation and minimize respiratory effort  - Oxygen administered by appropriate delivery if ordered  - Initiate smoking cessation education as indicated  - Encourage broncho-pulmonary hygiene including cough, deep breathe, Incentive Spirometry  - Assess the need for suctioning and aspirate as needed  - Assess and instruct to report SOB or any respiratory difficulty  - Respiratory Therapy support as indicated  Outcome: Progressing     Problem: METABOLIC, FLUID AND ELECTROLYTES - ADULT  Goal: Electrolytes maintained within normal limits  Description  INTERVENTIONS:  - Monitor labs and assess patient for signs and symptoms of electrolyte imbalances  - Administer electrolyte replacement as ordered  - Monitor response to electrolyte replacements, including repeat lab results as appropriate  - Instruct patient on fluid and nutrition as appropriate  Outcome: Progressing  Goal: Fluid balance maintained  Description  INTERVENTIONS:  - Monitor labs   - Monitor I/O and WT  - Instruct patient on fluid and nutrition as appropriate  - Assess for signs & symptoms of volume excess or deficit  Outcome: Progressing  Goal: Glucose maintained within target range  Description  INTERVENTIONS:  - Monitor Blood Glucose as ordered  - Assess for signs and symptoms of hyperglycemia and hypoglycemia  - Administer ordered medications to maintain glucose within target range  - Assess nutritional intake and initiate nutrition service referral as needed  Outcome: Progressing

## 2020-01-07 NOTE — CONSULTS
Patient currently intubated we will follow up with the patient once extubated and ready to be transferred to the floor

## 2020-01-07 NOTE — RESPIRATORY THERAPY NOTE
RT Ventilator Management Note  Mcginnis Joseph 81 y o  female MRN: [id number]  Unit/Bed#:  Encounter: [id number]      Daily Screen       1/7/2020  0540 1/7/2020  0951          Patient safety screen outcome[de-identified]  Failed  Failed      Not Ready for Weaning due to[de-identified]  Underline problem not resolved  Alternative forms of ventilation              Physical Exam:   Assessment Type: Assess only  General Appearance: Sedated  Respiratory Pattern: Irregular, Assisted  Chest Assessment: Chest expansion symmetrical  Bilateral Breath Sounds: Diminished, Expiratory wheezes, Scattered  Cough: None  O2 Device: mechanical ventil;ation  Subjective Data: sedated      Resp Comments: pateint found on PC/ac mode per order DR martin  patdrake being sedated on full mechanical ventilation aeroso therapy started in attemeprt to improve puolmonary mechanics as pateint has significant pulmonary PMH for COPD and emphysemal  plan: conitnue roxannae t suport and attemept to adjusrted vent settings to maiximize ventilation  as tolerated  Et tube in place and secure tension of ties adequate without skin breakdown noted at this time

## 2020-01-07 NOTE — ASSESSMENT & PLAN NOTE
- transiently hypertensive in ED prior to rapid, likely secondary to respiratory distress   - hold additional agents for now  - hemodynamic monitoring

## 2020-01-07 NOTE — NURSING NOTE
During oral care it was noted that one of the patient's teeth was loose and became dislodged  It fell out of oral cavity and was discarded  Dr Melvina Castañeda notified, no new orders  Patient's daughter Ruby Barnes also notified

## 2020-01-07 NOTE — ASSESSMENT & PLAN NOTE
- nitrite positive UA  - f/u culture growth  - ceftriazone until susceptibilities and culture return

## 2020-01-07 NOTE — ASSESSMENT & PLAN NOTE
- initiate SSI  - serial acuchecks  - can start insulin gtt if little improvement while critically ill

## 2020-01-07 NOTE — RESPIRATORY THERAPY NOTE
Pt intubated with a 7 5mm ETT secured at 22 at the lip with a commercial tube harris  Placement confirmed with color change, bilateral breath sounds, bilateral chest rise and CXR  Pt was moved to room 222 in the ICU with no incident  Pt remains on full mechanical support   [facility]         01/07/20 0643   Vent Information   Vent ID Mirna   Vent type Drager   Drager Vent Mode AC/VC+   $ Pulse Oximetry Spot Check Charge Completed   AC/VC+ Settings   Resp Rate (BPM) 20 BPM   VT (mL) 320 mL   Insp Time (S) 0 6 S   FIO2 (%) 60 %   PEEP (cmH2O) 8 cmH2O   Rise Time (%) 0 2 %   Trigger Sensitivity Flow (LPM) 2 LPM   Humidification Heater   Heater Temp 98 6 °F (37 °C)   AC/VC+ Actuals   Resp Rate (BPM) 20 BPM   VT (mL) 328 mL   MV (Obs) 5 68   MAP (cmH2O) 15 cmH2O   Peak Pressure (cmH2O) 43 cmH2O   I:E Ratio (Obs) 1:4   Static Compliance (mL/cmH20) 22 4 mL/cmH2O   Plateau Pressure (cm H2O) 28 3 cm H2O   Heater Temperature (Obs) 71 6 °F (22 °C)   AC/VC+ ALARMS   High Peak Pressure (cmH2O) 20 cmH2O   High Resp Rate (BPM) 35 BPM   High MV (L/min) 10 9 L/min   Low MV (L/min) 3 L/min   High VT (mL) 900 mL   Maintenance   Alarm (pink) cable attached Yes   Resuscitation bag with peep valve at bedside Yes   Water bag changed No   Circuit changed No   IHI Ventilator Associated Pneumonia Bundle   Daily Assessment of Readiness to Extubate Not applicable (Comment)   Head of Bed Elevated HOB 45   ETT  7 5 mm   Placement Date/Time: 01/07/20 0536   Preoxygenated: Yes  Tube Size: 7 5 mm  Laryngoscope: GlideScope  Location: Oral  Insertion attempts: 1  Placement Verification: Auscultation  Secured at (cm): 20 @ lip  Placed By: (c) Advanced Practioner   Secured at (cm) 22   Measured from Lips   Secured Location Right   Secured by Commercial tube harris   Site Condition Cool;Dry   HI-LO Suction  Continuous low suction   HI-LO Secretions Scant   HI-LO Intervention Patent

## 2020-01-07 NOTE — ED NOTES
Critical care PA-C at bedside     Mariah Lee, [facility]  01/07/20 6204

## 2020-01-07 NOTE — PROGRESS NOTES
H&P/Rapid Response- Elvis Floyd 1950, 81 y o  female MRN: [id number]    Unit/Bed#: ED 22 Encounter: [id number]    Primary Care Provider: HAVEN Trotter   Date and time admitted to hospital: 1/6/2020 11:02 AM     Type 2 diabetes mellitus with complication, without long-term current use of insulin ([address] )  Assessment & Plan  - initiate SSI  - serial acuchecks  - can start insulin gtt if little improvement while critically ill    Atherosclerosis of other type of bypass graft(s) of the extremities with rest pain, left leg (HCC)  Assessment & Plan  - plavix     Urinary tract infection  Assessment & Plan  - nitrite positive UA  - f/u culture growth  - ceftriazone until susceptibilities and culture return    Coronary artery disease  Assessment & Plan  - asa / plavix  - repeat trop / EKG     Hypertension  Assessment & Plan  - transiently hypertensive in ED prior to rapid, likely secondary to respiratory distress   - hold additional agents for now  - hemodynamic monitoring       Progress Note - Rapid Response   Elvis Floyd 81 y o  female MRN: [id number]    Time Called ( Time): 4:14  Date Called: 1/7/20  Level of Care: MS  Room#: ED 22  Arrival Time ( Time): 4:16  Event End Time ( Time): 4:32  Primary reason for call: Acute change in SPO2 and Acute change in mental status  Interventions:  Airway/Breathing:  NPPV  Circulation: N/A  Other Treatments: N/A       Assessment:   1  Acute on chronic hypoxic and hypercarbic respiratory failure   2   RSV   3  UTI     Plan:   · BiPAP  · Recheck ABG, monitor mental status closely and if not improvement intubate  · Confirmed code status and updated daughter via phone   · Azithromycin for COPD exacerbation, ceftriaxone for UTI         HPI/Chief Complaint (Background/Situation):   Elvis Floyd is a 81y o  year old female w PMH HTN, HLD, COPD on home O2, DM, thyroid disease, PAD, morbid obesity, TAVR who presented to the hospital yesterday with c/o shortness of breath and trouble breathing, found to be RSV positive w COPD exacerbation  Transiently required HFNC / BiPAP in ED but had improved w aggressive nebs / O2  However her WOB began to worsen this morning and patient became more and more anxious  Bedside nurse notified medical team however when she went to re-evaluate patient she was found unresponsive and hypoxic to 75%  Her O2 sats improved with gradually w placement of NRB  An ABG showed a respiratory acidosis and she was placed on BiPAP and admitted to the ICU       Historical Information   Past Medical History:   Diagnosis Date    Aneurysm ([email or web address] )     abdominal aortic aneurysm    Aortic valve disease     Aortic valve replaced     TAVR    Atherosclerosis of other type of bypass graft(s) of the extremities with rest pain, left leg ([facility]) 6/14/2019    Bronchiolitis     Cardiac disease     Chest pain     Contrast media allergy 6/11/2019    COPD (chronic obstructive pulmonary disease) ([facility])     Diabetes [facility]     Disease of thyroid gland     Dyspnea     Hyperlipidemia     Hypertension     Hypokalemia     Iliac artery stenosis, left ([facility])     Low back pain     Morbid obesity ([facility])     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[facility])     PAD (peripheral artery disease) ([facility])     Rheumatic fever     S/P TAVR (transcatheter aortic valve replacement)     SOB (shortness of breath)     SOB (shortness of breath)     Tachycardia      Past Surgical History:   Procedure Laterality Date    CARDIAC SURGERY      aortic valve replacement    CARDIAC VALVE REPLACEMENT      CHOLECYSTECTOMY      ESOPHAGUS SURGERY N/A     IR ABDOMINAL ANGIOGRAPHY / INTERVENTION  10/25/2018    IR LOWER EXTREMITY / INTERVENTION  6/14/2019    [place] SLCTV CATHJ 3RD+ ORD SLCTV ABDL PEL/LXTR [facility] Left 10/25/2018    Procedure: LEFT LEG ANGIOGRAM WITH PLACEMENT OF LEFT EXTERNAL ILIAC ARTERY / LEFT COMMON FEMORAL ARTERIAL STENT, RIGHT FEMORAL ACCESS;  Surgeon: Saadia Ortiz MD;  [facility]: [facility];  Service: Vascular    NC Tika Gale 3RD+ ORD SLCTV ABDL PEL/LXTR [facility] Left 6/14/2019    Procedure: Bilateral lower extremity arteriogram, left lower extremity intervention with laser atherectomy angioplasty and stent ;  Surgeon: Álvaro Ballard MD;  [facility];  Service: Vascular    NC THROMBOENDARTECTMY FEMORAL COMMON Left 9/21/2018    Procedure: ENDARTERECTOMY ARTERIAL FEMORAL;  Surgeon: Hugo Hill MD;  [facility];  Service: Vascular    REPLACEMENT AORTIC VALVE TRANSCATHETER (TAVR)      THROMBECTOMY W/ EMBOLECTOMY Left 9/21/2018    Procedure: EMBOLECTOMY/THROMBECTOMY LOWER EXTREMITY (GROIN EXPLORATION);   Surgeon: Saadia Ortiz MD;  [facility];  Service: Vascular    VASCULAR SURGERY       Social History   Social History     Substance and Sexual Activity   Alcohol Use Yes    Alcohol/week: 1 0 - 2 0 standard drinks    Types: 1 - 2 Cans of beer per week    Frequency: Monthly or less    Drinks per session: 1 or 2    Binge frequency: Never    Comment: social     Social History     Substance and Sexual Activity   Drug Use Never     Social History     Tobacco Use   Smoking Status Former Smoker    Last attempt to quit: 2005    Years since quitting: 15 0   Smokeless Tobacco Never Used     Family History: non-contributory    Meds/Allergies     Current Facility-Administered Medications:  albuterol 2 5 mg Nebulization Q6H PRN Mahamed Null MD   ALPRAZolam 0 25 mg Oral BID PRN Mahamed Null MD   aspirin 81 mg Oral Daily Mahamed Null MD   azithromycin 500 mg Intravenous Q24H Mahamed Null MD   benzonatate 100 mg Oral TID PRN Mahamed Null MD   calcium carbonate 1,000 mg Oral Daily PRN Mahamed Null MD   clopidogrel 75 mg Oral Daily Mahamed Null MD   co-enzyme Q-10 90 mg Oral Daily Mahamed Null MD   docusate sodium 100 mg Oral BID PRN Mahamed Null MD   enoxaparin 40 mg Subcutaneous Daily Mahamed Null MD   fluticasone 1 spray Each Nare BID Kaylene Sicard, MD   fluticasone-umeclidinium-vilanterol 1 puff Inhalation Daily Kaylene Sicard, MD   furosemide 20 mg Oral BID Kaylene Sicard, MD   gabapentin 100 mg Oral QAM Kaylene Sicard, MD   gabapentin 600 mg Oral HS Kaylene Sicard, MD   guaiFENesin 600 mg Oral BID Kaylene Sicard, MD   insulin glargine 15 Units Subcutaneous HS Kaylene Sicard, MD   insulin lispro 1-6 Units Subcutaneous TID AC Kaylene Sicard, MD   insulin lispro 1-6 Units Subcutaneous HS Kaylene Sicard, MD   levothyroxine 50 mcg Oral Daily Kaylene Sicard, MD   methylPREDNISolone sodium succinate 60 mg Intravenous Q6H [address] Araseli Vazquez PA-C   metoprolol tartrate 25 mg Oral Q12H Sandy Rodriguez MD   pantoprazole 40 mg Oral Daily Before Breakfast Kaylene Sicard, MD            Allergies   Allergen Reactions    Iv Contrast [Iodinated Diagnostic Agents]      Pt states that she was told many years ago that when she was given contrast dye she had a reaction, but does not know what  She said she is always prepped prior to having dye and she asked that I list this as an allergy in her chart      Pletal [Cilostazol] Diarrhea and Vomiting    Statins Other (See Comments)     Severe muscle cramps-- cannot move    Xarelto [Rivaroxaban] Other (See Comments)     Thinks she shouldn't take because she has an artificial valve  Also, made her "WOOZY"    Iohexol Other (See Comments)     Pt does not recall       ROS: Unable to provide 2/2 acuity of condition     Vitals:   Vitals:    01/07/20 0515   BP: (!) 184/84   Pulse: 85   Resp: 22   Temp:    SpO2: 98%         Physical Exam:  Gen:toxic and sickly in appearance   HEENT:estefania coloration to face   Neck:large neck circumference   Chest:tachycardic   Cor:dimished BS w some wheezes diffusely across anterior lung fields, O2 sats improved w NRB  Abd:unable to assess response to pain, morbidly obese   Neuro:unresponsive to verbal / painful stimuli  Skin:dry, warm hands / feet       Intake/Output Summary (Last 24 hours) at 1/7/2020 0523  Last data filed at 1/6/2020 1115  Gross per 24 hour   Intake 300 ml   Output    Net 300 ml       Respiratory    Lab Data (Last 4 hours)      01/07 0504            pH, Arterial       6 866           pCO2, Arterial       184 5           pO2, Arterial       77 6           HCO3, Arterial       32 7           Base Excess, Arterial       -6 2                O2/Vent Data       01/07 0429   Most Recent        Non-Invasive Ventilation Mode BiPAP  BiPAP                Invasive Devices     Peripheral Intravenous Line            Peripheral IV 01/06/20 Left Hand less than 1 day    Peripheral IV 01/06/20 Right Antecubital less than 1 day    Peripheral IV 01/07/20 Left Antecubital less than 1 day                DIAGNOSTIC DATA:    Lab: I have personally reviewed pertinent lab results  CBC:   Results from last 7 days   Lab Units 01/07/20  0439   WBC Thousand/uL 13 32*   HEMOGLOBIN g/dL 14 8   HEMATOCRIT % 50 1*   PLATELETS Thousands/uL 423*     CMP:   Results from last 7 days   Lab Units 01/07/20  0440 01/07/20  0424 01/06/20  1126   POTASSIUM mmol/L 5 3  --  3 0*   CHLORIDE mmol/L 106  --  102   CO2 mmol/L 34*  --  32   BUN mg/dL 16  --  15   CREATININE mg/dL 0 84  --  0 85   CALCIUM mg/dL 7 9*  --  8 4   ALK PHOS U/L  --   --  99   ALT U/L  --   --  33   AST U/L  --   --  29   GLUCOSE, ISTAT mg/dl  --  211*  --      PT/INR:   Lab Results   Component Value Date    INR 1 02 01/06/2020   ,   Magnesium: No components found for: MAG,   Phosphorous: No results found for: PHOS    Microbiology:  Lab Results   Component Value Date    BLOODCX Received in Microbiology Lab  Culture in Progress  01/06/2020    BLOODCX Received in Microbiology Lab  Culture in Progress   01/06/2020    URINECX >100,000 cfu/ml Escherichia coli (A) 02/09/2018    URINECX 50,000-59,000 cfu/ml  02/09/2018         OUTCOME:   Transferred to Critical Care Unit and Critical Care Attending notified to transfer  Family notified of transfer: yes  Family member contacted: Daughter via phone - code status confirmed with her as full   Code Status: Level 1 - Full Code  Critical Care Time: Total Critical Care time spent 30 minutes excluding procedures, teaching and family updates

## 2020-01-07 NOTE — SOCIAL WORK
CM attempted to speak to pt at bedside, but she is intubated  Hx obtained from daughter Yariel Landeros at bedside  Pt lives with her sister Dary Barajas and daughter Meri John in a 2 story house with 13 steps w/railing to reach her bedroom and 3 OBDULIA  Pt is able to navigate steps, but it causes SOB and leg pain  Pt has a hx of vascular leg stents  She is independent with ADL's  Pt uses O2-2-3 lpm continuously  She has never been in rehab or used [facility] services  Denies substance abuse or mental health issues  She quit smoking 15 years ago  Her PCP is Dr Iftikhar Vegas from [place]  Pt moved here from [place], but  wanted to keep her PCP there  She uses [facility] in [facility] and has no problem with co-pays  Pt does not have a POA or Advanced Directive, but already has info to complete  Pt does not work, but still drives  Needs TBD when pt is extubated and able to make discharge decisions  CM will continue to follow  CM reviewed discharge planning process including the following: identifying help at home, patient preference for discharge planning needs, pharmacy preference, and availability of treatment team to discuss questions or concerns patient and/or family may have regarding understanding medications and recognizing signs and symptoms once discharged  CM also encouraged patient to follow up with all recommended appointments after discharge  Patient advised of importance for patient and family to participate in managing patients medical well being

## 2020-01-07 NOTE — PROCEDURES
Arterial Line Insertion  Date/Time: 1/7/2020 8:02 AM  Performed by: Avi Guardado MD  Authorized by: Avi Guardado MD     Patient location:  Bedside  [place] protocol:     Patient identity confirmed:  Arm band  Indications:     Indications: multiple ABGs, continuous blood pressure monitoring and frequent labs / infusion    Pre-procedure details:     Skin preparation:  Chlorhexidine    Preparation: Patient was prepped and draped in sterile fashion    Anesthesia (see MAR for exact dosages): Anesthesia method:  None  Procedure details:     Location / Tip of Catheter:  Radial    Laterality:  Right    Needle gauge: 4F micropuncture kit used  Placement technique:  Seldinger and ultrasound guided    Number of attempts:  1    Successful placement: yes      Transducer: waveform confirmed    Post-procedure details:     Post-procedure:  Biopatch applied, secured with tape, sterile dressing applied, sutured and wrist guard applied    CMS:  Unchanged    Patient tolerance of procedure:   Tolerated well, no immediate complications

## 2020-01-07 NOTE — RESPIRATORY THERAPY NOTE
Pt was RRT, pt was on NRB with SpO2 in the 80's  Sats were improving  ABG was drawn and run via iSTAT  Pt was placed on BiPAP at this time  Albuterol PRN was placed inline on the BiPAP  Will repeat ABG in half an hour

## 2020-01-07 NOTE — PROCEDURES
Intubation  Date/Time: 1/7/2020 6:22 AM  Performed by: Luis A Pickering PA-C  Authorized by: Luis A Pickering PA-C     Patient location:  ED  Consent:     Consent obtained:  Emergent situation  Universal protocol:     Immediately prior to procedure, a time out was called: yes    Pre-procedure details:     Patient status:  Unresponsive    Mallampati score:  1    Pretreatment medications:  Etomidate    Paralytics:  Succinylcholine  Indications:     Indications for intubation: respiratory failure, hypoxemia and hypercapnia    Procedure details:     Preoxygenation:  BiPAP    CPR in progress: no      Intubation method:  Oral    Oral intubation technique:  Glidescope    Tube size (mm):  7 5    Tube type:  Cuffed    Number of attempts:  1    Tube visualized through cords: yes    Placement assessment:     ETT to lip:  20    Tube secured with:  ETT harris    Breath sounds:  Equal    Placement verification: chest rise, CXR verification, direct visualization, equal breath sounds and ETCO2 detector      CXR findings:  ETT in proper place  Post-procedure details:     Patient tolerance of procedure: Tolerated well, no immediate complications  Comments:      Tube advanced 1cm following XR

## 2020-01-07 NOTE — RESPIRATORY THERAPY NOTE
RT Ventilator Management Note  Delmar Tiwari 81 y o  female MRN: [id number]  Unit/Bed#:  Encounter: [id number]      Daily Screen       1/7/2020  0540 1/7/2020  0951          Patient safety screen outcome[de-identified]  Failed  Failed      Not Ready for Weaning due to[de-identified]  Underline problem not resolved  Alternative forms of ventilation              Physical Exam:   Assessment Type: During-treatment  General Appearance: Sedated  Respiratory Pattern: Irregular, Assisted  Chest Assessment: Chest expansion symmetrical  Bilateral Breath Sounds: Diminished, Scattered, Expiratory wheezes  Cough: None  O2 Device: mechanical ventilation  Subjective Data: sedated      Resp Comments: pateint is vent day #1 s/p being admitted for acute on chronic respiratory failure requiring intubation to mainatain adeqauet ventilationa dn oxygenation  patient has significant pulonary PMH for COPD and emphysema resulting in need for pressure control ventilation to maximize pulmonary status aerosol treatments being given in lkine wuth vent q4 hours in attemept to improve pulmonary function   pateint remaisn sedated wihtout grteat apparent respiratory distress plan: contineu roxannae t suport unitl further orders

## 2020-01-08 NOTE — OCCUPATIONAL THERAPY NOTE
Occupational Therapy Cancellation Note        Patient Name: Michelle Bhatti  Today's Date: 1/8/2020      OT orders received, patient remains intubated and sedated on PC/ AV ventilation support, OT will continue to follow patient and evaluate as appropriate

## 2020-01-08 NOTE — RESPIRATORY THERAPY NOTE
RT Ventilator Management Note  Lo Calero 81 y o  female MRN: [id number]  Unit/Bed#:  Encounter: [id number]      Daily Screen       1/8/2020  0410 1/8/2020  0721          Patient safety screen outcome[de-identified]  Failed  Failed      Not Ready for Weaning due to[de-identified]  Alternative forms of ventilation; Underline problem not resolved  Alternative forms of ventilation; Underline problem not resolved              Physical Exam:   Assessment Type: Pre-treatment  General Appearance: Sedated  Respiratory Pattern: Assisted  Chest Assessment: Chest expansion symmetrical  Bilateral Breath Sounds: Diminished, Expiratory wheezes, Scattered  Suction: ET Tube      Resp Comments: pt remains intubated and sedated and on PC/AC ventilation  Q4 nebs continue to be given in an attempt to improve pulmonary function  No changes made at this time  Pt appears to be tolerating settings well    Skin integrity remains intact with 2 finger fit between pt and tube harris

## 2020-01-08 NOTE — ASSESSMENT & PLAN NOTE
- failed BiPAP requiring intubation 1/7  - initially w poor compliance on vent, requiring PCV w high inspiratory pressure, wean vent requirements as able  - not a candidate for SBT today   - continue sedation w ketamine and propofol to promote vent synchrony   - treat for COPD exacerbation  - supportive care for RSV  - can de-escalate abx

## 2020-01-08 NOTE — PROGRESS NOTES
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 with rest pain, left leg (HCC)  Assessment & Plan  - plavix     Urinary tract infection  Assessment & Plan  - nitrite positive UA w >100,000 colonies on culture  - received 2 doses of ceftriaxone       Coronary artery disease  Assessment & Plan  - asa / plavix      Hypertension  Assessment & Plan  - hold home lopressor  - BP stable on propofol for sedation    COPD exacerbation ([facility])  Assessment & Plan  - wears 1 - 2L NC O2 at home  - solumedrol 60mg Q6   - albuterol 5mg Q4, w PRN nebs    * Acute on chronic respiratory failure with hypoxia and hypercapnia ([facility])  Assessment & Plan  - failed BiPAP requiring intubation 1/7  - initially w poor compliance on vent, requiring PCV w high inspiratory pressure, wean vent requirements as able  - not a candidate for SBT today   - continue sedation w ketamine and propofol to promote vent synchrony   - treat for COPD exacerbation  - supportive care for RSV  - can de-escalate abx         ----------------------------------------------------------------------------------------  HPI/24hr events: Patient intubated yesterday morning after rapid response called for unresponsiveness  Patient had no improvement w BiPAP and was severely retaining Co2 w severe respiratory acidosis requiring intubation  Initially w some difficulty ventilating and poor compliance, now improved, maintained on PCV       Disposition: Continue Critical Care   Code Status: Level 1 - Full Code  ---------------------------------------------------------------------------------------  SUBJECTIVE  Unable to provide 2/2 acuity of condition     Review of Systems  Review of systems was unable to be performed secondary to intubated  ---------------------------------------------------------------------------------------  OBJECTIVE    Physical Exam   Constitutional: She is oriented to person, place, and time  She appears well-developed and well-nourished  No distress  HENT:   Head: Normocephalic and atraumatic  Eyes: Pupils are equal, round, and reactive to light  Neck: Neck supple  No tracheal deviation present  Cardiovascular: Normal rate, regular rhythm and intact distal pulses  Exam reveals no gallop and no friction rub  No murmur heard  Pulmonary/Chest: Effort normal  No respiratory distress  She has wheezes  She has no rales  PCV  Improved aeration  Scattered wheezes and rhonchi    Abdominal: Soft  Bowel sounds are normal  She exhibits distension (mild distension )  She exhibits no mass  There is no tenderness  There is no guarding  Genitourinary:   Genitourinary Comments: Chari w yellow urine output 35/h   Musculoskeletal: She exhibits no edema or deformity  Neurological: She is alert and oriented to person, place, and time  Deeply sedated    Skin: Skin is warm and dry  She is not diaphoretic  Psychiatric: She has a normal mood and affect  Her behavior is normal    Nursing note and vitals reviewed        Vitals   Vitals:    20 2359 20 0000 20 0100 20 0200   BP:  118/58 116/56 114/56   BP Location:       Pulse:  91 (!) 110 103   Resp:       Temp:  99 9 °F (37 7 °C) 99 9 °F (37 7 °C) 100 °F (37 8 °C)   TempSrc:       SpO2: 98% 97% 96% 97%   Weight:       Height:         Temp (24hrs), Av 1 °F (37 3 °C), Min:97 3 °F (36 3 °C), Max:100 2 °F (37 9 °C)  Current: Temperature: 100 °F (37 8 °C)  Arterial Line BP: 131/51  Arterial Line MAP (mmHg): 74 mmHg    SpO2: SpO2: 97 %  O2 Flow Rate (L/min): 3 L/min    Invasive/non-invasive ventilation settings   Respiratory    Lab Data (Last 4 hours)    None         O2/Vent Data (Last 4 hours)      01/07 2359          Drager Vent Mode AC/PC       Resp Rate (BPM) (BPM) 20       Pressure Control (cmH2O) (cm) 36       Insp Time (sec) (sec) 0 64       FiO2 (%) (%) 30       PEEP (cmH2O) (cmH2O) 3       MV 4 35                   Height and Weights   Height: 5' 3" (160 cm)  IBW: 52 4 kg  Body mass index is 32 34 kg/m²  Weight (last 2 days)     Date/Time   Weight    01/07/20 0600   82 8 (182 54)    01/06/20 1104   82 4 (181 66)              Intake and Output  I/O       01/06 0701 - 01/07 0700 01/07 0701 - 01/08 0700    I V  (mL/kg) 300 (3 6) 4052 (48 9)    NG/GT  185    IV Piggyback  500    Feedings  373    Total Intake(mL/kg) 300 (3 6) 5110 (61 7)    Urine (mL/kg/hr)  795 (0 4)    Total Output  795    Net +300 +4315                Nutrition       Diet Orders   (From admission, onward)             Start     Ordered    01/07/20 1238  Diet Enteral/Parenteral; Tube Feeding No Oral Diet; Jevity 1 2 Angel; Continuous; 45; Prosource Protein Liquid - Two Packets; 150; Every 6 hours  Diet effective now     Question Answer Comment   Diet Type Enteral/Parenteral    Enteral/Parenteral Tube Feeding No Oral Diet    Tube Feeding Formula: Jevity 1 2 Angel    Bolus/Cyclic/Continuous Continuous    Tube Feeding Goal Rate (mL/hr): 45    Prosource Protein Liquid - No Carb Prosource Protein Liquid - Two Packets    Tube Feeding water flush (mL): 150    Water flush frequency: Every 6 hours    RD to adjust diet per protocol?  Yes        01/07/20 1237                Laboratory and Diagnostics:  Results from last 7 days   Lab Units 01/07/20  0656 01/07/20  0439 01/07/20  0424 01/06/20  1126   WBC Thousand/uL 25 93* 13 32*  --  11 36*   HEMOGLOBIN g/dL 14 5 14 8  --  15 0   I STAT HEMOGLOBIN g/dl  --   --  15 6*  --    HEMATOCRIT % 49 9* 50 1*  --  49 0*   HEMATOCRIT, ISTAT %  --   --  46  --    PLATELETS Thousands/uL 357 423*  --  346   NEUTROS PCT % 92* 87*  --  82*   MONOS PCT % 5 3*  --  7     Results from last 7 days   Lab Units 01/07/20  1736 01/07/20  1051 01/07/20  0656 01/07/20  0440 01/06/20  1126   SODIUM mmol/L 142 141 141 142 144   POTASSIUM mmol/L 4 1 5 3 7 1* 5 3 3 0*   CHLORIDE mmol/L 108 106 106 106 102   CO2 mmol/L 27 25 32 34* 32   ANION GAP mmol/L 7 10 3* 2* 10   BUN mg/dL 20 20 17 16 15   CREATININE mg/dL 0 86 0 94 1 09 0 84 0 85   CALCIUM mg/dL 8 2* 7 9* 7 5* 7 9* 8 4   GLUCOSE RANDOM mg/dL 231* 244* 230* 219* 153*   ALT U/L  --   --   --   --  33   AST U/L  --   --   --   --  29   ALK PHOS U/L  --   --   --   --  99   ALBUMIN g/dL  --   --   --   --  3 9   TOTAL BILIRUBIN mg/dL  --   --   --   --  0 50          Results from last 7 days   Lab Units 01/06/20  1126   INR  1 02   PTT seconds 25      Results from last 7 days   Lab Units 01/06/20  1126   TROPONIN I ng/mL 0 04     Results from last 7 days   Lab Units 01/07/20  0440 01/06/20  1126   LACTIC ACID mmol/L 1 1 1 8     ABG:  Results from last 7 days   Lab Units 01/07/20  1736   PH ART  7 304*   PCO2 ART mm Hg 53 7*   PO2 ART mm Hg 79 1   HCO3 ART mmol/L 26 1   BASE EXC ART mmol/L -1 0   ABG SOURCE  Line, Arterial     VBG:  Results from last 7 days   Lab Units 01/07/20  1736  01/06/20  1126   PH RENETTA   --   --  7 251*   PCO2 RENETTA mm Hg  --   --  71 4*   PO2 RENETTA mm Hg  --   --  50 7*   HCO3 RENETTA mmol/L  --   --  30 7*   BASE EXC RENETTA mmol/L  --   --  1 1   ABG SOURCE  Line, Arterial   < >  --     < > = values in this interval not displayed  Results from last 7 days   Lab Units 01/07/20  0656 01/06/20  1126   PROCALCITONIN ng/ml 0 09 0 11       Micro  Results from last 7 days   Lab Units 01/07/20  0730 01/06/20  1126   BLOOD CULTURE   --  No Growth at 24 hrs  No Growth at 24 hrs     LEGIONELLA URINARY ANTIGEN  Negative  --    STREP PNEUMONIAE ANTIGEN, URINE  Negative  --        EKG:    NSR  Imaging: I have personally reviewed pertinent reports     and I have personally reviewed pertinent films in PACS    Active Medications  Scheduled Meds:  Current Facility-Administered Medications:  acetaminophen 650 mg Oral Q6H PRN Mekhi Mistry PA-C    albuterol 5 mg Nebulization Q4H Don Watkins MD    aspirin 81 mg Oral Daily Rochelle Montes MD    azithromycin 500 mg Intravenous Q24H Rochelle Montes MD    chlorhexidine 15 mL Swish & Spit Q12H [address] Anca Dillard PA-C    clopidogrel 75 mg Oral Daily Rochelle Montes MD    co-enzyme Q-10 90 mg Oral Daily Rochelle Montes MD    famotidine 20 mg Oral Daily Mekhi Mistry PA-C    fluticasone-vilanterol 1 puff Inhalation Daily Mekhi Mistry PA-C    heparin (porcine) 5,000 Units Subcutaneous Q8H [address] Mekhi Mistry PA-C    insulin regular (HumuLIN R,NovoLIN R) infusion 0 3-21 Units/hr Intravenous Titrated Anca Dillard PA-C Last Rate: 6 Units/hr (01/08/20 0200)   ipratropium 0 5 mg Nebulization Q4H Don Watkins MD    ketamine 0 5 mg/kg/hr Intravenous Continuous Mekhi Mistry PA-C Last Rate: 0 5 mg/kg/hr (01/07/20 1921)   levothyroxine 50 mcg Oral Daily Rochelle Montes MD    methylPREDNISolone sodium succinate 60 mg Intravenous Q6H [address] Anca Dillard PA-C    multi-electrolyte 75 mL/hr Intravenous Continuous Chino Ramsey Last Rate: 75 mL/hr (01/07/20 2158)   propofol 5-50 mcg/kg/min Intravenous Titrated Anca Dillard PA-C Last Rate: 30 mcg/kg/min (01/08/20 0227)   senna 8 8 mg Oral HS Mekhi Mistry PA-C      Continuous Infusions:    insulin regular (HumuLIN R,NovoLIN R) infusion 0 3-21 Units/hr Last Rate: 6 Units/hr (01/08/20 0200)   ketamine 0 5 mg/kg/hr Last Rate: 0 5 mg/kg/hr (01/07/20 1921)   multi-electrolyte 75 mL/hr Last Rate: 75 mL/hr (01/07/20 2158)   propofol 5-50 mcg/kg/min Last Rate: 30 mcg/kg/min (01/08/20 0227)     PRN Meds:     acetaminophen 650 mg Q6H PRN       ---------------------------------------------------------------------------------------  Advance Directive and Living Will: Yes    Power of :    POLST:    ---------------------------------------------------------------------------------------  Counseling / Coordination of Care  Total Critical Care time spent 34 minutes excluding procedures, teaching and family updates  Letitia Valles PA-C      Portions of the record may have been created with voice recognition software  Occasional wrong word or "sound a like" substitutions may have occurred due to the inherent limitations of voice recognition software    Read the chart carefully and recognize, using context, where substitutions have occurred

## 2020-01-08 NOTE — RESPIRATORY THERAPY NOTE
RT Ventilator Management Note  Stiven Tyler 81 y o  female MRN: [id number]  Unit/Bed#:  Encounter: [id number]      Daily Screen       1/8/2020  0410 1/8/2020  0721          Patient safety screen outcome[de-identified]  Failed  Failed      Not Ready for Weaning due to[de-identified]  Alternative forms of ventilation; Underline problem not resolved  Alternative forms of ventilation; Underline problem not resolved              Physical Exam:   Assessment Type: Pre-treatment  General Appearance: Sedated  Respiratory Pattern: Irregular  Chest Assessment: Chest expansion symmetrical  Bilateral Breath Sounds: Diminished, Expiratory wheezes  Suction: ET Tube      Resp Comments: Pt remains intubated and is currently on full support  Pt placed in AC/VC mode per Dr Coleta Mcardle  Pt appears to be tolerating settings well    Will continue to monitor

## 2020-01-08 NOTE — RESPIRATORY THERAPY NOTE
RT Ventilator Management Note  Elvis Floyd 81 y o  female MRN: [id number]  Unit/Bed#:  Encounter: [id number]      Daily Screen       1/8/2020  0410 1/8/2020  0721          Patient safety screen outcome[de-identified]  Failed  Failed      Not Ready for Weaning due to[de-identified]  Alternative forms of ventilation; Underline problem not resolved  Alternative forms of ventilation; Underline problem not resolved              Physical Exam:   Assessment Type: Pre-treatment  General Appearance: Sedated  Respiratory Pattern: Assisted  Chest Assessment: Chest expansion symmetrical  Bilateral Breath Sounds: Diminished, Expiratory wheezes, Scattered  Suction: ET Tube      Resp Comments: Per Dr Timbo Vogt, switched pt to PC-PSV mode  Pt appear most comfortable on this mode and is tolerating well    Will continue to monitor

## 2020-01-08 NOTE — PLAN OF CARE
Problem: PAIN - ADULT  Goal: Verbalizes/displays adequate comfort level or baseline comfort level  Description  Interventions:  - Encourage patient to monitor pain and request assistance  - Assess pain using appropriate pain scale  - Administer analgesics based on type and severity of pain and evaluate response  - Implement non-pharmacological measures as appropriate and evaluate response  - Consider cultural and social influences on pain and pain management  - Notify physician/advanced practitioner if interventions unsuccessful or patient reports new pain  Outcome: Progressing     Problem: INFECTION - ADULT  Goal: Absence or prevention of progression during hospitalization  Description  INTERVENTIONS:  - Assess and monitor for signs and symptoms of infection  - Monitor lab/diagnostic results  - Monitor all insertion sites, i e  indwelling lines, tubes, and drains  - Monitor endotracheal if appropriate and nasal secretions for changes in amount and color  - [place] appropriate cooling/warming therapies per order  - Administer medications as ordered  - Instruct and encourage patient and family to use good hand hygiene technique  - Identify and instruct in appropriate isolation precautions for identified infection/condition  Outcome: Progressing     Problem: SAFETY ADULT  Goal: Patient will remain free of falls  Description  INTERVENTIONS:  - Assess patient frequently for physical needs  -  Identify cognitive and physical deficits and behaviors that affect risk of falls    -  [place] fall precautions as indicated by assessment   - Educate patient/family on patient safety including physical limitations  - Instruct patient to call for assistance with activity based on assessment  - Modify environment to reduce risk of injury  - Consider OT/PT consult to assist with strengthening/mobility  Outcome: Progressing  Goal: Maintain or return to baseline ADL function  Description  INTERVENTIONS:  -  Assess patient's ability to carry out ADLs; assess patient's baseline for ADL function and identify physical deficits which impact ability to perform ADLs (bathing, care of mouth/teeth, toileting, grooming, dressing, etc )  - Assess/evaluate cause of self-care deficits   - Assess range of motion  - Assess patient's mobility; develop plan if impaired  - Assess patient's need for assistive devices and provide as appropriate  - Encourage maximum independence but intervene and supervise when necessary  - Involve family in performance of ADLs  - Assess for home care needs following discharge   - Consider OT consult to assist with ADL evaluation and planning for discharge  - Provide patient education as appropriate  Outcome: Progressing  Goal: Maintain or return mobility status to optimal level  Description  INTERVENTIONS:  - Assess patient's baseline mobility status (ambulation, transfers, stairs, etc )    - Identify cognitive and physical deficits and behaviors that affect mobility  - Identify mobility aids required to assist with transfers and/or ambulation (gait belt, sit-to-stand, lift, walker, cane, etc )  - [place] fall precautions as indicated by assessment  - Record patient progress and toleration of activity level on Mobility SBAR; progress patient to next Phase/Stage  - Instruct patient to call for assistance with activity based on assessment  - Consider rehabilitation consult to assist with strengthening/weightbearing, etc   Outcome: Progressing     Problem: DISCHARGE PLANNING  Goal: Discharge to home or other facility with appropriate resources  Description  INTERVENTIONS:  - Identify barriers to discharge w/patient and caregiver  - Arrange for needed discharge resources and transportation as appropriate  - Identify discharge learning needs (meds, wound care, etc )  - Arrange for interpretive services to assist at discharge as needed  - Refer to Case Management Department for coordinating discharge planning if the patient needs post-hospital services based on physician/advanced practitioner order or complex needs related to functional status, cognitive ability, or social support system  Outcome: Progressing     Problem: Knowledge Deficit  Goal: Patient/family/caregiver demonstrates understanding of disease process, treatment plan, medications, and discharge instructions  Description  Complete learning assessment and assess knowledge base    Interventions:  - Provide teaching at level of understanding  - Provide teaching via preferred learning methods  Outcome: Progressing     Problem: Prexisting or High Potential for Compromised Skin Integrity  Goal: Skin integrity is maintained or improved  Description  INTERVENTIONS:  - Identify patients at risk for skin breakdown  - Assess and monitor skin integrity  - Assess and monitor nutrition and hydration status  - Monitor labs   - Assess for incontinence   - Turn and reposition patient  - Assist with mobility/ambulation  - Relieve pressure over bony prominences  - Avoid friction and shearing  - Provide appropriate hygiene as needed including keeping skin clean and dry  - Evaluate need for skin moisturizer/barrier cream  - Collaborate with interdisciplinary team   - Patient/family teaching  - Consider wound care consult   Outcome: Progressing     Problem: RESPIRATORY - ADULT  Goal: Achieves optimal ventilation and oxygenation  Description  INTERVENTIONS:  - Assess for changes in respiratory status  - Assess for changes in mentation and behavior  - Position to facilitate oxygenation and minimize respiratory effort  - Oxygen administered by appropriate delivery if ordered  - Initiate smoking cessation education as indicated  - Encourage broncho-pulmonary hygiene including cough, deep breathe, Incentive Spirometry  - Assess the need for suctioning and aspirate as needed  - Assess and instruct to report SOB or any respiratory difficulty  - Respiratory Therapy support as indicated  Outcome: Progressing     Problem: METABOLIC, FLUID AND ELECTROLYTES - ADULT  Goal: Electrolytes maintained within normal limits  Description  INTERVENTIONS:  - Monitor labs and assess patient for signs and symptoms of electrolyte imbalances  - Administer electrolyte replacement as ordered  - Monitor response to electrolyte replacements, including repeat lab results as appropriate  - Instruct patient on fluid and nutrition as appropriate  Outcome: Progressing  Goal: Fluid balance maintained  Description  INTERVENTIONS:  - Monitor labs   - Monitor I/O and WT  - Instruct patient on fluid and nutrition as appropriate  - Assess for signs & symptoms of volume excess or deficit  Outcome: Progressing  Goal: Glucose maintained within target range  Description  INTERVENTIONS:  - Monitor Blood Glucose as ordered  - Assess for signs and symptoms of hyperglycemia and hypoglycemia  - Administer ordered medications to maintain glucose within target range  - Assess nutritional intake and initiate nutrition service referral as needed  Outcome: Progressing     Problem: Nutrition/Hydration-ADULT  Goal: Nutrient/Hydration intake appropriate for improving, restoring or maintaining nutritional needs  Description  Monitor and assess patient's nutrition/hydration status for malnutrition  Collaborate with interdisciplinary team and initiate plan and interventions as ordered  Monitor patient's weight and dietary intake as ordered or per policy  Determine patient's food preferences and provide high-protein, high-caloric foods as appropriate       INTERVENTIONS:  - Monitor oral intake, urinary output, labs, and treatment plans  - Assess nutrition and hydration status and recommend course of action  - Evaluate amount of meals eaten  - Assist patient with eating if necessary   - Allow adequate time for meals  - Recommend/ encourage appropriate diets, oral nutritional supplements, and vitamin/mineral supplements  - Order, calculate, and assess calorie counts as needed  - Recommend, monitor, and adjust tube feedings based on assessed needs  - Assess need for intravenous fluids  - Provide nutrition/hydration education as appropriate  - Include patient/family/caregiver in decisions related to nutrition   Outcome: Progressing     Problem: Potential for Falls  Goal: Patient will remain free of falls  Description  INTERVENTIONS:  - Assess patient frequently for physical needs  -  Identify cognitive and physical deficits and behaviors that affect risk of falls    -  [place] fall precautions as indicated by assessment   - Educate patient/family on patient safety including physical limitations  - Instruct patient to call for assistance with activity based on assessment  - Modify environment to reduce risk of injury  - Consider OT/PT consult to assist with strengthening/mobility  Outcome: Progressing     Problem: SAFETY,RESTRAINT: NV/NON-SELF DESTRUCTIVE BEHAVIOR  Goal: Remains free of harm/injury (restraint for non violent/non self-detsructive behavior)  Description  INTERVENTIONS:  - Instruct patient/family regarding restraint use   - Assess and monitor physiologic and psychological status   - Provide interventions and comfort measures to meet assessed patient needs   - Identify and implement measures to help patient regain control  - Assess readiness for release of restraint   Outcome: Progressing  Goal: Returns to optimal restraint-free functioning  Description  INTERVENTIONS:  - Assess the patient's behavior and symptoms that indicate continued need for restraint  - Identify and implement measures to help patient regain control  - Assess readiness for release of restraint   Outcome: Progressing

## 2020-01-08 NOTE — RESPIRATORY THERAPY NOTE
Pt remains on PC/AC settings at this time to allow for better ventilation  Pt to remain on vent at this time  Continue with Q4 breathing txs to help open the patient's airway  01/07/20 2033   Vent Information   Vent ID Mirna   Vent type Drager   Drager Vent Mode AC/PC   $ Pulse Oximetry Spot Check Charge Completed   SpO2 95 %   AC/PC Settings   Resp Rate (BPM) 20 BPM   Pressure Control (cmH2O) 36 cm H2O   Insp Time (sec) 0 64 sec   FiO2 (%) 30 %   PEEP (cmH2O) 3 cmH2O   Insp Rise Time (%) 0 26 %   Trigger Sensitivity Flow (lpm) 2   Humidification Heater   Heater Temp 98 6 °F (37 °C)   Vol Guarantee Off   AC/PC ACTUALS   Resp Rate (BPM) 20 BPM   VT (mL) 264 mL   MV 4 72   MAP (cmH2O) 8 9 cmH2O   Peak Pressure (cmH2O) 39 cmH2O   I/E Ratio (Obs) 1:3 7   Static Compliance (mL/cmH2O) 87 mL/cmH2O   Plateau Pressure (ACC9D) 22 6 cmH2O   Heater Temperature (Obs) 98 6 °F (37 °C)   AC/PC Alarms    High Peak Pressure (cmH2O) 56 cmH2O   High Resp Rate (BPM) 30 BPM   High MV (L/min) 10 L/min   Low MV (L/min) 3 L/min   High [place] VT (mL) 800 mL   Low [place] VT (mL) 150 mL   Maintenance   Alarm (pink) cable attached Yes   Resuscitation bag with peep valve at bedside Yes   Water bag changed No   Circuit changed No   Daily Screen   Patient safety screen outcome: Failed   Not Ready for Weaning due to: Alternative forms of ventilation   IHI Ventilator Associated Pneumonia Bundle   Daily Assessment of Readiness to Extubate Yes   Head of Bed Elevated HOB 30   ETT  7 5 mm   Placement Date/Time: 01/07/20 0536   Preoxygenated: Yes  Tube Size: 7 5 mm  Laryngoscope: GlideScope  Location: Oral  Insertion attempts: 1  Placement Verification: Auscultation  Secured at (cm): 22  Comments: bottom lip  Placed By: (c) Advanced Pract       Secured at (cm) 22   Measured from Lips   Secured Location Right   Secured by Commercial tube harris   Site Condition Dry   Cuff Pressure (cm H2O) 22 cm H2O   HI-LO Suction  Continuous low suction   HI-LO Secretions Scant   HI-LO Intervention Patent

## 2020-01-09 NOTE — PROGRESS NOTES
Daily Progress Note - Dianne [age] y o  female MRN: [id number]  Unit/Bed#:  Encounter: [id number]        ----------------------------------------------------------------------------------------  HPI/24hr events:  Transition from PC-AC to AC-VC  Remained ventilator dependent setting hypercapnia  Steroids decreased  No acute events over night    ---------------------------------------------------------------------------------------  SUBJECTIVE  Intubated and sedated  ---------------------------------------------------------------------------------------  Assessment and Plan:    Neuro:    Analgesia/Sedation:  Precedex gtt, ketamine 0 5 mg/kg/hour to achieve goal RASS 0 to -2  Coordinate sedation breaks and frequent neuro checks   Diagnosis: Chronic Low back with claudication  o Plan:  Hold gabapentin  Resume once extubated   Delirium precautions  Regulate sleep-wake cycles  Daily CAM ICU  CV:    Diagnosis: aortic stenosis s/p [id number])  o Plan:  Strict I&Os monitoring of fluid status  Consider initiation p o  Lasix as patient approaching euvolemia   Diagnosis: PAD s/p right iliac artery stent, and left CF endarterectomy  o Plan: continue asa/plavix daily   Diagnosis: HTN   o Resume metoprolol 25 mg b i d  Darletta Pac  Diagnosis: HLD  o Co-enzyme 90 mg daily      Pulm:   Diagnosis: Acute on chronic hypercarbic respiratory failure in the setting of severe COPD and RSV+  o Plan:  Continue mechanical ventilation lung protective volumes  Wean as tolerated  o Daily SBT/VAP Bundle  o Solu-Medrol 40 mg q 8 hours  o Continue Breo Ellipta  o Respiratory clearance protocol  o Follow-up pulmonary consultation  GI:    Bowel Regimen : senna, andrei lax   GI ppx: Not indicated    :    Diagnosis:  No acute issues  o Plan:  Strict I&Os  o Monitor daily BUN/creatinine  o Marcelino catheter:  Yes    F/E/N:    Plan:  Tolerating TF at Goal: Jevity 1 2 ml/hr  ml Q 6hrs  Monitor electrolytes  Replete as warranted  Mg> 2 0, K > 4 0, Phos > 3 0      Heme/Onc:    Diagnosis: Anemia  o Plan: suspect dilutional  Daily H/H and platelets    ppx vte: sqh    Endo:    Diagnosis: DM II with hyperglycemia  o Plan:  Insulin gtt   Diagnosis:  Hypothyroidism  o Plan:  Continue levothyroxine 50 mcg daily  ID:    Diagnosis: Severe sepsis in setting of RSV+  o Plan:  Supportive care   o Monitor fever curve and WBC count    MSK/Skin:    Frequent offloading repositioning void skin breakdown   PT/OT when applicable  Disposition: Continue Critical Care   Code Status: Level 1 - Full Code  ---------------------------------------------------------------------------------------  ICU CORE MEASURES    Prophylaxis   VTE Pharmacologic Prophylaxis: Heparin  VTE Mechanical Prophylaxis: sequential compression device  Stress Ulcer Prophylaxis: Famotidine IV     ABCDE Protocol (if indicated)  Plan to perform spontaneous awakening trial today? Yes  Plan to perform spontaneous breathing trial today? Yes  Obvious barriers to extubation? Yes  CAM-ICU: Positive    Invasive Devices Review  Invasive Devices     Peripheral Intravenous Line            Peripheral IV 01/06/20 Right Antecubital 2 days    Peripheral IV 01/07/20 Left Antecubital 2 days    Peripheral IV 01/08/20 Left Forearm less than 1 day    Peripheral IV 01/08/20 Left Hand less than 1 day          Drain            NG/OG/Enteral Tube Orogastric 18 Fr Center mouth 1 day    Urethral Catheter Temperature probe 16 Fr  1 day          Airway            ETT  7 5 mm 1 day              Can any invasive devices be discontinued today? No (provide explanation): patient requiring jimenez in setting of critical illness  ---------------------------------------------------------------------------------------  OBJECTIVE    Physical Exam   Constitutional: She appears well-developed and well-nourished  No distress     HENT:   Head: Normocephalic and atraumatic  Eyes: Pupils are equal, round, and reactive to light  EOM are normal    Neck: Normal range of motion  Neck supple  Cardiovascular: Normal rate and regular rhythm  Pulmonary/Chest: She has wheezes  Diminished breath sounds bilaterally  Faint expiratory wheezes of left anterior lung fields  Abdominal: Soft  Bowel sounds are normal  She exhibits no distension  There is no tenderness  There is no guarding  Musculoskeletal: Normal range of motion  She exhibits no edema  Neurological:   Intubated and sedated  Skin: Skin is warm  Capillary refill takes less than 2 seconds  Vitals reviewed  Vitals   Vitals:    20 0200 20 0307 20 0308 20 0400   BP: 161/71   144/62   Pulse: (!) 122 102  (!) 110   Resp:  18  20   Temp: 100 2 °F (37 9 °C)  99 2 °F (37 3 °C)    TempSrc:   Oral    SpO2: 95% 97% 97% 97%   Weight:       Height:         Temp (24hrs), Av 4 °F (37 4 °C), Min:98 8 °F (37 1 °C), Max:100 8 °F (38 2 °C)  Current: Temperature: 99 2 °F (37 3 °C)    Invasive/non-invasive ventilation settings   Respiratory    Lab Data (Last 4 hours)       0425            pH, Arterial       7 294           pCO2, Arterial       64 8           pO2, Arterial       98 5           HCO3, Arterial       30 7           Base Excess, Arterial       2 8                O2/Vent Data        0308   Most Recent        Drager Vent Mode AC/VC+  AC/VC+      Resp Rate (BPM) (BPM) 18  18      VT (mL) (mL) 320  320      Insp Time (S) (S) 0 64  0 64      FIO2 (%) (%) 40  40      PEEP (cmH2O) (cmH2O) 4  4      Rise Time (%) (%) 0 2  0 2      MV (Obs) 5 42  5 42                  Height and Weights   Height: 5' 3" (160 cm)  IBW: 52 4 kg  Body mass index is 34 52 kg/m²    Weight (last 2 days)     Date/Time   Weight    20 06   88 4 (194 89)    20 06   82 8 (182 54)              Intake and Output  I/O       701 -  0700 701 -  07    I V  (mL/kg) 4140 5 (46 8) 2175 5 (24 6)    NG/ 930    IV Piggyback 500 100    Feedings 461 875    Total Intake(mL/kg) 5436 5 (61 5) 4080 5 (46 2)    Urine (mL/kg/hr) 795 (0 4) 1565 (0 7)    Stool  0    Total Output 795 1565    Net +4641 5 +2515 5          Unmeasured Stool Occurrence  2 x            Nutrition       Diet Orders   (From admission, onward)             Start     Ordered    01/07/20 1238  Diet Enteral/Parenteral; Tube Feeding No Oral Diet; Jevity 1 2 Angel; Continuous; 45; Prosource Protein Liquid - Two Packets; 150; Every 6 hours  Diet effective now     Question Answer Comment   Diet Type Enteral/Parenteral    Enteral/Parenteral Tube Feeding No Oral Diet    Tube Feeding Formula: Jevity 1 2 Angel    Bolus/Cyclic/Continuous Continuous    Tube Feeding Goal Rate (mL/hr): 45    Prosource Protein Liquid - No Carb Prosource Protein Liquid - Two Packets    Tube Feeding water flush (mL): 150    Water flush frequency: Every 6 hours    RD to adjust diet per protocol?  Yes        01/07/20 1237                  Laboratory and Diagnostics:  Results from last 7 days   Lab Units 01/09/20  0413 01/08/20  1407 01/08/20  0435 01/07/20  0656 01/07/20  0439 01/07/20  0424 01/06/20  1126   WBC Thousand/uL 8 82  --  9 15 25 93* 13 32*  --  11 36*   HEMOGLOBIN g/dL 11 0* 10 8* 11 1* 14 5 14 8  --  15 0   I STAT HEMOGLOBIN g/dl  --   --   --   --   --  15 6*  --    HEMATOCRIT % 37 6  --  36 4 49 9* 50 1*  --  49 0*   HEMATOCRIT, ISTAT %  --   --   --   --   --  46  --    PLATELETS Thousands/uL 240  --  225 357 423*  --  346   NEUTROS PCT %  --   --  87* 92* 87*  --  82*   MONOS PCT %  --   --  6 5 3*  --  7     Results from last 7 days   Lab Units 01/09/20  0413 01/08/20  0435 01/07/20  1736 01/07/20  1051 01/07/20  0656 01/07/20  0440 01/06/20  1126   SODIUM mmol/L 144 144 142 141 141 142 144   POTASSIUM mmol/L 3 8 3 7 4 1 5 3 7 1* 5 3 3 0*   CHLORIDE mmol/L 109* 108 108 106 106 106 102   CO2 mmol/L 30 28 27 25 32 34* 32   ANION GAP mmol/L 5 8 7 10 3* 2* 10   BUN mg/dL 25 18 20 20 17 16 15   CREATININE mg/dL 0 81 0 80 0 86 0 94 1 09 0 84 0 85   CALCIUM mg/dL 7 9* 7 9* 8 2* 7 9* 7 5* 7 9* 8 4   GLUCOSE RANDOM mg/dL 160* 170* 231* 244* 230* 219* 153*   ALT U/L 81* 107*  --   --   --   --  33   AST U/L 36 58*  --   --   --   --  29   ALK PHOS U/L 60 70  --   --   --   --  99   ALBUMIN g/dL 2 6* 2 6*  --   --   --   --  3 9   TOTAL BILIRUBIN mg/dL 0 20 0 20  --   --   --   --  0 50     Results from last 7 days   Lab Units 01/09/20  0413 01/08/20  0435   MAGNESIUM mg/dL 2 4 2 3   PHOSPHORUS mg/dL  --  2 1*      Results from last 7 days   Lab Units 01/06/20  1126   INR  1 02   PTT seconds 25      Results from last 7 days   Lab Units 01/06/20  1126   TROPONIN I ng/mL 0 04     Results from last 7 days   Lab Units 01/07/20  0440 01/06/20  1126   LACTIC ACID mmol/L 1 1 1 8     ABG:  Results from last 7 days   Lab Units 01/09/20  0425 01/08/20  0434   PH ART  7 294* 7 332*   PCO2 ART mm Hg 64 8* 49 7*   PO2 ART mm Hg 98 5 81 2   HCO3 ART mmol/L 30 7* 25 7   BASE EXC ART mmol/L 2 8 -0 6   ABG SOURCE   --  Line, Arterial     VBG:  Results from last 7 days   Lab Units 01/08/20  0434  01/06/20  1126   PH RENETTA   --   --  7 251*   PCO2 RENETTA mm Hg  --   --  71 4*   PO2 RENETTA mm Hg  --   --  50 7*   HCO3 RENETTA mmol/L  --   --  30 7*   BASE EXC RENETTA mmol/L  --   --  1 1   ABG SOURCE  Line, Arterial   < >  --     < > = values in this interval not displayed  Results from last 7 days   Lab Units 01/07/20  0656 01/06/20  1126   PROCALCITONIN ng/ml 0 09 0 11       Micro  Results from last 7 days   Lab Units 01/07/20  0730 01/06/20  1126   BLOOD CULTURE   --  No Growth at 48 hrs  No Growth at 48 hrs  LEGIONELLA URINARY ANTIGEN  Negative  --    STREP PNEUMONIAE ANTIGEN, URINE  Negative  --        EKG:   Imaging:  I have personally reviewed pertinent reports     and I have personally reviewed pertinent films in PACS    Active Medications  Scheduled Meds:    Current Facility-Administered Medications:  acetaminophen 650 mg Oral Q6H PRN Celina Batres PA-C    albuterol 2 5 mg Nebulization Q4H Wyatt Marrero MD    aspirin 81 mg Oral Daily Amanda Weiss MD    azithromycin 500 mg Intravenous Q24H Wyatt Marrero MD    chlorhexidine 15 mL Swish & Spit Q12H [address] Aimee Ortiz PA-C    clopidogrel 75 mg Oral Daily Amanda Weiss MD    co-enzyme Q-10 90 mg Oral Daily Amanda Weiss MD    dexmedetomidine 0 1-0 7 mcg/kg/hr Intravenous Titrated Celina Batres PA-C    famotidine 20 mg Oral BID HAVEN Rodas    fentaNYL 50 mcg Intravenous Q2H PRN SalvadorHAVEN Conti    fluticasone-vilanterol 1 puff Inhalation Daily Celina Batres PA-C    heparin (porcine) 5,000 Units Subcutaneous [facility] Celina Batres PA-C    insulin regular (HumuLIN R,NovoLIN R) infusion 0 3-21 Units/hr Intravenous Titrated Aimee Ortiz PA-C Last Rate: 8 Units/hr (01/08/20 2010)   ipratropium 0 5 mg Nebulization Q4H Wyatt Marrero MD    ketamine 0 5 mg/kg/hr Intravenous Continuous Celina Batres PA-C Last Rate: 0 5 mg/kg/hr (01/09/20 0431)   levothyroxine 50 mcg Oral Daily Amanda Weiss MD    methylPREDNISolone sodium succinate 60 mg Intravenous [facility] HAVEN Rodas    metoprolol tartrate 25 mg Oral Q12H [address] Celina Batres PA-C    polyethylene glycol 17 g Oral Daily HAVEN Rodas    propofol 5-50 mcg/kg/min Intravenous Titrated Aimee Ortiz PA-C Last Rate: Stopped (01/09/20 0449)   senna 8 8 mg Oral HS Celina Batres PA-C      Continuous Infusions:    dexmedetomidine 0 1-0 7 mcg/kg/hr    insulin regular (HumuLIN R,NovoLIN R) infusion 0 3-21 Units/hr Last Rate: 8 Units/hr (01/08/20 2010)   ketamine 0 5 mg/kg/hr Last Rate: 0 5 mg/kg/hr (01/09/20 0431)   propofol 5-50 mcg/kg/min Last Rate: Stopped (01/09/20 0449)     PRN Meds:     acetaminophen 650 mg Q6H PRN   fentaNYL 50 mcg Q2H PRN       Allergies   Allergies   Allergen Reactions    Iv Contrast [Iodinated Diagnostic Agents]      Pt states that she was told many years ago that when she was given contrast dye she had a reaction, but does not know what  She said she is always prepped prior to having dye and she asked that I list this as an allergy in her chart   Pletal [Cilostazol] Diarrhea and Vomiting    Statins Other (See Comments)     Severe muscle cramps-- cannot move    Xarelto [Rivaroxaban] Other (See Comments)     Thinks she shouldn't take because she has an artificial valve  Also, made her "WOOZY"    Iohexol Other (See Comments)     Pt does not recall       Advance Directive and Living Will: Yes    Power of :    POLST:      Counseling / Coordination of Care  Total Critical Care time spent 32 minutes excluding procedures, teaching and family updates  Amina Smith PA-C      Portions of the record may have been created with voice recognition software  Occasional wrong word or "sound a like" substitutions may have occurred due to the inherent limitations of voice recognition software    Read the chart carefully and recognize, using context, where substitutions have occurred

## 2020-01-09 NOTE — RESPIRATORY THERAPY NOTE
01/08/20 2314   Vent Information   Vent ID [email or web address]   Vent type Drager   Drager Vent Mode AC/VC+   $ Pulse Oximetry Spot Check Charge Completed   SpO2 97 %   AC/VC+ Settings   Resp Rate (BPM) 18 BPM   VT (mL) 320 mL   Insp Time (S) 0 64 S   FIO2 (%) 40 %   PEEP (cmH2O) 4 cmH2O   Rise Time (%) 0 2 %   Trigger Sensitivity Flow (LPM) 2 LPM   Humidification Heater   Heater Temp 98 6 °F (37 °C)   AC/VC+ Actuals   Resp Rate (BPM) 22 BPM   VT (mL) 314 mL   MV (Obs) 6 42   MAP (cmH2O) 9 2 cmH2O   Peak Pressure (cmH2O) 28 cmH2O   I:E Ratio (Obs) 1:3 2   Static Compliance (mL/cmH20) 18 9 mL/cmH2O   Plateau Pressure (cm H2O) 25 5 cm H2O   Heater Temperature (Obs) 98 6 °F (37 °C)   AC/VC+ ALARMS   High Peak Pressure (cmH2O) 46 cmH2O   High Resp Rate (BPM) 30 BPM   High MV (L/min) 10 L/min   Low MV (L/min) 3 L/min   High VT (mL) 800 mL   High Brandon VTE (mL) 800 mL   Maintenance   Alarm (pink) cable attached Yes   Resuscitation bag with peep valve at bedside Yes   Water bag changed No   Circuit changed No   IHI Ventilator Associated Pneumonia Bundle   Daily Assessment of Readiness to Extubate Yes   Head of Bed Elevated HOB 30   ETT  7 5 mm   Placement Date/Time: 01/07/20 0536   Preoxygenated: Yes  Tube Size: 7 5 mm  Laryngoscope: GlideScope  Location: Oral  Insertion attempts: 1  Placement Verification: Auscultation  Secured at (cm): 22  Comments: bottom lip  Placed By: (c) Advanced Pract       Secured at (cm) 23   Measured from Lips   Secured Location Right   Secured by Commercial tube harris  (no changes )   Site Condition Dry   Cuff Pressure (cm H2O) 26 cm H2O   HI-LO Suction  Continuous low suction   HI-LO Secretions Scant   HI-LO Intervention Patent

## 2020-01-09 NOTE — RESPIRATORY THERAPY NOTE
01/09/20 1532   AC/VC+ Settings   Resp Rate (BPM) 22 BPM   VT (mL) 320 mL   Insp Time (S) 0 66 S   FIO2 (%) 40 %   PEEP (cmH2O) 4 cmH2O   Rise Time (%) 0 3 %   Trigger Sensitivity Flow (LPM) 2 LPM   Humidification Heater   Heater Temp 98 6 °F (37 °C)   AC/VC+ Actuals   Resp Rate (BPM) 22 BPM   VT (mL) 315 mL   MV (Obs) 6 3   MAP (cmH2O) 11 cmH2O   Peak Pressure (cmH2O) 36 cmH2O   I:E Ratio (Obs) 1:3 1   Heater Temperature (Obs) 98 4 °F (36 9 °C)

## 2020-01-09 NOTE — RESPIRATORY THERAPY NOTE
01/08/20 2314   Respiratory Assessment   Assessment Type Pre-treatment   General Appearance Sedated   Respiratory Pattern Assisted   Chest Assessment Chest expansion symmetrical   Bilateral Breath Sounds Diminished;Coarse   Suction ET Tube   Resp Comments no changes made tx given edin vent well    O2 Device vent   Cough Description   Sputum Amount None   Airway Suctioning/Secretions   Suction Type Endotracheal tube   Suction Device Inline   Secretion Amount Small   Secretion Color Yellow   Secretion Consistency Thick   Suction Tolerance Tolerated well   Suctioning Adverse Effects None   ETT  7 5 mm   Placement Date/Time: 01/07/20 0536   Preoxygenated: Yes  Tube Size: 7 5 mm  Laryngoscope: GlideScope  Location: Oral  Insertion attempts: 1  Placement Verification: Auscultation  Secured at (cm): 22  Comments: bottom lip  Placed By: (c) Advanced Pract       Secured at (cm) 23   Measured from Lips   Secured Location Right   Secured by Commercial tube harris  (no changes )   Site Condition Dry   Cuff Pressure (cm H2O) 26 cm H2O   HI-LO Suction  Continuous low suction   HI-LO Secretions Scant   HI-LO Intervention Patent   Additional Assessments   Pulse (!) 115   Respirations 21   SpO2 97 %   Position Bladimir

## 2020-01-09 NOTE — RESPIRATORY THERAPY NOTE
RT Ventilator Management Note  Ynes Carter 81 y o  female MRN: [id number]  Unit/Bed#:  Encounter: [id number]      Daily Screen       1/8/2020  1919 1/9/2020  0547          Patient safety screen outcome[de-identified]  Failed  Passed      Not Ready for Weaning due to[de-identified]  Underline problem not resolved        Spont breathing trial % for 30 min:                  Physical Exam:   Assessment Type: Pre-treatment  General Appearance: Sedated  Respiratory Pattern: Assisted, Symmetrical  Chest Assessment: Chest expansion symmetrical  Bilateral Breath Sounds: Diminished, Coarse  Suction: ET Tube  O2 Device: vent      Resp Comments: no changes pt remains unchanged t/o shift, will cont to monitor

## 2020-01-09 NOTE — RESPIRATORY THERAPY NOTE
01/09/20 0308   Vent Information   Vent ID Mirna   Vent type Drager   Drager Vent Mode AC/VC+   Is the patient reintubated? No   $ Vent Daily Charge-Subsequent Yes   $ Pulse Oximetry Spot Check Charge Completed   SpO2 97 %   AC/VC+ Settings   Resp Rate (BPM) 18 BPM   VT (mL) 320 mL   Insp Time (S) 0 64 S   FIO2 (%) 40 %   PEEP (cmH2O) 4 cmH2O   Rise Time (%) 0 2 %   Trigger Sensitivity Flow (LPM) 2 LPM   Humidification Heater   Heater Temp 98 6 °F (37 °C)   AC/VC+ Actuals   Resp Rate (BPM) 18 BPM   VT (mL) 321 mL   MV (Obs) 5 42   MAP (cmH2O) 8 1 cmH2O   Peak Pressure (cmH2O) 24 cmH2O   I:E Ratio (Obs) 1:4   Static Compliance (mL/cmH20) 15 4 mL/cmH2O   Plateau Pressure (cm H2O) 21 6 cm H2O   Heater Temperature (Obs) 98 8 °F (37 1 °C)   AC/VC+ ALARMS   High Peak Pressure (cmH2O) 46 cmH2O   High Resp Rate (BPM) 30 BPM   High MV (L/min) 10 L/min   Low MV (L/min) 3 L/min   High VT (mL) 800 mL   High Brandon VTE (mL) 800 mL   Maintenance   Alarm (pink) cable attached Yes   Resuscitation bag with peep valve at bedside Yes   Water bag changed No   Circuit changed No   IHI Ventilator Associated Pneumonia Bundle   Daily Assessment of Readiness to Extubate Yes   Head of Bed Elevated HOB 30   ETT  7 5 mm   Placement Date/Time: 01/07/20 0536   Preoxygenated: Yes  Tube Size: 7 5 mm  Laryngoscope: GlideScope  Location: Oral  Insertion attempts: 1  Placement Verification: Auscultation  Secured at (cm): 22  Comments: bottom lip  Placed By: (c) Advanced Pract       Secured at (cm) 23   Measured from Lips   Secured Location Right   Secured by Commercial tube harris  (no changes )   Site Condition Dry   Cuff Pressure (cm H2O) 26 cm H2O   HI-LO Suction  Continuous low suction   HI-LO Secretions Scant   HI-LO Intervention Patent

## 2020-01-09 NOTE — RESPIRATORY THERAPY NOTE
RT Ventilator Management Note  Yesenia Hou 81 y o  female MRN: [id number]  Unit/Bed#:  Encounter: [id number]      Daily Screen       1/8/2020  0721 1/8/2020  1919          Patient safety screen outcome[de-identified]  Failed  Failed  (Pended)       Not Ready for Weaning due to[de-identified]  Alternative forms of ventilation; Underline problem not resolved  Underline problem not resolved  (Pended)               Physical Exam:   Assessment Type: Pre-treatment  General Appearance: Sedated  Respiratory Pattern: Irregular  Chest Assessment: Chest expansion symmetrical  Bilateral Breath Sounds: Diminished, Expiratory wheezes      Resp Comments: pt remains an full support tx given, AC 18 320 40%+4 edin well ETT changed to rt side same 23 no other changes made will cont to monitor

## 2020-01-09 NOTE — RESPIRATORY THERAPY NOTE
RT Ventilator Management Note  Mary Cifuentes 81 y o  female MRN: [id number]  Unit/Bed#:  Encounter: [id number]      Daily Screen       1/9/2020  0547 1/9/2020  0803          Patient safety screen outcome[de-identified]  Passed  Failed      Not Ready for Weaning due to[de-identified]    Underline problem not resolved      Spont breathing trial % for 30 min:                  Physical Exam:   Assessment Type: Pre-treatment  General Appearance: Sedated  Respiratory Pattern: Assisted  Chest Assessment: Chest expansion symmetrical  Bilateral Breath Sounds: Coarse  Suction: ET Tube  O2 Device: vent      Resp Comments: pt is currently on cpap/ps 10/4  No additional changes made to vent settings  skin integrity remains intact with 2 finger fit between tube harrsi and pt    Will continue to [facility]

## 2020-01-09 NOTE — PHYSICAL THERAPY NOTE
Physical Therapy Cancellation Note    PT order received  Chart review performed  At this time, PT evaluation cancelled secondary to patient placed back on full support per chart review; not medically appropriate to participate at this time  PT will follow and evaluate as appropriate      Sukhi Garrett, PT, DPT

## 2020-01-09 NOTE — PROCEDURES
Arterial Line Insertion  Date/Time: 1/9/2020 4:55 PM  Performed by: Renuka Jay MD  Authorized by: Renuka Jay MD     Patient location:  Bedside  Etna protocol:     Patient identity confirmed:  Arm band  Indications:     Indications: multiple ABGs    Pre-procedure details:     Skin preparation:  Chlorhexidine    Preparation: Patient was prepped and draped in sterile fashion    Anesthesia (see MAR for exact dosages): Anesthesia method:  None  Procedure details:     Location / Tip of Catheter:  Other (comment) (ulnar)    Laterality:  Right    Franklin's test performed: yes      Franklin's test abnormal: no      Needle gauge: micropuncture kit  Placement technique:  Seldinger and ultrasound guided    Successful placement: yes      Transducer: waveform confirmed    Post-procedure details:     Post-procedure:  Biopatch applied, secured with tape, sutured and sterile dressing applied    CMS:  Normal    Patient tolerance of procedure:   Tolerated well, no immediate complications

## 2020-01-09 NOTE — RESPIRATORY THERAPY NOTE
01/09/20 1043   Vent Information   Vent ID Mirna   Vent type Drager   Drager Vent Mode AC/VC+   $ Pulse Oximetry Spot Check Charge Completed

## 2020-01-09 NOTE — RESPIRATORY THERAPY NOTE
01/09/20 0307   Respiratory Assessment   Assessment Type Pre-treatment   General Appearance Sedated   Respiratory Pattern Assisted;Symmetrical   Chest Assessment Chest expansion symmetrical   Bilateral Breath Sounds Diminished;Coarse   Suction ET Tube   Resp Comments no changes pt remains unchanged t/o shift, will cont to monitor    O2 Device vent   Cough Description   Sputum Amount None   Additional Assessments   Pulse 102   Respirations 18   SpO2 97 %   Position Kwasi's

## 2020-01-09 NOTE — PROCEDURES
Insert PICC line  Date/Time: 1/9/2020 10:58 AM  Performed by: Ralf Ferrer RN  Authorized by: HAVEN Blankenship     Patient location:  Bedside  Other Assisting Provider: No    Consent:     Consent obtained:  Written    Consent given by:  Healthcare agent  Universal protocol:     Procedure explained and questions answered to patient or proxy's satisfaction: yes      Relevant documents present and verified: yes      Test results available and properly labeled: yes      Radiology Images displayed and confirmed  If images not available, report reviewed: no      Required blood products, implants, devices, and special equipment available: yes      Site/side marked: yes      Immediately prior to procedure, a time out was called: yes      Patient identity confirmed:  Verbally with patient, arm band and hospital-assigned identification number  Pre-procedure details:     Hand hygiene: Hand hygiene performed prior to insertion      Sterile barrier technique: All elements of maximal sterile technique followed      Skin preparation:  ChloraPrep    Skin preparation agent: Skin preparation agent completely dried prior to procedure    Indications:     PICC line indications: other (comment)      PICC line indications comment:  Access  Anesthesia (see MAR for exact dosages):      Anesthesia method:  Local infiltration    Local anesthetic:  Lidocaine 1% w/o epi  Procedure details:     Location:  Cephalic    Vessel type: vein      Laterality:  Right    Approach: percutaneous technique used      Patient position:  Flat    Procedural supplies:  Double lumen    Catheter size:  5 Fr    Landmarks identified: yes      Ultrasound guidance: yes      Sterile ultrasound techniques: Sterile gel and sterile probe covers were used      Number of attempts:  1    Successful placement: yes      Vessel of catheter tip end:  Sherlock 3CG confirmed    Total catheter length (cm):  40    Catheter out on skin (cm):  2    Max flow rate:  5 ml/sec    Arm circumference:  36  Post-procedure details:     Post-procedure:  Dressing applied and securement device placed    Assessment:  Blood return through all ports    Post-procedure complications: none      Patient tolerance of procedure:   Tolerated well, no immediate complications

## 2020-01-09 NOTE — RESPIRATORY THERAPY NOTE
01/08/20 1919   Respiratory Assessment   Assessment Type Pre-treatment   General Appearance Sedated   Respiratory Pattern Assisted   Chest Assessment Chest expansion symmetrical   Bilateral Breath Sounds Diminished;Coarse;Scattered; Expiratory wheezes   Suction ET Tube   Resp Comments pt remains an full support tx given, AC 18 320 40%+4 edin well ETT changed to rt side same 23 no other changes made will cont to monitor    O2 Device vent   Cough Description   Sputum Amount None   Airway Suctioning/Secretions   Suction Type Endotracheal tube   Suction Device Inline   Secretion Amount Small   Secretion Color Yellow   Secretion Consistency Thick   Suction Tolerance Tolerated fairly well   Suctioning Adverse Effects None   ETT  7 5 mm   Placement Date/Time: 01/07/20 0536   Preoxygenated: Yes  Tube Size: 7 5 mm  Laryngoscope: GlideScope  Location: Oral  Insertion attempts: 1  Placement Verification: Auscultation  Secured at (cm): 22  Comments: bottom lip  Placed By: (c) Advanced Pract       Secured at (cm) 23   Measured from Lips   Secured Location Right   Repositioned Center to Right   Secured by Commercial tube harris  (skin remains intact and strap with two finger width )   Site Condition Dry   Cuff Pressure (cm H2O) 26 cm H2O   HI-LO Suction  Continuous low suction   HI-LO Secretions Scant   HI-LO Intervention Patent   Additional Assessments   Pulse (!) 112   Respirations 20   SpO2 97 %   Position Bladimir

## 2020-01-10 NOTE — RESPIRATORY THERAPY NOTE
01/09/20 1945   Vent Information   Vent ID Mirna   Vent type Drager   Drager Vent Mode CPAP/PS Spont  (found )   Is the patient reintubated? No   $ Pulse Oximetry Spot Check Charge Completed   SpO2 98 %   CPAP/PS Spont Settings   FIO2 (%) 40 %   PEEP (cmH2O) 5 cmH2O   Pressure Support (cmH2O) 10 cmH20   Trigger Sensitivity Flow (lpm) 2 LPM   Humidification Heater   Heater Temp 98 6 °F (37 °C)   CPAP/PS Spont Actuals   Resp Rate (BPM) 20 BPM   VT (mL) 439 mL   MV (Obs) 6 09   MAP (cmH2O) 7 9 cmH2O   Peak Pressure (cmH2O) 17 cmH2O   I/E Ratio (Obs) 1:3 5   RSBI 57   Heater Temperature (Obs) 98 6 °F (37 °C)   CPAP/PS Spont Alarms   High Peak Pressure (cmH20) 40 cmH2O   High Resp Rate (BPM) 30 BPM   High MV (L/min) 10 L/min   Low MV (L/min) 3 L/min   High Brandon VTE (mL) 800 mL   Low Brandon VTE (mL) 150 mL   High SPONT VTE (mL) 800 mL   Low Spont VTE (mL) 150 mL   CPAP/PS Spont Apnea Settings   Resp Rate (BPM) 18 BPM   VT (mL) 320 mL   FIO2 (%) 40 %   Apnea Time (s) 20 S   Maintenance   Alarm (pink) cable attached Yes   Resuscitation bag with peep valve at bedside Yes   Water bag changed No   Circuit changed No   IHI Ventilator Associated Pneumonia Bundle   Daily Assessment of Readiness to Extubate Yes   Head of Bed Elevated HOB 30   ETT  7 5 mm   Placement Date/Time: 01/07/20 0536   Preoxygenated: Yes  Tube Size: 7 5 mm  Laryngoscope: GlideScope  Location: Oral  Insertion attempts: 1  Placement Verification: Auscultation  Secured at (cm): 22  Comments: bottom lip  Placed By: (c) Advanced Pract       Secured at (cm) 23   Measured from [place] [id number]   Repositioned Right to [address] [place] [address] by Commercial tube harris  (skin intact and strap )   Site Condition Dry   Cuff Pressure (cm H2O) 26 cm H2O   HI-LO Suction  Continuous low suction   HI-LO Secretions Scant   HI-LO Intervention Patent

## 2020-01-10 NOTE — PROGRESS NOTES
Daily Progress Note - Dianne [age] y o  female MRN: [id number]  Unit/Bed#:  Encounter: [id number]        ----------------------------------------------------------------------------------------  HPI/24hr events:  Weaned off of propofol  Patient calm and synchronous with ventilator on ketamine and Precedex  Seroquel initiated in attempt to further wean sedation  Remained on PS/CPAP 10/5+ last evening with no acute events  ---------------------------------------------------------------------------------------  SUBJECTIVE    Intubated and sedated  ---------------------------------------------------------------------------------------  Assessment and Plan:    Neuro:    Analgesia/Sedation:  Decrease ketamine 0 3 mg/hour  Precedex GTT to achieve goal RASS 0 to -2  P r n  Ativan as needed for agitation  Seroquel q h s  Patsi Reil  Diagnosis:  Chronic low back pain with claudication  o Plan:  Hold gabapentin  Resume once extubated   Delirium precautions  Regulate sleep-wake cycles  Daily cam ICU  CV:    Diagnosis:  Aortic stenosis s/p TVAR (2013)  o Plan:  Strict I&Os  Resume home dose of Lasix   Diagnosis: PAD s/p right iliac artery stent and last CF endarterectomy  o Plan:  Continue ASA/Plavix daily   Diagnosis: HTN  o Plan:  Metoprolol 25 mg b i d    Diagnosis: HLD  o Plan: Co-enzyme 90 mg daily      Pulm:   Diagnosis:  Acute on chronic hypercarbic respiratory failure in setting of severe COPD and RSV+  o Plan:  Continue mechanical ventilation lung protective volumes  Wean as tolerated  o Daily SBT/VAP Bundle  o Solu-Medrol 40 mg q 8 hours  o Continue Breo Ellipta   o Respiratory clearance protocol   o Follow-up pulmonary consultation once extubated  GI:     Diagnosis:  Transaminitis  o Plan:  Improving  Continue trend LFTs daily   Bowel regimen:  Senna, MiraLax   GI prophylaxis not indicated      :    Diagnosis:  No acute issues    o Plan:  Strict I&Os   o Monitor daily BUN/creatinine   o No Marcelino catheter:  Yes     F/E/N:    Plan: Tolerating tube feeds at goal   Jevity 1 2  45mL/hour  Free water flush 150 mL q 6 hours   Monitor electrolytes  Replete as warranted  Mg> 2 0, K > 4 0, Phos >3 0      Heme/Onc:    H/H and platelets remained stable  Continue trend monitor   ppx vte: sqh      Endo:    Diagnosis: DM II with hyperglycemia  o Plan:  Continuous insulin infusion   Diagnosis:  Hypothyroidism  o Plan:  Levothyroxine 50 mcg daily  ID:    Diagnosis:  Severe sepsis in setting of RSV+  o Plan:  Continue supportive care   o Monitor fever curve and WBC count  MSK/Skin:    Frequent offloading repositioning void skin breakdown   PT/OT    Disposition: Continue Critical Care   Code Status: Level 1 - Full Code  ---------------------------------------------------------------------------------------  ICU CORE MEASURES    Prophylaxis   VTE Pharmacologic Prophylaxis: Heparin  VTE Mechanical Prophylaxis: sequential compression device  Stress Ulcer Prophylaxis: Pantoprazole IV     ABCDE Protocol (if indicated)  Plan to perform spontaneous awakening trial today? Yes  Plan to perform spontaneous breathing trial today? Yes  Obvious barriers to extubation? Yes  CAM-ICU: Positive    Invasive Devices Review  Invasive Devices     Peripherally Inserted Central Catheter Line            PICC Line 89/88/55 Right Cephalic less than 1 day          Peripheral Intravenous Line            Peripheral IV 01/07/20 Left Antecubital 2 days    Peripheral IV 01/08/20 Left Forearm 1 day    Peripheral IV 01/08/20 Left Hand 1 day          Arterial Line            Arterial Line 01/09/20 Other (Comment) less than 1 day          Drain            NG/OG/Enteral Tube Orogastric 18 Fr Center mouth 2 days    Urethral Catheter Temperature probe 16 Fr  2 days          Airway            ETT  7 5 mm 2 days              Can any invasive devices be discontinued today?  No (provide explanation): requiring jimenez catheter in critically ill patient  PICC for long-term peripheral access  ---------------------------------------------------------------------------------------  OBJECTIVE    Physical Exam   Constitutional: She is oriented to person, place, and time  She appears well-developed and well-nourished  No distress  Eyes: Pupils are equal, round, and reactive to light  EOM are normal    Neck: Normal range of motion  Neck supple  Cardiovascular: Normal rate and regular rhythm  Pulmonary/Chest: She has wheezes  Diminished breath sounds bilaterally  Expiratory wheeze L> R   Abdominal: Soft  Bowel sounds are normal  She exhibits no distension  There is no tenderness  There is no guarding  Neurological: She is alert and oriented to person, place, and time  Will arouse off sedation  Intermittently will follow commands  Moves all 4 extremities without difficulty  Skin: Skin is warm  Vitals reviewed  Vitals   Vitals:    01/10/20 0200 01/10/20 0256 01/10/20 0259 01/10/20 0300   BP: 105/56   111/59   Pulse: 63   62   Resp:       Temp: 99 7 °F (37 6 °C)   99 5 °F (37 5 °C)   TempSrc:       SpO2: 98% 98% 98% 98%   Weight:       Height:         Temp (24hrs), Av 9 °F (37 7 °C), Min:99 2 °F (37 3 °C), Max:100 8 °F (38 2 °C)  Current: Temperature: 99 5 °F (37 5 °C)    O2 Flow Rate (L/min): 3 L/min    Invasive/non-invasive ventilation settings   Respiratory    Lab Data (Last 4 hours)    None         O2/Vent Data (Last 4 hours)      01/10 0259          Drager Vent Mode CPAP/PS Spont  Comment: found        Resp Rate (BPM) (BPM)        VT (mL) (mL)        Insp Time (S) (S)        FIO2 (%) (%)        PEEP (cmH2O) (cmH2O)        Rise Time (%) (%)        FIO2 (%) (%) 40       PEEP (cmH2O) (cmH2O) 5       Pressure Support (cmH2O) (cmH20) 10       MV (Obs) 6 36       RSBI 71                   Height and Weights   Height: 5' 3" (160 cm)  IBW: 52 4 kg  Body mass index is 34 52 kg/m²    Weight (last 2 days)     Date/Time   Weight    01/08/20 0600   88 4 (194 89)              Intake and Output  I/O       01/08 0701 - 01/09 0700 01/09 0701 - 01/10 0700    I V  (mL/kg) 2339 (26 5) 960 5 (10 9)    NG/GT 1200 645    IV Piggyback 100 350    Feedings 1040 959    Total Intake(mL/kg) 4679 (52 9) 2914 5 (33)    Urine (mL/kg/hr) 1890 (0 9) 1675 (0 8)    Stool 0 0    Total Output 1890 1675    Net +2789 +1239 5          Unmeasured Stool Occurrence 3 x 4 x          Nutrition       Diet Orders   (From admission, onward)             Start     Ordered    01/07/20 1238  Diet Enteral/Parenteral; Tube Feeding No Oral Diet; Jevity 1 2 Angel; Continuous; 45; Prosource Protein Liquid - Two Packets; 150; Every 6 hours  Diet effective now     Question Answer Comment   Diet Type Enteral/Parenteral    Enteral/Parenteral Tube Feeding No Oral Diet    Tube Feeding Formula: Jevity 1 2 Angel    Bolus/Cyclic/Continuous Continuous    Tube Feeding Goal Rate (mL/hr): 45    Prosource Protein Liquid - No Carb Prosource Protein Liquid - Two Packets    Tube Feeding water flush (mL): 150    Water flush frequency: Every 6 hours    RD to adjust diet per protocol?  Yes        01/07/20 1237                Laboratory and Diagnostics:  Results from last 7 days   Lab Units 01/09/20  0413 01/08/20  1407 01/08/20  0435 01/07/20  0656 01/07/20  0439 01/07/20  0424 01/06/20  1126   WBC Thousand/uL 8 82  --  9 15 25 93* 13 32*  --  11 36*   HEMOGLOBIN g/dL 11 0* 10 8* 11 1* 14 5 14 8  --  15 0   I STAT HEMOGLOBIN g/dl  --   --   --   --   --  15 6*  --    HEMATOCRIT % 37 6  --  36 4 49 9* 50 1*  --  49 0*   HEMATOCRIT, ISTAT %  --   --   --   --   --  46  --    PLATELETS Thousands/uL 240  --  225 357 423*  --  346   NEUTROS PCT %  --   --  87* 92* 87*  --  82*   MONOS PCT %  --   --  6 5 3*  --  7     Results from last 7 days   Lab Units 01/09/20  0413 01/08/20  0435 01/07/20  1736 01/07/20  1051 01/07/20  0656 01/07/20  0440 01/06/20  1126   SODIUM mmol/L 144 144 142 141 141 142 144   POTASSIUM mmol/L 3 8 3 7 4 1 5 3 7 1* 5 3 3 0*   CHLORIDE mmol/L 109* 108 108 106 106 106 102   CO2 mmol/L 30 28 27 25 32 34* 32   ANION GAP mmol/L 5 8 7 10 3* 2* 10   BUN mg/dL 25 18 20 20 17 16 15   CREATININE mg/dL 0 81 0 80 0 86 0 94 1 09 0 84 0 85   CALCIUM mg/dL 7 9* 7 9* 8 2* 7 9* 7 5* 7 9* 8 4   GLUCOSE RANDOM mg/dL 160* 170* 231* 244* 230* 219* 153*   ALT U/L 81* 107*  --   --   --   --  33   AST U/L 36 58*  --   --   --   --  29   ALK PHOS U/L 60 70  --   --   --   --  99   ALBUMIN g/dL 2 6* 2 6*  --   --   --   --  3 9   TOTAL BILIRUBIN mg/dL 0 20 0 20  --   --   --   --  0 50     Results from last 7 days   Lab Units 01/09/20  0413 01/08/20  0435   MAGNESIUM mg/dL 2 4 2 3   PHOSPHORUS mg/dL 3 9 2 1*      Results from last 7 days   Lab Units 01/06/20  1126   INR  1 02   PTT seconds 25      Results from last 7 days   Lab Units 01/06/20  1126   TROPONIN I ng/mL 0 04     Results from last 7 days   Lab Units 01/07/20  0440 01/06/20  1126   LACTIC ACID mmol/L 1 1 1 8     ABG:  Results from last 7 days   Lab Units 01/09/20  1705   PH ART  7 367   PCO2 ART mm Hg 56 9*   PO2 ART mm Hg 103 8   HCO3 ART mmol/L 31 9*   BASE EXC ART mmol/L 5 3   ABG SOURCE  Line, Arterial     VBG:  Results from last 7 days   Lab Units 01/09/20  1705  01/06/20  1126   PH RENETTA   --   --  7 251*   PCO2 RENETTA mm Hg  --   --  71 4*   PO2 RENETTA mm Hg  --   --  50 7*   HCO3 RENETTA mmol/L  --   --  30 7*   BASE EXC RENETTA mmol/L  --   --  1 1   ABG SOURCE  Line, Arterial   < >  --     < > = values in this interval not displayed  Results from last 7 days   Lab Units 01/07/20  0656 01/06/20  1126   PROCALCITONIN ng/ml 0 09 0 11       Micro  Results from last 7 days   Lab Units 01/07/20  0730 01/06/20  1126   BLOOD CULTURE   --  No Growth at 72 hrs  No Growth at 72 hrs     LEGIONELLA URINARY ANTIGEN  Negative  --    STREP PNEUMONIAE ANTIGEN, URINE  Negative  --        EKG:   Imaging:  I have personally reviewed pertinent reports     and I have personally reviewed pertinent films in PACS    Active Medications  Scheduled Meds:  Current Facility-Administered Medications:  acetaminophen 650 mg Oral Q6H PRN Lindsey Trinh PA-C    aspirin 81 mg Oral Daily Cynthia Price MD    azithromycin 500 mg Intravenous Q24H Jerardo Bain MD Last Rate: Stopped (01/09/20 1600)   chlorhexidine 15 mL Swish & Spit Q12H [address] Chuck Goodrich PA-C    clopidogrel 75 mg Oral Daily Cynthia Price MD    co-enzyme Q-10 90 mg Oral Daily Cynthia Price MD    dexmedetomidine 0 1-1 5 mcg/kg/hr Intravenous Titrated Jerardo Bain MD Last Rate: 0 8 mcg/kg/hr (01/10/20 0204)   famotidine 20 mg Oral BID HAVEN Rodas    fentaNYL 50 mcg Intravenous Q2H PRN HAVEN Lanier    fluticasone-vilanterol 1 puff Inhalation Daily Lindsey Trinh PA-C    heparin (porcine) 5,000 Units Subcutaneous [facility] Lindsey Trinh PA-C    insulin regular (HumuLIN R,NovoLIN R) infusion 0 3-21 Units/hr Intravenous Titrated Chuck Goodrich PA-C Last Rate: 6 Units/hr (01/10/20 0203)   ipratropium 0 5 mg Nebulization Q6H HAVEN Rodas    ketamine 0 3 mg/kg/hr Intravenous Continuous Lindsey Trinh PA-C Last Rate: 0 3 mg/kg/hr (01/09/20 2211)   levalbuterol 1 25 mg Nebulization Q6H HAVEN Rodas    levothyroxine 50 mcg Oral Daily Cynthia Price MD    LORazepam 0 25 mg Per NG Tube Q8H PRN Jerardo Bain MD    methylPREDNISolone sodium succinate 60 mg Intravenous [facility] HAVEN Rodas    metoprolol tartrate 25 mg Oral Q12H [address] Lindsey Trinh PA-C    polyethylene glycol 17 g Oral Daily HAVEN Rodas    QUEtiapine 50 mg Per NG Tube HS Jerardo Bain MD    senna 8 8 mg Oral HS Lindsey Trinh PA-C      Continuous Infusions:    dexmedetomidine 0 1-1 5 mcg/kg/hr Last Rate: 0 8 mcg/kg/hr (01/10/20 0204)   insulin regular (HumuLIN R,NovoLIN R) infusion 0 3-21 Units/hr Last Rate: 6 Units/hr (01/10/20 0203)   ketamine 0 3 mg/kg/hr Last Rate: 0 3 mg/kg/hr (01/09/20 2211)     PRN Meds: acetaminophen 650 mg Q6H PRN   fentaNYL 50 mcg Q2H PRN   LORazepam 0 25 mg Q8H PRN       Allergies   Allergies   Allergen Reactions    Iv Contrast [Iodinated Diagnostic Agents]      Pt states that she was told many years ago that when she was given contrast dye she had a reaction, but does not know what  She said she is always prepped prior to having dye and she asked that I list this as an allergy in her chart   Pletal [Cilostazol] Diarrhea and Vomiting    Statins Other (See Comments)     Severe muscle cramps-- cannot move    Xarelto [Rivaroxaban] Other (See Comments)     Thinks she shouldn't take because she has an artificial valve  Also, made her "WOOZY"    Iohexol Other (See Comments)     Pt does not recall       Advance Directive and Living Will: Yes    Power of :    POLST:      Counseling / Coordination of Care  Total Critical Care time spent 34 minutes excluding procedures, teaching and family updates  Lyudmila Moe PA-C      Portions of the record may have been created with voice recognition software  Occasional wrong word or "sound a like" substitutions may have occurred due to the inherent limitations of voice recognition software    Read the chart carefully and recognize, using context, where substitutions have occurred

## 2020-01-10 NOTE — RESPIRATORY THERAPY NOTE
01/10/20 0256   Respiratory Assessment   Assessment Type Pre-treatment   General Appearance Sleeping;Sedated   Respiratory Pattern Assisted;Symmetrical   Chest Assessment Chest expansion symmetrical   Bilateral Breath Sounds Diminished;Coarse   Suction ET Tube   Resp Comments no changes remains on PSV t/o shift edin well will cont to monitor    O2 Device vent   Airway Suctioning/Secretions   Suction Type Endotracheal tube   Suction Device Inline   Secretion Amount Small   Secretion Color Pink tinged;Yellow   Secretion Consistency Thick   Suction Tolerance Tolerated well   Suctioning Adverse Effects None   ETT  7 5 mm   Placement Date/Time: 01/07/20 0536   Preoxygenated: Yes  Tube Size: 7 5 mm  Laryngoscope: GlideScope  Location: Oral  Insertion attempts: 1  Placement Verification: Auscultation  Secured at (cm): 22  Comments: bottom lip  Placed By: (c) Advanced Pract       Secured at (cm) 23   Measured from [facility] by Commercial tube harris  (no changes )   Site Condition Dry   Cuff Pressure (cm H2O) 26 cm H2O   HI-LO Suction  Continuous low suction   HI-LO Secretions Scant   HI-LO Intervention Patent   Additional Assessments   SpO2 98 %

## 2020-01-10 NOTE — RESPIRATORY THERAPY NOTE
01/09/20 1945   Respiratory Assessment   Assessment Type Pre-treatment   General Appearance Sedated   Respiratory Pattern Assisted;Spontaneous   Chest Assessment Chest expansion symmetrical   Bilateral Breath Sounds Diminished;Coarse   Suction ET Tube   Resp Comments pt remains on vent on PSV 10 +5 40% edin well tx given , ETT moved to center, no other changes made will cont to monitor    O2 Device vent   Airway Suctioning/Secretions   Suction Type Endotracheal tube   Suction Device Inline   Secretion Amount Small   Secretion Color Pink tinged;Yellow   Secretion Consistency Thick   Suction Tolerance Tolerated well   Suctioning Adverse Effects None   ETT  7 5 mm   Placement Date/Time: 01/07/20 0536   Preoxygenated: Yes  Tube Size: 7 5 mm  Laryngoscope: GlideScope  Location: Oral  Insertion attempts: 1  Placement Verification: Auscultation  Secured at (cm): 22  Comments: bottom lip  Placed By: (c) Advanced Pract       Secured at (cm) 23   Measured from [facility] 399   Repositioned Right to [address] [place] [address] by Commercial tube harris  (skin intact and strap )   Site Condition Dry   Cuff Pressure (cm H2O) 26 cm H2O   HI-LO Suction  Continuous low suction   HI-LO Secretions Scant   HI-LO Intervention Patent   Additional Assessments   Pulse 86   Respirations 20   SpO2 98 %   Position Bladimir

## 2020-01-10 NOTE — RESPIRATORY THERAPY NOTE
RT Ventilator Management Note  Troy Coronado 81 y o  female MRN: [id number]  Unit/Bed#:  Encounter: [id number]      Daily Screen       1/9/2020  0803 1/10/2020  0615          Patient safety screen outcome[de-identified]  Failed  Failed  (Pended)       Not Ready for Weaning due to[de-identified]  Underline problem not resolved                Physical Exam:   Assessment Type: Pre-treatment  General Appearance: Sleeping, Sedated  Respiratory Pattern: Assisted, Symmetrical  Chest Assessment: Chest expansion symmetrical  Bilateral Breath Sounds: Diminished, Coarse  Suction: ET Tube  O2 Device: vent      Resp Comments: no changes remains on PSV t/o shift edin well will cont to monitor

## 2020-01-10 NOTE — RESPIRATORY THERAPY NOTE
Patient placed in cpap/ps mode at this time in attempt to assess ability to liberate from mechanical ventilation

## 2020-01-10 NOTE — RESPIRATORY THERAPY NOTE
RT Ventilator Management Note  Mary Cifuentes 81 y o  female MRN: [id number]  Unit/Bed#:  Encounter: [id number]      Daily Screen       1/10/2020  0615 1/10/2020  0930          Patient safety screen outcome[de-identified]  Failed  Passed      Spont breathing trial % for 30 min:    Yes      Spont breathing trial outcome[de-identified]    Passed      Name of Medical Team Notified[de-identified]  Wiley Randhawa      Preparing to extubate/ Notify Nurse:  Wiley Milligan  Yes      Extubation order obtained:    Yes      Consider Cuff Test:    Yes      Patient extubated:    Yes      RSBI:    79      Additional Mechanics Requested:    Yes      NIF:    21 cm H2O      FVC (mL):    340              Physical Exam:   Assessment Type: During-treatment  General Appearance: Sleeping  Respiratory Pattern: Normal, Spontaneous, Assisted  Chest Assessment: Chest expansion symmetrical  Bilateral Breath Sounds: Diminished, Coarse, Scattered  Cough: None  O2 Device: Bipap  Subjective Data: resting wihtout great apparent respiratory distress       Resp Comments: pateint remaisn on Bipap at this time pateint is LOS#4 s/p being admitted for acute on chronic respiratory failure complicated by RSV requiring intubation and mechanical ventilation to mainatain adeqauet ventilationa dn oxygenation  pateint extubated earlier today to bipap to mainatain adeqauet ventilationa and oxygenation post extubation  pateint resting at this time without great apparent respiratory distyress   aerosol treatment given in line wuth bipap mask q4 in attemept to improve pulmonary status   repeat ABG pending plan: contineu current support until further orders

## 2020-01-10 NOTE — PLAN OF CARE
Problem: Nutrition/Hydration-ADULT  Goal: Nutrient/Hydration intake appropriate for improving, restoring or maintaining nutritional needs  Description  Monitor and assess patient's nutrition/hydration status for malnutrition  Collaborate with interdisciplinary team and initiate plan and interventions as ordered  Monitor patient's weight and dietary intake as ordered or per policy  Determine patient's food preferences and provide high-protein, high-caloric foods as appropriate       INTERVENTIONS:  - Monitor oral intake, urinary output, labs, and treatment plans  - Assess nutrition and hydration status and recommend course of action  - Evaluate amount of meals eaten  - Assist patient with eating if necessary   - Allow adequate time for meals  - Recommend/ encourage appropriate diets, oral nutritional supplements, and vitamin/mineral supplements  - Order, calculate, and assess calorie counts as needed  - Recommend, monitor, and adjust tube feedings based on assessed needs  - Assess need for intravenous fluids  - Provide nutrition/hydration education as appropriate  - Include patient/family/caregiver in decisions related to nutrition   Outcome: Not Progressing  Nutrition on hold due to extubation, monitoring status for swallow assessment

## 2020-01-10 NOTE — RESPIRATORY THERAPY NOTE
01/10/20 0259   Vent Information   Vent ID Mirna   Vent type Drager   Drager Vent Mode CPAP/PS Spont  (found )   $ Vent Daily Charge-Subsequent Yes   $ Pulse Oximetry Spot Check Charge Completed   SpO2 98 %   CPAP/PS Spont Settings   FIO2 (%) 40 %   PEEP (cmH2O) 5 cmH2O   Pressure Support (cmH2O) 10 cmH20   Trigger Sensitivity Flow (lpm) 2 LPM   Humidification Heater   Heater Temp 98 6 °F (37 °C)   CPAP/PS Spont Actuals   Resp Rate (BPM) 24 BPM   VT (mL) 279 mL   MV (Obs) 6 36   MAP (cmH2O) 8 5 cmH2O   Peak Pressure (cmH2O) 17 cmH2O   I/E Ratio (Obs) 1:3 4   RSBI 71   Heater Temperature (Obs) 98 6 °F (37 °C)   CPAP/PS Spont Alarms   High Peak Pressure (cmH20) 40 cmH2O   High Resp Rate (BPM) 30 BPM   High MV (L/min) 10 L/min   Low MV (L/min) 3 L/min   High Brandon VTE (mL) 800 mL   Low Brandon VTE (mL) 150 mL   High SPONT VTE (mL) 800 mL   Low Spont VTE (mL) 150 mL   CPAP/PS Spont Apnea Settings   Resp Rate (BPM) 18 BPM   VT (mL) 320 mL   FIO2 (%) 40 %   Apnea Time (s) 20 S   Maintenance   Alarm (pink) cable attached Yes   Resuscitation bag with peep valve at bedside Yes   Water bag changed No   Circuit changed No   IHI Ventilator Associated Pneumonia Bundle   Daily Assessment of Readiness to Extubate Yes   Head of Bed Elevated HOB 30   ETT  7 5 mm   Placement Date/Time: 01/07/20 0536   Preoxygenated: Yes  Tube Size: 7 5 mm  Laryngoscope: GlideScope  Location: Oral  Insertion attempts: 1  Placement Verification: Auscultation  Secured at (cm): 22  Comments: bottom lip  Placed By: (c) Advanced Pract       Secured at (cm) 23   Measured from [facility] by Commercial tube harris   Site Condition Dry   Cuff Pressure (cm H2O) 26 cm H2O   HI-LO Suction  Continuous low suction   HI-LO Secretions Scant   HI-LO Intervention Patent

## 2020-01-10 NOTE — RESPIRATORY THERAPY NOTE
01/09/20 2342   Respiratory Assessment   Assessment Type Assess only   General Appearance Sedated   Respiratory Pattern Assisted;Symmetrical   Chest Assessment Chest expansion symmetrical   Bilateral Breath Sounds Diminished;Coarse   Suction ET Tube   Resp Comments no changes made will cont to monitor    O2 Device vent    Airway Suctioning/Secretions   Suction Type Endotracheal tube   Suction Device Inline   Secretion Amount Small   Secretion Color Yellow   Secretion Consistency Thick   Suction Tolerance Tolerated well   Suctioning Adverse Effects None   ETT  7 5 mm   Placement Date/Time: 01/07/20 0536   Preoxygenated: Yes  Tube Size: 7 5 mm  Laryngoscope: GlideScope  Location: Oral  Insertion attempts: 1  Placement Verification: Auscultation  Secured at (cm): 22  Comments: bottom lip  Placed By: (c) Advanced Pract       Secured at (cm) 23   Measured from [facility] by Commercial tube harris  (skin intact and strap)   Site Condition Dry   Cuff Pressure (cm H2O) 26 cm H2O   HI-LO Suction  Continuous low suction   HI-LO Secretions Scant   HI-LO Intervention Patent   Additional Assessments   Pulse 66   Respirations 20   SpO2 100 %   Kiara Garrison

## 2020-01-10 NOTE — RESPIRATORY THERAPY NOTE
01/09/20 2342   Vent Information   Vent ID Mirna   Vent type Drager   Drager Vent Mode CPAP/PS Spont  (found )   $ Vent Daily Charge-Subsequent Yes   $ Pulse Oximetry Spot Check Charge Completed   SpO2 100 %   CPAP/PS Spont Settings   FIO2 (%) 40 %   PEEP (cmH2O) 5 cmH2O   Pressure Support (cmH2O) 10 cmH20   Trigger Sensitivity Flow (lpm) 2 LPM   Humidification Heater   Heater Temp 98 6 °F (37 °C)   CPAP/PS Spont Actuals   Resp Rate (BPM) 20 BPM   VT (mL) 291 mL   MV (Obs) 5 07   MAP (cmH2O) 7 8 cmH2O   Peak Pressure (cmH2O) 17 cmH2O   I/E Ratio (Obs) 1:3 1   RSBI 41   Heater Temperature (Obs) 98 4 °F (36 9 °C)   CPAP/PS Spont Alarms   High Peak Pressure (cmH20) 40 cmH2O   High Resp Rate (BPM) 30 BPM   High MV (L/min) 10 L/min   Low MV (L/min) 3 L/min   High Brandon VTE (mL) 800 mL   Low Brandon VTE (mL) 150 mL   High SPONT VTE (mL) 800 mL   Low Spont VTE (mL) 150 mL   CPAP/PS Spont Apnea Settings   Resp Rate (BPM) 18 BPM   VT (mL) 320 mL   FIO2 (%) 40 %   Apnea Time (s) 20 S   Maintenance   Alarm (pink) cable attached Yes   Resuscitation bag with peep valve at bedside Yes   Water bag changed No   Circuit changed No   IHI Ventilator Associated Pneumonia Bundle   Daily Assessment of Readiness to Extubate Yes   Head of Bed Elevated HOB 30   ETT  7 5 mm   Placement Date/Time: 01/07/20 0536   Preoxygenated: Yes  Tube Size: 7 5 mm  Laryngoscope: GlideScope  Location: Oral  Insertion attempts: 1  Placement Verification: Auscultation  Secured at (cm): 22  Comments: bottom lip  Placed By: (c) Advanced Pract       Secured at (cm) 23   Measured from [address] [facility] [place] by [employer] tube harris  (skin intact and strap)   Site Condition Dry   Cuff Pressure (cm H2O) 26 cm H2O   HI-LO Suction  Continuous low suction   HI-LO Secretions Scant   HI-LO Intervention Patent

## 2020-01-11 PROBLEM — N39.0 URINARY TRACT INFECTION: Status: RESOLVED | Noted: 2018-02-10 | Resolved: 2020-01-01

## 2020-01-11 NOTE — RESPIRATORY THERAPY NOTE
Pt placed on HFNC of 40% and 40L at this time due to increased WOB and low SpO2 in the 70's           01/11/20 0603   Non-Invasive Information   Interface HFNC prongs   Non-Invasive Ventilation Mode HFNC   $ CPAP/BiPAP Charge - Initial & Daily Mgmt Yes   $ Pulse Oximetry Spot Check Charge Completed   Resp Comments pt placed on HFNC due to increased WOB and low SpO2   Non-Invasive Settings   FiO2 (%) 40   Flow (lpm) 40   Temperature (Set) 37   Humidification   (heater)   Non-Invasive Readings   Heater Temperature (Obs) 24 6   Skin Intervention Skin intact   Maintenance   Water bag changed No

## 2020-01-11 NOTE — RESPIRATORY THERAPY NOTE
Adjusted settings at this time due to increased tidal volumes for this patient         01/11/20 0306   Non-Invasive Information   Interface Face mask   Non-Invasive Ventilation Mode BiPAP   SpO2 100 %   Resp Comments settings adjust at this time due to increased tital volumes for the patient   Non-Invasive Settings   FiO2 (%) 40   Flow (lpm) 61   Temperature (Set) 31   IPAP (cm) 10 cm   EPAP (cm) 5 cm   Rate (Set) 8   Pressure Support (cm H2O) 5   Rise Time 2   Trigger Sensitivity Flow (lpm) 2   Inspiratory Time (Set) 0 85   Humidification   (heater)   Non-Invasive Readings   Heater Temperature (Obs) 30 6   Skin Intervention Skin intact   Total Rate 16   MV (Mech) 14 1   Peak Pressure (Obs) 12   Spontaneous Vt (mL) 645   Leak (lpm) 10   Non-Invasive Alarms   Insp Pressure High (cm H20) 25   Insp Pressure Low (cm H20) 6   Low Insp Pressure Time (sec) 20 sec   MV Low (L/min) 4   Vt High (mL) 1100   Vt Low (mL) 200   High Resp Rate (BPM) 90 BPM   Low Resp Rate (BPM) 8 BPM   Apnea Interval (sec) 20   Apnea Rate 8   Apnea Pressure 14   Maintenance   Water bag changed No

## 2020-01-11 NOTE — PROGRESS NOTES
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, left leg (HCC)  Assessment & Plan  - continue plavix     Chronic radicular pain of lower back  Assessment & Plan  Continue to monitor  Continue gabapentin  No complaints offered at present    Coronary artery disease  Assessment & Plan  - asa / plavix      Morbid obesity due to excess calories [facility])  Assessment & Plan  Nutrition counseling    Hypertension  Assessment & Plan  Continue Lopressor      COPD exacerbation ([facility] [address] )  Assessment & Plan  - wears 1 - 2L NC O2 at home  - continue solumedrol 60mg Q8  - albuterol 5mg Q4, w PRN nebs    * Acute on chronic respiratory failure with hypoxia and hypercapnia (HCC)  Assessment & Plan  Remains dependent on BiPAP, wean as tolerated  Precedex weaned to off  Tolerating well  Daily Progress Note - Dianne [age] y o  female MRN: [id number]  Unit/Bed#:  Encounter: [id number]        ----------------------------------------------------------------------------------------  HPI/24hr events:  Extubated to BiPAP yesterday  Doing well overnight  Transitioning to nasal cannula this morning  Anxiety making her transition difficult  Saturations stable in the mid to high 90s on regular nasal cannula    Able to complete full sentences, no retractions     ---------------------------------------------------------------------------------------  SUBJECTIVE  I still can't breathe  Review of Systems  Review of systems was reviewed and negative unless stated above in HPI/24-hour events   ---------------------------------------------------------------------------------------  Disposition: Transfer to Stepdown Level 1   Code Status: Level 1 - Full Code  ---------------------------------------------------------------------------------------  ICU CORE MEASURES    PT/OT when appropriate  Encourage IS, early mobilization to prevent atelectasis    Prophylaxis   VTE Pharmacologic Prophylaxis: Heparin  VTE Mechanical Prophylaxis: sequential compression device  Stress Ulcer Prophylaxis: Famotidine IV       Invasive Devices Review  Invasive Devices     Peripherally Inserted Central Catheter Line            PICC Line 33/82/31 Right Cephalic 1 day          Peripheral Intravenous Line            Peripheral IV 01/10/20 Left Antecubital less than 1 day          Drain            Urethral Catheter Temperature probe 16 Fr  3 days              Can any invasive devices be discontinued today? Not applicable  ---------------------------------------------------------------------------------------  OBJECTIVE    GEN:  Chronically ill-appearing, appears stated age, no acute distress  HEENT:  Sclera anicteric, mucous membranes pink and moist, conjunctiva pink, no fazal/rhinorrhea  Neck:  No lymphadenopathy, normal ROM, supple, no bruits  CV :  S1S2, regular, no murmurs, rubs or gallops  Intact distal pulses  No JVD  Resp:  Lungs diminished throughout, occasional wheezing     No subq air or crepitus  Symmetrical expansion  No cough noted    GI :  Abd soft, nontender, no guarding/rebound, nondistended, normoactive bowel sounds X4 quads, no organomegaly appreciated  Neuro:  CN II-XII grossly intact, nonfocal exam, speech clear, GCS 15  Psych:  Anxious affect      Vitals   Vitals:    01/11/20 0200 01/11/20 0300 01/11/20 0306 01/11/20 0359   BP: 143/64 137/64     BP Location: Left arm Left arm     Pulse: 92 97     Resp: 18 18     Temp: 99 1 °F (37 3 °C) 99 3 °F (37 4 °C)  99 3 °F (37 4 °C)   TempSrc: Bladder Bladder     SpO2: 99% 99% 100%    Weight:       Height:         Temp (24hrs), Av 4 °F (37 4 °C), Min:98 2 °F (36 8 °C), Max:100 °F (37 8 °C)  Current: Temperature: 99 3 °F (37 4 °C)  /64 (BP Location: Left arm)   Pulse 97   Temp 99 3 °F (37 4 °C)   Resp 18   Ht 5' 3" (1 6 m)   Wt 90 5 kg (199 lb 8 3 oz)   SpO2 100%   BMI 35 34 kg/m²      Invasive/non-invasive ventilation settings   Respiratory    Lab Data (Last 4 hours)    None         O2/Vent Data (Last 4 hours)       0306          Non-Invasive Ventilation Mode BiPAP                   Height and Weights   Height: 5' 3" (160 cm)  IBW: 52 4 kg  Body mass index is 35 34 kg/m²  Weight (last 2 days)     Date/Time   Weight    01/10/20 0600   90 5 (199 52)    01/10/20 0500   90 5 (199 52)                Intake and Output  I/O        07 - 01/10 0700 01/10 07 -  0700    P  O   0    I V  (mL/kg) 1118 1 (12 4) 353 1 (3 9)    NG/ 180    IV Piggyback 350 350    Feedings 1065 178    Total Intake(mL/kg) 3178 1 (35 1) 1061 1 (11 7)    Urine (mL/kg/hr) 1875 (0 9) 3200 (1 5)    Stool 0 0    Total Output 1875 3200    Net +1303 1 -2138 9          Unmeasured Stool Occurrence 4 x 4 x            Nutrition       Diet Orders   (From admission, onward)             Start     Ordered    01/10/20 1907  Diet NPO  Diet effective now     Question Answer Comment   Diet Type NPO    RD to adjust diet per protocol?  No        01/10/20 1906                  Laboratory and Diagnostics:  Results from last 7 days   Lab Units 20  0448 01/10/20  1050 01/10/20  0435 20  0413 20  1407 20  0435 20  0656 20  0439  20  1126   WBC Thousand/uL 8 20  --  7 21 8 82  --  9 15 25 93* 13 32*  --  11 36*   HEMOGLOBIN g/dL 11 8  --  11 5 11 0* 10 8* 11 1* 14 5 14 8  --  15 0   I STAT HEMOGLOBIN g/dl  --  11 2*  --   --   --   --   --   --    < >  --    HEMATOCRIT % 39 1  --  38 5 37 6  --  36 4 49 9* 50 1*  -- 49 0*   HEMATOCRIT, ISTAT %  --  33*  --   --   --   --   --   --    < >  --    PLATELETS Thousands/uL 246  --  224 240  --  225 357 423*  --  346   NEUTROS PCT %  --   --   --   --   --  87* 92* 87*  --  82*   MONOS PCT %  --   --   --   --   --  6 5 3*  --  7    < > = values in this interval not displayed  Results from last 7 days   Lab Units 01/10/20  1837 01/10/20  1050 01/10/20  0435 01/09/20  0413 01/08/20  0435 01/07/20  1736 01/07/20  1051 01/07/20  0656  01/06/20  1126   SODIUM mmol/L 150*  --  148* 144 144 142 141 141   < > 144   POTASSIUM mmol/L 3 5  --  4 1 3 8 3 7 4 1 5 3 7 1*   < > 3 0*   CHLORIDE mmol/L 108  --  108 109* 108 108 106 106   < > 102   CO2 mmol/L 41*  --  35* 30 28 27 25 32   < > 32   CO2, I-STAT mmol/L  --  33*  --   --   --   --   --   --   --   --    ANION GAP mmol/L 1*  --  5 5 8 7 10 3*   < > 10   BUN mg/dL 32*  --  33* 25 18 20 20 17   < > 15   CREATININE mg/dL 0 76  --  0 76 0 81 0 80 0 86 0 94 1 09   < > 0 85   CALCIUM mg/dL 7 7*  --  7 9* 7 9* 7 9* 8 2* 7 9* 7 5*   < > 8 4   GLUCOSE RANDOM mg/dL 108  --  99 160* 170* 231* 244* 230*   < > 153*   ALT U/L  --   --  125* 81* 107*  --   --   --   --  33   AST U/L  --   --  47* 36 58*  --   --   --   --  29   ALK PHOS U/L  --   --  59 60 70  --   --   --   --  99   ALBUMIN g/dL  --   --  2 6* 2 6* 2 6*  --   --   --   --  3 9   TOTAL BILIRUBIN mg/dL  --   --  0 30 0 20 0 20  --   --   --   --  0 50    < > = values in this interval not displayed       Results from last 7 days   Lab Units 01/10/20  0435 01/09/20  0413 01/08/20  0435   MAGNESIUM mg/dL 2 5 2 4 2 3   PHOSPHORUS mg/dL  --  3 9 2 1*      Results from last 7 days   Lab Units 01/06/20  1126   INR  1 02   PTT seconds 25      Results from last 7 days   Lab Units 01/06/20  1126   TROPONIN I ng/mL 0 04     Results from last 7 days   Lab Units 01/07/20  0440 01/06/20  1126   LACTIC ACID mmol/L 1 1 1 8     ABG:  Results from last 7 days   Lab Units 01/10/20  1710   PH ART  7 406 PCO2 ART mm Hg 61 6*   PO2 ART mm Hg 92 1   HCO3 ART mmol/L 37 8*   BASE EXC ART mmol/L 10 9   ABG SOURCE  Artery     VBG:  Results from last 7 days   Lab Units 01/10/20  1710  01/06/20  1126   PH RENETTA   --   --  7 251*   PCO2 RENETTA mm Hg  --   --  71 4*   PO2 RENETTA mm Hg  --   --  50 7*   HCO3 RENETTA mmol/L  --   --  30 7*   BASE EXC RENETTA mmol/L  --   --  1 1   ABG SOURCE  Artery   < >  --     < > = values in this interval not displayed  Results from last 7 days   Lab Units 01/07/20  0656 01/06/20  1126   PROCALCITONIN ng/ml 0 09 0 11       Micro  Results from last 7 days   Lab Units 01/07/20  0730 01/06/20  1126   BLOOD CULTURE   --  No Growth After 4 Days  No Growth After 4 Days  LEGIONELLA URINARY ANTIGEN  Negative  --    STREP PNEUMONIAE ANTIGEN, URINE  Negative  --        EKG:  Sinus rhythm  Imaging: I have personally reviewed pertinent reports        Active Medications  Scheduled Meds:  Current Facility-Administered Medications:  acetaminophen 650 mg Oral Q6H PRN Gómez Young PA-C    aspirin 81 mg Oral Daily Noni Soulier, MD    chlorhexidine 15 mL Swish & Spit Q12H [facility] Raciel Cornejo PA-C    clopidogrel 75 mg Oral Daily Noni Soulier, MD    co-enzyme Q-10 90 mg Oral Daily Noni Soulier, MD    famotidine 20 mg Intravenous Q12H [facility] HAVEN Rodas    fluticasone-vilanterol 1 puff Inhalation Daily Gómez Young PA-C    heparin (porcine) 5,000 Units Subcutaneous [facility] Gómez Young PA-C    insulin regular (HumuLIN R,NovoLIN R) infusion 0 3-21 Units/hr Intravenous Titrated Raciel Cornejo PA-C Last Rate: 2 Units/hr (01/11/20 0353)   ipratropium-albuterol 3 mL Nebulization Q4H Henrique Rabago MD    levothyroxine 50 mcg Oral Daily Noni Soulier, MD    methylPREDNISolone sodium succinate 60 mg Intravenous [facility] HAVEN Rodas    metoprolol tartrate 25 mg Oral Q12H [facility] Gómez Young PA-C    OLANZapine 5 mg Oral HS HAVEN Rodas      Continuous Infusions:    insulin regular (HumuLIN R,NovoLIN R) infusion 0 3-21 Units/hr Last Rate: 2 Units/hr (01/11/20 0353)     PRN Meds:     acetaminophen 650 mg Q6H PRN       Allergies   Allergies   Allergen Reactions    Iv Contrast [Iodinated Diagnostic Agents]      Pt states that she was told many years ago that when she was given contrast dye she had a reaction, but does not know what  She said she is always prepped prior to having dye and she asked that I list this as an allergy in her chart   Pletal [Cilostazol] Diarrhea and Vomiting    Statins Other (See Comments)     Severe muscle cramps-- cannot move    Xarelto [Rivaroxaban] Other (See Comments)     Thinks she shouldn't take because she has an artificial valve  Also, made her "WOOZY"    Iohexol Other (See Comments)     Pt does not recall       Advance Directive and Living Will: Yes    Power of :    POLST:        Counseling / Coordination of Care  Total time spent today 34 minutes  Greater than 50% of total time was spent with the patient and / or family counseling and / or coordination of care  A description of the counseling / coordination of care: HAVEN Mccarthy        Portions of the record may have been created with voice recognition software  Occasional wrong word or "sound a like" substitutions may have occurred due to the inherent limitations of voice recognition software    Read the chart carefully and recognize, using context, where substitutions have occurred

## 2020-01-11 NOTE — RESPIRATORY THERAPY NOTE
01/11/20 0810   Non-Invasive Information   Interface HFNC prongs   Non-Invasive Ventilation Mode HFNC   SpO2 97 %   $ Pulse Oximetry Spot Check Charge Completed   Non-Invasive Settings   FiO2 (%) 35   Flow (lpm) 45   Temperature (Set) 31   Humidification   (heater)   Non-Invasive Readings   Skin Intervention Skin intact   Total Rate 30   Maintenance   Water bag changed No       Weaned fio2 to 35%  Spo2 remains 97% post wean  Trialed patient on 55L/M for wob  No change in wob  Left at 45 L/M

## 2020-01-12 NOTE — PLAN OF CARE
Problem: SAFETY,RESTRAINT: NV/NON-SELF DESTRUCTIVE BEHAVIOR  Goal: Remains free of harm/injury (restraint for non violent/non self-detsructive behavior)  Description  INTERVENTIONS:  - Instruct patient/family regarding restraint use   - Assess and monitor physiologic and psychological status   - Provide interventions and comfort measures to meet assessed patient needs   - Identify and implement measures to help patient regain control  - Assess readiness for release of restraint   Outcome: Completed  Goal: Returns to optimal restraint-free functioning  Description  INTERVENTIONS:  - Assess the patient's behavior and symptoms that indicate continued need for restraint  - Identify and implement measures to help patient regain control  - Assess readiness for release of restraint   Outcome: Completed

## 2020-01-12 NOTE — ASSESSMENT & PLAN NOTE
Remains dependent on BiPAP, wean as tolerated  Precedex weaning in favor of Catapres patch for anxiety/agitation  Tolerating well

## 2020-01-12 NOTE — RESPIRATORY THERAPY NOTE
Pt remains on continuous BiPAP, pt has remained on BiPAP throughout that night with no changes being made  Pt to continue on BiPAP until orders are changed         01/12/20 0358   Non-Invasive Information   Interface Face mask   Non-Invasive Ventilation Mode BiPAP   SpO2 100 %   $ Pulse Oximetry Spot Check Charge Completed   Non-Invasive Settings   FiO2 (%) 40   Temperature (Set) 31   IPAP (cm) 10 cm   EPAP (cm) 5 cm   Rate (Set) 8   Pressure Support (cm H2O) 5   Rise Time 2   Trigger Sensitivity Flow (lpm) 2   Inspiratory Time (Set) 0 85   Humidification   (heater)   Non-Invasive Readings   Heater Temperature (Obs) 30 2   Skin Intervention Skin intact   Total Rate 20   MV (Mech) 5 3   Peak Pressure (Obs) 10   Spontaneous Vt (mL) 316   Leak (lpm) 52   Non-Invasive Alarms   Insp Pressure High (cm H20) 25   Insp Pressure Low (cm H20) 6   Low Insp Pressure Time (sec) 20 sec   MV Low (L/min) 4   Vt High (mL) 1100   Vt Low (mL) 200   High Resp Rate (BPM) 35 BPM   Low Resp Rate (BPM) 8 BPM   Apnea Interval (sec) 20   Apnea Rate 8   Apnea Pressure 14   Maintenance   Water bag changed Yes

## 2020-01-12 NOTE — PROGRESS NOTES
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)  Assessment & Plan  Nutrition counseling    Hypertension  Assessment & Plan  Continue Lopressor      COPD exacerbation ([facility] [address] )  Assessment & Plan  - wears 1 - 2L NC O2 at home, currently requiring BiPAP  - continue solumedrol 60mg Q8  - albuterol 5mg Q4, w PRN nebs    * Acute on chronic respiratory failure with hypoxia and hypercapnia (HCC)  Assessment & Plan  Remains dependent on BiPAP, wean as tolerated  Precedex weaning in favor of Catapres patch for anxiety/agitation  Tolerating well  Daily Progress Note - Critical Care   Gail Dunn 81 y o  female MRN: [id number]  Unit/Bed#:  Encounter: [id number]        ----------------------------------------------------------------------------------------  HPI/24hr events: Tolerated several hours off BiPAP yesterday, however anxiety lead to increased work of breathing  Placed back on BiPAP overnight  Precedex restarted, and weaning in favor of Catapres patch  Right hand with ecchymosis and intact capillary refill less than 3 seconds    Unclear etiology  ---------------------------------------------------------------------------------------  SUBJECTIVE  No complaints offered  Review of Systems  Review of systems was reviewed and negative unless stated above in HPI/24-hour events   ---------------------------------------------------------------------------------------  Disposition: Continue Stepdown Level 1 level of care   Code Status: Level 1 - Full Code  ---------------------------------------------------------------------------------------  ICU CORE MEASURES    PT/OT when appropriate  Encourage IS, early mobilization to prevent atelectasis    Prophylaxis   VTE Pharmacologic Prophylaxis: Heparin  VTE Mechanical Prophylaxis: sequential compression device  Stress Ulcer Prophylaxis: Prophylaxis Not Indicated       Invasive Devices Review  Invasive Devices     Peripherally Inserted Central Catheter Line            PICC Line 03/23/36 Right Cephalic 2 days          Peripheral Intravenous Line            Peripheral IV 01/10/20 Left Antecubital 1 day              Can any invasive devices be discontinued today? Not applicable  ---------------------------------------------------------------------------------------  OBJECTIVE    GEN:  Ill appearing, appears stated age, no acute distress  HEENT:  Sclera anicteric, mucous membranes pink and moist, conjunctiva pink, no fazal/rhinorrhea  Neck:  No lymphadenopathy, normal ROM, supple, no bruits  CV :  S1S2, regular, no  murmurs, rubs or gallops  Intact distal pulses  No JVD  Resp:  Lungs diminished with expiratory wheezing  No subq air or crepitus  Symmetrical expansion  No cough noted    GI :  Abd soft, nontender, no guarding/rebound, nondistended, hypoactive bowel sounds X4 quads, no organomegaly appreciated  Neuro:  CN II-XII grossly intact, nonfocal exam, speech clear, GCS 15  Psych:  Anxious affect        Vitals   Vitals:    01/11/20 2200 01/11/20 2330 01/12/20 0000 01/12/20 0200   BP: 154/75  93/55 114/58 BP Location: Left arm  Left arm Left arm   Pulse: (!) 116  61 76   Resp: (!) 35  20 21   Temp:   98 5 °F (36 9 °C) 98 6 °F (37 °C)   TempSrc:   Oral Oral   SpO2: 98% 98% 99% 100%   Weight:       Height:         Temp (24hrs), Av 7 °F (37 1 °C), Min:97 7 °F (36 5 °C), Max:99 3 °F (37 4 °C)  Current: Temperature: 98 6 °F (37 °C)  /58 (BP Location: Left arm)   Pulse 76   Temp 98 6 °F (37 °C) (Oral)   Resp 21   Ht 5' 3" (1 6 m)   Wt 94 kg (207 lb 3 7 oz)   SpO2 100%   BMI 36 71 kg/m²      Invasive/non-invasive ventilation settings   Respiratory    Lab Data (Last 4 hours)    None         O2/Vent Data (Last 4 hours)    None                Height and Weights   Height: 5' 3" (160 cm)  IBW: 52 4 kg  Body mass index is 36 71 kg/m²  Weight (last 2 days)     Date/Time   Weight    20 0600   94 (207 23)    01/10/20 0600   90 5 (199 52)    01/10/20 0500   90 5 (199 52)                Intake and Output  I/O       01/10 07 -  0700  07 -  0700    P  O  0 50    I V  (mL/kg) 353 1 (3 8) 787 2 (8 4)    NG/     IV Piggyback 350     Feedings 178     Total Intake(mL/kg) 1061 1 (11 3) 837 2 (8 9)    Urine (mL/kg/hr) 3200 (1 4) 1125 (0 5)    Stool 0 0    Total Output 3200 1125    Net -2138 9 -287 8          Unmeasured Stool Occurrence 4 x 4 x            Nutrition       Diet Orders   (From admission, onward)             Start     Ordered    20 0934  Diet NPO; Sips with meds  Diet effective now     Question Answer Comment   Diet Type NPO    NPO Except: Sips with meds    RD to adjust diet per protocol?  No        20 0933                  Laboratory and Diagnostics:  Results from last 7 days   Lab Units 20  1433 20  0448 01/10/20  1050 01/10/20  0435 20  0413 20  1407 20  0435 20  0656 20  0439  20  1126   WBC Thousand/uL  --  8 20  --  7 21 8 82  --  9 15 25 93* 13 32*  --  11 36*   HEMOGLOBIN g/dL  --  11 8  --  11 5 11 0* 10 8* 11 1* 14 5 14 8  --  15 0   I STAT HEMOGLOBIN g/dl 15 0  --  11 2*  --   --   --   --   --   --    < >  --    HEMATOCRIT %  --  39 1  --  38 5 37 6  --  36 4 49 9* 50 1*  --  49 0*   HEMATOCRIT, ISTAT % 44  --  33*  --   --   --   --   --   --    < >  --    PLATELETS Thousands/uL  --  246  --  224 240  --  225 357 423*  --  346   NEUTROS PCT %  --   --   --   --   --   --  87* 92* 87*  --  82*   MONOS PCT %  --   --   --   --   --   --  6 5 3*  --  7    < > = values in this interval not displayed  Results from last 7 days   Lab Units 01/11/20  1433 01/11/20  1414 01/11/20  0448 01/10/20  1837 01/10/20  1050 01/10/20  0435 01/09/20  0413 01/08/20  0435 01/07/20  1736  01/06/20  1126   SODIUM mmol/L  --  149* 151* 150*  --  148* 144 144 142   < > 144   POTASSIUM mmol/L  --  4 1 4 0 3 5  --  4 1 3 8 3 7 4 1   < > 3 0*   CHLORIDE mmol/L  --  107 108 108  --  108 109* 108 108   < > 102   CO2 mmol/L  --  38* 40* 41*  --  35* 30 28 27   < > 32   CO2, I-STAT mmol/L 39*  --   --   --  33*  --   --   --   --   --   --    ANION GAP mmol/L  --  4 3* 1*  --  5 5 8 7   < > 10   BUN mg/dL  --  36* 37* 32*  --  33* 25 18 20   < > 15   CREATININE mg/dL  --  0 68 0 81 0 76  --  0 76 0 81 0 80 0 86   < > 0 85   CALCIUM mg/dL  --  8 1* 7 9* 7 7*  --  7 9* 7 9* 7 9* 8 2*   < > 8 4   GLUCOSE RANDOM mg/dL  --  201* 115 108  --  99 160* 170* 231*   < > 153*   ALT U/L  --   --  96*  --   --  125* 81* 107*  --   --  33   AST U/L  --   --  31  --   --  47* 36 58*  --   --  29   ALK PHOS U/L  --   --  54  --   --  59 60 70  --   --  99   ALBUMIN g/dL  --   --  2 6*  --   --  2 6* 2 6* 2 6*  --   --  3 9   TOTAL BILIRUBIN mg/dL  --   --  0 40  --   --  0 30 0 20 0 20  --   --  0 50    < > = values in this interval not displayed       Results from last 7 days   Lab Units 01/10/20  0435 01/09/20  0413 01/08/20  0435   MAGNESIUM mg/dL 2 5 2 4 2 3   PHOSPHORUS mg/dL  --  3 9 2 1*      Results from last 7 days   Lab Units 01/06/20  1126   INR  1 02 PTT seconds 25      Results from last 7 days   Lab Units 01/06/20  1126   TROPONIN I ng/mL 0 04     Results from last 7 days   Lab Units 01/07/20  0440 01/06/20  1126   LACTIC ACID mmol/L 1 1 1 8     ABG:  Results from last 7 days   Lab Units 01/10/20  1710   PH ART  7 406   PCO2 ART mm Hg 61 6*   PO2 ART mm Hg 92 1   HCO3 ART mmol/L 37 8*   BASE EXC ART mmol/L 10 9   ABG SOURCE  Artery     VBG:  Results from last 7 days   Lab Units 01/10/20  1710  01/06/20  1126   PH RENETTA   --   --  7 251*   PCO2 RENETTA mm Hg  --   --  71 4*   PO2 RENETTA mm Hg  --   --  50 7*   HCO3 RENETTA mmol/L  --   --  30 7*   BASE EXC RENETTA mmol/L  --   --  1 1   ABG SOURCE  Artery   < >  --     < > = values in this interval not displayed  Results from last 7 days   Lab Units 01/07/20  0656 01/06/20  1126   PROCALCITONIN ng/ml 0 09 0 11       Micro  Results from last 7 days   Lab Units 01/07/20  0730 01/06/20  1126   BLOOD CULTURE   --  No Growth After 5 Days  No Growth After 5 Days  LEGIONELLA URINARY ANTIGEN  Negative  --    STREP PNEUMONIAE ANTIGEN, URINE  Negative  --        EKG:  Sinus rhythm  Imaging: I have personally reviewed pertinent reports        Active Medications  Scheduled Meds:  Current Facility-Administered Medications:  acetaminophen 650 mg Oral Q6H PRN José Miguel Duckworth PA-C    aspirin 81 mg Oral Daily Lilibeth Richards MD    chlorhexidine 15 mL Swish [employer] Spit Q12H [address] Linden Sarabia PA-C    cloNIDine 0 1 mg Transdermal Weekly HAVEN Melissa    clopidogrel 75 mg Oral Daily Lilibeth Richards MD    co-enzyme Q-10 90 mg Oral Daily Lilibeth Richards MD    dexmedetomidine 0 1-0 7 mcg/kg/hr Intravenous Titrated HAVEN Melissa Last Rate: 0 2 mcg/kg/hr (01/12/20 0137)   dextrose 50 mL/hr Intravenous Continuous HAVEN Long Last Rate: 50 mL/hr (01/11/20 0937)   famotidine 20 mg Oral BID HAVEN Long    fluticasone-vilanterol 1 puff Inhalation Daily José Miguel Duckworth PA-C    heparin (porcine) 5,000 Units Subcutaneous [facility] Latonya Luna PA-C    hydrALAZINE 10 mg Intravenous Q6H PRN Karole Right, CRNP    insulin regular (HumuLIN R,NovoLIN R) infusion 0 3-21 Units/hr Intravenous Titrated Kelly Ramirez PA-C Last Rate: 1 5 Units/hr (01/12/20 0157)   ipratropium-albuterol 3 mL Nebulization Q4H Telma Gomez MD    levothyroxine 50 mcg Oral Daily Champ Guerrero MD    LORazepam 0 25 mg Oral BID PRN Karole Right, CRNP    methylPREDNISolone sodium succinate 40 mg Intravenous Q8H [address] Karole Right, CRNP    metoprolol tartrate 25 mg Oral Q12H [address] Latonya Luna PA-C    OLANZapine 5 mg Oral HS Leena Black, HAVEN      Continuous Infusions:    dexmedetomidine 0 1-0 7 mcg/kg/hr Last Rate: 0 2 mcg/kg/hr (01/12/20 0137)   dextrose 50 mL/hr Last Rate: 50 mL/hr (01/11/20 0937)   insulin regular (HumuLIN R,NovoLIN R) infusion 0 3-21 Units/hr Last Rate: 1 5 Units/hr (01/12/20 0157)     PRN Meds:     acetaminophen 650 mg Q6H PRN   hydrALAZINE 10 mg Q6H PRN   LORazepam 0 25 mg BID PRN       Allergies   Allergies   Allergen Reactions    Iv Contrast [Iodinated Diagnostic Agents]      Pt states that she was told many years ago that when she was given contrast dye she had a reaction, but does not know what  She said she is always prepped prior to having dye and she asked that I list this as an allergy in her chart   Pletal [Cilostazol] Diarrhea and Vomiting    Statins Other (See Comments)     Severe muscle cramps-- cannot move    Xarelto [Rivaroxaban] Other (See Comments)     Thinks she shouldn't take because she has an artificial valve  Also, made her "WOOZY"    Iohexol Other (See Comments)     Pt does not recall       Advance Directive and Living Will: Yes    Power of :    POLST:        Counseling / Coordination of Care  Total time spent today 34 minutes  Greater than 50% of total time was spent with the patient and / or family counseling and / or coordination of care   A description of the counseling / coordination of care:        [facility], HAVEN        Portions of the record may have been created with voice recognition software  Occasional wrong word or "sound a like" substitutions may have occurred due to the inherent limitations of voice recognition software    Read the chart carefully and recognize, using context, where substitutions have occurred

## 2020-01-12 NOTE — RESPIRATORY THERAPY NOTE
Pt currently remains on BiPAP, tx are being given via aerogen thought the BiPAP set up  Pt is constantly pulling her BiPAP mask off of her face and not getting the benefits of the machine, Pt is being constantly reminded of the importance of wearing the BiPAP and keeping the mask properly on her face           01/11/20 2031   Non-Invasive Information   Interface Face mask   Non-Invasive Ventilation Mode BiPAP   SpO2 96 %   $ Pulse Oximetry Spot Check Charge Completed   Resp Comments pt remains on BiPAP at this time, pt is constantly pulling the mask off and requiring redirection to keep the mask on    Non-Invasive Settings   FiO2 (%) 40   Temperature (Set) 31   IPAP (cm) 10 cm   EPAP (cm) 5 cm   Rate (Set) 8   Pressure Support (cm H2O) 5   Rise Time 2   Trigger Sensitivity Flow (lpm) 2   Inspiratory Time (Set) 0 85   Humidification   (heater)   Non-Invasive Readings   Heater Temperature (Obs) 31   Skin Intervention Skin intact   Total Rate 32   MV (Mech) 12 5   Peak Pressure (Obs) 12   Spontaneous Vt (mL) 386   Leak (lpm) 35   Non-Invasive Alarms   Insp Pressure High (cm H20) 25   Insp Pressure Low (cm H20) 6   Low Insp Pressure Time (sec) 20 sec   MV Low (L/min) 4   Vt High (mL) 1100   Vt Low (mL) 200   High Resp Rate (BPM) 35 BPM   Low Resp Rate (BPM) 8 BPM   Apnea Interval (sec) 20   Apnea Rate 8   Apnea Pressure 14   Maintenance   Water bag changed Yes

## 2020-01-12 NOTE — ASSESSMENT & PLAN NOTE
- wears 1 - 2L NC O2 at home, currently requiring BiPAP  - continue solumedrol 60mg Q8  - albuterol 5mg Q4, w PRN nebs

## 2020-01-12 NOTE — RESPIRATORY THERAPY NOTE
01/12/20 0918   Non-Invasive Information   Interface HFNC prongs   Non-Invasive Ventilation Mode HFNC   $ CPAP/BiPAP Charge - Initial & Daily Mgmt Yes   SpO2 97 %   $ Pulse Oximetry Spot Check Charge Completed   Non-Invasive Settings   FiO2 (%) 35   Flow (lpm) 50   Temperature (Set) 31   Humidification   (heater)   Non-Invasive Readings   Skin Intervention Skin intact   Total Rate 22   Maintenance   Water bag changed No       Transitioned patient from bipap to HFNC  Pt tolerating well  No apparent distress

## 2020-01-12 NOTE — RESPIRATORY THERAPY NOTE
01/12/20 0819   Non-Invasive Information   Interface Face mask   Non-Invasive Ventilation Mode BiPAP   SpO2 98 %   $ Pulse Oximetry Spot Check Charge Completed   Resp Comments pt awake and alert  no distress  tx given      Non-Invasive Settings   FiO2 (%) 40   Temperature (Set) 31   IPAP (cm) 10 cm   EPAP (cm) 5 cm   Rate (Set) 8   Pressure Support (cm H2O) 5   Rise Time 2   Trigger Sensitivity Flow (lpm) 2   Inspiratory Time (Set) 0 85   Humidification   (heater)   Non-Invasive Readings   Heater Temperature (Obs) 30 1   Skin Intervention Skin intact   Total Rate 23   MV (Mech) 9 4   Peak Pressure (Obs) 10   Spontaneous Vt (mL) 458   Leak (lpm) 11   Non-Invasive Alarms   Insp Pressure High (cm H20) 25   Insp Pressure Low (cm H20) 6   Low Insp Pressure Time (sec) 20 sec   MV Low (L/min) 4   Vt High (mL) 1100   Vt Low (mL) 200   High Resp Rate (BPM) 35 BPM   Low Resp Rate (BPM) 8 BPM   Apnea Interval (sec) 20   Apnea Rate 8   Maintenance   Water bag changed No

## 2020-01-12 NOTE — PLAN OF CARE
Problem: PAIN - ADULT  Goal: Verbalizes/displays adequate comfort level or baseline comfort level  Description  Interventions:  - Encourage patient to monitor pain and request assistance  - Assess pain using appropriate pain scale  - Administer analgesics based on type and severity of pain and evaluate response  - Implement non-pharmacological measures as appropriate and evaluate response  - Consider cultural and social influences on pain and pain management  - Notify physician/advanced practitioner if interventions unsuccessful or patient reports new pain  1/11/2020 2120 by Aditya Hayden RN  Outcome: Progressing  1/11/2020 2106 by Aditya Hayden RN  Outcome: Progressing

## 2020-01-13 PROBLEM — Z51.5 COMFORT MEASURES ONLY STATUS: Status: ACTIVE | Noted: 2020-01-01

## 2020-01-13 NOTE — RESPIRATORY THERAPY NOTE
Pt remains on BiPAP at this time, continue with BiPAP and Q4 resp txs  Pt was trialed on HFNC yesterday for 4 hours, possible attempt to see if patient is able to tolerate an extended period of time on the HFNC today  At this time continue with current BiPAP orders         01/13/20 0419   Non-Invasive Information   Interface Face mask   Non-Invasive Ventilation Mode BiPAP   SpO2 96 %   Non-Invasive Settings   FiO2 (%) 30   Temperature (Set) 31   IPAP (cm) 10 cm   EPAP (cm) 5 cm   Rate (Set) 8   Pressure Support (cm H2O) 5   Rise Time 2   Trigger Sensitivity Flow (lpm) 2   Inspiratory Time (Set) 0 85   Humidification   (heater)   Non-Invasive Readings   Heater Temperature (Obs) 30 9   Skin Intervention Skin intact   Total Rate 17   MV (Mech) 7 7   Peak Pressure (Obs) 10   Spontaneous Vt (mL) 454   Leak (lpm) 40   Non-Invasive Alarms   Insp Pressure High (cm H20) 25   Insp Pressure Low (cm H20) 6   Low Insp Pressure Time (sec) 20 sec   MV Low (L/min) 4   Vt High (mL) 1100   Vt Low (mL) 200   High Resp Rate (BPM) 35 BPM   Low Resp Rate (BPM) 8 BPM   Apnea Interval (sec) 20   Apnea Rate 8   Apnea Pressure 14   Maintenance   Water bag changed No

## 2020-01-13 NOTE — ASSESSMENT & PLAN NOTE
- wears 1 - 2L NC O2 at home, currently requiring BiPAP/HFNC  - continue weaning Solu-Medrol  - continue bronchodilators

## 2020-01-13 NOTE — QUICK NOTE
Critical Care Advanced Planning Meeting      I had multiple conversations with the patient and her daughter throughout the day  On exam this morning she was in Afib with RVR, tachypneic, with use of accessory muscles  She appeared tired, anxious with a tenuous respiratory status  She has known severe COPD and was admitted with RSV Pneumonia  Post extubation her course has been mitzi with continued severe bronchospasm despite frequent nebulizer treatments and high dose steroids  We discussed reintubation and the likelihood of prolonged mechanical ventilation and tracheostomy  The patient has an advanced directive that explicitly states she would not want any form of prolonged life support including tracheostomy, PEG tube, or artificial nutrition  The patient and her daughter both had time to reflect on this and the likelihood that the patient will not improve without intubation  They would like to transition to comfort care  Code status is now DNR/DNI  We will wean patient off Bipap and HFNC, provide comfort feeds, and appropriate medications to treat symptoms of shortness of breath or anxiety      Critical Care Time: (45) min    Jesus Diego MD  Critical Care Attending

## 2020-01-13 NOTE — PALLIATIVE CARE CONFERENCE
Palliative team consulted for [address] and symptom management  Spoke with critical care prior to visiting pt  Her current respiratory status is concerning and re-intubation may be required  Dr Tavares Fish and this writer met with pt at bedside  She was in distress exhibiting anxiety and significant work of breathing  She was alert and oriented and able to answer simple questions  When asked if she wants to be put back on ventilator, she replies "I don't know?" "I want to fight" and "Am I not going to make it out?"  Dr Tavares Fish explained concerns related to her respiratory status  She did not provide definitive decision about escalating care if needed  It appears her daughter, Ade Thomas is her healthcare representative per scanned advanced directive  Pt has another daughter, Edvin Kendall who requires care  She is currently with Ade Thomas, per pt  Updated critical care and RN Case Manager after visit  PSC will continue to follow

## 2020-01-13 NOTE — RESPIRATORY THERAPY NOTE
Pt taken off of HFNC and placed back on BiPAP for use overnight  Continue with current orders         01/12/20 2038   Non-Invasive Information   Interface Face mask   Non-Invasive Ventilation Mode BiPAP   $ Pulse Oximetry Spot Check Charge Completed   Resp Comments placed pt back on BiPAP for HS   Non-Invasive Settings   FiO2 (%) 30   Temperature (Set) 31   IPAP (cm) 10 cm   EPAP (cm) 5 cm   Rate (Set) 8   Pressure Support (cm H2O) 5   Rise Time 2   Trigger Sensitivity Flow (lpm) 2   Inspiratory Time (Set) 0 85   Humidification   (heater)   Non-Invasive Readings   Heater Temperature (Obs) 22   Skin Intervention Skin intact   Total Rate 20   MV (Mech) 8 9   Peak Pressure (Obs) 10   Spontaneous Vt (mL) 455   Leak (lpm) 6   Non-Invasive Alarms   Insp Pressure High (cm H20) 25   Insp Pressure Low (cm H20) 6   Low Insp Pressure Time (sec) 20 sec   MV Low (L/min) 4   Vt High (mL) 1100   Vt Low (mL) 200   High Resp Rate (BPM) 35 BPM   Low Resp Rate (BPM) 8 BPM   Apnea Interval (sec) 20   Apnea Rate 8   Apnea Pressure 14   Maintenance   Water bag changed No

## 2020-01-13 NOTE — ASSESSMENT & PLAN NOTE
Remains dependent on BiPAP, wean as tolerated  Attempt alternations with high-flow nasal cannula  Precedex weaning in favor of Catapres patch for anxiety/agitation  Tolerating well

## 2020-01-13 NOTE — CONSULTS
Consultation - Palliative and Supportive Care   Calderon Scales 81 y o  female [id number]    Assessment:     Patient Active Problem List   Diagnosis    COPD exacerbation ([facility] )    Chest pain    Hypertension    Morbid obesity due to excess calories ([facility] )    Coronary artery disease    Chronic radicular pain of lower back    Centrilobular emphysema (HCC)    SOB (shortness of breath)    Mixed hyperlipidemia    Peripheral artery disease ([facility])    Intermittent claudication of both lower extremities due to atherosclerosis ([place] [address] )    S/p TAVR (transcatheter aortic valve replacement), bioprosthetic    Occlusion of common femoral artery ([place] [address] )    Common femoral artery injury, left, sequela    Injury of left femoral artery    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[facility])    Iliac artery stenosis, left ([facility])    LEFT common femoral endarterectomy    Low back pain    Type 2 diabetes mellitus with complication, without long-term current use of insulin ([facility])    Contrast media allergy    Atherosclerosis of other type of bypass graft(s) of the extremities with rest pain, left leg ([facility])    Acute on chronic respiratory failure with hypoxia and hypercapnia ([place] [address] )    RSV infection         Plan:  1  Symptom management -    -  Agree with precedex gtt, clonidine, and PRN ativan under the close supervision of the ICU team   If an adjuvant is needed, could consider adding atarax  Low dose opioid therapy may prove beneficial to this patient in the future however given her current tenuous respiratory status I would defer initiation of new opioid therapy until patient clinically improved  - will add zofran for nausea  - room fan for breathlessness  2  Goals - Currently patient is listed as a level 1 full code  I have reviewed her advanced directive in detail  If patient loses competency her medical decision maker would default to her daughter    ICU to speak with daughter this afternoon, I will follow up depending on patient's clinical progression  Code Status: level 1   Decisional apparatus:  Patient is competent on my exam today  If competence is lost, patient's substitute decision maker would default to daughter [place] 169  Advance Directive / Living Will / POLST:  ON FILE IN EPIC  I have reviewed the patient's controlled substance dispensing history in the Prescription Drug Monitoring Program in compliance with the [facility] regulations before prescribing any controlled substances  We appreciate the invitation to be involved in this patient's care  We will continue to follow  Please do not hesitate to reach our on call provider through our clinic answering service at  should you have acute symptom control concerns  IDENTIFICATION:  Inpatient consult to Palliative Care  Consult performed by: Primo Eldrdige MD  Consult ordered by: Sheridan Perez PA-C        Physician Requesting Consult: Elie Hashimoto, MD  Reason for Consult / Principal Problem: goals of care  Hx and PE limited by: severe dyspnea    HISTORY OF PRESENT ILLNESS:       Yesenia Hou is a 81 y o  female with known severe COPD who presents with dyspnea  Patient was diagnosed with RSV and was intubated  Since extubation, she has been on a mix of hi-flow and BiPAP for respiratory support  Given patient's overall lack of improvement, PSC Consulted to assist with goals of care  Patient is dypsneic on my exam and primarily communicates with head shakes  She endorses SOB  Expresses significant anxiety  No pain  Reluctant to discuss goals of care  She did state she was "a fighter " But when I asked her about reintubation she states "I don't know "  She did confirm to me that the living will is accurate and her daughter is her medical decision maker       Review of Systems   Unable to perform ROS: severe respiratory distress       Past Medical History:   Diagnosis Date    Aneurysm ([address] )     abdominal aortic aneurysm  Aortic valve disease     Aortic valve replaced     TAVR    Atherosclerosis of other type of bypass graft(s) of the extremities with rest pain, left leg ([facility]) 6/14/2019    Bronchiolitis     Cardiac disease     Chest pain     Contrast media allergy 6/11/2019    COPD (chronic obstructive pulmonary disease) ([facility])     Diabetes mellitus ([address] )     Disease of thyroid gland     Dyspnea     Hyperlipidemia     Hypertension     Hypokalemia     Iliac artery stenosis, left ([facility])     Low back pain     Morbid obesity ([facility])     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[facility])     PAD (peripheral artery disease) ([facility])     Rheumatic fever     S/P TAVR (transcatheter aortic valve replacement)     SOB (shortness of breath)     SOB (shortness of breath)     Tachycardia      Past Surgical History:   Procedure Laterality Date    CARDIAC SURGERY      aortic valve replacement    CARDIAC VALVE REPLACEMENT      CHOLECYSTECTOMY      ESOPHAGUS SURGERY N/A     IR ABDOMINAL ANGIOGRAPHY / INTERVENTION  10/25/2018    IR LOWER EXTREMITY / INTERVENTION  6/14/2019    IN SLCTV CATHJ 3RD+ ORD SLCTV ABDL PEL/LXTR [address] [facility] Left 10/25/2018    Procedure: LEFT LEG ANGIOGRAM WITH PLACEMENT OF LEFT EXTERNAL ILIAC ARTERY / LEFT COMMON FEMORAL ARTERIAL STENT, RIGHT FEMORAL ACCESS;  Surgeon: Victorino Ortiz MD;  Location: [facility];  Service: Vascular    IN Birdia Fish 3RD+ ORD SLCTV ABDL PEL/LXTR [address] [facility] Left 6/14/2019    Procedure: Bilateral lower extremity arteriogram, left lower extremity intervention with laser atherectomy angioplasty and stent ;  Surgeon: Marielos Saenz MD;  [facility];  Service: Vascular    IN THROMBOENDARTECTMY FEMORAL COMMON Left 9/21/2018    Procedure: ENDARTERECTOMY ARTERIAL FEMORAL;  Surgeon: Victorino Ortiz MD;  [facility];  Service: Vascular    REPLACEMENT AORTIC VALVE TRANSCATHETER (TAVR)      THROMBECTOMY W/ EMBOLECTOMY Left 9/21/2018    Procedure: EMBOLECTOMY/THROMBECTOMY LOWER EXTREMITY (GROIN EXPLORATION); Surgeon: Robles Ortiz MD;  [facility];  Service: Vascular    VASCULAR SURGERY       Social History     Socioeconomic History    Marital status:      Spouse name: Not on file    Number of children: Not on file    Years of education: Not on file    Highest education level: Not on file   Occupational History    Not on file   Social Needs    Financial resource strain: Not on file    Food insecurity:     Worry: Not on file     Inability: Not on file    Transportation needs:     Medical: Not on file     Non-medical: Not on file   Tobacco Use    Smoking status: Former Smoker     Last attempt to quit: 2005     Years since quitting: 15 0    Smokeless tobacco: Never Used   Substance and Sexual Activity    Alcohol use:  Yes     Alcohol/week: 1 0 - 2 0 standard drinks     Types: 1 - 2 Cans of beer per week     Frequency: Monthly or less     Drinks per session: 1 or 2     Binge frequency: Never     Comment: social    Drug use: Never    Sexual activity: Not Currently   Lifestyle    Physical activity:     Days per week: Not on file     Minutes per session: Not on file    Stress: Not on file   Relationships    Social connections:     Talks on phone: Not on file     Gets together: Not on file     Attends Taoist service: Not on file     Active member of club or organization: Not on file     Attends meetings of clubs or organizations: Not on file     Relationship status: Not on file    Intimate partner violence:     Fear of current or ex partner: Not on file     Emotionally abused: Not on file     Physically abused: Not on file     Forced sexual activity: Not on file   Other Topics Concern    Not on file   Social History Narrative    Not on file     Family History   Problem Relation Age of Onset    Heart disease Mother     Diabetes Mother     Heart disease Father     Diabetes Father        MEDICATIONS / ALLERGIES:    all current active meds have been reviewed and current meds: Current Facility-Administered Medications   Medication Dose Route Frequency    acetaminophen (TYLENOL) oral suspension 650 mg  650 mg Oral Q6H PRN    amiodarone (CORDARONE) 900 mg in dextrose 5 % 500 mL infusion  1 mg/min Intravenous Continuous    Followed by   [address] [place] [address] amiodarone (CORDARONE) 900 mg in dextrose 5 % 500 mL infusion  0 5 mg/min Intravenous Continuous    aspirin chewable tablet 81 mg  81 mg Oral Daily    chlorhexidine (PERIDEX) 0 12 % oral rinse 15 mL  15 mL Swish & Spit Q12H [address]    [START ON 1/18/2020] cloNIDine (CATAPRES-TTS-2) 0 2 mg/24 hr TD weekly patch  0 2 mg Transdermal Weekly    clopidogrel (PLAVIX) tablet 75 mg  75 mg Oral Daily    co-enzyme Q-10 capsule 90 mg  90 mg Oral Daily    dexmedetomidine (PRECEDEX) 400 mcg in sodium chloride 0 9 % 100 mL infusion  0 1-1 2 mcg/kg/hr Intravenous Titrated    diltiazem (CARDIZEM) 125 mg in sodium chloride 0 9 % 125 mL infusion  1-15 mg/hr Intravenous Titrated    famotidine (PEPCID) tablet 20 mg  20 mg Oral BID    fluticasone-vilanterol (BREO ELLIPTA) 100-25 mcg/inh inhaler 1 puff  1 puff Inhalation Daily    gabapentin (NEURONTIN) capsule 100 mg  100 mg Oral BID    heparin (porcine) subcutaneous injection 5,000 Units  5,000 Units Subcutaneous Q8H [address]    hydrALAZINE (APRESOLINE) injection 10 mg  10 mg Intravenous Q6H PRN    insulin regular (HumuLIN R,NovoLIN R) 1 Units/mL in sodium chloride 0 9 % 100 mL infusion  0 3-21 Units/hr Intravenous Titrated    ipratropium-albuterol (DUO-NEB) 0 5-2 5 mg/3 mL inhalation solution 3 mL  3 mL Nebulization Q2H    levothyroxine tablet 50 mcg  50 mcg Oral Daily    lidocaine (LIDODERM) 5 % patch 1 patch  1 patch Topical Daily    LORazepam (ATIVAN) tablet 0 5 mg  0 5 mg Oral Q8H PRN    methylPREDNISolone sodium succinate (Solu-MEDROL) injection 40 mg  40 mg Intravenous Q8H [address]    metoprolol tartrate (LOPRESSOR) tablet 50 mg  50 mg Oral Q12H JUNI    OLANZapine (ZyPREXA ZYDIS) dispersible tablet 5 mg  5 mg Oral HS    ondansetron (ZOFRAN) oral solution 4 mg  4 mg Oral Q8H PRN       Allergies   Allergen Reactions    Iv Contrast [Iodinated Diagnostic Agents]      Pt states that she was told many years ago that when she was given contrast dye she had a reaction, but does not know what  She said she is always prepped prior to having dye and she asked that I list this as an allergy in her chart   Pletal [Cilostazol] Diarrhea and Vomiting    Statins Other (See Comments)     Severe muscle cramps-- cannot move    Xarelto [Rivaroxaban] Other (See Comments)     Thinks she shouldn't take because she has an artificial valve  Also, made her "WOOZY"    Iohexol Other (See Comments)     Pt does not recall       OBJECTIVE:    Physical Exam  Physical Exam   Constitutional: She appears distressed  HENT:   Head: Atraumatic  HFNC   Eyes: Right eye exhibits no discharge  Left eye exhibits no discharge  Pulmonary/Chest: She is in respiratory distress  Abdominal: Soft  She exhibits no distension  Musculoskeletal: She exhibits edema  Neurological: She is alert  Skin: Skin is warm and dry  She is not diaphoretic  Psychiatric:   anxious   Nursing note and vitals reviewed  Lab Results:   I have personally reviewed pertinent labs  , CBC:   Lab Results   Component Value Date    WBC 10 70 (H) 01/13/2020    HGB 11 7 01/13/2020    HCT 38 3 01/13/2020    MCV 91 01/13/2020     01/13/2020    MCH 27 9 01/13/2020    MCHC 30 5 (L) 01/13/2020    RDW 14 1 01/13/2020    MPV 9 2 01/13/2020    NRBC 0 01/13/2020   , CMP:   Lab Results   Component Value Date    SODIUM 148 (H) 01/13/2020    K 3 5 01/13/2020     01/13/2020    CO2 38 (H) 01/13/2020    BUN 28 (H) 01/13/2020    CREATININE 0 67 01/13/2020    CALCIUM 7 9 (L) 01/13/2020    EGFR 91 01/13/2020     Imaging Studies: I personally reviewed relevant reports  EKG, Pathology, and Other Studies: I personally reviewed relevant reports       Counseling / Coordination of Care  Total floor / unit time spent today 30+ minutes  Greater than 50% of total time was spent with the patient and / or family counseling and / or coordination of care   A description of the counseling / coordination of care: chart review, patient assessment, med review, med changes, discussion with ICU team

## 2020-01-14 NOTE — PROGRESS NOTES
Progress Note - Yesenia Hou 1950, 81 y o  female MRN: [id number]    Unit/Bed#:  Encounter: [id number]    Primary Care Provider: HAVEN Rahman   Date and time admitted to hospital: 1/6/2020 11:02 AM    * Comfort measures only status  Assessment & Plan  Continue morphine infusion with PRN morphine/Ativan  Continue Zofran as needed  Discuss change in goals of care with palliative to assist with disposition      ----------------------------------------------------------------------------------------  HPI/24hr events: Patient hemodynamically within normal limits following transition to comfort-focused care, required PRN x1 overnight    Disposition: Comfort Care  Code Status: Level 4 - Comfort Care  ---------------------------------------------------------------------------------------  SUBJECTIVE  None offered, patient on morphine infusion and somnolent    Review of Systems   Unable to perform ROS: Mental status change       ---------------------------------------------------------------------------------------  OBJECTIVE    Physical Exam   Constitutional: She has a sickly appearance  Nasal cannula in place  Eyes:   Pupils equal, pinpoint   Neck: No JVD present  No tracheal deviation present  Cardiovascular: Normal rate and regular rhythm  Pulmonary/Chest: Effort normal  She has decreased breath sounds  Abdominal: Soft  She exhibits no distension  Genitourinary:   Genitourinary Comments: External urine collection system in place   Musculoskeletal: She exhibits edema (scant lower extremity edema)  She exhibits no tenderness  Neurological: She is unresponsive  GCS eye subscore is 1  GCS verbal subscore is 2  GCS motor subscore is 4  Patient on morphine infusion   Skin: Skin is warm and dry         Vitals   Vitals:    01/13/20 1700 01/13/20 1800 01/13/20 1900 01/13/20 2000   BP: (!) 81/43 (!) 88/52 94/55 96/53   BP Location:       Pulse: 62 62 67 76   Resp: (!) 27 (!) 24 (!) 23 20   Temp: TempSrc:       SpO2: 92% 92% 93% 91%   Weight:       Height:         Temp (24hrs), Av 7 °F (37 1 °C), Min:97 7 °F (36 5 °C), Max:99 8 °F (37 7 °C)  Current: Temperature: 97 7 °F (36 5 °C)  Arterial Line BP: 132/54  Arterial Line MAP (mmHg): 76 mmHg    SpO2: SpO2: 91 %, SpO2 Activity: SpO2 Activity: At Rest, SpO2 Device: O2 Device: High flow nasal cannula  O2 Flow Rate (L/min): 50 L/min    Invasive/non-invasive ventilation settings   Respiratory    Lab Data (Last 4 hours)    None         O2/Vent Data (Last 4 hours)    None                Height and Weights   Height: 5' 3" (160 cm)  IBW: 52 4 kg  Body mass index is 36 71 kg/m²  Weight (last 2 days)     Date/Time   Weight    20 0600   94 (207 23)    20 0538   94 2 (207 67)              Intake and Output  I/O        07 -  0700  07 -  0700    P  O  40     I V  (mL/kg) 879 (9 4) 177 8 (1 9)    IV Piggyback  200    Total Intake(mL/kg) 919 (9 8) 377 8 (4)    Urine (mL/kg/hr) 1100 (0 5) 410 (0 2)    Stool 0 0    Total Output 1100 410    Net -181 -32 2          Unmeasured Urine Occurrence  1 x    Unmeasured Stool Occurrence 2 x 1 x          Nutrition       Diet Orders   (From admission, onward)             Start     Ordered    20 1616  Diet Regular; Regular House  Diet effective now     Question Answer Comment   Diet Type Regular    Regular Regular House    RD to adjust diet per protocol?  No        20 1617                Laboratory and Diagnostics:  Results from last 7 days   Lab Units 20  0459 20  0531 20  1433 20  0448 01/10/20  1050 01/10/20  0435 20  0413  20  0435 20  0656 20  0439   WBC Thousand/uL 10 70* 7 07  --  8 20  --  7 21 8 82  --  9 15 25 93* 13 32*   HEMOGLOBIN g/dL 11 7 10 9*  --  11 8  --  11 5 11 0*   < > 11 1* 14 5 14 8   I STAT HEMOGLOBIN g/dl  --   --  15 0  --  11 2*  --   --   --   --   --   --    HEMATOCRIT % 38 3 37 0  --  39 1  --  38 5 37 6  -- 36 4 49 9* 50 1*   HEMATOCRIT, ISTAT %  --   --  44  --  33*  --   --   --   --   --   --    PLATELETS Thousands/uL 251 266  --  246  --  224 240  --  225 357 423*   NEUTROS PCT % 80* 77*  --   --   --   --   --   --  87* 92* 87*   MONOS PCT % 7 9  --   --   --   --   --   --  6 5 3*    < > = values in this interval not displayed  Results from last 7 days   Lab Units 01/13/20  1552 01/13/20  0459 01/12/20  0531 01/11/20  1433 01/11/20  1414 01/11/20  0448 01/10/20  1837  01/10/20  0435 01/09/20  0413 01/08/20  0435   SODIUM mmol/L 147* 148* 143  --  149* 151* 150*  --  148* 144 144   POTASSIUM mmol/L 4 2 3 5 3 7  --  4 1 4 0 3 5  --  4 1 3 8 3 7   CHLORIDE mmol/L 108 108 104  --  107 108 108  --  108 109* 108   CO2 mmol/L 34* 38* 38*  --  38* 40* 41*  --  35* 30 28   CO2, I-STAT mmol/L  --   --   --  39*  --   --   --    < >  --   --   --    ANION GAP mmol/L 5 2* 1*  --  4 3* 1*  --  5 5 8   BUN mg/dL 28* 28* 30*  --  36* 37* 32*  --  33* 25 18   CREATININE mg/dL 0 76 0 67 0 63  --  0 68 0 81 0 76  --  0 76 0 81 0 80   CALCIUM mg/dL 7 6* 7 9* 7 5*  --  8 1* 7 9* 7 7*  --  7 9* 7 9* 7 9*   GLUCOSE RANDOM mg/dL 106 88 288*  --  201* 115 108  --  99 160* 170*   ALT U/L  --   --  181*  --   --  96*  --   --  125* 81* 107*   AST U/L  --   --  64*  --   --  31  --   --  47* 36 58*   ALK PHOS U/L  --   --  54  --   --  54  --   --  59 60 70   ALBUMIN g/dL  --   --  2 4*  --   --  2 6*  --   --  2 6* 2 6* 2 6*   TOTAL BILIRUBIN mg/dL  --   --  0 50  --   --  0 40  --   --  0 30 0 20 0 20    < > = values in this interval not displayed       Results from last 7 days   Lab Units 01/13/20  0459 01/12/20  0531 01/10/20  0435 01/09/20  0413 01/08/20  0435   MAGNESIUM mg/dL 2 5 2 7* 2 5 2 4 2 3   PHOSPHORUS mg/dL 2 4 3 1  --  3 9 2 1*               Results from last 7 days   Lab Units 01/07/20  0440   LACTIC ACID mmol/L 1 1     ABG:  Results from last 7 days   Lab Units 01/10/20  1710   PH ART  7 406   PCO2 ART mm Hg 61 6* PO2 ART mm Hg 92 1   HCO3 ART mmol/L 37 8*   BASE EXC ART mmol/L 10 9   ABG SOURCE  Artery     VBG:  Results from last 7 days   Lab Units 01/10/20  1710   ABG SOURCE  Artery     Results from last 7 days   Lab Units 01/13/20  0459 01/12/20  1040 01/07/20  0656   PROCALCITONIN ng/ml 0 38* 0 50* 0 09       Micro  Results from last 7 days   Lab Units 01/07/20  0730   LEGIONELLA URINARY ANTIGEN  Negative   STREP PNEUMONIAE ANTIGEN, URINE  Negative       EKG: Review of telemetry demonstrates sinus rhythm  Imaging: I have personally reviewed pertinent reports  and I have personally reviewed pertinent films in PACS    Active Medications  Scheduled Meds:  Current Facility-Administered Medications:  LORazepam 0 5 mg Intravenous Q1H PRN Lupe Reyes PA-C    morphine 2 mg/hr Intravenous Continuous Lupe Reyes PA-C Last Rate: 2 mg/hr (01/13/20 1706)   morphine injection 2 mg Intravenous Q10 Min PRN Lupe Reyes PA-C    ondansetron 4 mg Intravenous Q8H PRN Laura West MD      Continuous Infusions:    morphine 2 mg/hr Last Rate: 2 mg/hr (01/13/20 1706)     PRN Meds:     LORazepam 0 5 mg Q1H PRN   morphine injection 2 mg Q10 Min PRN   ondansetron 4 mg Q8H PRN       ---------------------------------------------------------------------------------------  Advance Directive and Living Will: Yes    Power of :    POLST:    ---------------------------------------------------------------------------------------  Counseling / Coordination of Care  Total time spent today 22 minutes  Greater than 50% of total time was spent with the patient and / or family counseling and / or coordination of care  A description of the counseling / coordination of care: plan of care for the day    HAVEN Thomas      Portions of the record may have been created with voice recognition software  Occasional wrong word or "sound a like" substitutions may have occurred due to the inherent limitations of voice recognition software  Read the chart carefully and recognize, using context, where substitutions have occurred

## 2020-01-14 NOTE — DISCHARGE SUMMARY
Discharge Summary - Jacob Katz 81 y o  female MRN: [id number]    Unit/Bed#:  Encounter: [id number] PCP: Matilde Barillas    Admission Date:   Admission Orders (From admission, onward)     Ordered        01/06/20 1440  Inpatient Admission  Once                     Admitting Diagnosis: Respiratory distress [R06 03]  COPD exacerbation ([address] ) [J44 1]  RSV infection [B97 4]    HPI: Jacob Katz is 81year old female who [facility] for evaluation of acute on chronic respiratory failure  Patient past medical history includes severe COPD,DM II, CAD, HTN, HLD, aortic valve s/p TVAR , PAD, and DM  Tumor loaded increased work of breathing and shortness of breath over the past 2 days  At baseline patient utilizes 2 L of home O2 however has noted increased during this time  Upon workup patient was found to be RSV +  She was subsequently admitted to hospitalist service for treatment of multifactorial acute on chronic hypoxic/hypercarbic respiratory failure in the setting of COPD exacerbation and RSV  Procedures Performed:   Orders Placed This Encounter   Procedures    ED ECG Documentation Only    Intubation       Summary of Hospital Course:   Patient was started on high-dose IV steroids and scheduled nebulizer treatments, in conjunction with supportive care for viral infection  Unfortunately within the 1st 24 hours patient experienced acute decompensation  She was noted to be increasingly lethargic with increased work of breathing  ABG was obtained revealing severe respiratory acidosis  Patient was trialed on BiPAP however pCO2 retention worsen requiring emergent intubation  She required high ventilatory support to maintain adequate ventilation  She experienced prolonged period of ventilatory support  She was successfully extubated 1/10/20  Unfortunately following patient experienced worsening respiratory distress    She was trialed between high-flow nasal cannula and BiPAP however experience continued difficulty tolerating noninvasive positive-pressure devices  It was explained to patient that she may require re-intubation she was safely continue treatment from a respiratory standpoint  At that point she was unsure as to whether not intubation would be in line with her goals of care  Family meeting was held with patient and her daughter Janine Rome  After long discussions it was agreed that patient would likely require prolonged intubation with possibility of tracheostomy which would not be something which she would want  The ultimate decision was made to transition goals of care towards comfort  Last rights were provided  Spiritual support offered to family  Patient passed comfortably 20  Our deepest condolences were offered to family  Complications: N/A    Disposition:      Final Diagnosis:   Acute on chronic hypoxic/hypercarbic respiratory failure  COPD exacerbation in setting of severe COPD  RSV  Toxic metabolic encephalopathy  Anxiety    Aortic insufficiency/dose post TAVR  Hypertension  Hyperlipidemia  CAD  PD    Resolved Problems  Date Reviewed: 2020    None          Condition at Time of Death:  Peaceful    Date, Time and Cause of Death    Date of Death:  20  Time of Death:  12:06 PM  Preliminary Cause of Death:  Respiratory failure with hypoxia and hypercapnia ([facility] [address] )  Entered by:  Santiago Young PA-C[MY1 1]     Attribution     MY1 1 Santiago Young PA-C 20 12:12

## 2020-01-14 NOTE — ASSESSMENT & PLAN NOTE
Continue morphine infusion with PRN morphine/Ativan  Continue Zofran as needed  Discuss change in goals of care with palliative to assist with disposition

## 2020-01-14 NOTE — DEATH NOTE
Death Pronouncement Note    Patient identified visually and identification confirmed with identification bracelet  All lines intact  Patient unresponsive to stimuli  No spontaneous respirations  No palpable pulse or audible heart sounds  Pupils fixed bilaterally  Asystolic on two contiguous leads  Code status at time of death: comfort care     Time of death: 12:06    Family present at bedside  Attending notified  Deepest condolences offered to family

## 2020-12-11 NOTE — PROGRESS NOTES
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(s) of the extremities with rest pain, left leg (HCC)  Assessment & Plan  - continue plavix     Chronic radicular pain of lower back  Assessment & Plan  Continue to monitor  Continue gabapentin  No complaints offered at present    Coronary artery disease  Assessment & Plan  - continue asa / plavix      Morbid obesity due to excess calories [facility])  Assessment & Plan  Nutrition counseling    Hypertension  Assessment & Plan  Continue Lopressor and clonidine patch      COPD exacerbation (HCC)  Assessment & Plan  - wears 1 - 2L NC O2 at home, currently requiring BiPAP/HFNC  - continue weaning Solu-Medrol  - continue bronchodilators    * Acute on chronic respiratory failure with hypoxia and hypercapnia (HCC)  Assessment & Plan  Remains dependent on BiPAP, wean as tolerated  Attempt alternations with high-flow nasal cannula  Precedex weaning in favor of Catapres patch for anxiety/agitation  Tolerating well  Daily Progress Note - Dianne [age] y o  female MRN: [id number]  Unit/Bed#:  Encounter: [id number]        ----------------------------------------------------------------------------------------  HPI/24hr events:  Remains dependent on BiPAP, alternating with high-flow nasal cannula  Anxiety continues to be problematic  Attempting clonidine patch to wean off Precedex drip    Continue Zyprexa     ---------------------------------------------------------------------------------------  SUBJECTIVE  Offers no complaints  Review of Systems  Review of systems was reviewed and negative unless stated above in HPI/24-hour events   ---------------------------------------------------------------------------------------  Disposition: Continue Stepdown Level 1 level of care   Code Status: Level 1 - Full Code  ---------------------------------------------------------------------------------------  ICU CORE MEASURES    PT/OT when appropriate  Encourage IS, early mobilization to prevent atelectasis    Prophylaxis   VTE Pharmacologic Prophylaxis: Heparin  VTE Mechanical Prophylaxis: sequential compression device  Stress Ulcer Prophylaxis: Prophylaxis Not Indicated         Invasive Devices Review  Invasive Devices     Peripherally Inserted Central Catheter Line            PICC Line 77/57/82 Right Cephalic 3 days          Peripheral Intravenous Line            Peripheral IV 01/10/20 Left Antecubital 2 days              Can any invasive devices be discontinued today? Not applicable  ---------------------------------------------------------------------------------------  OBJECTIVE    GEN:  Ill appearing, appears stated age, no acute distress  HEENT:  Sclera anicteric, mucous membranes pink and moist, conjunctiva pink, no fazal/rhinorrhea  Neck:  No lymphadenopathy, normal ROM, supple, no bruits  CV :  S1S2, regular, no murmurs, rubs or gallops  Intact distal pulses  No JVD  Resp:  Lungs diminished with expiratory wheezing  No subq air or crepitus  Symmetrical expansion  Dry cough noted    GI :  Abd soft, nontender, no guarding/rebound, nondistended, hypoactive bowel sounds X4 quads, no organomegaly appreciated  Neuro:  CN II-XII grossly intact, nonfocal exam, speech clear, GCS 15  Psych:  Anxious affect        Vitals   Vitals:    01/12/20 2000 01/12/20 2039 01/12/20 2353 01/13/20 0000   BP: 121/74 133/60  122/86 BP Location: Left arm   Left arm   Pulse: 79 61  80   Resp: 19   19   Temp: 98 7 °F (37 1 °C)   98 6 °F (37 °C)   TempSrc: Oral   Oral   SpO2: 96%  96% 95%   Weight:       Height:         Temp (24hrs), Av 7 °F (37 1 °C), Min:98 1 °F (36 7 °C), Max:99 1 °F (37 3 °C)  Current: Temperature: 98 6 °F (37 °C)  /86 (BP Location: Left arm)   Pulse 80   Temp 98 6 °F (37 °C) (Oral)   Resp 19   Ht 5' 3" (1 6 m)   Wt 94 2 kg (207 lb 10 8 oz)   SpO2 95%   BMI 36 79 kg/m²      Invasive/non-invasive ventilation settings   Respiratory    Lab Data (Last 4 hours)    None         O2/Vent Data (Last 4 hours)       2353          Non-Invasive Ventilation Mode BiPAP                   Height and Weights   Height: 5' 3" (160 cm)  IBW: 52 4 kg  Body mass index is 36 79 kg/m²  Weight (last 2 days)     Date/Time   Weight    20 0538   94 2 (207 67)    20 0600   94 (207 23)                Intake and Output  I/O        07 -  0700  07 -  0700    P  O  70 40    I V  (mL/kg) 1138 8 (12 1) 848 2 (9)    Total Intake(mL/kg) 1208 8 (12 8) 888 2 (9 4)    Urine (mL/kg/hr) 1525 (0 7) 800 (0 4)    Stool 0 0    Total Output 1525 800    Net -316 2 +88 2          Unmeasured Stool Occurrence 4 x 2 x            Nutrition       Diet Orders   (From admission, onward)             Start     Ordered    20 0934  Diet NPO; Sips with meds  Diet effective now     Question Answer Comment   Diet Type NPO    NPO Except: Sips with meds    RD to adjust diet per protocol?  No        20 0933                  Laboratory and Diagnostics:  Results from last 7 days   Lab Units 20  0531 20  1433 20  0448 01/10/20  1050 01/10/20  0435 20  0413 20  1407 20  0435 20  0656 20  0439  20  1126   WBC Thousand/uL 7 07  --  8 20  --  7 21 8 82  --  9 15 25 93* 13 32*  --  11 36*   HEMOGLOBIN g/dL 10 9*  --  11 8  --  11 5 11 0* 10 8* 11 1* 14 5 14 8  --  15 0   I STAT HEMOGLOBIN g/dl  --  15 0  --  11 2*  --   --   --   --   --   --    < >  --    HEMATOCRIT % 37 0  --  39 1  --  38 5 37 6  --  36 4 49 9* 50 1*  --  49 0*   HEMATOCRIT, ISTAT %  --  44  --  33*  --   --   --   --   --   --    < >  --    PLATELETS Thousands/uL 266  --  246  --  224 240  --  225 357 423*  --  346   NEUTROS PCT % 77*  --   --   --   --   --   --  87* 92* 87*  --  82*   MONOS PCT % 9  --   --   --   --   --   --  6 5 3*  --  7    < > = values in this interval not displayed  Results from last 7 days   Lab Units 01/12/20  0531 01/11/20  1433 01/11/20  1414 01/11/20  0448 01/10/20  1837 01/10/20  1050 01/10/20  0435 01/09/20  0413 01/08/20  0435  01/06/20  1126   SODIUM mmol/L 143  --  149* 151* 150*  --  148* 144 144   < > 144   POTASSIUM mmol/L 3 7  --  4 1 4 0 3 5  --  4 1 3 8 3 7   < > 3 0*   CHLORIDE mmol/L 104  --  107 108 108  --  108 109* 108   < > 102   CO2 mmol/L 38*  --  38* 40* 41*  --  35* 30 28   < > 32   CO2, I-STAT mmol/L  --  39*  --   --   --  33*  --   --   --   --   --    ANION GAP mmol/L 1*  --  4 3* 1*  --  5 5 8   < > 10   BUN mg/dL 30*  --  36* 37* 32*  --  33* 25 18   < > 15   CREATININE mg/dL 0 63  --  0 68 0 81 0 76  --  0 76 0 81 0 80   < > 0 85   CALCIUM mg/dL 7 5*  --  8 1* 7 9* 7 7*  --  7 9* 7 9* 7 9*   < > 8 4   GLUCOSE RANDOM mg/dL 288*  --  201* 115 108  --  99 160* 170*   < > 153*   ALT U/L 181*  --   --  96*  --   --  125* 81* 107*  --  33   AST U/L 64*  --   --  31  --   --  47* 36 58*  --  29   ALK PHOS U/L 54  --   --  54  --   --  59 60 70  --  99   ALBUMIN g/dL 2 4*  --   --  2 6*  --   --  2 6* 2 6* 2 6*  --  3 9   TOTAL BILIRUBIN mg/dL 0 50  --   --  0 40  --   --  0 30 0 20 0 20  --  0 50    < > = values in this interval not displayed       Results from last 7 days   Lab Units 01/12/20  0531 01/10/20  0435 01/09/20  0413 01/08/20  0435   MAGNESIUM mg/dL 2 7* 2 5 2 4 2 3   PHOSPHORUS mg/dL 3 1  --  3 9 2 1*      Results from last 7 days   Lab Units 01/06/20  1126   INR  1 02   PTT seconds 25      Results from last 7 days   Lab Units 01/06/20  1126   TROPONIN I ng/mL 0 04     Results from last 7 days   Lab Units 01/07/20  0440 01/06/20  1126   LACTIC ACID mmol/L 1 1 1 8     ABG:  Results from last 7 days   Lab Units 01/10/20  1710   PH ART  7 406   PCO2 ART mm Hg 61 6*   PO2 ART mm Hg 92 1   HCO3 ART mmol/L 37 8*   BASE EXC ART mmol/L 10 9   ABG SOURCE  Artery     VBG:  Results from last 7 days   Lab Units 01/10/20  1710  01/06/20  1126   PH RENETTA   --   --  7 251*   PCO2 RENETTA mm Hg  --   --  71 4*   PO2 RENETTA mm Hg  --   --  50 7*   HCO3 RENETTA mmol/L  --   --  30 7*   BASE EXC RENETTA mmol/L  --   --  1 1   ABG SOURCE  Artery   < >  --     < > = values in this interval not displayed  Results from last 7 days   Lab Units 01/12/20  1040 01/07/20  0656 01/06/20  1126   PROCALCITONIN ng/ml 0 50* 0 09 0 11       Micro  Results from last 7 days   Lab Units 01/07/20  0730 01/06/20  1126   BLOOD CULTURE   --  No Growth After 5 Days  No Growth After 5 Days  LEGIONELLA URINARY ANTIGEN  Negative  --    STREP PNEUMONIAE ANTIGEN, URINE  Negative  --        EKG:  Sinus rhythm  Imaging: I have personally reviewed pertinent reports        Active Medications  Scheduled Meds:  Current Facility-Administered Medications:  acetaminophen 650 mg Oral Q6H PRN Romana Tracey PA-C    aspirin 81 mg Oral Daily Cali Kingsley MD    chlorhexidine 15 mL Swish [employer] Spit Q12H [address] Abeba Grullon PA-C    cloNIDine 0 1 mg Transdermal Weekly HAVEN Christianson    clopidogrel 75 mg Oral Daily Cali Kingsley MD    co-enzyme Q-10 90 mg Oral Daily Cali Kingsley MD    dexmedetomidine 0 1-0 7 mcg/kg/hr Intravenous Titrated HAVEN Christianson Last Rate: 0 2 mcg/kg/hr (01/13/20 0008)   famotidine 20 mg Oral BID Dewaine Socks, HAVEN    fluticasone-vilanterol 1 puff Inhalation Daily Romana Alexy, PA-C    gabapentin 100 mg Oral BID Jocelynn Santiago MD    heparin (porcine) 5,000 Units Subcutaneous [facility] Lyudmila Moe PA-C    hydrALAZINE 10 mg Intravenous Q6H PRN Florentin Fallow, CRNP    insulin regular (HumuLIN R,NovoLIN R) infusion 0 3-21 Units/hr Intravenous Titrated Efrem Chen PA-C Last Rate: 1 5 Units/hr (01/13/20 0218)   ipratropium-albuterol 3 mL Nebulization Q4H Ric Moss MD    levothyroxine 50 mcg Oral Daily Megan Newman MD    lidocaine 1 patch Topical Daily Ric Moss MD    LORazepam 0 25 mg Oral BID PRN Florentin Fallow, CRNP    methylPREDNISolone sodium succinate 40 mg Intravenous [facility] Florentin Fallow, CRNP    metoprolol tartrate 25 mg Oral Q12H [address] Lyudmila Moe PA-C    OLANZapine 5 mg Oral HS HAVEN Rodas      Continuous Infusions:    dexmedetomidine 0 1-0 7 mcg/kg/hr Last Rate: 0 2 mcg/kg/hr (01/13/20 0008)   insulin regular (HumuLIN R,NovoLIN R) infusion 0 3-21 Units/hr Last Rate: 1 5 Units/hr (01/13/20 0218)     PRN Meds:     acetaminophen 650 mg Q6H PRN   hydrALAZINE 10 mg Q6H PRN   LORazepam 0 25 mg BID PRN       Allergies   Allergies   Allergen Reactions    Iv Contrast [Iodinated Diagnostic Agents]      Pt states that she was told many years ago that when she was given contrast dye she had a reaction, but does not know what  She said she is always prepped prior to having dye and she asked that I list this as an allergy in her chart   Pletal [Cilostazol] Diarrhea and Vomiting    Statins Other (See Comments)     Severe muscle cramps-- cannot move    Xarelto [Rivaroxaban] Other (See Comments)     Thinks she shouldn't take because she has an artificial valve  Also, made her "WOOZY"    Iohexol Other (See Comments)     Pt does not recall       Advance Directive and Living Will: Yes    Power of :    POLST:        Counseling / Coordination of Care  Total time spent today 32 minutes  Greater than 50% of total time was spent with the patient and / or family counseling and / or coordination of care   A description of the counseling / coordination of care:        HAVEN Williamson        Portions of the record may have been created with voice recognition software  Occasional wrong word or "sound a like" substitutions may have occurred due to the inherent limitations of voice recognition software    Read the chart carefully and recognize, using context, where substitutions have occurred n/a

## (undated) DEVICE — STOPCOCK 4 WAY LL HIGH PRESSURE

## (undated) DEVICE — PAD GROUNDING ADULT

## (undated) DEVICE — GLOVE INDICATOR PI UNDERGLOVE SZ 6.5 BLUE

## (undated) DEVICE — ADHESIVE SKN CLSR HISTOACRYL FLEX 0.5ML LF

## (undated) DEVICE — GUIDEWIRE WITH ICE™ HYDROPHILIC COATING: Brand: V-18™ CONTROL WIRE™

## (undated) DEVICE — FLOSEAL HEMOSTATIC MATRIX, 10 ML: Brand: FLOSEAL

## (undated) DEVICE — SUT PROLENE 6-0 BV130 30 IN 8709H

## (undated) DEVICE — 3000CC GUARDIAN II: Brand: GUARDIAN

## (undated) DEVICE — MICROPUNCTURE 501

## (undated) DEVICE — CHLORAPREP HI-LITE 26ML ORANGE

## (undated) DEVICE — RADIFOCUS GLIDEWIRE: Brand: GLIDEWIRE

## (undated) DEVICE — SURGICEL 4 X 8

## (undated) DEVICE — SUT SILK 4-0 18 IN A183H

## (undated) DEVICE — 1200CC GUARDIAN II: Brand: GUARDIAN

## (undated) DEVICE — SUT VICRYL 3-0 SH 27 IN J416H

## (undated) DEVICE — SUT PROLENE 7-0 BV175-6 24 IN M8737

## (undated) DEVICE — PINNACLE R/O II INTRODUCER SHEATH WITH RADIOPAQUE MARKER: Brand: PINNACLE

## (undated) DEVICE — DILATOR: Brand: COOK

## (undated) DEVICE — CATH BAL CHARGER 5 X 60MM X 135CM

## (undated) DEVICE — 3M™ TEGADERM™ TRANSPARENT FILM DRESSING FRAME STYLE, 1626W, 4 IN X 4-3/4 IN (10 CM X 12 CM), 50/CT 4CT/CASE: Brand: 3M™ TEGADERM™

## (undated) DEVICE — COVER PROBE ULTRASOUND

## (undated) DEVICE — Device

## (undated) DEVICE — STERILE FEM POP PACK: Brand: CARDINAL HEALTH

## (undated) DEVICE — PROXIMATE PLUS MD MULTI-DIRECTIONAL RELEASE SKIN STAPLERS CONTAINS 35 STAINLESS STEEL STAPLES APPROXIMATE CLOSED DIMENSIONS: 6.9MM X 3.9MM WIDE: Brand: PROXIMATE

## (undated) DEVICE — BAG DECANTER

## (undated) DEVICE — PENCIL ELECTROSURG E-Z CLEAN -0035H

## (undated) DEVICE — MEDI-VAC YANKAUER SUCTION HANDLE W/STRAIGHT TIP & CONTROL VENT: Brand: CARDINAL HEALTH

## (undated) DEVICE — IV CATH INTROCAN 18G X 1 1/4 SAFETY

## (undated) DEVICE — FOGARTY ARTERIAL EMBOLECTOMY CATHETER 3F 80CM: Brand: FOGARTY

## (undated) DEVICE — SI BRITE TIP F5 11CM: Brand: BRITE TIP

## (undated) DEVICE — GLOVE SRG BIOGEL ECLIPSE 6

## (undated) DEVICE — PERCUTANEOUS ENTRY THINWALL NEEDLE  ONE-PART: Brand: COOK

## (undated) DEVICE — SYRINGE KIT,PACKAGED,,150FT,MK 7(ANGIO-ARTERION, 150ML SYR KIT W/QFT,MC)(60729385): Brand: MEDRAD® MARK 7 ARTERION DISPOSABLE SYRINGE 150 ML WITH QUICK FILL TUBE

## (undated) DEVICE — NYLON DILATOR: Brand: COOK

## (undated) DEVICE — FLUID MANAGEMENT KIT - IR

## (undated) DEVICE — INTENDED FOR TISSUE SEPARATION, AND OTHER PROCEDURES THAT REQUIRE A SHARP SURGICAL BLADE TO PUNCTURE OR CUT.: Brand: BARD-PARKER ® CARBON RIB-BACK BLADES

## (undated) DEVICE — COVER PROBE INTRAOPERATIVE 6 X 96 IN

## (undated) DEVICE — MICROPUNCTURE 401

## (undated) DEVICE — DRESSING MEPILEX AG BORDER 4 X 4 IN

## (undated) DEVICE — TUBING INJECTOR HIGH PRESSURE 91051482

## (undated) DEVICE — CATH DIAG 5FR .035 65CM 6S OMMI-FLUSH

## (undated) DEVICE — CATH BAL CHARGER 6 X 20MM X 75CM

## (undated) DEVICE — STERILE ICS CARDIOVASCULAR PK: Brand: CARDINAL HEALTH

## (undated) DEVICE — PRESTO™ INFLATION DEVICE: Brand: PRESTO

## (undated) DEVICE — INFLATION DEVICE BASIX 30ATM

## (undated) DEVICE — 3M™ IOBAN™ 2 ANTIMICROBIAL INCISE DRAPE 6650EZ: Brand: IOBAN™ 2

## (undated) DEVICE — GUIDEWIRE AMPLATZ .035 180CM 6CM ST SS

## (undated) DEVICE — SUT SILK 2-0 18 IN A185H

## (undated) DEVICE — MICROPUNCTURE INTRODUCER SET SILHOUETTE TRANSITIONLESS PUSH-PLUS DESIGN - STIFFENED CANNULA WITH NITINOL WIRE GUIDE: Brand: MICROPUNCTURE

## (undated) DEVICE — DRESSING MEPILEX AG BORDER 4 X 8 IN

## (undated) DEVICE — SNAP KOVER: Brand: UNBRANDED

## (undated) DEVICE — DRAPE SURGIKIT SADDLE BAG

## (undated) DEVICE — FOGARTY ARTERIAL EMBOLECTOMY CATHETER 4F 80CM: Brand: FOGARTY

## (undated) DEVICE — GAUZE SPONGES,16 PLY: Brand: CURITY

## (undated) DEVICE — INTRO SHEATH 6FR .038 30CM CHECK-FLO

## (undated) DEVICE — TELFA NON-ADHERENT ABSORBENT DRESSING: Brand: TELFA

## (undated) DEVICE — INTENDED FOR TISSUE SEPARATION, AND OTHER PROCEDURES THAT REQUIRE A SHARP SURGICAL BLADE TO PUNCTURE OR CUT.: Brand: BARD-PARKER SAFETY BLADES SIZE 15, STERILE

## (undated) DEVICE — VESSEL LOOPS X-RAY DETECTABLE: Brand: DEROYAL

## (undated) DEVICE — SUT MONOCRYL 4-0 PS-2 18 IN Y496G

## (undated) DEVICE — SUT VICRYL 2-0 CT-1 27 IN J259H

## (undated) DEVICE — NON-DEHP HIGH FLOW RATE EXTENSION SET, MALE LUER LOCK ADAPTER

## (undated) DEVICE — DESTINATION PERIPHERAL GUIDING SHEATH: Brand: DESTINATION